# Patient Record
Sex: MALE | Race: WHITE | NOT HISPANIC OR LATINO | Employment: OTHER | URBAN - METROPOLITAN AREA
[De-identification: names, ages, dates, MRNs, and addresses within clinical notes are randomized per-mention and may not be internally consistent; named-entity substitution may affect disease eponyms.]

---

## 2017-01-06 ENCOUNTER — INITIAL CONSULT (OUTPATIENT)
Dept: OPHTHALMOLOGY | Facility: CLINIC | Age: 77
End: 2017-01-06
Payer: MEDICARE

## 2017-01-06 DIAGNOSIS — G70.00 OCULAR MYASTHENIA GRAVIS: ICD-10-CM

## 2017-01-06 DIAGNOSIS — H35.30 AMD (AGE-RELATED MACULAR DEGENERATION), BILATERAL: ICD-10-CM

## 2017-01-06 DIAGNOSIS — H52.7 REFRACTIVE ERROR: ICD-10-CM

## 2017-01-06 DIAGNOSIS — I10 ESSENTIAL HYPERTENSION: ICD-10-CM

## 2017-01-06 DIAGNOSIS — H25.13 NUCLEAR SCLEROSIS, BILATERAL: Primary | ICD-10-CM

## 2017-01-06 PROCEDURE — 99999 PR PBB SHADOW E&M-EST. PATIENT-LVL II: CPT | Mod: PBBFAC,,, | Performed by: OPHTHALMOLOGY

## 2017-01-06 PROCEDURE — 92004 COMPRE OPH EXAM NEW PT 1/>: CPT | Mod: S$GLB,,, | Performed by: OPHTHALMOLOGY

## 2017-01-06 RX ORDER — ALFUZOSIN HYDROCHLORIDE 10 MG/1
10 TABLET, EXTENDED RELEASE ORAL
COMMUNITY
End: 2018-05-16

## 2017-01-06 RX ORDER — AMLODIPINE BESYLATE 10 MG/1
5 TABLET ORAL DAILY
COMMUNITY
End: 2017-11-03

## 2017-01-06 RX ORDER — PYRIDOSTIGMINE BROMIDE 60 MG/1
60 TABLET ORAL 3 TIMES DAILY
COMMUNITY
End: 2022-03-30

## 2017-01-06 NOTE — PROGRESS NOTES
HPI     Pt states last eye exam was X 1 year ago. Pt was d/x with ocular Melgina   and was being treated form previous. MD. Pt states he occas has double   vision. Pt states that she has a 1 D prism down in glasses. Pt states that   he has Dry AMD in OD only. Pt has occas floaters. Sunlight is a bother in   OD only. RLL occas get sore. Pt denies any eye sx and procedures.     Eyemeds  AT's OU PRN          Last edited by Gonzalo Hall on 1/6/2017  2:13 PM.         Assessment /Plan     For exam results, see Encounter Report.    There are no diagnoses linked to this encounter.

## 2017-01-06 NOTE — MR AVS SNAPSHOT
[facility]  [address]  [place] [address]  Phone: [phone]  Fax: [phone]                  Robe Cevallos   2017 2:30 PM   Initial consult    Description:  Male : 1940   Provider:  Ad Jolley MD   Department:  [facility]           Reason for Visit     Eye Exam           Diagnoses this Visit        Comments    Nuclear sclerosis, bilateral    -  Primary     Ocular myasthenia gravis         AMD (age-related macular degeneration), bilateral         Essential hypertension         Refractive error                To Do List           Goals (5 Years of Data)     None      Follow-Up and Disposition     Return if symptoms worsen or fail to improve, for CE OU..      [facility]     [facility] On Call Nurse Care Line -  Assistance  Registered nurses in the [facility] On Call Center provide clinical advisement, health education, appointment booking, and other advisory services.  Call for this free service at [phone].             Medications           Message regarding Medications     Verify the changes and/or additions to your medication regime listed below are the same as discussed with your clinician today.  If any of these changes or additions are incorrect, please notify your healthcare provider.             Verify that the below list of medications is an accurate representation of the medications you are currently taking.  If none reported, the list may be blank. If incorrect, please contact your healthcare provider. Carry this list with you in case of emergency.           Current Medications     alfuzosin (UROXATRAL) 10 mg Tb24 Take 10 mg by mouth daily with breakfast.    amlodipine (NORVASC) 10 MG tablet Take 10 mg by mouth once daily.    pyridostigmine (MESTINON) 60 mg Tab Take 60 mg by mouth.           Clinical Reference Information           Allergies as of 2017     No Known Allergies      Immunizations Administered on Date of  Encounter - 1/6/2017     None      [facility] Sign-Up     Activating your [facility] account is as easy as 1-2-3!     1) Visit [email or web address], select Sign Up Now, enter this activation code and your date of birth, then select Next.  [email or web address]  Expires: 2/20/2017  5:52 PM      2) Create a username and password to use when you visit [facility] in the future and select a security question in case you lose your password and select Next.    3) Enter your e-mail address and click Sign Up!    Additional Information  If you have questions, please e-mail [email or web address] or call [phone] to talk to our [facility] staff. Remember, [facility] is NOT to be used for urgent needs. For medical emergencies, dial 911.

## 2017-01-06 NOTE — PROGRESS NOTES
Subjective:       Patient ID: Robe Cevallos is a 76 y.o. male.    Chief Complaint: Eye Exam    HPI  Review of Systems    Objective:      Physical Exam    Assessment:       1. Nuclear sclerosis, bilateral    2. Ocular myasthenia gravis    3. AMD (age-related macular degeneration), bilateral    4. Essential hypertension    5. Refractive error        Plan:       Visually significant cataract OU -Pt. Wants Sx.     Ocular MG-Stable with prism in Rx & Mestinon.  Dry AMD OU-Mild, stable.  HTN-No retinopathy OU.  RE        Pt to call to schedule pre-op for CE in June or sooner.  AREDS/AG.  Control HTN.

## 2017-01-09 ENCOUNTER — TELEPHONE (OUTPATIENT)
Dept: OPHTHALMOLOGY | Facility: CLINIC | Age: 77
End: 2017-01-09

## 2017-01-09 NOTE — TELEPHONE ENCOUNTER
----- Message from Martínez Heath sent at 1/9/2017  1:09 PM CST -----  Contact: Robe Patrick Mart would like to schedule his testing he needs to before surgery with Dr. Jolley. He can be reached at [phone] or [phone]

## 2017-01-27 ENCOUNTER — OFFICE VISIT (OUTPATIENT)
Dept: OPHTHALMOLOGY | Facility: CLINIC | Age: 77
End: 2017-01-27
Payer: MEDICARE

## 2017-01-27 DIAGNOSIS — H25.13 NUCLEAR SCLEROSIS, BILATERAL: Primary | ICD-10-CM

## 2017-01-27 PROCEDURE — 99499 UNLISTED E&M SERVICE: CPT | Mod: S$GLB,,, | Performed by: OPHTHALMOLOGY

## 2017-01-27 PROCEDURE — 92136 OPHTHALMIC BIOMETRY: CPT | Mod: 26,RT,S$GLB, | Performed by: OPHTHALMOLOGY

## 2017-01-27 PROCEDURE — 99999 PR PBB SHADOW E&M-EST. PATIENT-LVL I: CPT | Mod: PBBFAC,,, | Performed by: OPHTHALMOLOGY

## 2017-01-27 RX ORDER — OFLOXACIN 3 MG/ML
1 SOLUTION/ DROPS OPHTHALMIC 4 TIMES DAILY
Qty: 5 ML | Refills: 1 | Status: SHIPPED | OUTPATIENT
Start: 2017-03-27 | End: 2017-04-06

## 2017-01-27 RX ORDER — DIFLUPREDNATE OPHTHALMIC 0.5 MG/ML
1 EMULSION OPHTHALMIC 4 TIMES DAILY
Qty: 5 ML | Refills: 1 | Status: SHIPPED | OUTPATIENT
Start: 2017-03-30 | End: 2017-04-29

## 2017-01-27 RX ORDER — NEPAFENAC 3 MG/ML
1 SUSPENSION/ DROPS OPHTHALMIC DAILY
Qty: 3 ML | Refills: 1 | Status: SHIPPED | OUTPATIENT
Start: 2017-03-27 | End: 2017-04-26

## 2017-01-27 NOTE — PROGRESS NOTES
Subjective:       Patient ID: Robe Cevallso is a 76 y.o. male.    Chief Complaint: Pre-op Exam    HPI  Review of Systems    Objective:      Physical Exam    Assessment:       1. Nuclear sclerosis, bilateral        Plan:       Visually significant cataract OU -Pt. Wants Sx.           CE OD 3/30/17 SN60WF 21.0,        OS 4/20/17 SN60WF 20.0.

## 2017-01-27 NOTE — MR AVS SNAPSHOT
[facility]  [address] [facility]  [place] [address]  Phone: [phone]  Fax: [phone]                  Robe Cevallos   2017 11:00 AM   Office Visit    Description:  Male : 1940   Provider:  Ad Jolley MD   Department:  [facility]           Reason for Visit     Pre-op Exam           Diagnoses this Visit        Comments    Nuclear sclerosis, bilateral    -  Primary            To Do List           Future Appointments        Provider Department Dept Phone    3/31/2017 8:15 AM Ad Jolley MD [facility] [phone]    2017 8:15 AM Ad Jolley MD [facility] [phone]    2017 8:00 AM Ad Jolley MD [facility] [phone]    2017 8:00 AM Ad Jolley MD [facility] [phone]    2017 8:00 AM Ad Jolley MD [facility] [phone]      Goals (5 Years of Data)     None      Follow-Up and Disposition     Return in about 2 months (around 3/30/2017) for CE OD..       These Medications        Disp Refills Start End    ILEVRO 0.3 % DrpS 3 mL 1 3/27/2017 2017    Place 1 drop into both eyes once daily. For 30 days - Both Eyes    Pharmacy: [facility] [address] [place] [address] - [address] AT [email or web address]  #: [phone]       ofloxacin (OCUFLOX) 0.3 % ophthalmic solution 5 mL 1 3/27/2017 2017    Place 1 drop into the right eye 4 (four) times daily. - Right Eye    Pharmacy: [id number] - [place] - [address] AT [email or web address] Ph #: [phone]       difluprednate (DUREZOL) 0.05 % Drop ophthalmic solution 5 mL 1 3/30/2017 2017    Place 1 drop into the right eye 4 (four) times daily. For 30 days - Right Eye    Pharmacy: [id number] - [place] - [address] AT SEC of [address] Ph #: [phone]         [facility]     [facility] Nurse Care [facility] - 24/7 Assistance  Registered nurses in the [facility] Center provide clinical advisement, health education, appointment booking, and other advisory services.  Call for this free service at [phone].             Medications           Message regarding Medications     Verify the changes and/or additions to your medication regime listed below are the same as discussed with your clinician today.  If any of these changes or additions are incorrect, please notify your healthcare provider.        START taking these NEW medications        Refills    ILEVRO 0.3 % DrpS 1    Starting on: 3/27/2017    Sig: Place 1 drop into both eyes once daily. For 30 days    Class: Normal    Route: Both Eyes    ofloxacin (OCUFLOX) 0.3 % ophthalmic solution 1    Starting on: 3/27/2017    Sig: Place 1 drop into the right eye 4 (four) times daily.    Class: Normal    Route: Right Eye    difluprednate (DUREZOL) 0.05 % Drop ophthalmic solution 1    Starting on: 3/30/2017    Sig: Place 1 drop into the right eye 4 (four) times daily. For 30 days    Class: Normal    Route: Right Eye           Verify that the below list of medications is an accurate representation of the medications you are currently taking.  If none reported, the list may be blank. If incorrect, please contact your healthcare provider. Carry this list with you in case of emergency.           Current Medications     alfuzosin (UROXATRAL) 10 mg Tb24 Take 10 mg by mouth daily with breakfast.    amlodipine (NORVASC) 10 MG tablet Take 10 mg by mouth once daily.    difluprednate (DUREZOL) 0.05 % Drop ophthalmic solution Starting on Mar 30, 2017. Place 1 drop into the right eye 4 (four) times daily. For 30 days    ILEVRO 0.3 % DrpS Starting on Mar 27, 2017. Place 1 drop into both eyes once daily. For 30 days    ofloxacin (OCUFLOX) 0.3 % ophthalmic solution Starting on Mar 27, 2017. Place 1 drop into the right eye 4 (four) times daily.    pyridostigmine  (MESTINON) 60 mg Tab Take 60 mg by mouth.           Clinical Reference Information           Allergies as of 1/27/2017     No Known Allergies      Immunizations Administered on Date of Encounter - 1/27/2017     None      Orders Placed During Today's Visit      Normal Orders This Visit    Biometry       [facility] Sign-Up     Activating your [facility] account is as easy as 1-2-3!     1) Visit [email or web address], select Sign Up Now, enter this activation code and your date of birth, then select Next.  [email or web address]  Expires: 2/20/2017  5:52 PM      2) Create a username and password to use when you visit [facility] in the future and select a security question in case you lose your password and select Next.    3) Enter your e-mail address and click Sign Up!    Additional Information  If you have questions, please e-mail [email or web address] or call [phone] to talk to our [facility] staff. Remember, [facility] is NOT to be used for urgent needs. For medical emergencies, dial 911.

## 2017-02-07 ENCOUNTER — TELEPHONE (OUTPATIENT)
Dept: OPHTHALMOLOGY | Facility: CLINIC | Age: 77
End: 2017-02-07

## 2017-02-07 DIAGNOSIS — H25.11 NS (NUCLEAR SCLEROSIS), RIGHT: Primary | ICD-10-CM

## 2017-03-23 NOTE — PRE ADMISSION SCREENING
RN phone pre op done.  Instructed to remain NPO after midnight prior to surgery, except to take Amlodipine and Mestinon as directed.  Expressed understanding of instructions.  Arrival time 06:30 am.

## 2017-03-25 NOTE — H&P
[facility]  History & Physical    Subjective:      Chief Complaint/Reason for Admission:     Robe Cevallos is a 77 y.o. male.    Past Medical History:   Diagnosis Date    Cancer 2008    Lymphoma    Hypertension      Past Surgical History:   Procedure Laterality Date    CHOLECYSTECTOMY      KNEE ARTHROSCOPY      Lt knee    TONSILLECTOMY       History reviewed. No pertinent family history.  Social History   Substance Use Topics    Smoking status: Never Smoker    Smokeless tobacco: Never Used    Alcohol use No       No prescriptions prior to admission.     Review of patient's allergies indicates:  No Known Allergies     Review of Systems   Eyes: Positive for blurred vision.   All other systems reviewed and are negative.      Objective:      Vital Signs (Most Recent)       Vital Signs Range (Last 24H):       Physical Exam   Constitutional: He is oriented to person, place, and time. He appears well-developed and well-nourished.   HENT:   Head: Normocephalic.   Eyes: Conjunctivae and EOM are normal. Pupils are equal, round, and reactive to light.   Neck: Normal range of motion. Neck supple.   Cardiovascular: Normal rate.    Pulmonary/Chest: Effort normal and breath sounds normal.   Abdominal: Soft. Bowel sounds are normal.   Musculoskeletal: Normal range of motion.   Neurological: He is alert and oriented to person, place, and time.   Skin: Skin is warm.   Psychiatric: He has a normal mood and affect.       Data Review:   ECG:     Assessment:      Cataract OD.    Plan:    CE OD.

## 2017-03-28 ENCOUNTER — TELEPHONE (OUTPATIENT)
Dept: OPTOMETRY | Facility: CLINIC | Age: 77
End: 2017-03-28

## 2017-03-28 DIAGNOSIS — H25.12 NUCLEAR SCLEROSIS, LEFT: Primary | ICD-10-CM

## 2017-03-29 ENCOUNTER — ANESTHESIA EVENT (OUTPATIENT)
Dept: SURGERY | Facility: HOSPITAL | Age: 77
End: 2017-03-29
Payer: MEDICARE

## 2017-03-30 ENCOUNTER — ANESTHESIA (OUTPATIENT)
Dept: SURGERY | Facility: HOSPITAL | Age: 77
End: 2017-03-30
Payer: MEDICARE

## 2017-03-30 ENCOUNTER — SURGERY (OUTPATIENT)
Age: 77
End: 2017-03-30

## 2017-03-30 ENCOUNTER — HOSPITAL ENCOUNTER (OUTPATIENT)
Facility: HOSPITAL | Age: 77
Discharge: HOME | End: 2017-03-30
Attending: OPHTHALMOLOGY | Admitting: OPHTHALMOLOGY
Payer: MEDICARE

## 2017-03-30 VITALS
HEART RATE: 69 BPM | HEIGHT: 71 IN | BODY MASS INDEX: 27.3 KG/M2 | SYSTOLIC BLOOD PRESSURE: 131 MMHG | RESPIRATION RATE: 22 BRPM | WEIGHT: 195 LBS | DIASTOLIC BLOOD PRESSURE: 82 MMHG | OXYGEN SATURATION: 95 % | TEMPERATURE: 98 F

## 2017-03-30 DIAGNOSIS — H25.10 SENILE NUCLEAR SCLEROSIS: ICD-10-CM

## 2017-03-30 PROCEDURE — 36000706: Performed by: OPHTHALMOLOGY

## 2017-03-30 PROCEDURE — 71000015 HC POSTOP RECOV 1ST HR: Performed by: OPHTHALMOLOGY

## 2017-03-30 PROCEDURE — 63600175 PHARM REV CODE 636 W HCPCS: Performed by: NURSE ANESTHETIST, CERTIFIED REGISTERED

## 2017-03-30 PROCEDURE — C9447 INJ, PHENYLEPHRINE KETOROLAC: HCPCS | Performed by: OPHTHALMOLOGY

## 2017-03-30 PROCEDURE — V2632 POST CHMBR INTRAOCULAR LENS: HCPCS | Performed by: OPHTHALMOLOGY

## 2017-03-30 PROCEDURE — D9220A PRA ANESTHESIA: Mod: CRNA,,, | Performed by: NURSE ANESTHETIST, CERTIFIED REGISTERED

## 2017-03-30 PROCEDURE — 25000003 PHARM REV CODE 250: Performed by: OPHTHALMOLOGY

## 2017-03-30 PROCEDURE — 63600175 PHARM REV CODE 636 W HCPCS: Performed by: OPHTHALMOLOGY

## 2017-03-30 PROCEDURE — 37000009 HC ANESTHESIA EA ADD 15 MINS: Performed by: OPHTHALMOLOGY

## 2017-03-30 PROCEDURE — 66984 XCAPSL CTRC RMVL W/O ECP: CPT | Mod: RT,,, | Performed by: OPHTHALMOLOGY

## 2017-03-30 PROCEDURE — 37000008 HC ANESTHESIA 1ST 15 MINUTES: Performed by: OPHTHALMOLOGY

## 2017-03-30 PROCEDURE — 36000707: Performed by: OPHTHALMOLOGY

## 2017-03-30 PROCEDURE — 25000003 PHARM REV CODE 250: Performed by: ANESTHESIOLOGY

## 2017-03-30 PROCEDURE — D9220A PRA ANESTHESIA: Mod: ANES,,, | Performed by: ANESTHESIOLOGY

## 2017-03-30 DEVICE — LENS 21.0 ACRYSOF: Type: IMPLANTABLE DEVICE | Site: EYE | Status: FUNCTIONAL

## 2017-03-30 RX ORDER — HYDROCODONE BITARTRATE AND ACETAMINOPHEN 5; 325 MG/1; MG/1
1 TABLET ORAL EVERY 4 HOURS PRN
Status: DISCONTINUED | OUTPATIENT
Start: 2017-03-30 | End: 2017-03-30 | Stop reason: HOSPADM

## 2017-03-30 RX ORDER — LIDOCAINE HYDROCHLORIDE 10 MG/ML
1 INJECTION, SOLUTION EPIDURAL; INFILTRATION; INTRACAUDAL; PERINEURAL ONCE AS NEEDED
Status: DISCONTINUED | OUTPATIENT
Start: 2017-03-30 | End: 2017-03-30 | Stop reason: HOSPADM

## 2017-03-30 RX ORDER — CYCLOPENTOLATE HYDROCHLORIDE 10 MG/ML
1 SOLUTION/ DROPS OPHTHALMIC
Status: COMPLETED | OUTPATIENT
Start: 2017-03-30 | End: 2017-03-30

## 2017-03-30 RX ORDER — LIDOCAINE HYDROCHLORIDE 40 MG/ML
INJECTION, SOLUTION RETROBULBAR
Status: DISCONTINUED | OUTPATIENT
Start: 2017-03-30 | End: 2017-03-30 | Stop reason: HOSPADM

## 2017-03-30 RX ORDER — PROPARACAINE HYDROCHLORIDE 5 MG/ML
1 SOLUTION/ DROPS OPHTHALMIC
Status: DISCONTINUED | OUTPATIENT
Start: 2017-03-30 | End: 2017-03-30 | Stop reason: HOSPADM

## 2017-03-30 RX ORDER — PHENYLEPHRINE HYDROCHLORIDE 25 MG/ML
1 SOLUTION/ DROPS OPHTHALMIC
Status: DISCONTINUED | OUTPATIENT
Start: 2017-03-30 | End: 2017-03-30 | Stop reason: HOSPADM

## 2017-03-30 RX ORDER — MIDAZOLAM HYDROCHLORIDE 1 MG/ML
INJECTION, SOLUTION INTRAMUSCULAR; INTRAVENOUS
Status: DISCONTINUED | OUTPATIENT
Start: 2017-03-30 | End: 2017-03-30

## 2017-03-30 RX ORDER — OFLOXACIN 3 MG/ML
1 SOLUTION/ DROPS OPHTHALMIC
Status: COMPLETED | OUTPATIENT
Start: 2017-03-30 | End: 2017-03-30

## 2017-03-30 RX ORDER — PREDNISOLONE ACETATE 10 MG/ML
SUSPENSION/ DROPS OPHTHALMIC
Status: DISCONTINUED | OUTPATIENT
Start: 2017-03-30 | End: 2017-03-30 | Stop reason: HOSPADM

## 2017-03-30 RX ORDER — POVIDONE-IODINE 5 %
SOLUTION, NON-ORAL OPHTHALMIC (EYE)
Status: DISCONTINUED | OUTPATIENT
Start: 2017-03-30 | End: 2017-03-30 | Stop reason: HOSPADM

## 2017-03-30 RX ORDER — ACETAMINOPHEN 325 MG/1
650 TABLET ORAL EVERY 4 HOURS PRN
Status: DISCONTINUED | OUTPATIENT
Start: 2017-03-30 | End: 2017-03-30 | Stop reason: HOSPADM

## 2017-03-30 RX ORDER — TROPICAMIDE 10 MG/ML
1 SOLUTION/ DROPS OPHTHALMIC
Status: DISCONTINUED | OUTPATIENT
Start: 2017-03-30 | End: 2017-03-30 | Stop reason: HOSPADM

## 2017-03-30 RX ORDER — SODIUM CHLORIDE, SODIUM LACTATE, POTASSIUM CHLORIDE, CALCIUM CHLORIDE 600; 310; 30; 20 MG/100ML; MG/100ML; MG/100ML; MG/100ML
INJECTION, SOLUTION INTRAVENOUS CONTINUOUS
Status: DISCONTINUED | OUTPATIENT
Start: 2017-03-30 | End: 2017-03-30 | Stop reason: HOSPADM

## 2017-03-30 RX ADMIN — MIDAZOLAM HYDROCHLORIDE 1 MG: 1 INJECTION, SOLUTION INTRAMUSCULAR; INTRAVENOUS at 09:03

## 2017-03-30 RX ADMIN — CYCLOPENTOLATE HYDROCHLORIDE 1 DROP: 10 SOLUTION/ DROPS OPHTHALMIC at 06:03

## 2017-03-30 RX ADMIN — TROPICAMIDE 1 DROP: 10 SOLUTION/ DROPS OPHTHALMIC at 06:03

## 2017-03-30 RX ADMIN — POVIDONE-IODINE 30 ML: 5 SOLUTION OPHTHALMIC at 08:03

## 2017-03-30 RX ADMIN — PHENYLEPHRINE AND KETOROLAC 4 ML: 10.16; 2.88 INJECTION, SOLUTION, CONCENTRATE INTRAOCULAR at 08:03

## 2017-03-30 RX ADMIN — ACETAMINOPHEN 650 MG: 325 TABLET ORAL at 09:03

## 2017-03-30 RX ADMIN — OFLOXACIN 1 DROP: 3 SOLUTION OPHTHALMIC at 06:03

## 2017-03-30 RX ADMIN — PHENYLEPHRINE HYDROCHLORIDE 1 DROP: 25 SOLUTION/ DROPS OPHTHALMIC at 06:03

## 2017-03-30 RX ADMIN — FLUORESCEIN SODIUM 1 STRIP: 1 STRIP OPHTHALMIC at 08:03

## 2017-03-30 RX ADMIN — PROPARACAINE HYDROCHLORIDE 1 DROP: 5 SOLUTION/ DROPS OPHTHALMIC at 06:03

## 2017-03-30 RX ADMIN — BALANCED SALT SOLUTION ENRICHED WITH BICARBONATE, DEXTROSE, AND GLUTATHIONE 500 ML: KIT at 08:03

## 2017-03-30 RX ADMIN — Medication 15 ML: at 08:03

## 2017-03-30 RX ADMIN — PREDNISOLONE ACETATE 2 DROP: 10 SUSPENSION/ DROPS OPHTHALMIC at 08:03

## 2017-03-30 RX ADMIN — SODIUM CHONDROITIN SULFATE / SODIUM HYALURONATE 2 ML: 0.55-0.5 INJECTION INTRAOCULAR at 08:03

## 2017-03-30 RX ADMIN — MIDAZOLAM HYDROCHLORIDE 1 MG: 1 INJECTION, SOLUTION INTRAMUSCULAR; INTRAVENOUS at 08:03

## 2017-03-30 RX ADMIN — LIDOCAINE HYDROCHLORIDE 3 ML: 40 INJECTION, SOLUTION RETROBULBAR; TOPICAL at 08:03

## 2017-03-30 RX ADMIN — SODIUM CHLORIDE, SODIUM LACTATE, POTASSIUM CHLORIDE, AND CALCIUM CHLORIDE: .6; .31; .03; .02 INJECTION, SOLUTION INTRAVENOUS at 06:03

## 2017-03-30 NOTE — BRIEF OP NOTE
Operative Note     SUMMARY     Surgery Date: 3/30/2017    Surgeon(s) and Role:      Ad Jolley MD - Primary    Pre-op Diagnosis:  Nuclear sclerosis     Post-op/ Diagnosis:  Same    Final Diagnosis: Cataract    Procedure(s) (LRB):  PHACOEMULSIFICATION-ASPIRATION-CATARACT   INSERTION-INTRAOCULAR LENS (IOL)     Anesthesia: Monitored Anesthesia Care    Findings/Key Components:  Cataract    Outcome: Tolerated procedure well    Estimated Blood Loss: None         Specimens     None          Follow-up:  Tomorrow in clinic      Discharge Note      SUMMARY     Admit Date: 3/30/2017    Attending Physician: Ad Jolley MD    Discharge Physician: Ad Jolley MD    Discharge Date: 3/30/2017    Final Diagnosis: Cataract    Outcome: Tolerated procedure well    Disposition: Discharge to Home.    Medications:       Robe Cevallos   Home Medication Instructions FRANCISCO:[id number]    Printed on:03/30/17 0926   Medication Information                      alfuzosin (UROXATRAL) 10 mg Tb24  Take 10 mg by mouth daily with breakfast.             amlodipine (NORVASC) 10 MG tablet  Take 10 mg by mouth once daily.             difluprednate (DUREZOL) 0.05 % Drop ophthalmic solution  Place 1 drop into the right eye 4 (four) times daily. For 30 days             ILEVRO 0.3 % DrpS  Place 1 drop into both eyes once daily. For 30 days             ofloxacin (OCUFLOX) 0.3 % ophthalmic solution  Place 1 drop into the right eye 4 (four) times daily.             pyridostigmine (MESTINON) 60 mg Tab  Take 60 mg by mouth.                   Patient Discharge Instructions:     Keep Summers Shield over operated eye when not using drops.     DIET:  Regular    Activity: No heavy lifting or bending X 1 week.    Follow-up:  Tomorrow in clinic

## 2017-03-30 NOTE — DISCHARGE INSTRUCTIONS
ACTIVITY LEVEL:  If you received sedation or an anesthetic, you may feel sleepy for several hours. Rest until you are more awake. Gradually resume your normal activities. Normal activity will cause no undue risk to your eye. The white part of your eye might be red - this is nothing to worry about. Wear your old glasses or sunglasses that were given to you for protection during the day.    RESTRICTIONS - for the next 7 days  · Do not lift anything over 10 pounds.  · When bending, bend at the knees not the waist.  · Do not rub the eye.  · Do not get water in the eye.  · Do not sleep on the side that had surgery.  · Protect your eye at bedtime with the shield provided.    DIET: At home, continue with liquids. If there is no nausea, you may eat a soft diet and gradually resume your normal diet. Limit alcohol intake for 24 hours.    BATHING: You may shower or bathe as normal. You may take a warm wash cloth, which has been wrung out to remove excess water, and gently remove crusting on the lashes.    MEDICATIONS: You may take Extra Strength Tylenol every 4 hours for pain/headache.    LAST DOSE OF TYLENOL 9:47 am    Use your drops     Today: [place]- 1, 5,  AND 9 pm     [place] - 1, 5, and 9 pm     [place]- 1 pm        Tomorrow:  Resume pink and beige cap drops four times a day.  Resume grey cap drops once a day.    Place one drop in the eye that had surgery from the first bottle. Wait 5 minutes and then use the second bottle. (It does not matter which bottle is used first.) CONTINUE all glaucoma drops.   No driving, alcoholic beverages or signing legal documents for next 24 hours or while taking pain medication      WHEN TO CALL THE DOCTOR:  · Redness that increases significantly  · Pain not relieved by Tylenol  RETURN APPOINTMENT:  You will need to see Dr. Jolley on Friday at the [facility] at__8:15 am__. Bring your eye kit with you on your follow-up visit. Your kit contains sunglasses, eye shield, tape and your eye  drops.  FOR EMERGENCIES:  If any unusual problems or difficulties occur, contact Dr. Jolley at the Clinic office at [phone] during normal business hours. If after hours, call [phone].  Fall Prevention  Millions of people fall every year and injure themselves. You may have had anesthesia or sedation which may increase your risk of falling. You may have health issues that put you at an increased risk of falling.     Here are ways to reduce your risk of falling.  ·   · Make your home safe by keeping walkways clear of objects you may trip over.  · Use non-slip pads under rugs. Do not use area rugs or small throw rugs.  · Use non-slip mats in bathtubs and showers.  · Install handrails and lights on staircases.  · Do not walk in poorly lit areas.  · Do not stand on chairs or wobbly ladders.  · Use caution when reaching overhead or looking upward. This position can cause a loss of balance.  · Be sure your shoes fit properly, have non-slip bottoms and are in good condition.   · Wear shoes both inside and out. Avoid going barefoot or wearing slippers.  · Be cautious when going up and down stairs, curbs, and when walking on uneven sidewalks.  · If your balance is poor, consider using a cane or walker.  · If your fall was related to alcohol use, stop or limit alcohol intake.   · If your fall was related to use of sleeping medicines, talk to your doctor about this. You may need to reduce your dosage at bedtime if you awaken during the night to go to the bathroom.    · To reduce the need for nighttime bathroom trips:  ¨ Avoid drinking fluids for several hours before going to bed  ¨ Empty your bladder before going to bed  ¨ Men can keep a urinal at the bedside  · Stay as active as you can. Balance, flexibility, strength, and endurance all come from exercise. They all play a role in preventing falls. Ask your healthcare provider which types of activity are right for you.  · Get your vision checked on a regular  basis.  · If you have pets, know where they are before you stand up or walk so you don't trip over them.  · Use night lights.

## 2017-03-30 NOTE — ANESTHESIA POSTPROCEDURE EVALUATION
"Anesthesia Post Evaluation    Patient: Robe Cevallos    Procedure(s) Performed: Procedure(s) (LRB):  PHACOEMULSIFICATION-ASPIRATION-CATARACT (Right)  INSERTION-INTRAOCULAR LENS (IOL) (Right)    Final Anesthesia Type: MAC  Patient location during evaluation: [facility]  Patient participation: Yes- Able to Participate  Level of consciousness: awake and alert and oriented  Post-procedure vital signs: reviewed and stable  Pain management: adequate  Airway patency: patent  PONV status at discharge: No PONV  Anesthetic complications: no      Cardiovascular status: blood pressure returned to baseline and hemodynamically stable  Respiratory status: unassisted, spontaneous ventilation and room air  Hydration status: euvolemic  Follow-up not needed.        Visit Vitals    BP (!) 140/91 (BP Location: Right arm, Patient Position: Lying, BP Method: Automatic)    Pulse 72    Temp 37 °C (98.6 °F) (Oral)    Resp 17    Ht 5' 11" (1.803 m)    Wt 88.5 kg (195 lb)    SpO2 97%    BMI 27.2 kg/m2       Pain/Joel Score: Pain Assessment Performed: Yes (3/30/2017  6:53 AM)  Presence of Pain: denies (3/30/2017  6:53 AM)  Joel Score: 10 (3/30/2017  6:53 AM)      "

## 2017-03-30 NOTE — IP AVS SNAPSHOT
[place] [address] [place] [address]  Phone: [phone]           Patient Discharge Instructions   Our goal is to set you up for success. This packet includes information on your condition, medications, and your home care.  It will help you care for yourself to prevent having to return to the hospital.     Please ask your nurse if you have any questions.      There are many details to remember when preparing to leave the hospital. Here is what you will need to do:    1. Take your medicine. If you are prescribed medications, review your Medication List on the following pages. You may have new medications to  at the pharmacy and others that you'll need to stop taking. Review the instructions for how and when to take your medications. Talk with your doctor or nurses if you are unsure of what to do.     2. Go to your follow-up appointments. Specific follow-up information is listed in the following pages. Your may be contacted by a nurse or clinical provider about future appointments. Be sure we have all of the phone numbers to reach you. Please contact your provider's office if you are unable to make an appointment.     3. Watch for warning signs. Your doctor or nurse will give you detailed warning signs to watch for and when to call for assistance. These instructions may also include educational information about your condition. If you experience any of warning signs to your health, call your doctor.           [facility]  Unless otherwise directed by your provider, please   contact [facility], our nurse care line   that is available for 24/7 assistance.     [phone] (toll-free)     Registered nurses in the [facility]   provide: appointment scheduling, clinical advisement, health education, and other advisory services.                  ** Verify the list of medication(s) below is accurate and up to date. Carry this with you in case of emergency.  If your medications have changed, please notify your healthcare provider.             Medication List      CONTINUE taking these medications        Additional Info                      difluprednate 0.05 % Drop ophthalmic solution   Commonly known as:  DUREZOL   Quantity:  5 mL   Refills:  1   Dose:  1 drop    Instructions:  Place 1 drop into the right eye 4 (four) times daily. For 30 days     Begin Date    AM    Noon    PM    Bedtime       ILEVRO 0.3 % Drps   Quantity:  3 mL   Refills:  1   Dose:  1 drop   Generic drug:  nepafenac    Instructions:  Place 1 drop into both eyes once daily. For 30 days     Begin Date    AM    Noon    PM    Bedtime       NORVASC 10 MG tablet   Refills:  0   Dose:  10 mg   Generic drug:  amlodipine    Instructions:  Take 10 mg by mouth once daily.     Begin Date    AM    Noon    PM    Bedtime       ofloxacin 0.3 % ophthalmic solution   Commonly known as:  OCUFLOX   Quantity:  5 mL   Refills:  1   Dose:  1 drop    Last time this was given:  1 drop on 3/30/2017  6:56 AM   Instructions:  Place 1 drop into the right eye 4 (four) times daily.     Begin Date    AM    Noon    PM    Bedtime       pyridostigmine 60 mg Tab   Commonly known as:  MESTINON   Refills:  0   Dose:  60 mg    Instructions:  Take 60 mg by mouth.     Begin Date    AM    Noon    PM    Bedtime       UROXATRAL 10 mg Tb24   Refills:  0   Dose:  10 mg   Generic drug:  alfuzosin    Instructions:  Take 10 mg by mouth daily with breakfast.     Begin Date    AM    Noon    PM    Bedtime                  Please bring to all follow up appointments:    1. A copy of your discharge instructions.  2. All medicines you are currently taking in their original bottles.  3. Identification and insurance card.    Please arrive 15 minutes ahead of scheduled appointment time.    Please call 24 hours in advance if you must reschedule your appointment and/or time.        Your Scheduled Appointments     Mar 31, 2017  8:15 AM CDT   Post OP with  Ad Jolley MD   [facility] ([facility])    [address]  [place] [address] [phone]            Apr 07, 2017  8:15 AM CDT   Post OP with Ad Jolley MD   [facility] ([facility])    [address]  [place] [id number] [phone]            Apr 21, 2017  8:00 AM CDT   Post OP with Ad Jolley MD   [facility] ([facility])    [address]  [place] [address] [phone]            Apr 28, 2017  8:00 AM CDT   Post OP with Ad Jolley MD   [facility] ([facility])    [address]  [place] [address] [phone]            May 19, 2017  8:00 AM CDT   Post OP with Ad Jolley MD   [facility] ([facility])    [address]  [place] [address] [phone]              Your Future Surgeries/Procedures     Apr 20, 2017   Surgery with Ad Jolley MD   [facility] ([facility])    [place] [address]   [phone]                Discharge Instructions     Future Orders    Other restrictions (specify):     Comments:    No heavy lifting or bending for 1 week.        Discharge Instructions       ACTIVITY LEVEL:  If you received sedation or an anesthetic, you may feel sleepy for several hours. Rest until you are more awake. Gradually resume your normal activities. Normal activity will cause no undue risk to your eye. The white part of your eye might be red - this is nothing to worry about. Wear your old glasses or sunglasses that were given to you for protection during the day.    RESTRICTIONS - for the next 7 days  · Do not lift anything over 10 pounds.  · When bending, bend at the knees not the waist.  · Do not rub the eye.  · Do not get water in the eye.  · Do not sleep on the side that had surgery.  · Protect your eye at bedtime with the shield provided.    DIET: At  home, continue with liquids. If there is no nausea, you may eat a soft diet and gradually resume your normal diet. Limit alcohol intake for 24 hours.    BATHING: You may shower or bathe as normal. You may take a warm wash cloth, which has been wrung out to remove excess water, and gently remove crusting on the lashes.    MEDICATIONS: You may take Extra Strength Tylenol every 4 hours for pain/headache.    LAST DOSE OF TYLENOL 9:47 am    Use your drops     Today: Hough- 1, 5,  AND 9 pm     Beige - 1, 5, and 9 pm     Perla- 1 pm        Tomorrow:  Resume pink and beige cap drops four times a day.  Resume grey cap drops once a day.    Place one drop in the eye that had surgery from the first bottle. Wait 5 minutes and then use the second bottle. (It does not matter which bottle is used first.) CONTINUE all glaucoma drops.   No driving, alcoholic beverages or signing legal documents for next 24 hours or while taking pain medication      WHEN TO CALL THE DOCTOR:  · Redness that increases significantly  · Pain not relieved by Tylenol  RETURN APPOINTMENT:  You will need to see Dr. Jolley on Friday at the [facility] clinic at__8:15 am__. Bring your eye kit with you on your follow-up visit. Your kit contains sunglasses, eye shield, tape and your eye drops.  FOR EMERGENCIES:  If any unusual problems or difficulties occur, contact Dr. Jolley at the Clinic office at [phone] during normal business hours. If after hours, call [phone].  Fall Prevention  Millions of people fall every year and injure themselves. You may have had anesthesia or sedation which may increase your risk of falling. You may have health issues that put you at an increased risk of falling.     Here are ways to reduce your risk of falling.  ·   · Make your home safe by keeping walkways clear of objects you may trip over.  · Use non-slip pads under rugs. Do not use area rugs or small throw rugs.  · Use non-slip mats in bathtubs and  showers.  · Install handrails and lights on staircases.  · Do not walk in poorly lit areas.  · Do not stand on chairs or wobbly ladders.  · Use caution when reaching overhead or looking upward. This position can cause a loss of balance.  · Be sure your shoes fit properly, have non-slip bottoms and are in good condition.   · Wear shoes both inside and out. Avoid going barefoot or wearing slippers.  · Be cautious when going up and down stairs, curbs, and when walking on uneven sidewalks.  · If your balance is poor, consider using a cane or walker.  · If your fall was related to alcohol use, stop or limit alcohol intake.   · If your fall was related to use of sleeping medicines, talk to your doctor about this. You may need to reduce your dosage at bedtime if you awaken during the night to go to the bathroom.    · To reduce the need for nighttime bathroom trips:  ¨ Avoid drinking fluids for several hours before going to bed  ¨ Empty your bladder before going to bed  ¨ Men can keep a urinal at the bedside  · Stay as active as you can. Balance, flexibility, strength, and endurance all come from exercise. They all play a role in preventing falls. Ask your healthcare provider which types of activity are right for you.  · Get your vision checked on a regular basis.  · If you have pets, know where they are before you stand up or walk so you don't trip over them.  · Use night lights.        Primary Diagnosis     Your primary diagnosis was:  Cataract      Admission Information     Date & Time Provider Department [facility]    3/30/2017  6:10 AM Ad Jolley MD [facility] [id number]      Care Providers     Provider Role Specialty Primary office phone    Ad Jolley MD Attending Provider Ophthalmology [phone]    Ad Jolley MD Surgeon  Ophthalmology [phone]      Your Vitals Were     BP Pulse Temp Resp Height Weight    140/91 (BP Location: Right arm, Patient Position: Lying, BP  "Method: Automatic) 72 98.6 °F (37 °C) (Oral) 17 5' 11" (1.803 m) 88.5 kg (195 lb)    SpO2 BMI             97% 27.2 kg/m2         Recent Lab Values     No lab values to display.      Allergies as of 3/30/2017     No Known Allergies      Advance Directives     An advance directive is a document which, in the event you are no longer able to make decisions for yourself, tells your healthcare team what kind of treatment you do or do not want to receive, or who you would like to make those decisions for you.  If you do not currently have an advance directive, [facility] encourages you to create one.  For more information call:  [phone] ([phone]), [phone] ([phone]),  or log on to [email or web address].        Language Assistance Services     ATTENTION: Language assistance services are available, free of charge. Please call [phone].      ATENCIÓN: Si habla español, tiene a elliott disposición servicios gratuitos de asistencia lingüística. Llame al [phone].     CHÚ Ý: N?u b?n nói Ti?ng Vi?t, có các d?ch v? h? tr? ngôn ng? mi?n phí dành cho b?n. G?i s? [phone].        [facility] Sign-Up     Activating your [facility] account is as easy as 1-2-3!     1) Visit [email or web address], select Sign Up Now, enter this activation code and your date of birth, then select Next.  [email or web address]  Expires: 5/14/2017 10:01 AM      2) Create a username and password to use when you visit [facility] in the future and select a security question in case you lose your password and select Next.    3) Enter your e-mail address and click Sign Up!    Additional Information  If you have questions, please e-mail [email or web address] or call [phone] to talk to our [facility] staff. Remember, [facility] is NOT to be used for urgent needs. For medical emergencies, dial 911.          [facility] complies with applicable Federal civil rights laws and does not discriminate on the basis of race, color, " national origin, age, disability, or sex.

## 2017-03-30 NOTE — TRANSFER OF CARE
"Anesthesia Transfer of Care Note    Patient: Robe Cevallos    Procedure(s) Performed: Procedure(s) (LRB):  PHACOEMULSIFICATION-ASPIRATION-CATARACT (Right)  INSERTION-INTRAOCULAR LENS (IOL) (Right)    Patient location: OPS    Anesthesia Type: MAC    Transport from OR: Transported from OR on room air with adequate spontaneous ventilation    Post pain: adequate analgesia    Post assessment: no apparent anesthetic complications and tolerated procedure well    Post vital signs: stable    Level of consciousness: awake, alert and oriented    Nausea/Vomiting: no nausea/vomiting    Complications: none          Last vitals:   Visit Vitals    BP (!) 140/91 (BP Location: Right arm, Patient Position: Lying, BP Method: Automatic)    Pulse 72    Temp 37 °C (98.6 °F) (Oral)    Resp 17    Ht 5' 11" (1.803 m)    Wt 88.5 kg (195 lb)    SpO2 97%    BMI 27.2 kg/m2     "

## 2017-03-31 ENCOUNTER — OFFICE VISIT (OUTPATIENT)
Dept: OPHTHALMOLOGY | Facility: CLINIC | Age: 77
End: 2017-03-31
Payer: MEDICARE

## 2017-03-31 VITALS — DIASTOLIC BLOOD PRESSURE: 72 MMHG | SYSTOLIC BLOOD PRESSURE: 145 MMHG | HEART RATE: 67 BPM

## 2017-03-31 DIAGNOSIS — Z98.890 POST-OPERATIVE STATE: Primary | ICD-10-CM

## 2017-03-31 PROCEDURE — 99024 POSTOP FOLLOW-UP VISIT: CPT | Mod: S$GLB,,, | Performed by: OPHTHALMOLOGY

## 2017-03-31 PROCEDURE — 99999 PR PBB SHADOW E&M-EST. PATIENT-LVL II: CPT | Mod: PBBFAC,,, | Performed by: OPHTHALMOLOGY

## 2017-03-31 NOTE — MR AVS SNAPSHOT
[facility]  [address]  [place] [address]  Phone: [phone]  Fax: [phone]                  Robe Cevallos   3/31/2017 8:15 AM   Office Visit    Description:  Male : 1940   Provider:  Ad Jolley MD   Department:  [facility]           Reason for Visit     Post-op Evaluation           Diagnoses this Visit        Comments    Post-operative state    -  Primary            To Do List           Future Appointments        Provider Department Dept Phone    2017 8:15 AM Ad Jolley MD [facility] [phone]    2017 8:00 AM Ad Jolley MD [facility] [phone]    2017 8:00 AM Ad Jolley MD [facility] [phone]    2017 8:00 AM Ad Jolley MD [facility] [phone]      Your Future Surgeries/Procedures     2017   Surgery with Ad Jolley MD   [facility] ([facility])    [facility] [place] [address] [phone]              Goals (5 Years of Data)     None      Follow-Up and Disposition     Return in about 1 week (around 2017) for Post-op Right eye.      [facility]     [facility] Nurse Care Line -  Assistance  Unless otherwise directed by your provider, please contact [facility], our nurse care line that is available for  assistance.     Registered nurses in the [facility] provide: appointment scheduling, clinical advisement, health education, and other advisory services.  Call: [phone] (toll free)               Medications           Message regarding Medications     Verify the changes and/or additions to your medication regime listed below are the same as discussed with your clinician today.  If any of these changes or additions are incorrect, please notify your healthcare provider.             Verify that the below list of  medications is an accurate representation of the medications you are currently taking.  If none reported, the list may be blank. If incorrect, please contact your healthcare provider. Carry this list with you in case of emergency.           Current Medications     alfuzosin (UROXATRAL) 10 mg Tb24 Take 10 mg by mouth daily with breakfast.    amlodipine (NORVASC) 10 MG tablet Take 10 mg by mouth once daily.    difluprednate (DUREZOL) 0.05 % Drop ophthalmic solution Place 1 drop into the right eye 4 (four) times daily. For 30 days    ILEVRO 0.3 % DrpS Place 1 drop into both eyes once daily. For 30 days    ofloxacin (OCUFLOX) 0.3 % ophthalmic solution Place 1 drop into the right eye 4 (four) times daily.    pyridostigmine (MESTINON) 60 mg Tab Take 60 mg by mouth.           Clinical Reference Information           Your Vitals Were     BP Pulse                145/72 (BP Location: Right arm, Patient Position: Sitting) 67          Blood Pressure          Most Recent Value    BP  (!)  145/72      Allergies as of 3/31/2017     No Known Allergies      Immunizations Administered on Date of Encounter - 3/31/2017     None      [facility] Sign-Up     Activating your [facility] account is as easy as 1-2-3!     1) Visit [email or web address], select Sign Up Now, enter this activation code and your date of birth, then select Next.  [email or web address]  Expires: 5/14/2017 10:01 AM      2) Create a username and password to use when you visit [facility] in the future and select a security question in case you lose your password and select Next.    3) Enter your e-mail address and click Sign Up!    Additional Information  If you have questions, please e-mail [email or web address] or call [phone] to talk to our [facility] staff. Remember, [facility] is NOT to be used for urgent needs. For medical emergencies, dial 911.         Language Assistance Services     ATTENTION: Language assistance services are available, free of charge. Please call  [phone].      ATENCIÓN: Si habla español, tiene a elliott disposición servicios gratuitos de asistencia lingüística. Llame al [phone].     CHÚ Ý: N?u b?n nói Ti?ng Vi?t, có các d?ch v? h? tr? ngôn ng? mi?n phí dành cho b?n. G?i s? [phone].         [facility] complies with applicable Federal civil rights laws and does not discriminate on the basis of race, color, national origin, age, disability, or sex.

## 2017-03-31 NOTE — OP NOTE
DATE OF PROCEDURE:  03/30/2017    PREOPERATIVE DIAGNOSIS:  Nuclear sclerotic cataract, right eye.    POSTOPERATIVE DIAGNOSIS:  Nuclear sclerotic cataract, right eye.    SURGEON:  Ad Jolley M.D.    PROCEDURE:  Clear corneal phacoemulsification with posterior chamber intraocular   lens implant, right eye.    ANESTHESIA:  Monitored anesthesia care with 2% Xylocaine jelly topically, 1%   free lidocaine topically and intrachamberly.    PROCEDURE IN DETAIL:   After the appropriate preoperative consent was obtained,   the patient had the 2% Xylocaine jelly applied to the cornea.  The patient was   then brought to the operating room and placed into the supine position.  The   right periorbit was then prepped and draped in the usual sterile ophthalmic   fashion.  A lid speculum was then inserted into the right eye.  Several drops of   the 1% lidocaine were placed onto the right cornea.  The 1% lidocaine was also   applied to the perilimbal region with the Weck-sweta sponge.  Using the 0.12-mm   forceps and the Super Sharp blade, a paracentesis site was made at the 12   o'clock position.  Approximately 0.5 mL of the 1% lidocaine was injected into   the anterior chamber.  Next, Viscoat was injected into the anterior chamber   through the paracentesis site.  The 2.75-mm keratome and the cyclodialysis   spatula were used to create a 2.75-mm clear corneal temporal groove.  The   cystitomy needle and Utrata forceps were then used to create a continuous tear   360-degree capsulorrhexis.  BSS in a cannula was then used for hydrodissection.    Phacoemulsification then proceeded in a stop-and-chop fashion without any   complications.  Another 0.5 mL of the 1% lidocaine was injected into the   anterior chamber.  The curved tip irrigation aspiration handpiece was then used   to remove the residual cortical material from the capsular bag.  Again, the 1%   lidocaine was applied to the perilimbal region with the Weck-sweta sponge.   Healon   GV was then injected into the anterior chamber and capsular bag.  An [employer] intraocular lens model SN60WF, 21.0 diopters in power, and serial   #[id number] was injected into the capsular bag with the lens injector.  The   Sinskey hook was used to position the lens into its proper place.  The residual   viscoelastic material was removed from the anterior chamber and capsular bag   with the curved tip irrigation aspiration handpiece.  Both wounds were hydrated   with BSS on a cannula.  Both wounds were checked and found to be watertight.    The lid speculum was then removed from the right eye.  The patient then had 1   drop of Vigamox ophthalmic drop and 1 drop of Econopred +1% ophthalmic drop   instilled onto the right cornea.  The eye was then shielded.  The patient   tolerated the procedure well and was then brought to the recovery room in good   condition.      WALLACE  dd: 03/30/2017 18:26:50 (CDT)  td: 03/31/2017 00:30:08 (CDT)  Doc ID   #[id number]  Job ID #[id number]    CC:

## 2017-03-31 NOTE — PROGRESS NOTES
Subjective:       Patient ID: Robe Cevallos is a 77 y.o. male.    Chief Complaint: Post-op Evaluation    HPI  Review of Systems    Objective:      Physical Exam    Assessment:       1. Post-operative state        Plan:       S/p CE OD- Doing well.           CPM OD.  RTC 1 wk.

## 2017-04-07 ENCOUNTER — OFFICE VISIT (OUTPATIENT)
Dept: OPHTHALMOLOGY | Facility: CLINIC | Age: 77
End: 2017-04-07
Payer: MEDICARE

## 2017-04-07 DIAGNOSIS — H25.12 NUCLEAR SCLEROSIS, LEFT: ICD-10-CM

## 2017-04-07 DIAGNOSIS — H35.30 AMD (AGE-RELATED MACULAR DEGENERATION), BILATERAL: ICD-10-CM

## 2017-04-07 DIAGNOSIS — Z98.890 POST-OPERATIVE STATE: Primary | ICD-10-CM

## 2017-04-07 PROCEDURE — 92136 OPHTHALMIC BIOMETRY: CPT | Mod: LT,S$GLB,, | Performed by: OPHTHALMOLOGY

## 2017-04-07 PROCEDURE — 99024 POSTOP FOLLOW-UP VISIT: CPT | Mod: S$GLB,,, | Performed by: OPHTHALMOLOGY

## 2017-04-07 PROCEDURE — 99999 PR PBB SHADOW E&M-EST. PATIENT-LVL II: CPT | Mod: PBBFAC,,, | Performed by: OPHTHALMOLOGY

## 2017-04-07 NOTE — MR AVS SNAPSHOT
[facility]  [address] [facility]  [place] [address]  Phone: [phone]  Fax: [phone]                  Robe Cevallos   2017 8:15 AM   Office Visit    Description:  Male : 1940   Provider:  Ad Jolley MD   Department:  [facility]           Reason for Visit     Post-op Evaluation           Diagnoses this Visit        Comments    Post-operative state    -  Primary     Nuclear sclerosis, left         AMD (age-related macular degeneration), bilateral                To Do List           Future Appointments        Provider Department Dept Phone    2017 8:00 AM Ad Jolley MD [facility] [phone]    2017 8:00 AM Ad Jolley MD [facility] [phone]    2017 8:00 AM Ad Jolley MD [facility] [phone]      Your Future Surgeries/Procedures     2017   Surgery with Ad Jolley MD   [facility] ([facility])    [address] [place], [place] [address] [address] [phone]              Goals (5 Years of Data)     None      Follow-Up and Disposition     Return in about 2 weeks (around 2017) for CE OS..      [facility]     [facility] Nurse Care Line -  Assistance  Unless otherwise directed by your provider, please contact [facility], our nurse care line that is available for  assistance.     Registered nurses in the [facility] provide: appointment scheduling, clinical advisement, health education, and other advisory services.  Call: [phone] (toll free)               Medications           Message regarding Medications     Verify the changes and/or additions to your medication regime listed below are the same as discussed with your clinician today.  If any of these changes or additions are incorrect, please notify your healthcare provider.             Verify that the below list of  medications is an accurate representation of the medications you are currently taking.  If none reported, the list may be blank. If incorrect, please contact your healthcare provider. Carry this list with you in case of emergency.           Current Medications     alfuzosin (UROXATRAL) 10 mg Tb24 Take 10 mg by mouth daily with breakfast.    amlodipine (NORVASC) 10 MG tablet Take 5 mg by mouth once daily.     difluprednate (DUREZOL) 0.05 % Drop ophthalmic solution Place 1 drop into the right eye 4 (four) times daily. For 30 days    ILEVRO 0.3 % DrpS Place 1 drop into both eyes once daily. For 30 days    pyridostigmine (MESTINON) 60 mg Tab Take 60 mg by mouth.           Clinical Reference Information           Allergies as of 4/7/2017     No Known Allergies      Immunizations Administered on Date of Encounter - 4/7/2017     None      [facility] Sign-Up     Activating your [facility] account is as easy as 1-2-3!     1) Visit [email or web address], select Sign Up Now, enter this activation code and your date of birth, then select Next.  [email or web address]  Expires: 5/14/2017 10:01 AM      2) Create a username and password to use when you visit [facility] in the future and select a security question in case you lose your password and select Next.    3) Enter your e-mail address and click Sign Up!    Additional Information  If you have questions, please e-mail [email or web address] or call [phone] to talk to our [facility] staff. Remember, [facility] is NOT to be used for urgent needs. For medical emergencies, dial 911.         Language Assistance Services     ATTENTION: Language assistance services are available, free of charge. Please call [phone].      ATENCIÓN: Si habla español, tiene a elliott disposición servicios gratuitos de asistencia lingüística. Llame al [phone].     CHÚ Ý: N?u b?n nói Ti?ng Vi?t, có các d?ch v? h? tr? ngôn ng? mi?n phí dành cho b?n. G?i s? [phone].         [facility]  complies with applicable Federal civil rights laws and does not discriminate on the basis of race, color, national origin, age, disability, or sex.

## 2017-04-07 NOTE — PROGRESS NOTES
Subjective:       Patient ID: Robe Cevallos is a 77 y.o. male.    Chief Complaint: Post-op Evaluation (1 week PO OD)    HPI  Review of Systems    Objective:      Physical Exam    Assessment:       1. Post-operative state    2. Nuclear sclerosis, left    3. AMD (age-related macular degeneration), bilateral        Plan:       S/p CE OD- Doing well.     Visually significant cataract OS -Pt. Wants Sx.  Dry AMD OU-Stable.      Taper gtts OD.  CE OD 4/20/17.

## 2017-04-15 NOTE — H&P
[facility]  History & Physical    Subjective:      Chief Complaint/Reason for Admission:     Robe Cevallos is a 77 y.o. male.    Past Medical History:   Diagnosis Date    Cancer 2008    Lymphoma    Cataract     Hypertension      Past Surgical History:   Procedure Laterality Date    CATARACT EXTRACTION W/  INTRAOCULAR LENS IMPLANT Right 03/30/2017    Dr. Jolley    CHOLECYSTECTOMY      KNEE ARTHROSCOPY      Lt knee    TONSILLECTOMY       Family History   Problem Relation Age of Onset    Amblyopia Neg Hx     Blindness Neg Hx     Cataracts Neg Hx     Glaucoma Neg Hx     Macular degeneration Neg Hx     Retinal detachment Neg Hx     Strabismus Neg Hx      Social History   Substance Use Topics    Smoking status: Never Smoker    Smokeless tobacco: Never Used    Alcohol use No       No prescriptions prior to admission.     Review of patient's allergies indicates:  No Known Allergies     Review of Systems   Eyes: Positive for blurred vision.   All other systems reviewed and are negative.      Objective:      Vital Signs (Most Recent)       Vital Signs Range (Last 24H):  BP: ()/()   Arterial Line BP: ()/()     Physical Exam   Constitutional: He is oriented to person, place, and time. He appears well-developed and well-nourished.   HENT:   Head: Normocephalic.   Eyes: Conjunctivae and EOM are normal. Pupils are equal, round, and reactive to light.   Neck: Normal range of motion. Neck supple.   Cardiovascular: Normal rate.    Pulmonary/Chest: Effort normal and breath sounds normal.   Abdominal: Soft. Bowel sounds are normal.   Musculoskeletal: Normal range of motion.   Neurological: He is alert and oriented to person, place, and time.   Skin: Skin is warm.   Psychiatric: He has a normal mood and affect.       Data Review:    ECG:     Assessment:      Cataract OS.    Plan:    CE OS.

## 2017-04-17 NOTE — PRE ADMISSION SCREENING
RN Phone pre op done.  Instructed to remain NPO after midnight prior to surgery, except to take Amlodipine as directed.  Expressed understanding of instructions.  Arrival time 06:30 am.

## 2017-04-19 ENCOUNTER — ANESTHESIA EVENT (OUTPATIENT)
Dept: SURGERY | Facility: HOSPITAL | Age: 77
End: 2017-04-19
Payer: MEDICARE

## 2017-04-20 ENCOUNTER — ANESTHESIA (OUTPATIENT)
Dept: SURGERY | Facility: HOSPITAL | Age: 77
End: 2017-04-20
Payer: MEDICARE

## 2017-04-20 ENCOUNTER — HOSPITAL ENCOUNTER (OUTPATIENT)
Facility: HOSPITAL | Age: 77
Discharge: HOME | End: 2017-04-20
Attending: OPHTHALMOLOGY | Admitting: OPHTHALMOLOGY
Payer: MEDICARE

## 2017-04-20 VITALS
TEMPERATURE: 99 F | SYSTOLIC BLOOD PRESSURE: 125 MMHG | HEIGHT: 72 IN | DIASTOLIC BLOOD PRESSURE: 76 MMHG | BODY MASS INDEX: 26.41 KG/M2 | WEIGHT: 195 LBS | RESPIRATION RATE: 16 BRPM | HEART RATE: 67 BPM | OXYGEN SATURATION: 98 %

## 2017-04-20 DIAGNOSIS — H25.10 SENILE NUCLEAR SCLEROSIS: ICD-10-CM

## 2017-04-20 PROCEDURE — 37000009 HC ANESTHESIA EA ADD 15 MINS: Performed by: OPHTHALMOLOGY

## 2017-04-20 PROCEDURE — 37000008 HC ANESTHESIA 1ST 15 MINUTES: Performed by: OPHTHALMOLOGY

## 2017-04-20 PROCEDURE — 66984 XCAPSL CTRC RMVL W/O ECP: CPT | Mod: 79,LT,, | Performed by: OPHTHALMOLOGY

## 2017-04-20 PROCEDURE — V2632 POST CHMBR INTRAOCULAR LENS: HCPCS | Performed by: OPHTHALMOLOGY

## 2017-04-20 PROCEDURE — D9220A PRA ANESTHESIA: Mod: ANES,,, | Performed by: ANESTHESIOLOGY

## 2017-04-20 PROCEDURE — D9220A PRA ANESTHESIA: Mod: CRNA,,, | Performed by: REGISTERED NURSE

## 2017-04-20 PROCEDURE — 36000706: Performed by: OPHTHALMOLOGY

## 2017-04-20 PROCEDURE — 71000015 HC POSTOP RECOV 1ST HR: Performed by: OPHTHALMOLOGY

## 2017-04-20 PROCEDURE — 63600175 PHARM REV CODE 636 W HCPCS: Performed by: OPHTHALMOLOGY

## 2017-04-20 PROCEDURE — 36000707: Performed by: OPHTHALMOLOGY

## 2017-04-20 PROCEDURE — C9447 INJ, PHENYLEPHRINE KETOROLAC: HCPCS | Performed by: OPHTHALMOLOGY

## 2017-04-20 PROCEDURE — 25000003 PHARM REV CODE 250: Performed by: OPHTHALMOLOGY

## 2017-04-20 PROCEDURE — 25000003 PHARM REV CODE 250: Performed by: ANESTHESIOLOGY

## 2017-04-20 PROCEDURE — 63600175 PHARM REV CODE 636 W HCPCS: Performed by: REGISTERED NURSE

## 2017-04-20 DEVICE — LENS 20.0: Type: IMPLANTABLE DEVICE | Site: EYE | Status: FUNCTIONAL

## 2017-04-20 RX ORDER — SODIUM CHLORIDE, SODIUM LACTATE, POTASSIUM CHLORIDE, CALCIUM CHLORIDE 600; 310; 30; 20 MG/100ML; MG/100ML; MG/100ML; MG/100ML
INJECTION, SOLUTION INTRAVENOUS CONTINUOUS
Status: DISCONTINUED | OUTPATIENT
Start: 2017-04-20 | End: 2017-04-20 | Stop reason: HOSPADM

## 2017-04-20 RX ORDER — POVIDONE-IODINE 5 %
SOLUTION, NON-ORAL OPHTHALMIC (EYE)
Status: DISCONTINUED | OUTPATIENT
Start: 2017-04-20 | End: 2017-04-20 | Stop reason: HOSPADM

## 2017-04-20 RX ORDER — OFLOXACIN 3 MG/ML
1 SOLUTION/ DROPS OPHTHALMIC
Status: COMPLETED | OUTPATIENT
Start: 2017-04-20 | End: 2017-04-20

## 2017-04-20 RX ORDER — HYDROCODONE BITARTRATE AND ACETAMINOPHEN 5; 325 MG/1; MG/1
1 TABLET ORAL EVERY 4 HOURS PRN
Status: CANCELLED | OUTPATIENT
Start: 2017-04-20

## 2017-04-20 RX ORDER — LIDOCAINE HYDROCHLORIDE 40 MG/ML
INJECTION, SOLUTION RETROBULBAR
Status: DISCONTINUED | OUTPATIENT
Start: 2017-04-20 | End: 2017-04-20 | Stop reason: HOSPADM

## 2017-04-20 RX ORDER — ACETAMINOPHEN 325 MG/1
650 TABLET ORAL EVERY 4 HOURS PRN
Status: CANCELLED | OUTPATIENT
Start: 2017-04-20

## 2017-04-20 RX ORDER — TROPICAMIDE 10 MG/ML
1 SOLUTION/ DROPS OPHTHALMIC
Status: DISCONTINUED | OUTPATIENT
Start: 2017-04-20 | End: 2017-04-20 | Stop reason: HOSPADM

## 2017-04-20 RX ORDER — PREDNISOLONE ACETATE 10 MG/ML
SUSPENSION/ DROPS OPHTHALMIC
Status: DISCONTINUED | OUTPATIENT
Start: 2017-04-20 | End: 2017-04-20 | Stop reason: HOSPADM

## 2017-04-20 RX ORDER — LIDOCAINE HYDROCHLORIDE 10 MG/ML
1 INJECTION, SOLUTION EPIDURAL; INFILTRATION; INTRACAUDAL; PERINEURAL ONCE
Status: DISCONTINUED | OUTPATIENT
Start: 2017-04-20 | End: 2017-04-20 | Stop reason: HOSPADM

## 2017-04-20 RX ORDER — FENTANYL CITRATE 50 UG/ML
INJECTION, SOLUTION INTRAMUSCULAR; INTRAVENOUS
Status: DISCONTINUED | OUTPATIENT
Start: 2017-04-20 | End: 2017-04-20

## 2017-04-20 RX ORDER — PROPARACAINE HYDROCHLORIDE 5 MG/ML
1 SOLUTION/ DROPS OPHTHALMIC
Status: DISCONTINUED | OUTPATIENT
Start: 2017-04-20 | End: 2017-04-20 | Stop reason: HOSPADM

## 2017-04-20 RX ORDER — CYCLOPENTOLATE HYDROCHLORIDE 10 MG/ML
1 SOLUTION/ DROPS OPHTHALMIC
Status: DISCONTINUED | OUTPATIENT
Start: 2017-04-20 | End: 2017-04-20 | Stop reason: HOSPADM

## 2017-04-20 RX ORDER — PHENYLEPHRINE HYDROCHLORIDE 25 MG/ML
1 SOLUTION/ DROPS OPHTHALMIC
Status: DISCONTINUED | OUTPATIENT
Start: 2017-04-20 | End: 2017-04-20 | Stop reason: HOSPADM

## 2017-04-20 RX ADMIN — TROPICAMIDE 1 DROP: 10 SOLUTION/ DROPS OPHTHALMIC at 07:04

## 2017-04-20 RX ADMIN — PROPARACAINE HYDROCHLORIDE 1 DROP: 5 SOLUTION/ DROPS OPHTHALMIC at 07:04

## 2017-04-20 RX ADMIN — FENTANYL CITRATE 25 MCG: 50 INJECTION INTRAMUSCULAR; INTRAVENOUS at 08:04

## 2017-04-20 RX ADMIN — OFLOXACIN 1 DROP: 3 SOLUTION OPHTHALMIC at 07:04

## 2017-04-20 RX ADMIN — SODIUM CHLORIDE, SODIUM LACTATE, POTASSIUM CHLORIDE, AND CALCIUM CHLORIDE: .6; .31; .03; .02 INJECTION, SOLUTION INTRAVENOUS at 07:04

## 2017-04-20 RX ADMIN — CYCLOPENTOLATE HYDROCHLORIDE 1 DROP: 10 SOLUTION/ DROPS OPHTHALMIC at 07:04

## 2017-04-20 RX ADMIN — PHENYLEPHRINE HYDROCHLORIDE 1 DROP: 25 SOLUTION/ DROPS OPHTHALMIC at 07:04

## 2017-04-20 RX ADMIN — FENTANYL CITRATE 25 MCG: 50 INJECTION INTRAMUSCULAR; INTRAVENOUS at 09:04

## 2017-04-20 NOTE — ANESTHESIA PREPROCEDURE EVALUATION
04/20/2017  Robe Cevallos is a 77 y.o., male.    Anesthesia Evaluation    I have reviewed the Patient Summary Reports.     I have reviewed the Medications.     Review of Systems  Anesthesia Hx:  No problems with previous Anesthesia Denies Hx of Anesthetic complications  History of prior surgery of interest to airway management or planning: Denies Family Hx of Anesthesia complications.   Denies Personal Hx of Anesthesia complications.   Social:  Non-Smoker, No Alcohol Use    Hematology/Oncology:  Hematology Normal       -- Cancer in past history:  Oncology Comments: Lymphoma now in remission     EENT/Dental:EENT/Dental Normal   Cardiovascular:   Exercise tolerance: good Hypertension    Pulmonary:  Pulmonary Normal    Renal/:  Renal/ Normal     Hepatic/GI:  Hepatic/GI Normal    Musculoskeletal:  Musculoskeletal Normal    Neurological:   Neuromuscular Disease, MG   Endocrine:  Endocrine Normal    Dermatological:  Skin Normal    Psych:  Psychiatric Normal           Physical Exam  General:  Well nourished    Airway/Jaw/Neck:  Airway Findings: Mouth Opening: Normal General Airway Assessment: Adult  Mallampati: II  TM Distance: Normal, at least 6 cm         Dental:  DENTAL FINDINGS: Normal   Chest/Lungs:  Chest/Lungs Clear    Heart/Vascular:  Heart Findings: Normal Heart murmur: negative       Mental Status:  Mental Status Findings:  Cooperative, Alert and Oriented         Anesthesia Plan  Type of Anesthesia, risks & benefits discussed:  Anesthesia Type:  MAC  Patient's Preference:   Intra-op Monitoring Plan: standard ASA monitors  Intra-op Monitoring Plan Comments:   Post Op Pain Control Plan:   Post Op Pain Control Plan Comments:   Induction:   IV  Beta Blocker:  Patient is not currently on a Beta-Blocker (No further documentation required).       Informed Consent: Patient understands risks and agrees with  Anesthesia plan.  Questions answered. Anesthesia consent signed with patient.  ASA Score: 2     Day of Surgery Review of History & Physical:            Ready For Surgery From Anesthesia Perspective.

## 2017-04-20 NOTE — DISCHARGE INSTRUCTIONS
ACTIVITY LEVEL:  If you received sedation or an anesthetic, you may feel sleepy for several hours. Rest until you are more awake. Gradually resume your normal activities. Normal activity will cause no undue risk to your eye. The white part of your eye might be red - this is nothing to worry about. Wear your old glasses or sunglasses that were given to you for protection during the day.    RESTRICTIONS - for the next 7 days  · Do not lift anything over 10 pounds.  · When bending, bend at the knees not the waist.  · Do not rub the eye.  · Do not get water in the eye.  · Do not sleep on the side that had surgery.  · Protect your eye at bedtime with the shield provided.    DIET: At home, continue with liquids. If there is no nausea, you may eat a soft diet and gradually resume your normal diet. Limit alcohol intake for 24 hours.    BATHING: You may shower or bathe as normal. You may take a warm wash cloth, which has been wrung out to remove excess water, and gently remove crusting on the lashes.    MEDICATIONS: You may take Extra Strength Tylenol every 4 hours for pain/headache.     Use your drops     Today: Pink-   1pm 5pm 9pm     Beige -1 pm 5pm 9pm     Grey-   1p    Tomorrow:  Resume pink and beige cap drops four times a day.  Resume grey cap drops once a day.    Place one drop in the eye that had surgery from the first bottle. Wait 5 minutes and then use the second bottle. (It does not matter which bottle is used first.) CONTINUE all glaucoma drops.   No driving, alcoholic beverages or signing legal documents for next 24 hours or while taking pain medication      WHEN TO CALL THE DOCTOR:  · Redness that increases significantly  · Pain not relieved by Tylenol  RETURN APPOINTMENT:  You will need to see Dr. Jolley on Friday at the [facility] at_______________. Bring your eye kit with you on your follow-up visit. Your kit contains sunglasses, eye shield, tape and your eye drops.  FOR EMERGENCIES:  If any  unusual problems or difficulties occur, contact Dr. Jolley at the Clinic office at [phone] during normal business hours. If after hours, call [phone].

## 2017-04-20 NOTE — TRANSFER OF CARE
"Anesthesia Transfer of Care Note    Patient: Robe Cevallos    Procedure(s) Performed: Procedure(s) (LRB):  PHACOEMULSIFICATION-ASPIRATION-CATARACT (Left)  INSERTION-INTRAOCULAR LENS (IOL) (Left)    Patient location: [facility]    Anesthesia Type: MAC    Transport from OR: Transported from OR on room air with adequate spontaneous ventilation    Post pain: adequate analgesia    Post assessment: no apparent anesthetic complications and tolerated procedure well    Post vital signs: stable    Level of consciousness: awake, alert and oriented    Nausea/Vomiting: no nausea/vomiting    Complications: none          Last vitals:   Visit Vitals    BP (!) 152/94    Pulse 70    Temp 36.6 °C (97.9 °F) (Oral)    Resp 14    Ht 5' 11.5" (1.816 m)    Wt 88.5 kg (195 lb)    SpO2 96%    BMI 26.82 kg/m2     "

## 2017-04-20 NOTE — ANESTHESIA POSTPROCEDURE EVALUATION
"Anesthesia Post Evaluation    Patient: Robe Cevallos    Procedure(s) Performed: Procedure(s) (LRB):  PHACOEMULSIFICATION-ASPIRATION-CATARACT (Left)  INSERTION-INTRAOCULAR LENS (IOL) (Left)    Final Anesthesia Type: MAC  Patient location during evaluation: [facility]  Patient participation: Yes- Able to Participate  Level of consciousness: awake  Post-procedure vital signs: reviewed and stable  Pain management: adequate  Airway patency: patent  PONV status at discharge: No PONV  Anesthetic complications: no      Cardiovascular status: stable  Respiratory status: unassisted  Hydration status: euvolemic  Follow-up not needed.        Visit Vitals    BP (!) 152/94    Pulse 70    Temp 36.6 °C (97.9 °F) (Oral)    Resp 14    Ht 5' 11.5" (1.816 m)    Wt 88.5 kg (195 lb)    SpO2 96%    BMI 26.82 kg/m2       Pain/Joel Score: Presence of Pain: denies (4/20/2017  7:43 AM)      "

## 2017-04-20 NOTE — IP AVS SNAPSHOT
[place] [address] [place] [address]  Phone: [phone]           Patient Discharge Instructions   Our goal is to set you up for success. This packet includes information on your condition, medications, and your home care.  It will help you care for yourself to prevent having to return to the hospital.     Please ask your nurse if you have any questions.      There are many details to remember when preparing to leave the hospital. Here is what you will need to do:    1. Take your medicine. If you are prescribed medications, review your Medication List on the following pages. You may have new medications to  at the pharmacy and others that you'll need to stop taking. Review the instructions for how and when to take your medications. Talk with your doctor or nurses if you are unsure of what to do.     2. Go to your follow-up appointments. Specific follow-up information is listed in the following pages. Your may be contacted by a nurse or clinical provider about future appointments. Be sure we have all of the phone numbers to reach you. Please contact your provider's office if you are unable to make an appointment.     3. Watch for warning signs. Your doctor or nurse will give you detailed warning signs to watch for and when to call for assistance. These instructions may also include educational information about your condition. If you experience any of warning signs to your health, call your doctor.           [facility]  Unless otherwise directed by your provider, please   contact [facility], our nurse care line   that is available for 24/7 assistance.     [phone] (toll-free)     Registered nurses in the [facility]   provide: appointment scheduling, clinical advisement, health education, and other advisory services.                  ** Verify the list of medication(s) below is accurate and up to date. Carry this with you in case of emergency.  If your medications have changed, please notify your healthcare provider.             Medication List      CONTINUE taking these medications        Additional Info                      difluprednate 0.05 % Drop ophthalmic solution   Commonly known as:  DUREZOL   Quantity:  5 mL   Refills:  1   Dose:  1 drop    Instructions:  Place 1 drop into the right eye 4 (four) times daily. For 30 days     Begin Date    AM    Noon    PM    Bedtime       ILEVRO 0.3 % Drps   Quantity:  3 mL   Refills:  1   Dose:  1 drop   Generic drug:  nepafenac    Instructions:  Place 1 drop into both eyes once daily. For 30 days     Begin Date    AM    Noon    PM    Bedtime       NORVASC 10 MG tablet   Refills:  0   Dose:  5 mg   Generic drug:  amlodipine    Instructions:  Take 5 mg by mouth once daily.     Begin Date    AM    Noon    PM    Bedtime       pyridostigmine 60 mg Tab   Commonly known as:  MESTINON   Refills:  0   Dose:  60 mg    Instructions:  Take 60 mg by mouth.     Begin Date    AM    Noon    PM    Bedtime       UROXATRAL 10 mg Tb24   Refills:  0   Dose:  10 mg   Generic drug:  alfuzosin    Instructions:  Take 10 mg by mouth daily with breakfast.     Begin Date    AM    Noon    PM    Bedtime                  Please bring to all follow up appointments:    1. A copy of your discharge instructions.  2. All medicines you are currently taking in their original bottles.  3. Identification and insurance card.    Please arrive 15 minutes ahead of scheduled appointment time.    Please call 24 hours in advance if you must reschedule your appointment and/or time.        Your Scheduled Appointments     Apr 21, 2017  8:00 AM CDT   Post OP with Ad Jolley MD   [facility] ([facility])    [address] [facility] [place] [address] [phone]            Apr 28, 2017  8:00 AM CDT   Post OP with Ad Jolley MD   [facility] ([facility])    [address] [place] [address]    [phone]            May 19, 2017  8:00 AM CDT   Post OP with Ad Jolley MD   [facility] ([facility])    [address] [facility]  [place] [address] [phone]                Discharge Instructions     Future Orders    Other restrictions (specify):     Comments:    No heavy lifting or bending for 1 week.        Discharge Instructions       ACTIVITY LEVEL:  If you received sedation or an anesthetic, you may feel sleepy for several hours. Rest until you are more awake. Gradually resume your normal activities. Normal activity will cause no undue risk to your eye. The white part of your eye might be red - this is nothing to worry about. Wear your old glasses or sunglasses that were given to you for protection during the day.    RESTRICTIONS - for the next 7 days  · Do not lift anything over 10 pounds.  · When bending, bend at the knees not the waist.  · Do not rub the eye.  · Do not get water in the eye.  · Do not sleep on the side that had surgery.  · Protect your eye at bedtime with the shield provided.    DIET: At home, continue with liquids. If there is no nausea, you may eat a soft diet and gradually resume your normal diet. Limit alcohol intake for 24 hours.    BATHING: You may shower or bathe as normal. You may take a warm wash cloth, which has been wrung out to remove excess water, and gently remove crusting on the lashes.    MEDICATIONS: You may take Extra Strength Tylenol every 4 hours for pain/headache.     Use your drops     Today: Pink-   1pm 5pm 9pm     Beige -1 pm 5pm 9pm     Grey-   1p    Tomorrow:  Resume pink and beige cap drops four times a day.  Resume grey cap drops once a day.    Place one drop in the eye that had surgery from the first bottle. Wait 5 minutes and then use the second bottle. (It does not matter which bottle is used first.) CONTINUE all glaucoma drops.   No driving, alcoholic beverages or signing legal documents for next 24 hours or while taking  "pain medication      WHEN TO CALL THE DOCTOR:  · Redness that increases significantly  · Pain not relieved by Tylenol  RETURN APPOINTMENT:  You will need to see Dr. Jolley on Friday at the [facility] clinic at_______________. Bring your eye kit with you on your follow-up visit. Your kit contains sunglasses, eye shield, tape and your eye drops.  FOR EMERGENCIES:  If any unusual problems or difficulties occur, contact Dr. Jolley at the Clinic office at [phone] during normal business hours. If after hours, call [phone].        Primary Diagnosis     Your primary diagnosis was:  Cataract      Admission Information     Date & Time Provider Department CSN    4/20/2017  6:20 AM Ad Jolley MD [facility] [id number]      Care Providers     Provider Role Specialty Primary office phone    Ad Jolley MD Attending Provider Ophthalmology [phone]    Ad Jolley MD Surgeon  Ophthalmology [phone]      Your Vitals Were     BP Pulse Temp Resp Height Weight    152/94 70 97.9 °F (36.6 °C) (Oral) 14 5' 11.5" (1.816 m) 88.5 kg (195 lb)    SpO2 BMI             96% 26.82 kg/m2         Recent Lab Values     No lab values to display.      Allergies as of 4/20/2017     No Known Allergies      Advance Directives     An advance directive is a document which, in the event you are no longer able to make decisions for yourself, tells your healthcare team what kind of treatment you do or do not want to receive, or who you would like to make those decisions for you.  If you do not currently have an advance directive, [facility] encourages you to create one.  For more information call:  [phone]), [phone] ([phone]),  or log on to [email or web address].        Language Assistance Services     ATTENTION: Language assistance services are available, free of charge. Please call [phone].      ATENCIÓN: Si habla español, tiene a elliott disposición " servicios gratuitos de asistencia lingüística. Anika al [phone].     [facility] Ý: N?u b?n nói Ti?ng Vi?t, có các d?ch v? h? tr? ngôn ng? mi?n phí dành cho b?n. G?i s? [phone].        [facility] Sign-Up     Activating your [facility] account is as easy as 1-2-3!     1) Visit [email or web address], select Sign Up Now, enter this activation code and your date of birth, then select Next.  [email or web address]  Expires: 5/14/2017 10:01 AM      2) Create a username and password to use when you visit [facility] in the future and select a security question in case you lose your password and select Next.    3) Enter your e-mail address and click Sign Up!    Additional Information  If you have questions, please e-mail [email or web address] or call [phone] to talk to our [facility] staff. Remember, [facility] is NOT to be used for urgent needs. For medical emergencies, dial 911.          [facility] complies with applicable Federal civil rights laws and does not discriminate on the basis of race, color, national origin, age, disability, or sex.

## 2017-04-20 NOTE — BRIEF OP NOTE
Operative Note     SUMMARY     Surgery Date: 4/20/2017    Surgeon(s) and Role:      Ad Jolley MD - Primary    Pre-op Diagnosis:  Nuclear sclerosis     Post-op/ Diagnosis:  Same    Final Diagnosis: Cataract    Procedure(s) (LRB):  PHACOEMULSIFICATION-ASPIRATION-CATARACT   INSERTION-INTRAOCULAR LENS (IOL)     Anesthesia: Monitored Anesthesia Care    Findings/Key Components:  Cataract    Outcome: Tolerated procedure well    Estimated Blood Loss: None         Specimens     None          Follow-up:  Tomorrow in clinic      Discharge Note      SUMMARY     Admit Date: 4/20/2017    Attending Physician: dA Jolley MD    Discharge Physician: Ad Jolley MD    Discharge Date: 4/20/2017    Final Diagnosis: Cataract    Outcome: Tolerated procedure well    Disposition: Discharge to Home.    Medications:       Robe Cevallos   Home Medication Instructions FRANCISCO:[id number]    Printed on:04/20/17 0921   Medication Information                      alfuzosin (UROXATRAL) 10 mg Tb24  Take 10 mg by mouth daily with breakfast.             amlodipine (NORVASC) 10 MG tablet  Take 5 mg by mouth once daily.              difluprednate (DUREZOL) 0.05 % Drop ophthalmic solution  Place 1 drop into the right eye 4 (four) times daily. For 30 days             ILEVRO 0.3 % DrpS  Place 1 drop into both eyes once daily. For 30 days             pyridostigmine (MESTINON) 60 mg Tab  Take 60 mg by mouth.                   Patient Discharge Instructions:     Keep Summers Shield over operated eye when not using drops.     DIET:  Regular    Activity: No heavy lifting or bending X 1 week.    Follow-up:  Tomorrow in clinic

## 2017-04-21 ENCOUNTER — OFFICE VISIT (OUTPATIENT)
Dept: OPHTHALMOLOGY | Facility: CLINIC | Age: 77
End: 2017-04-21
Payer: MEDICARE

## 2017-04-21 VITALS — SYSTOLIC BLOOD PRESSURE: 148 MMHG | DIASTOLIC BLOOD PRESSURE: 74 MMHG | HEART RATE: 67 BPM

## 2017-04-21 DIAGNOSIS — Z98.890 POST-OPERATIVE STATE: Primary | ICD-10-CM

## 2017-04-21 PROCEDURE — 99024 POSTOP FOLLOW-UP VISIT: CPT | Mod: S$GLB,,, | Performed by: OPHTHALMOLOGY

## 2017-04-21 PROCEDURE — 99999 PR PBB SHADOW E&M-EST. PATIENT-LVL II: CPT | Mod: PBBFAC,,, | Performed by: OPHTHALMOLOGY

## 2017-04-21 NOTE — OP NOTE
DATE OF PROCEDURE:  04/20/2017    PREOPERATIVE DIAGNOSIS:  Nuclear sclerotic cataract, left eye.    POSTOPERATIVE DIAGNOSIS:  Nuclear sclerotic cataract, left eye.    SURGEON:  Ad Jolley M.D.    PROCEDURE:  Clear corneal phacoemulsification with posterior chamber intraocular   lens implant, left eye.    ANESTHESIA:  Monitored anesthesia care with 2% Xylocaine jelly topically, 1%   free lidocaine topically and intrachamberly.    PROCEDURE IN DETAIL:  After the appropriate preoperative consent was obtained,   the patient had the 2% Xylocaine jelly applied to the cornea.  The patient was   then brought to the operating room and placed into the supine position.  The   left periorbit was then prepped and draped in the usual sterile ophthalmic   fashion.  A lid speculum was then inserted into the left eye.  Several drops of   the 1% lidocaine were placed onto the left cornea.  The 1% lidocaine was also   applied to the perilimbal region with the Weck-sweta sponge.  Using the 0.12-mm   forceps and the Super Sharp blade, a paracentesis site was made at the 6 o'clock   position.  Approximately 0.5 mL of the 1% lidocaine was injected into the   anterior chamber.  Next, Viscoat was injected into the anterior chamber through   the paracentesis site.  The 2.75-mm keratome and the cyclodialysis spatula were   used to create a 2.75-mm clear corneal temporal groove.  The cystitomy needle   and Utrata forceps were then used to create a continuous tear 360-degree   capsulorrhexis.  BSS in a cannula was then used for hydrodissection.    Phacoemulsification then proceeded in a stop-and-chop fashion without any   complications.  Another 0.5 mL of the 1% lidocaine was injected into the   anterior chamber.  The curved tip irrigation aspiration handpiece was then used   to remove the residual cortical material from the capsular bag.  Again, the 1%   lidocaine was applied to the perilimbal region with the Weck-sweta sponge.  Janey    GV was then injected into the anterior chamber and capsular bag.  An [employer] intraocular lens model SN60WF, 20.0 diopters in power, and serial   #[id number] was injected into the capsular bag with the lens injector.  The   Sinskey hook was used to position the lens into its proper place.  The residual   viscoelastic material was removed from the anterior chamber and capsular bag   with the curved tip irrigation aspiration handpiece.  Both wounds were hydrated   with BSS on a cannula.  Both wounds were checked and found to be watertight.    The lid speculum was then removed from the left eye.  The patient then had 1   drop of Vigamox ophthalmic drop and 1 drop of Econopred +1% ophthalmic drop   instilled onto the left cornea.  The eye was then shielded.  The patient   tolerated the procedure well and was then brought to the recovery room in good   condition.      WALLACE  dd: 04/20/2017 18:28:51 (CDT)  td: 04/20/2017 22:38:47 (CDT)  Doc ID   #[id number]  Job ID #[id number]    CC:

## 2017-04-21 NOTE — PROGRESS NOTES
Subjective:       Patient ID: Robe Cevallos is a 77 y.o. male.    Chief Complaint: Post-op Evaluation (1 day PO OS. CE IOL 4/20/2017 OS. ) and Photophobia (Left eye)    HPI  Review of Systems    Objective:      Physical Exam    Assessment:       1. Post-operative state        Plan:       S/p CE OU- Doing well.          CPM OS.  Taper gtts OD.  RTC 1 wk.

## 2017-04-21 NOTE — MR AVS SNAPSHOT
[facility]  [address] [facility]  [place] [address]  Phone: [phone]  Fax: [phone]                  Robe Cevallos   2017 8:00 AM   Office Visit    Description:  Male : 1940   Provider:  Ad Jolley MD   Department:  [facility]           Reason for Visit     Post-op Evaluation     Photophobia           Diagnoses this Visit        Comments    Post-operative state    -  Primary            To Do List           Future Appointments        Provider Department Dept Phone    2017 8:00 AM Ad Jolley MD [facility] [phone]    2017 8:00 AM Ad Jolley MD [facility] [phone]      Goals (5 Years of Data)     None      Follow-Up and Disposition     Return in about 1 week (around 2017) for Post-op Both eyes.      [facility] On Call     [facility] On Call Nurse Care Line -  Assistance  Unless otherwise directed by your provider, please contact [facility], our nurse care line that is available for  assistance.     Registered nurses in the [facility] On Call Center provide: appointment scheduling, clinical advisement, health education, and other advisory services.  Call: [phone] (toll free)               Medications           Message regarding Medications     Verify the changes and/or additions to your medication regime listed below are the same as discussed with your clinician today.  If any of these changes or additions are incorrect, please notify your healthcare provider.             Verify that the below list of medications is an accurate representation of the medications you are currently taking.  If none reported, the list may be blank. If incorrect, please contact your healthcare provider. Carry this list with you in case of emergency.           Current Medications     alfuzosin (UROXATRAL) 10 mg Tb24 Take 10 mg by mouth daily with breakfast.    amlodipine (NORVASC) 10 MG tablet  Take 5 mg by mouth once daily.     difluprednate (DUREZOL) 0.05 % Drop ophthalmic solution Place 1 drop into the right eye 4 (four) times daily. For 30 days    ILEVRO 0.3 % DrpS Place 1 drop into both eyes once daily. For 30 days    pyridostigmine (MESTINON) 60 mg Tab Take 60 mg by mouth.           Clinical Reference Information           Your Vitals Were     BP Pulse                148/74 (BP Location: Right arm, Patient Position: Sitting, BP Method: Automatic) 67          Blood Pressure          Most Recent Value    BP  (!)  148/74      Allergies as of 4/21/2017     No Known Allergies      Immunizations Administered on Date of Encounter - 4/21/2017     None      [facility] Sign-Up     Activating your [facility] account is as easy as 1-2-3!     1) Visit [email or web address], select Sign Up Now, enter this activation code and your date of birth, then select Next.  [email or web address]  Expires: 5/14/2017 10:01 AM      2) Create a username and password to use when you visit [facility] in the future and select a security question in case you lose your password and select Next.    3) Enter your e-mail address and click Sign Up!    Additional Information  If you have questions, please e-mail [email or web address] or call [phone] to talk to our [facility] staff. Remember, [facility] is NOT to be used for urgent needs. For medical emergencies, dial 911.         Language Assistance Services     ATTENTION: Language assistance services are available, free of charge. Please call [phone].      ATENCIÓN: Si habla español, tiene a elliott disposición servicios gratuitos de asistencia lingüística. Llame al [phone].     CHÚ Ý: N?u b?n nói Ti?ng Vi?t, có các d?ch v? h? tr? ngôn ng? mi?n phí dành cho b?n. G?i s? [phone].         [facility] complies with applicable Federal civil rights laws and does not discriminate on the basis of race, color, national origin, age, disability, or sex.

## 2017-04-28 ENCOUNTER — OFFICE VISIT (OUTPATIENT)
Dept: OPHTHALMOLOGY | Facility: CLINIC | Age: 77
End: 2017-04-28
Payer: MEDICARE

## 2017-04-28 DIAGNOSIS — H35.30 AMD (AGE-RELATED MACULAR DEGENERATION), BILATERAL: ICD-10-CM

## 2017-04-28 DIAGNOSIS — Z98.890 POST-OPERATIVE STATE: Primary | ICD-10-CM

## 2017-04-28 PROCEDURE — 99999 PR PBB SHADOW E&M-EST. PATIENT-LVL II: CPT | Mod: PBBFAC,,, | Performed by: OPHTHALMOLOGY

## 2017-04-28 PROCEDURE — 99024 POSTOP FOLLOW-UP VISIT: CPT | Mod: S$GLB,,, | Performed by: OPHTHALMOLOGY

## 2017-04-28 NOTE — MR AVS SNAPSHOT
[facility]  [address] [facility]  [place] [address]  Phone: [phone]  Fax: [phone]                  Robe Cevallos   2017 8:00 AM   Office Visit    Description:  Male : 1940   Provider:  Ad Jolley MD   Department:  [facility]           Reason for Visit     Post-op Evaluation           Diagnoses this Visit        Comments    Post-operative state    -  Primary     AMD (age-related macular degeneration), bilateral                To Do List           Future Appointments        Provider Department Dept Phone    5/10/2017 8:30 AM Rajani Read DPM [facility] [phone]    2017 8:00 AM Ad Jolley MD [facility] [phone]      Goals (5 Years of Data)     None      Follow-Up and Disposition     Return in about 3 weeks (around 2017) for Post-op Both eyes.      [facility] On Call     [facility] On Call Nurse Care Line -  Assistance  Unless otherwise directed by your provider, please contact [facility], our nurse care line that is available for  assistance.     Registered nurses in the [facility] On Call Center provide: appointment scheduling, clinical advisement, health education, and other advisory services.  Call: [phone] (toll free)               Medications           Message regarding Medications     Verify the changes and/or additions to your medication regime listed below are the same as discussed with your clinician today.  If any of these changes or additions are incorrect, please notify your healthcare provider.             Verify that the below list of medications is an accurate representation of the medications you are currently taking.  If none reported, the list may be blank. If incorrect, please contact your healthcare provider. Carry this list with you in case of emergency.           Current Medications     alfuzosin (UROXATRAL) 10 mg Tb24 Take 10 mg by mouth daily with breakfast.     amlodipine (NORVASC) 10 MG tablet Take 5 mg by mouth once daily.     difluprednate (DUREZOL) 0.05 % Drop ophthalmic solution Place 1 drop into the right eye 4 (four) times daily. For 30 days    pyridostigmine (MESTINON) 60 mg Tab Take 60 mg by mouth.           Clinical Reference Information           Allergies as of 4/28/2017     No Known Allergies      Immunizations Administered on Date of Encounter - 4/28/2017     None      [facility] Sign-Up     Activating your [facility] account is as easy as 1-2-3!     1) Visit [email or web address], select Sign Up Now, enter this activation code and your date of birth, then select Next.  [email or web address]  Expires: 5/14/2017 10:01 AM      2) Create a username and password to use when you visit [facility] in the future and select a security question in case you lose your password and select Next.    3) Enter your e-mail address and click Sign Up!    Additional Information  If you have questions, please e-mail [email or web address] or call [phone] to talk to our [facility] staff. Remember, [facility] is NOT to be used for urgent needs. For medical emergencies, dial 911.         Language Assistance Services     ATTENTION: Language assistance services are available, free of charge. Please call [phone].      ATENCIÓN: Si habla español, tiene a elliott disposición servicios gratuitos de asistencia lingüística. Llame al [phone].     [facility] Ý: N?u b?n nói Ti?ng Vi?t, có các d?ch v? h? tr? ngôn ng? mi?n phí dành cho b?n. G?i s? [phone].         [facility] complies with applicable Federal civil rights laws and does not discriminate on the basis of race, color, national origin, age, disability, or sex.

## 2017-04-28 NOTE — PROGRESS NOTES
Subjective:       Patient ID: Robe Cevallos is a 77 y.o. male.    Chief Complaint: Post-op Evaluation (1 week po os)    HPI  Review of Systems    Objective:      Physical Exam    Assessment:       1. Post-operative state    2. AMD (age-related macular degeneration), bilateral        Plan:       S/p CE OU- Doing well.  Dry AMD OU-Stable.        Taper gtts OS.  RTC 3 wks.

## 2017-05-10 ENCOUNTER — OFFICE VISIT (OUTPATIENT)
Dept: PODIATRY | Facility: CLINIC | Age: 77
End: 2017-05-10
Payer: MEDICARE

## 2017-05-10 VITALS
HEIGHT: 71 IN | WEIGHT: 195 LBS | BODY MASS INDEX: 27.3 KG/M2 | SYSTOLIC BLOOD PRESSURE: 142 MMHG | DIASTOLIC BLOOD PRESSURE: 74 MMHG

## 2017-05-10 DIAGNOSIS — I87.2 VENOUS INSUFFICIENCY: ICD-10-CM

## 2017-05-10 DIAGNOSIS — L30.9 DERMATITIS OF FOOT: ICD-10-CM

## 2017-05-10 DIAGNOSIS — B35.1 ONYCHOMYCOSIS DUE TO DERMATOPHYTE: ICD-10-CM

## 2017-05-10 DIAGNOSIS — R60.0 BILATERAL LOWER EXTREMITY EDEMA: ICD-10-CM

## 2017-05-10 DIAGNOSIS — T45.1X5A CHEMOTHERAPY-INDUCED NEUROPATHY: Primary | ICD-10-CM

## 2017-05-10 DIAGNOSIS — G62.0 CHEMOTHERAPY-INDUCED NEUROPATHY: Primary | ICD-10-CM

## 2017-05-10 PROCEDURE — 1126F AMNT PAIN NOTED NONE PRSNT: CPT | Mod: S$GLB,,, | Performed by: PODIATRIST

## 2017-05-10 PROCEDURE — 1159F MED LIST DOCD IN RCRD: CPT | Mod: S$GLB,,, | Performed by: PODIATRIST

## 2017-05-10 PROCEDURE — 3078F DIAST BP <80 MM HG: CPT | Mod: S$GLB,,, | Performed by: PODIATRIST

## 2017-05-10 PROCEDURE — 1160F RVW MEDS BY RX/DR IN RCRD: CPT | Mod: S$GLB,,, | Performed by: PODIATRIST

## 2017-05-10 PROCEDURE — 99999 PR PBB SHADOW E&M-EST. PATIENT-LVL III: CPT | Mod: PBBFAC,,, | Performed by: PODIATRIST

## 2017-05-10 PROCEDURE — 99203 OFFICE O/P NEW LOW 30 MIN: CPT | Mod: S$GLB,,, | Performed by: PODIATRIST

## 2017-05-10 PROCEDURE — 3077F SYST BP >= 140 MM HG: CPT | Mod: S$GLB,,, | Performed by: PODIATRIST

## 2017-05-10 NOTE — MR AVS SNAPSHOT
[facility]  [address] [facility]  [place] [address]  Phone: [phone]                  Robe Cevallos   5/10/2017 8:30 AM   Office Visit    Description:  Male : 1940   Provider:  Rajani Read DPM   Department:  [facility]           Reason for Visit     Foot Problem     Foot Injury     Nail Problem           Diagnoses this Visit        Comments    Chemotherapy-induced neuropathy    -  Primary     Onychomycosis due to dermatophyte         Bilateral lower extremity edema         Venous insufficiency                To Do List           Future Appointments        Provider Department Dept Phone    2017 8:00 AM Ad Jolley MD [facility] [phone]      Goals (5 Years of Data)     None      [facility]     [facility] On Call Nurse Care Line -  Assistance  Unless otherwise directed by your provider, please contact [facility], our nurse care line that is available for  assistance.     Registered nurses in the [facility] On Call Center provide: appointment scheduling, clinical advisement, health education, and other advisory services.  Call: [phone] (toll free)               Medications           Message regarding Medications     Verify the changes and/or additions to your medication regime listed below are the same as discussed with your clinician today.  If any of these changes or additions are incorrect, please notify your healthcare provider.             Verify that the below list of medications is an accurate representation of the medications you are currently taking.  If none reported, the list may be blank. If incorrect, please contact your healthcare provider. Carry this list with you in case of emergency.           Current Medications     alfuzosin (UROXATRAL) 10 mg Tb24 Take 10 mg by mouth daily with breakfast.    amlodipine (NORVASC) 10 MG tablet Take 5 mg by mouth once daily.     pyridostigmine (MESTINON) 60 mg Tab Take 60 mg by  "mouth.           Clinical Reference Information           Your Vitals Were     BP Height Weight BMI       142/74 5' 11" (1.803 m) 88.5 kg (195 lb) 27.2 kg/m2       Blood Pressure          Most Recent Value    BP  (!)  142/74      Allergies as of 5/10/2017     No Known Allergies      Immunizations Administered on Date of Encounter - 5/10/2017     None      [facility] Sign-Up     Activating your [facility] account is as easy as 1-2-3!     1) Visit [email or web address], select Sign Up Now, enter this activation code and your date of birth, then select Next.  [email or web address]  Expires: 5/14/2017 10:01 AM      2) Create a username and password to use when you visit [facility] in the future and select a security question in case you lose your password and select Next.    3) Enter your e-mail address and click Sign Up!    Additional Information  If you have questions, please e-mail [email or web address] or call [phone] to talk to our [facility] staff. Remember, [facility] is NOT to be used for urgent needs. For medical emergencies, dial 911.         Instructions      Understanding Tinea Unguium  Tinea unguium is a type of fungal infection. The fungus infects the fingernails and, more commonly, the toenails. Its more common in men, older adults, and people who have diabetes, peripheral vascular disease, or another health problem that weakens the immune system.  How to say it  TIN-ee-uh [id number]   What causes tinea unguium?  Tinea unguium is caused by a fungus. Several different types of fungus can grow on the nails.  The condition is much more likely to occur on the toenails. It can spread from one nail to another. You are more likely to get tinea unguium if you:  · Have another fungal infection, such as athletes foot  · Have sweaty feet  · Share nail clippers with a person who has a fungal infection  · Swim often  · Walk barefoot in damp areas, such as locker rooms  · Use communal or shared showers  What are the symptoms " of tinea unguium?  If you have an infected nail, it may become:  · Brittle  · Thick  · Hard  · Discolored, yellow to brown  · Irregular in shape  The nail may also have crumbling white or colored material under it.  If left untreated, the fungus may spread to the nail bed, which is the skin under the nail. The nail may  also fall off.  How is tinea unguium treated?  With proper treatment, tinea unguium may be cured. But it often takes several months as the nail grows.  Treatments include:  · Good hygiene. Keep feet and nails clean and dry. If the infection is on the toenails, check that your shoes fit properly.  · Medicine. Over-the-counter antifungal products, such as creams, may kill the fungus and ease symptoms. If you have a severe infection, or the infection wont go away, you may need to take another medicine by mouth.  Some of these oral medicines can have side effects and need to be used for 3 months.  · Surgery.  The nail may be removed. But there is a high chance the fungus will return.  · Laser. A special type of laser directed at the nail itself can kill the fungus.  When should I call my healthcare provider?  Call your healthcare provider right away if you have any of these:  · Pain that gets worse  · Symptoms that dont get better, or get worse  · New symptoms   Date Last Reviewed: 3/30/2016  © 0749-0528 [employer]. [address] [place] [address]. All rights reserved. This information is not intended as a substitute for professional medical care. Always follow your healthcare professional's instructions.             Language Assistance Services     ATTENTION: Language assistance services are available, free of charge. Please call [phone].      ATENCIÓN: Si sarmadla collin, tiene a elliott disposición servicios gratuitos de asistencia lingüística. Llame al [phone].     [facility] Ý: N?u b?n nói Ti?ng Vi?t, có các d?ch v? h? tr? ngôn ng? mi?n phí dành cho b?n. G?i s?  [phone].         [facility] complies with applicable Federal civil rights laws and does not discriminate on the basis of race, color, national origin, age, disability, or sex.

## 2017-05-10 NOTE — PROGRESS NOTES
Subjective:      Patient ID: Robe Cevallos is a 77 y.o. male.    Chief Complaint: Foot Problem (neuropathy in both feet /bilateral fungus/right foot swelling and pt thinks that he dislocated the great toe on that foot  Pcp Dr. Reddy  02/2017); Foot Injury; and Nail Problem    Robe is a 77 y.o. male who presents to the clinic complaining of thick and discolored toenails on both feet. Robe is inquiring about treatment options. Has been using vinegar and tea tree oil with some improvement over past several weeks. Also relates stubbing his R great toe, which led to sharp shooting pain. He manipulated the toe a few days ago, felt a pop and all the pain improved. States it may have been dislocated. 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 He has general questions regarding his feet and nail fungus. No other major complaints today. Had R ankle fracture years ago leading to chronic arthritis in the joint. Bothers him from time to time.     Past Medical History:   Diagnosis Date    AMD (age-related macular degeneration), bilateral     Cancer 2008    Lymphoma    Cataract     Hypertension        Past Surgical History:   Procedure Laterality Date    CATARACT EXTRACTION W/  INTRAOCULAR LENS IMPLANT Right 03/30/2017    Dr. Jolley    CATARACT EXTRACTION W/  INTRAOCULAR LENS IMPLANT Left 04/20/2017    Dr. Jolley    CHOLECYSTECTOMY      EYE SURGERY      Rt cataract    KNEE ARTHROSCOPY      Lt knee    TONSILLECTOMY         Family History   Problem Relation Age of Onset    Amblyopia Neg Hx     Blindness Neg Hx     Cataracts Neg Hx     Glaucoma Neg Hx     Macular degeneration Neg Hx     Retinal detachment Neg Hx     Strabismus Neg Hx        Social History     Social History    Marital status:      Spouse name: N/A    Number of children: N/A    Years of education: N/A     Social History Main Topics    Smoking status: Never Smoker    Smokeless tobacco: Never Used     Alcohol use No    Drug use: No    Sexual activity: Not Asked     Other Topics Concern    None     Social History Narrative       Current Outpatient Prescriptions   Medication Sig Dispense Refill    alfuzosin (UROXATRAL) 10 mg Tb24 Take 10 mg by mouth daily with breakfast.      amlodipine (NORVASC) 10 MG tablet Take 5 mg by mouth once daily.       pyridostigmine (MESTINON) 60 mg Tab Take 60 mg by mouth.       No current facility-administered medications for this visit.        Review of patient's allergies indicates:  No Known Allergies        Review of Systems   Constitution: Negative for chills and fever.   Cardiovascular: Negative for chest pain, claudication and leg swelling.   Respiratory: Negative for cough and shortness of breath.    Skin: Positive for dry skin, nail changes and rash.   Musculoskeletal: Positive for arthritis, joint pain, joint swelling and stiffness.   Gastrointestinal: Negative for nausea and vomiting.   Neurological: Positive for numbness and paresthesias.   Psychiatric/Behavioral: Negative for altered mental status.           Objective:      Physical Exam   Constitutional: He is oriented to person, place, and time. He appears well-developed and well-nourished.   HENT:   Head: Normocephalic.   Cardiovascular: Intact distal pulses.    Pulses:       Dorsalis pedis pulses are 2+ on the right side, and 2+ on the left side.        Posterior tibial pulses are 1+ on the right side, and 1+ on the left side.   CRT < 3 sec to tips of toes. Mild edema noted to b/l LE. ++ spider veins and vericosities noted to b/l LEs.      Pulmonary/Chest: No respiratory distress.   Musculoskeletal:   Gastrocnemius equinus noted to b/l ankles with decreased DF noted on exam. MMT 5/5 in DF/PF/Inv/Ev resistance with no reproduction of pain in any direction. Passive range of motion of ankle and pedal joints is painless. Adequate pedal joint ROM.     Crepitus noted to ROM of 1st MTPJ and IPJ of b/l hallux. Otherwise  adequate rectus positioning of all pedal and ankle joints.   Neurological: He is alert and oriented to person, place, and time. He has normal strength. A sensory deficit is present.   Light touch, proprioception, and sharp/dull sensation are all intact bilaterally. Protective threshold with the [place]Wienstein monofilament is intact bilaterally. Vibratory sensation diminished b/l distal foot.      Skin: Skin is warm, dry and intact. No erythema.   No open lesions, lacerations or wounds noted. Nails are thickened and discolored with white streaks however well trimmed to R 1-5 and L 1-5. Interdigital spaces clean, dry and intact b/l. No erythema noted to b/l foot. Skin texture normal. Pedal hair adequate. Skin temperature normal b/l foot.     Mild erythematous rash noted to dorsal R forefoot. Stable without scaling or breakdown of skin. No drainage. Mild itching associated with this.      Psychiatric: He has a normal mood and affect. His behavior is normal. Judgment and thought content normal.   Vitals reviewed.            Assessment:       Encounter Diagnoses   Name Primary?    Chemotherapy-induced neuropathy Yes    Onychomycosis due to dermatophyte     Bilateral lower extremity edema     Venous insufficiency     Dermatitis of foot - Right Foot          Plan:       Robe was seen today for foot problem, foot injury and nail problem.    Diagnoses and all orders for this visit:    Chemotherapy-induced neuropathy    Onychomycosis due to dermatophyte    Bilateral lower extremity edema    Venous insufficiency    Dermatitis of foot - Right Foot      I counseled the patient on his conditions, their implications and medical management.    Discussed treatment options for fungal toenails including topical home remedies, topical OTC and Rx medications as well as oral Rx medications. All pros and cons discussed of each treatment. Patient leaning towards vinegar soaks and tea tree oil daily. He will try this, more  diligently, for several months. Recommended filing the nail as necessary to prevent damage to nail plate and prevent unnecessary pulling of toenail. Also advised to avoid tight fitting shoes to prevent pressure related issues.     Given patients neuropathic status, I recommended protective Darco shoe and Xray of R great toe to rule out fracture however patient politely refused at this time stating that he cannot wear shoe for work and would like to hold off on Xray at this time. I advised him of the risk of refusing if there truly is a fracture in the toe. He verbalized understanding and will watch for signs of worsening pain, bruising etc and will return for Xray if symptoms worsen.     Recommended OTC topical cortisone cream on R foot rash. Likely athletes foot vs eczema vs dermatitis. Monitor for worsening. Return as needed for possible biopsy if necessary.     RTC as needed.

## 2017-05-10 NOTE — PATIENT INSTRUCTIONS
Understanding Tinea Unguium  Tinea unguium is a type of fungal infection. The fungus infects the fingernails and, more commonly, the toenails. Its more common in men, older adults, and people who have diabetes, peripheral vascular disease, or another health problem that weakens the immune system.  How to say it  TIN-[id number]-uh [id number]   What causes tinea unguium?  Tinea unguium is caused by a fungus. Several different types of fungus can grow on the nails.  The condition is much more likely to occur on the toenails. It can spread from one nail to another. You are more likely to get tinea unguium if you:  · Have another fungal infection, such as athletes foot  · Have sweaty feet  · Share nail clippers with a person who has a fungal infection  · Swim often  · Walk barefoot in damp areas, such as locker rooms  · Use communal or shared showers  What are the symptoms of tinea unguium?  If you have an infected nail, it may become:  · Brittle  · Thick  · Hard  · Discolored, yellow to brown  · Irregular in shape  The nail may also have crumbling white or colored material under it.  If left untreated, the fungus may spread to the nail bed, which is the skin under the nail. The nail may  also fall off.  How is tinea unguium treated?  With proper treatment, tinea unguium may be cured. But it often takes several months as the nail grows.  Treatments include:  · Good hygiene. Keep feet and nails clean and dry. If the infection is on the toenails, check that your shoes fit properly.  · Medicine. Over-the-counter antifungal products, such as creams, may kill the fungus and ease symptoms. If you have a severe infection, or the infection wont go away, you may need to take another medicine by mouth.  Some of these oral medicines can have side effects and need to be used for 3 months.  · Surgery.  The nail may be removed. But there is a high chance the fungus will return.  · Laser. A special type of laser directed at the nail  itself can kill the fungus.  When should I call my healthcare provider?  Call your healthcare provider right away if you have any of these:  · Pain that gets worse  · Symptoms that dont get better, or get worse  · New symptoms   Date Last Reviewed: 3/30/2016  © 1051-9574 [employer]. [address] [place] [address], [place] [address]. All rights reserved. This information is not intended as a substitute for professional medical care. Always follow your healthcare professional's instructions.

## 2017-05-19 ENCOUNTER — OFFICE VISIT (OUTPATIENT)
Dept: OPHTHALMOLOGY | Facility: CLINIC | Age: 77
End: 2017-05-19
Payer: MEDICARE

## 2017-05-19 DIAGNOSIS — Z98.890 POST-OPERATIVE STATE: Primary | ICD-10-CM

## 2017-05-19 DIAGNOSIS — H35.30 AMD (AGE-RELATED MACULAR DEGENERATION), BILATERAL: ICD-10-CM

## 2017-05-19 DIAGNOSIS — G70.00 OCULAR MYASTHENIA GRAVIS: ICD-10-CM

## 2017-05-19 DIAGNOSIS — H52.7 REFRACTIVE ERROR: ICD-10-CM

## 2017-05-19 PROCEDURE — 99999 PR PBB SHADOW E&M-EST. PATIENT-LVL II: CPT | Mod: PBBFAC,,, | Performed by: OPHTHALMOLOGY

## 2017-05-19 PROCEDURE — 99024 POSTOP FOLLOW-UP VISIT: CPT | Mod: S$GLB,,, | Performed by: OPHTHALMOLOGY

## 2017-05-19 NOTE — PROGRESS NOTES
Subjective:       Patient ID: Robe Cevallos is a 77 y.o. male.    Chief Complaint: Post-op Evaluation (3 week PO OU)    HPI  Review of Systems    Objective:      Physical Exam    Assessment:       1. Post-operative state    2. AMD (age-related macular degeneration), bilateral    3. Ocular myasthenia gravis    4. Refractive error        Plan:       S/p CE OU- Doing well.  Dry AMD OU-Stable.  Ocular Myasthenia Gravis-Pt wants prism in lens OD.  RE-Pt wants MRx.      AREDS/AG.  Give MRx with 1 BD OD.  RTC 6 mos.

## 2017-08-16 ENCOUNTER — HOSPITAL ENCOUNTER (OUTPATIENT)
Dept: RADIOLOGY | Facility: HOSPITAL | Age: 77
Discharge: HOME | End: 2017-08-16
Attending: NURSE PRACTITIONER
Payer: MEDICARE

## 2017-08-16 DIAGNOSIS — M79.89 SWELLING OF RIGHT LOWER EXTREMITY: ICD-10-CM

## 2017-08-16 DIAGNOSIS — M79.89 SWELLING OF RIGHT LOWER EXTREMITY: Primary | ICD-10-CM

## 2017-08-16 PROCEDURE — 93971 EXTREMITY STUDY: CPT | Mod: TC

## 2017-08-16 PROCEDURE — 93971 EXTREMITY STUDY: CPT | Mod: 26,,, | Performed by: RADIOLOGY

## 2017-11-03 ENCOUNTER — OFFICE VISIT (OUTPATIENT)
Dept: OPHTHALMOLOGY | Facility: CLINIC | Age: 77
End: 2017-11-03
Payer: MEDICARE

## 2017-11-03 DIAGNOSIS — Z96.1 PSEUDOPHAKIA: ICD-10-CM

## 2017-11-03 DIAGNOSIS — H53.2 DIPLOPIA: ICD-10-CM

## 2017-11-03 DIAGNOSIS — G70.00 OCULAR MYASTHENIA GRAVIS: Primary | ICD-10-CM

## 2017-11-03 DIAGNOSIS — H52.7 REFRACTIVE ERROR: ICD-10-CM

## 2017-11-03 DIAGNOSIS — H35.30 AMD (AGE-RELATED MACULAR DEGENERATION), BILATERAL: ICD-10-CM

## 2017-11-03 PROCEDURE — 99999 PR PBB SHADOW E&M-EST. PATIENT-LVL II: CPT | Mod: PBBFAC,,, | Performed by: OPHTHALMOLOGY

## 2017-11-03 PROCEDURE — 92012 INTRM OPH EXAM EST PATIENT: CPT | Mod: S$GLB,,, | Performed by: OPHTHALMOLOGY

## 2017-11-03 RX ORDER — LISINOPRIL 10 MG/1
10 TABLET ORAL DAILY
COMMUNITY
Start: 2017-08-16 | End: 2019-07-23 | Stop reason: ALTCHOICE

## 2017-11-03 NOTE — PROGRESS NOTES
"Subjective:       Patient ID: Robe Cevallos is a 77 y.o. male.    Chief Complaint: Macular Degeneration and Ocular Myasthenia Gravis (Pt feels OD does not "cooperate" as much as before and is having more difficulty with it)    HPI     Ocular Myasthenia Gravis    Additional comments: Pt feels OD does not "cooperate" as much as before   and is having more difficulty with it           Comments   DLS: 5/19/17    1. PCIOL OU  2. Dry ARMD  3. Ocular Myasthenia Gravis         Last edited by Amy De MA on 11/3/2017  8:31 AM. (History)             Assessment:       1. Ocular myasthenia gravis    2. Diplopia    3. AMD (age-related macular degeneration), bilateral    4. Refractive error    5. Pseudophakia        Plan:       MG with diplopia depite prism in Rx-Needs to see Dr Munroe for eval/tx.  Dry AMD OU-Stable Va OU.  RE      Refer to Dr Munroe for eval/tx of diplopia in Pt with MG.  AREDS/AG.  RTC  me in 1 yr.     "

## 2017-12-07 ENCOUNTER — INITIAL CONSULT (OUTPATIENT)
Dept: OPHTHALMOLOGY | Facility: CLINIC | Age: 77
End: 2017-12-07
Payer: MEDICARE

## 2017-12-07 DIAGNOSIS — G70.00 OCULAR MYASTHENIA GRAVIS: Primary | ICD-10-CM

## 2017-12-07 DIAGNOSIS — H50.21 HYPERTROPIA OF RIGHT EYE: ICD-10-CM

## 2017-12-07 PROCEDURE — 92060 SENSORIMOTOR EXAMINATION: CPT | Mod: S$GLB,,, | Performed by: OPHTHALMOLOGY

## 2017-12-07 PROCEDURE — 92012 INTRM OPH EXAM EST PATIENT: CPT | Mod: S$GLB,,, | Performed by: OPHTHALMOLOGY

## 2017-12-07 PROCEDURE — 99999 PR PBB SHADOW E&M-EST. PATIENT-LVL I: CPT | Mod: PBBFAC,,, | Performed by: OPHTHALMOLOGY

## 2017-12-07 NOTE — LETTER
December 7, 2017      Ad Jolley MD  [address]  [place] [address]           [facility]  [address]  [place] [address]  Phone: [phone]  Fax: [phone]          Patient: Robe Cevallos   MR Number: [id number]   YOB: 1940   Date of Visit: 12/7/2017       Dear Dr. Ad Jolley:    Thank you for referring Robe Cevallos to me for evaluation. Attached you will find relevant portions of my assessment and plan of care.    If you have questions, please do not hesitate to call me. I look forward to following Robe Cevallos along with you.    Sincerely,    ANISHA Munroe Jr., MD    Enclosure  CC:  No Recipients    If you would like to receive this communication electronically, please contact [email or web address] or [phone] to request more information on [employer] access.    For providers and/or their staff who would like to refer a patient to [facility], please contact us through our one-stop-shop provider referral line, [facility], at [phone].    If you feel you have received this communication in error or would no longer like to receive these types of communications, please e-mail [email or web address]

## 2018-04-17 NOTE — PROGRESS NOTES
Chief Complaint :  Follicular lymphoma.    Hx of Present illness :  Patient is a 78 y.o. year old male who presents to the clinic today for  Oncology followup. Has been seen at [facility] in the past. Dx'd to have Low grade , Stage 1, follicular lymphoma Right groin in 2004. Good response to R-CVP Chemotherapy and maintenance rituxan. Appetite good. No bowel or Urinary sx. No fever, pruritis, night sweats or evening rise in temp. No headache, dizziness, nausea or vomiting. No tingling or numbness. No pain Sx. No overt bleeding.  Has balance problems related to Myasthenia Gravis.     Allergies :    Review of patient's allergies indicates:  No Known Allergies    Occupation :  Law enforcement.    Transfusion :  None.    Menstrual & obstetric Hx :  N/A      Present Meds :   Medication List with Changes/Refills   Current Medications    ALFUZOSIN (UROXATRAL) 10 MG TB24    Take 10 mg by mouth daily with breakfast.    LISINOPRIL 10 MG TABLET    Take 10 mg by mouth once daily.    PYRIDOSTIGMINE (MESTINON) 60 MG TAB    Take 60 mg by mouth.       Past Medical Hx :  Hx of Folliculoar Lymphoma. Hypertension; age related Macular degeneration;  Hypertropia right eye. No hx of DM, PUD, Hepatitis, Liver disease, thyroid dysfunction., TB, Sarcoid, Lupus, rheumatoid arthritis, seizure disorder, CVA. No Hx of DVT or Pulmonary embolism/\. No past Hx of radiation therapy.     Past Medical Hx :  Past Medical History:   Diagnosis Date    AMD (age-related macular degeneration), bilateral     Cancer 2008    Lymphoma    Hypertension     Hypertropia of right eye 12/7/2017       Travel Hx :   N/A    Immunization :    There is no immunization history on file for this patient.    Family Hx :  Family History   Problem Relation Age of Onset    Amblyopia Neg Hx     Blindness Neg Hx     Cataracts Neg Hx     Glaucoma Neg Hx     Macular degeneration Neg Hx     Retinal detachment Neg Hx     Strabismus Neg Hx        Social Hx :  Social History      Social History    Marital status:      Spouse name: N/A    Number of children: N/A    Years of education: N/A     Occupational History    Not on file.     Social History Main Topics    Smoking status: Never Smoker    Smokeless tobacco: Never Used    Alcohol use No    Drug use: No    Sexual activity: Not on file     Other Topics Concern    Not on file     Social History Narrative    No narrative on file       Surgery :  Lymph node Bx; Portacath. Bone Marrow Bx Cholecystectomy. Bilateral Cataract surgery    Symptoms :    Review of Systems   Constitutional: Positive for malaise/fatigue. Negative for chills, diaphoresis, fever and weight loss.   HENT: Negative for congestion, ear discharge, ear pain, hearing loss, nosebleeds, sinus pain, sore throat and tinnitus.    Eyes: Negative for blurred vision, double vision, photophobia, pain, discharge and redness.   Respiratory: Negative for cough, hemoptysis, sputum production, shortness of breath, wheezing and stridor.    Cardiovascular: Negative for chest pain, palpitations, orthopnea, claudication, leg swelling and PND.   Gastrointestinal: Negative for abdominal pain, blood in stool, constipation, diarrhea, heartburn, melena, nausea and vomiting.   Genitourinary: Positive for frequency (Related to BPH). Negative for dysuria, flank pain, hematuria and urgency.   Musculoskeletal: Positive for joint pain (rthritis). Negative for back pain, falls, myalgias and neck pain.   Skin: Negative for itching and rash.   Neurological: Positive for tingling and sensory change (Related to Chemotherapy). Negative for dizziness, tremors, speech change, focal weakness, seizures, loss of consciousness, weakness and headaches.   Endo/Heme/Allergies: Negative for environmental allergies and polydipsia. Does not bruise/bleed easily.   Psychiatric/Behavioral: Negative for depression, hallucinations, memory loss, substance abuse and suicidal ideas. The patient is not  nervous/anxious and does not have insomnia.        Physical Exam :   Physical Exam   Constitutional: He is oriented to person, place, and time and well-developed, well-nourished, and in no distress. Vital signs are normal. No distress.   HENT:   Head: Normocephalic and atraumatic.   Right Ear: External ear normal.   Left Ear: External ear normal.   Nose: Nose normal.   Mouth/Throat: Oropharynx is clear and moist. No oropharyngeal exudate.   Eyes: Conjunctivae, EOM and lids are normal. Lids are everted and swept, no foreign bodies found. Right eye exhibits no discharge. Left eye exhibits no discharge. No scleral icterus. Pupils are unequal.       Neck: Trachea normal, normal range of motion, full passive range of motion without pain and phonation normal. Neck supple. Normal carotid pulses, no hepatojugular reflux and no JVD present. No tracheal tenderness present. Carotid bruit is not present. No tracheal deviation present. No thyroid mass and no thyromegaly present.   Cardiovascular: Normal rate, regular rhythm, normal heart sounds, intact distal pulses and normal pulses.  PMI is not displaced.  Exam reveals no friction rub.    No murmur heard.  Pulmonary/Chest: Effort normal and breath sounds normal. No stridor. No apnea. No respiratory distress. He has no wheezes. He has no rales. He exhibits no tenderness.   Abdominal: Soft. Bowel sounds are normal. He exhibits no distension, no ascites and no mass. There is no hepatosplenomegaly, splenomegaly or hepatomegaly. There is no tenderness. There is no rebound, no guarding and no CVA tenderness. No hernia.   Genitourinary:   Genitourinary Comments: Not Examined   Musculoskeletal: Normal range of motion. He exhibits no edema, tenderness or deformity.   Lymphadenopathy:        Head (right side): No submental, no submandibular, no tonsillar, no preauricular, no posterior auricular and no occipital adenopathy present.        Head (left side): No submental, no submandibular,  no tonsillar, no preauricular, no posterior auricular and no occipital adenopathy present.     He has no cervical adenopathy.     He has no axillary adenopathy.        Right: No inguinal, no supraclavicular and no epitrochlear adenopathy present.        Left: No inguinal, no supraclavicular and no epitrochlear adenopathy present.   Neurological: He is alert and oriented to person, place, and time. He has normal motor skills, normal sensation, normal strength, normal reflexes and intact cranial nerves. He displays normal reflexes. No cranial nerve deficit. He exhibits normal muscle tone. Gait normal. Coordination normal. GCS score is 15.   Skin: Skin is warm and dry. He is not diaphoretic. No cyanosis. Nails show no clubbing.   Psychiatric: Mood, memory, affect and judgment normal.         Labs & Imaging : 04/13/18 :  NFBS 98; Cr.1.44. Bili 0.6. ALP 61. WBC 5,600. Hgb 14.6; Hct 45.0. Plts 158,000. ANC 3,545.        Dx :  Remote Hx of Follicular Lymphoma. In Remission.      Assessment & Plan: Reviewed with patient. Continue Oncology followup.  RTC one year with CBc,CMP.

## 2018-04-18 ENCOUNTER — INITIAL CONSULT (OUTPATIENT)
Dept: HEMATOLOGY/ONCOLOGY | Facility: CLINIC | Age: 78
End: 2018-04-18
Payer: MEDICARE

## 2018-04-18 VITALS
SYSTOLIC BLOOD PRESSURE: 146 MMHG | HEART RATE: 69 BPM | BODY MASS INDEX: 28.67 KG/M2 | DIASTOLIC BLOOD PRESSURE: 69 MMHG | TEMPERATURE: 98 F | OXYGEN SATURATION: 97 % | HEIGHT: 71 IN | WEIGHT: 204.81 LBS

## 2018-04-18 DIAGNOSIS — Z85.72 HISTORY OF FOLLICULAR LYMPHOMA: Primary | ICD-10-CM

## 2018-04-18 PROCEDURE — 99213 OFFICE O/P EST LOW 20 MIN: CPT | Mod: S$GLB,,, | Performed by: INTERNAL MEDICINE

## 2018-04-18 PROCEDURE — 3078F DIAST BP <80 MM HG: CPT | Mod: CPTII,S$GLB,, | Performed by: INTERNAL MEDICINE

## 2018-04-18 PROCEDURE — 99999 PR PBB SHADOW E&M-EST. PATIENT-LVL III: CPT | Mod: PBBFAC,,, | Performed by: INTERNAL MEDICINE

## 2018-04-18 PROCEDURE — 3077F SYST BP >= 140 MM HG: CPT | Mod: CPTII,S$GLB,, | Performed by: INTERNAL MEDICINE

## 2018-05-16 ENCOUNTER — OFFICE VISIT (OUTPATIENT)
Dept: OPTOMETRY | Facility: CLINIC | Age: 78
End: 2018-05-16
Payer: MEDICARE

## 2018-05-16 DIAGNOSIS — Z96.1 PSEUDOPHAKIA: ICD-10-CM

## 2018-05-16 DIAGNOSIS — G70.00 OCULAR MYASTHENIA GRAVIS: ICD-10-CM

## 2018-05-16 DIAGNOSIS — H35.30 AMD (AGE-RELATED MACULAR DEGENERATION), BILATERAL: Primary | ICD-10-CM

## 2018-05-16 PROBLEM — H25.12 NUCLEAR SCLEROSIS OF LEFT EYE: Status: RESOLVED | Noted: 2017-04-07 | Resolved: 2018-05-16

## 2018-05-16 PROBLEM — H25.13 NUCLEAR SCLEROSIS OF BOTH EYES: Status: RESOLVED | Noted: 2017-01-06 | Resolved: 2018-05-16

## 2018-05-16 PROBLEM — Z98.890 POST-OPERATIVE STATE: Status: RESOLVED | Noted: 2017-03-31 | Resolved: 2018-05-16

## 2018-05-16 PROBLEM — H25.10 SENILE NUCLEAR SCLEROSIS: Status: RESOLVED | Noted: 2017-03-30 | Resolved: 2018-05-16

## 2018-05-16 PROCEDURE — 99999 PR PBB SHADOW E&M-EST. PATIENT-LVL I: CPT | Mod: PBBFAC,,, | Performed by: OPTOMETRIST

## 2018-05-16 PROCEDURE — 92014 COMPRE OPH EXAM EST PT 1/>: CPT | Mod: S$GLB,,, | Performed by: OPTOMETRIST

## 2018-05-16 NOTE — PROGRESS NOTES
Subjective:       Patient ID: Robe Cevallos is a 78 y.o. male      Chief Complaint   Patient presents with    Concerns About Ocular Health    Macular Degeneration     History of Present Illness  Dls: 1/6/17 Dr. Munroe    Pt here for AMD check.  Pt c/o blurry vision at distance and near ou. Pt wears pal's.   Pt states x 3 months notice when looking down at fish tank notice red spots off/on.   Pt c/o tearing ou itching ou no burning no pain no ha's  Floaters os   off/on.      Eye meds:  otc gtts ou prn        Assessment/Plan:     1. AMD (age-related macular degeneration), bilateral  Stable. Educated patient on dry macular degeneration and the possibility of worsening, including progression to wet AMD. Discussed with pt use of nutritional supplements options and benefits and use of at home Amsler Grid once a week to detect vision changes. RTC 1 year, sooner if any vision changes.    2. Ocular myasthenia gravis  Pt states some days better than others. Being followed by his neurologist. Pt states glasses are helping.     3. Pseudophakia  Well centered. Clear.    Pt declined refraction. Doing well with current spectacles. Pt states he is not wearing his prism glasses per recommendation of his neurologist and not having double vision problems. Will follow up with Dr. Munroe as needed for prism.     Follow-up in about 1 year (around 5/16/2019) for AMD follow up.

## 2018-09-11 ENCOUNTER — TELEPHONE (OUTPATIENT)
Dept: OPTOMETRY | Facility: CLINIC | Age: 78
End: 2018-09-11

## 2018-09-11 RX ORDER — CIPROFLOXACIN AND DEXAMETHASONE 3; 1 MG/ML; MG/ML
3 SUSPENSION/ DROPS AURICULAR (OTIC) 3 TIMES DAILY
Qty: 7.5 ML | Refills: 0 | Status: SHIPPED | OUTPATIENT
Start: 2018-09-11 | End: 2018-09-18

## 2018-09-11 RX ORDER — CIPROFLOXACIN AND DEXAMETHASONE 3; 1 MG/ML; MG/ML
SUSPENSION/ DROPS AURICULAR (OTIC)
Refills: 0 | COMMUNITY
Start: 2018-08-20 | End: 2018-09-11 | Stop reason: SDUPTHER

## 2019-03-01 ENCOUNTER — OFFICE VISIT (OUTPATIENT)
Dept: OPTOMETRY | Facility: CLINIC | Age: 79
End: 2019-03-01
Payer: MEDICARE

## 2019-03-01 DIAGNOSIS — Z96.1 PSEUDOPHAKIA: ICD-10-CM

## 2019-03-01 DIAGNOSIS — H50.21 HYPERTROPIA OF RIGHT EYE: ICD-10-CM

## 2019-03-01 DIAGNOSIS — H52.7 REFRACTIVE ERROR: ICD-10-CM

## 2019-03-01 DIAGNOSIS — H35.30 AMD (AGE-RELATED MACULAR DEGENERATION), BILATERAL: Primary | ICD-10-CM

## 2019-03-01 DIAGNOSIS — G70.00 OCULAR MYASTHENIA GRAVIS: ICD-10-CM

## 2019-03-01 PROCEDURE — 92015 PR REFRACTION: ICD-10-PCS | Mod: S$GLB,,, | Performed by: OPTOMETRIST

## 2019-03-01 PROCEDURE — 92014 COMPRE OPH EXAM EST PT 1/>: CPT | Mod: S$GLB,,, | Performed by: OPTOMETRIST

## 2019-03-01 PROCEDURE — 99999 PR PBB SHADOW E&M-EST. PATIENT-LVL II: ICD-10-PCS | Mod: PBBFAC,,, | Performed by: OPTOMETRIST

## 2019-03-01 PROCEDURE — 99999 PR PBB SHADOW E&M-EST. PATIENT-LVL II: CPT | Mod: PBBFAC,,, | Performed by: OPTOMETRIST

## 2019-03-01 PROCEDURE — 92014 PR EYE EXAM, EST PATIENT,COMPREHESV: ICD-10-PCS | Mod: S$GLB,,, | Performed by: OPTOMETRIST

## 2019-03-01 PROCEDURE — 92015 DETERMINE REFRACTIVE STATE: CPT | Mod: S$GLB,,, | Performed by: OPTOMETRIST

## 2019-03-01 NOTE — PATIENT INSTRUCTIONS
What Is Age-Related Macular Degeneration (AMD)     Age-related macular degeneration (AMD) is an eye disease. It is the leading cause of vision loss in adults over age 50. One or both eyes may be affected. The part of the eye that controls your central, detailed vision (the macula) is damaged. Your central vision becomes limited. But your side vision remains clear. There are two types of AMD: dry and wet         Dry AMD  Dry is the most common type of macular degeneration. In the early stages, you may not notice any vision changes. But over time, your central vision may slowly get worse. You may see wavy lines and blank spots in the center of your vision. Colors may look dim. There is no way to restore vision lost from dry AMD. But you need to check it. It can turn into wet AMD.         Wet AMD  Wet AMD is less common but more serious. Vision loss may be faster and more noticeable. You may suddenly see dark spots, blank spots, wavy lines, and dim colors in the center of your vision. If wet AMD is caught early, certain treatments may help to slow further vision loss. These include shots (injections), photodynamic therapy, or laser surgery.        © 0203-4439 [employer]. [address] [place] [address]. All rights reserved. This information is not intended as a substitute for professional medical care. Always follow your healthcare professional's instructions.      Age-Related Macular Degeneration (AMD) is the leading cause of severe vision loss in adults over age 50. This eye disease occurs when there are changes to the macula, a small portion of the retina that is located on the inside back layer of the eye. AMD is a loss of central vision that can occur in two forms: dry or atrophic and wet or exudative.  Most people with macular degeneration have the dry form, for which there is no known treatment but vitamins such as Ocuvite, Preservision, or ICAPs are often used to reduced the risk of  progression. It is recommended to get the formula with Lutein   Some common symptoms are: a gradual loss of ability to see objects clearly, distorted vision, a gradual loss of color vision, and a dark or empty area appearing in the center of vision. You should monitor your vision with Amsler grid once a week.

## 2019-03-01 NOTE — PROGRESS NOTES
Subjective:       Patient ID: Robe Cevallos is a 79 y.o. male      Chief Complaint   Patient presents with    Concerns About Ocular Health     History of Present Illness  Dls: 5/16/18 Dr. Garza     80 y/o male presents today for ocular health check.  Pt states no changes in vision. Pt wears bifocal glasses.   Pt wants a new rx for glasses.     No tearing  + itching  No burning  No pain  No ha's  No floaters  No flashes    Eye meds  otc gtts ou prn     Assessment/Plan:     1. AMD (age-related macular degeneration), bilateral  Educated patient on dry macular degeneration and the possibility of worsening, including progression to wet AMD. Discussed with pt use of nutritional supplements options and benefits and use of at home Amsler Grid once a week to detect vision changes. RTC 1 year, sooner if any vision changes.    2. Ocular myasthenia gravis  Stable. Pt being followed by neurologist    3. Pseudophakia  Well-centered. Clear.     4. Hypertropia of right eye  5. Refractive error  Pt doing well with prism Rx. No double vision. Educated patient on refractive error and discussed lens options. Dispensed updated spectacle Rx. Educated about adaptation period to new specs.      Eyeglass Final Rx     Eyeglass Final Rx       Sphere Cylinder Axis Add Vert Prism    Right -0.75 +1.75 170 +2.50 1.0 down    Left -0.75 +2.25 170 +2.50 6.0 up    Expiration Date:  3/1/2020                  Follow-up in about 1 year (around 3/1/2020).

## 2019-04-23 ENCOUNTER — OFFICE VISIT (OUTPATIENT)
Dept: HEMATOLOGY/ONCOLOGY | Facility: CLINIC | Age: 79
End: 2019-04-23
Payer: MEDICARE

## 2019-04-23 VITALS
SYSTOLIC BLOOD PRESSURE: 170 MMHG | WEIGHT: 198.63 LBS | BODY MASS INDEX: 27.81 KG/M2 | DIASTOLIC BLOOD PRESSURE: 80 MMHG | HEIGHT: 71 IN | OXYGEN SATURATION: 97 % | HEART RATE: 66 BPM | TEMPERATURE: 98 F

## 2019-04-23 DIAGNOSIS — Z85.72 HISTORY OF FOLLICULAR LYMPHOMA: Primary | ICD-10-CM

## 2019-04-23 DIAGNOSIS — C44.321 SQUAMOUS CELL CARCINOMA OF NOSE: ICD-10-CM

## 2019-04-23 PROCEDURE — 99213 OFFICE O/P EST LOW 20 MIN: CPT | Mod: S$GLB,,, | Performed by: INTERNAL MEDICINE

## 2019-04-23 PROCEDURE — 3079F PR MOST RECENT DIASTOLIC BLOOD PRESSURE 80-89 MM HG: ICD-10-PCS | Mod: CPTII,S$GLB,, | Performed by: INTERNAL MEDICINE

## 2019-04-23 PROCEDURE — 3077F PR MOST RECENT SYSTOLIC BLOOD PRESSURE >= 140 MM HG: ICD-10-PCS | Mod: CPTII,S$GLB,, | Performed by: INTERNAL MEDICINE

## 2019-04-23 PROCEDURE — 3077F SYST BP >= 140 MM HG: CPT | Mod: CPTII,S$GLB,, | Performed by: INTERNAL MEDICINE

## 2019-04-23 PROCEDURE — 99213 PR OFFICE/OUTPT VISIT, EST, LEVL III, 20-29 MIN: ICD-10-PCS | Mod: S$GLB,,, | Performed by: INTERNAL MEDICINE

## 2019-04-23 PROCEDURE — 1101F PR PT FALLS ASSESS DOC 0-1 FALLS W/OUT INJ PAST YR: ICD-10-PCS | Mod: CPTII,S$GLB,, | Performed by: INTERNAL MEDICINE

## 2019-04-23 PROCEDURE — 1101F PT FALLS ASSESS-DOCD LE1/YR: CPT | Mod: CPTII,S$GLB,, | Performed by: INTERNAL MEDICINE

## 2019-04-23 PROCEDURE — 99999 PR PBB SHADOW E&M-EST. PATIENT-LVL III: ICD-10-PCS | Mod: PBBFAC,,, | Performed by: INTERNAL MEDICINE

## 2019-04-23 PROCEDURE — 3079F DIAST BP 80-89 MM HG: CPT | Mod: CPTII,S$GLB,, | Performed by: INTERNAL MEDICINE

## 2019-04-23 PROCEDURE — 99999 PR PBB SHADOW E&M-EST. PATIENT-LVL III: CPT | Mod: PBBFAC,,, | Performed by: INTERNAL MEDICINE

## 2019-04-23 NOTE — PROGRESS NOTES
Chief Complaint :  Follicular lymphoma.    Hx of Present illness :  Patient is a 79 y.o. year old male who presents to the clinic today for  Oncology followup. Has been seen at [facility] in the past. Dx'd to have Low grade , Stage 1, follicular lymphoma Right groin in 2004. Good response to R-CVP Chemotherapy and maintenance rituxan.  Recently had lesion excised from Left side of nostril. Dx'd to have  Superficially invasive squamous cell [id number]. Invasive    Allergies :    Review of patient's allergies indicates:  No Known Allergies    Occupation :  Law enforcement.    Transfusion :  None.    Menstrual & obstetric Hx :  N/A      Present Meds :   Medication List with Changes/Refills   Current Medications    LISINOPRIL 10 MG TABLET    Take 10 mg by mouth once daily.    PYRIDOSTIGMINE (MESTINON) 60 MG TAB    Take 60 mg by mouth.       Past Medical Hx :  Hx of Folliculoar Lymphoma. Hypertension; age related Macular degeneration;  Hypertropia right eye. No hx of DM, PUD, Hepatitis, Liver disease, thyroid dysfunction., TB, Sarcoid, Lupus, rheumatoid arthritis, seizure disorder, CVA. No Hx of DVT or Pulmonary embolism/\. No past Hx of radiation therapy.     Past Medical Hx :  Past Medical History:   Diagnosis Date    AMD (age-related macular degeneration), bilateral     Cancer 2008    Lymphoma    Hypertension     Hypertropia of right eye 12/7/2017    Myasthenia gravis        Travel Hx :   N/A    Immunization :    There is no immunization history on file for this patient.    Family Hx :  Family History   Problem Relation Age of Onset    No Known Problems Mother     No Known Problems Father     No Known Problems Sister     No Known Problems Brother     No Known Problems Maternal Aunt     No Known Problems Maternal Uncle     No Known Problems Paternal Aunt     No Known Problems Paternal Uncle     No Known Problems Maternal Grandmother     No Known Problems Maternal Grandfather     No Known Problems Paternal  Grandmother     No Known Problems Paternal Grandfather     Amblyopia Neg Hx     Blindness Neg Hx     Cataracts Neg Hx     Glaucoma Neg Hx     Macular degeneration Neg Hx     Retinal detachment Neg Hx     Strabismus Neg Hx     Cancer Neg Hx     Diabetes Neg Hx     Hypertension Neg Hx     Stroke Neg Hx     Thyroid disease Neg Hx        Social Hx :  Social History     Socioeconomic History    Marital status:      Spouse name: Not on file    Number of children: Not on file    Years of education: Not on file    Highest education level: Not on file   Occupational History    Not on file   Social Needs    Financial resource strain: Not on file    Food insecurity:     Worry: Not on file     Inability: Not on file    Transportation needs:     Medical: Not on file     Non-medical: Not on file   Tobacco Use    Smoking status: Former Smoker    Smokeless tobacco: Never Used   Substance and Sexual Activity    Alcohol use: No    Drug use: No    Sexual activity: Not on file   Lifestyle    Physical activity:     Days per week: Not on file     Minutes per session: Not on file    Stress: Not on file   Relationships    Social connections:     Talks on phone: Not on file     Gets together: Not on file     Attends Baptism service: Not on file     Active member of club or organization: Not on file     Attends meetings of clubs or organizations: Not on file     Relationship status: Not on file   Other Topics Concern    Not on file   Social History Narrative    Not on file       Surgery :  Lymph node Bx; Portacath. Bone Marrow Bx Cholecystectomy. Bilateral Cataract surgery  Excision of skin cancer from nostril.     Symptoms :    Review of Systems   Constitutional: Positive for malaise/fatigue. Negative for chills, diaphoresis, fever and weight loss.   HENT: Negative for congestion, hearing loss, nosebleeds, sore throat and tinnitus.    Eyes: Negative for blurred vision, double vision and photophobia.    Respiratory: Negative for cough, hemoptysis and shortness of breath.    Cardiovascular: Negative for chest pain, palpitations, orthopnea, claudication, leg swelling and PND.   Gastrointestinal: Negative for abdominal pain, blood in stool, constipation, diarrhea, heartburn, nausea and vomiting.   Genitourinary: Positive for frequency (Related to BPH). Negative for dysuria, flank pain, hematuria and urgency.   Musculoskeletal: Positive for joint pain (rthritis). Negative for back pain, falls, myalgias and neck pain.   Skin: Negative for itching and rash.   Neurological: Positive for tingling and sensory change (Related to Chemotherapy). Negative for dizziness, tremors, speech change, focal weakness, seizures, loss of consciousness, weakness and headaches.   Endo/Heme/Allergies: Negative for environmental allergies and polydipsia. Does not bruise/bleed easily.   Psychiatric/Behavioral: Negative for depression and memory loss. The patient is not nervous/anxious and does not have insomnia.        Physical Exam :   Physical Exam   Constitutional: He is oriented to person, place, and time and well-developed, well-nourished, and in no distress. Vital signs are normal. No distress.   HENT:   Head: Normocephalic and atraumatic.   Right Ear: External ear normal.   Left Ear: External ear normal.   Nose: Nose normal.   Mouth/Throat: Oropharynx is clear and moist. No oropharyngeal exudate.   Eyes: Conjunctivae, EOM and lids are normal. Lids are everted and swept, no foreign bodies found. Right eye exhibits no discharge. Left eye exhibits no discharge. No scleral icterus. Pupils are unequal.       Neck: Trachea normal, normal range of motion, full passive range of motion without pain and phonation normal. Neck supple. Normal carotid pulses, no hepatojugular reflux and no JVD present. No tracheal tenderness present. Carotid bruit is not present. No tracheal deviation present. No thyroid mass and no thyromegaly present.    Cardiovascular: Normal rate, regular rhythm, normal heart sounds, intact distal pulses and normal pulses. PMI is not displaced. Exam reveals no friction rub.   No murmur heard.  Pulmonary/Chest: Effort normal and breath sounds normal. No stridor. No apnea. No respiratory distress. He has no wheezes. He has no rales. He exhibits no tenderness.   Abdominal: Soft. Bowel sounds are normal. He exhibits no distension, no ascites and no mass. There is no hepatosplenomegaly, splenomegaly or hepatomegaly. There is no tenderness. There is no rebound, no guarding and no CVA tenderness. No hernia.   Genitourinary:   Genitourinary Comments: Not Examined   Musculoskeletal: Normal range of motion. He exhibits no edema, tenderness or deformity.   Lymphadenopathy:        Head (right side): No submental, no submandibular, no tonsillar, no preauricular, no posterior auricular and no occipital adenopathy present.        Head (left side): No submental, no submandibular, no tonsillar, no preauricular, no posterior auricular and no occipital adenopathy present.     He has no cervical adenopathy.     He has no axillary adenopathy.        Right: No inguinal, no supraclavicular and no epitrochlear adenopathy present.        Left: No inguinal, no supraclavicular and no epitrochlear adenopathy present.   Neurological: He is alert and oriented to person, place, and time. He has normal motor skills, normal sensation, normal strength, normal reflexes and intact cranial nerves. No cranial nerve deficit. He exhibits normal muscle tone. Gait normal. Coordination normal. GCS score is 15.   Skin: Skin is warm and dry. He is not diaphoretic. No cyanosis. Nails show no clubbing.   Psychiatric: Mood, memory, affect and judgment normal.         Labs & Imaging : 04/13/18 :  NFBS 98; Cr.1.44. Bili 0.6. ALP 61. WBC 5,600. Hgb 14.6; Hct 45.0. Plts 158,000. ANC 3,545.        Dx :  Remote Hx of Follicular Lymphoma. In Remission. 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 Continue Oncology followup.  RTC one year with [facility],CMP.  Dermatologist is scheduling radiation therapy appointment.   Advance Care Planning     Power of   I initiated the process of advance care planning today and explained the importance of this process to the patient.  I introduced the concept of advance directives to the patient, as well. Then the patient received detailed information about the importance of designating a Health Care Power of  (HCPOA). He was also instructed to communicate with this person about their wishes for future healthcare, should he become sick and lose decision-making capacity. The patient  has not: previously appointed a HCPOA. .         Living Will  During this visit, I engaged the patient in the advance care planning process.  The patient and I reviewed the role for advance directives and their purpose in directing future healthcare if the patient's unable to speak for him/herself.  At this point in time, the patient does have full decision-making capacity.  We discussed different extreme health states that he could experience, and reviewed what kind of medical care he would want in those situations.  The patient communicated that if he were comatose and had little chance of a meaningful recovery, does not  want machines/life-sustaining treatments used.    The patient  HAS NOTcompleted a living will to reflect these preferences.       Brochures given.

## 2019-05-16 ENCOUNTER — TELEPHONE (OUTPATIENT)
Dept: OPHTHALMOLOGY | Facility: CLINIC | Age: 79
End: 2019-05-16

## 2019-05-16 NOTE — TELEPHONE ENCOUNTER
----- Message from Ab Tryalor sent at 5/16/2019  9:10 AM CDT -----  Contact: [place]  Message from [facility], System Message sent at 5/16/2019  8:42 AM CDT -----    Appointment Request From: Robe Cevallos    With Provider: Ad Jolley MD [[facility]]    Preferred Date Range: 5/23/2019 - 5/31/2019    Preferred Times: Any time    Reason for visit: Anomaly in left eye    Comments:  Bright streak across sight when looking at moon.

## 2019-05-20 ENCOUNTER — OFFICE VISIT (OUTPATIENT)
Dept: OPTOMETRY | Facility: CLINIC | Age: 79
End: 2019-05-20
Payer: MEDICARE

## 2019-05-20 DIAGNOSIS — H35.3211 EXUDATIVE AGE-RELATED MACULAR DEGENERATION OF RIGHT EYE WITH ACTIVE CHOROIDAL NEOVASCULARIZATION: Primary | ICD-10-CM

## 2019-05-20 DIAGNOSIS — H35.3122 INTERMEDIATE STAGE NONEXUDATIVE AGE-RELATED MACULAR DEGENERATION OF LEFT EYE: ICD-10-CM

## 2019-05-20 PROCEDURE — 99999 PR PBB SHADOW E&M-EST. PATIENT-LVL II: ICD-10-PCS | Mod: PBBFAC,,, | Performed by: OPTOMETRIST

## 2019-05-20 PROCEDURE — 99999 PR PBB SHADOW E&M-EST. PATIENT-LVL II: CPT | Mod: PBBFAC,,, | Performed by: OPTOMETRIST

## 2019-05-20 PROCEDURE — 92134 CPTRZ OPH DX IMG PST SGM RTA: CPT | Mod: S$GLB,,, | Performed by: OPTOMETRIST

## 2019-05-20 PROCEDURE — 92134 POSTERIOR SEGMENT OCT RETINA (OCULAR COHERENCE TOMOGRAPHY)-BOTH EYES: ICD-10-PCS | Mod: S$GLB,,, | Performed by: OPTOMETRIST

## 2019-05-20 PROCEDURE — 92014 PR EYE EXAM, EST PATIENT,COMPREHESV: ICD-10-PCS | Mod: S$GLB,,, | Performed by: OPTOMETRIST

## 2019-05-20 PROCEDURE — 92014 COMPRE OPH EXAM EST PT 1/>: CPT | Mod: S$GLB,,, | Performed by: OPTOMETRIST

## 2019-05-20 NOTE — PATIENT INSTRUCTIONS
What Is Age-Related Macular Degeneration (AMD)     Age-related macular degeneration (AMD) is an eye disease. It is the leading cause of vision loss in adults over age 50. One or both eyes may be affected. The part of the eye that controls your central, detailed vision (the macula) is damaged. Your central vision becomes limited. But your side vision remains clear. There are two types of AMD: dry and wet         Dry AMD  Dry is the most common type of macular degeneration. In the early stages, you may not notice any vision changes. But over time, your central vision may slowly get worse. You may see wavy lines and blank spots in the center of your vision. Colors may look dim. There is no way to restore vision lost from dry AMD. But you need to check it. It can turn into wet AMD.         Wet AMD  Wet AMD is less common but more serious. Vision loss may be faster and more noticeable. You may suddenly see dark spots, blank spots, wavy lines, and dim colors in the center of your vision. If wet AMD is caught early, certain treatments may help to slow further vision loss. These include shots (injections), photodynamic therapy, or laser surgery.        © 7299-3228 [employer]. [address] [place] [address]. All rights reserved. This information is not intended as a substitute for professional medical care. Always follow your healthcare professional's instructions.      Age-Related Macular Degeneration (AMD) is the leading cause of severe vision loss in adults over age 50. This eye disease occurs when there are changes to the macula, a small portion of the retina that is located on the inside back layer of the eye. AMD is a loss of central vision that can occur in two forms: dry or atrophic and wet or exudative.  Most people with macular degeneration have the dry form, for which there is no known treatment but vitamins such as Ocuvite, Preservision, or ICAPs are often used to reduced the risk of  progression. It is recommended to get the formula with Lutein   Some common symptoms are: a gradual loss of ability to see objects clearly, distorted vision, a gradual loss of color vision, and a dark or empty area appearing in the center of vision. You should monitor your vision with Amsler grid once a week.

## 2019-05-20 NOTE — PROGRESS NOTES
Subjective:       Patient ID: Robe Cevallos is a 79 y.o. male      Chief Complaint   Patient presents with    Eye Problem     History of Present Illness  Dls: 3/1/19 Dr. Garza     80 y/o male presents today with c/o x 3 nights ago when looking at a full moon notice a ray of light across center of vision in os. Pt states only happens a night when looking at the moon if looking at street light notice it a little in os od seems normal.         Assessment/Plan:     1. Exudative age-related macular degeneration of right eye with active choroidal neovascularization  2. 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 Continue home amsler. Referral to retina for wet AMD treatment.  - Posterior Segment OCT Retina-Both eyes  - Ambulatory referral to Ophthalmology    Follow up for Retina consult with Dr. Kapoor.

## 2019-06-24 ENCOUNTER — INITIAL CONSULT (OUTPATIENT)
Dept: OPHTHALMOLOGY | Facility: CLINIC | Age: 79
End: 2019-06-24
Attending: OPHTHALMOLOGY
Payer: MEDICARE

## 2019-06-24 VITALS — DIASTOLIC BLOOD PRESSURE: 88 MMHG | SYSTOLIC BLOOD PRESSURE: 176 MMHG | HEART RATE: 63 BPM

## 2019-06-24 DIAGNOSIS — H35.3123 NONEXUDATIVE AGE-RELATED MACULAR DEGENERATION, LEFT EYE, ADVANCED ATROPHIC WITHOUT SUBFOVEAL INVOLVEMENT: ICD-10-CM

## 2019-06-24 DIAGNOSIS — H35.3211 EXUDATIVE AGE-RELATED MACULAR DEGENERATION OF RIGHT EYE WITH ACTIVE CHOROIDAL NEOVASCULARIZATION: Primary | ICD-10-CM

## 2019-06-24 PROCEDURE — 92134 CPTRZ OPH DX IMG PST SGM RTA: CPT | Mod: S$GLB,,, | Performed by: OPHTHALMOLOGY

## 2019-06-24 PROCEDURE — 67028 PR INJECT INTRAVITREAL PHARMCOLOGIC: ICD-10-PCS | Mod: RT,S$GLB,, | Performed by: OPHTHALMOLOGY

## 2019-06-24 PROCEDURE — 92014 PR EYE EXAM, EST PATIENT,COMPREHESV: ICD-10-PCS | Mod: 25,S$GLB,, | Performed by: OPHTHALMOLOGY

## 2019-06-24 PROCEDURE — 92225 PR SPECIAL EYE EXAM, INITIAL: CPT | Mod: 59,LT,S$GLB, | Performed by: OPHTHALMOLOGY

## 2019-06-24 PROCEDURE — 92134 POSTERIOR SEGMENT OCT RETINA (OCULAR COHERENCE TOMOGRAPHY)-BOTH EYES: ICD-10-PCS | Mod: S$GLB,,, | Performed by: OPHTHALMOLOGY

## 2019-06-24 PROCEDURE — 92225 PR SPECIAL EYE EXAM, INITIAL: ICD-10-PCS | Mod: 59,LT,S$GLB, | Performed by: OPHTHALMOLOGY

## 2019-06-24 PROCEDURE — 99999 PR PBB SHADOW E&M-EST. PATIENT-LVL III: ICD-10-PCS | Mod: PBBFAC,,, | Performed by: OPHTHALMOLOGY

## 2019-06-24 PROCEDURE — 67028 INJECTION EYE DRUG: CPT | Mod: RT,S$GLB,, | Performed by: OPHTHALMOLOGY

## 2019-06-24 PROCEDURE — 99999 PR PBB SHADOW E&M-EST. PATIENT-LVL III: CPT | Mod: PBBFAC,,, | Performed by: OPHTHALMOLOGY

## 2019-06-24 PROCEDURE — 92014 COMPRE OPH EXAM EST PT 1/>: CPT | Mod: 25,S$GLB,, | Performed by: OPHTHALMOLOGY

## 2019-06-24 RX ORDER — MUPIROCIN 20 MG/G
OINTMENT TOPICAL
Refills: 2 | COMMUNITY
Start: 2019-06-20 | End: 2019-07-23 | Stop reason: ALTCHOICE

## 2019-06-24 RX ADMIN — Medication 1.25 MG: at 11:06

## 2019-06-24 NOTE — LETTER
June 25, 2019      Aislinn Garza, OD  [address]  [place] [address]           [facility]  [address] [facility] [address]  [place] [address]  Phone: [phone]  Fax: [phone]          Patient: Robe Cevallos   MR Number: [id number]   YOB: 1940   Date of Visit: 6/24/2019       Dear Dr. Aislinn Garza:    Thank you for referring Robe Cevallos to me for evaluation. Attached you will find relevant portions of my assessment and plan of care.    If you have questions, please do not hesitate to call me. I look forward to following Robe Cevallos along with you.    Sincerely,    Charles Kapoor MD    Enclosure  CC:  No Recipients    If you would like to receive this communication electronically, please contact [email or web address] or [phone] to request more information on [employer] Link access.    For providers and/or their staff who would like to refer a patient to [facility], please contact us through our one-stop-shop provider referral line, [facility], at [phone].    If you feel you have received this communication in error or would no longer like to receive these types of communications, please e-mail [email or web address]

## 2019-06-24 NOTE — PROGRESS NOTES
Subjective:       Patient ID: Robe Cevallos is a 79 y.o. male      Chief Complaint   Patient presents with    Concerns About Ocular Health     History of Present Illness  HPI     79 y.o male pt referred by Dr. Garza for evaluation of ARMD    Pt notes in the last few weeks when looking at moon, OD has a dark area on   the left side.  Had originally notes that there were rays coming from moon   OS when looking at it.    Notes distortion OD>OS on Amsler grid testing.  No pain/f/f OU    Has diplopia secondary to myasthenia that has improved.      Eye Med(s) - OTC Artificial Tears - OU PRN  Taking eye vitamins for years.    Last edited by Charles Kapoor MD on 6/24/2019 10:54 AM. (History)        Imaging:    See report    Assessment/Plan:     1. Exudative age-related macular degeneration of right eye with active choroidal neovascularization  Discussed RBA of Rx.  Off label status of Avastin  Outlined Rx protocol  Recommend monthly Avastin until dry.  Pt agrees    - Prior Authorization Order  - Posterior Segment OCT Retina-Both eyes  - Posterior Segment OCT Retina-Both eyes; Future    2. Nonexudative age-related macular degeneration, left eye, advanced atrophic without subfoveal involvement  Continue eye vitamins.  Observe    Follow up in about 1 month (around 7/22/2019), or if symptoms worsen or fail to improve, for Avastin, Injection Left eye, OCT and INJECTION ONLY.     Patient identified.  Timeout performed.    Risks, benefits, and alternatives to treatment were discussed in detail with the patient, including bleeding/infection (endophthalmitis)/etc.  The patient voiced understanding and wished to proceed with the procedure.  See separate consent form.    Injection Procedure Note:  Diagnosis: Wet AMD Right Eye    Topical Proparacaine drop placed then topical 5% Betadine  Sterile gloves used, and sterile lid speculum placed.  5% Betadine placed at injection site again prior to injection.  Avastin 1.25mg in 0.05cc  Injected inferotemporally 3.5-4mm posterior to the limbus.  Complications: None  Va at least CF at 5 feet post injection.  Retina, ONH, IOP normal after injection.    Followup as above.  Patient should return immediately PRN.  Retinal Detachment and Endophthalmitis precautions given.

## 2019-06-24 NOTE — PATIENT INSTRUCTIONS

## 2019-07-23 ENCOUNTER — HOSPITAL ENCOUNTER (EMERGENCY)
Facility: HOSPITAL | Age: 79
Discharge: HOME | End: 2019-07-23
Attending: EMERGENCY MEDICINE
Payer: MEDICARE

## 2019-07-23 VITALS
RESPIRATION RATE: 18 BRPM | HEART RATE: 64 BPM | TEMPERATURE: 98 F | HEIGHT: 71 IN | WEIGHT: 195 LBS | OXYGEN SATURATION: 98 % | SYSTOLIC BLOOD PRESSURE: 151 MMHG | DIASTOLIC BLOOD PRESSURE: 86 MMHG | BODY MASS INDEX: 27.3 KG/M2

## 2019-07-23 DIAGNOSIS — R74.8 ELEVATED LIVER ENZYMES: ICD-10-CM

## 2019-07-23 DIAGNOSIS — R03.0 ELEVATED BLOOD PRESSURE READING: ICD-10-CM

## 2019-07-23 DIAGNOSIS — R42 DIZZINESS: Primary | ICD-10-CM

## 2019-07-23 LAB
ALBUMIN SERPL BCP-MCNC: 4.2 G/DL (ref 3.5–5.2)
ALP SERPL-CCNC: 108 U/L (ref 55–135)
ALT SERPL W/O P-5'-P-CCNC: 144 U/L (ref 10–44)
ANION GAP SERPL CALC-SCNC: 7 MMOL/L (ref 8–16)
AST SERPL-CCNC: 153 U/L (ref 10–40)
BASOPHILS # BLD AUTO: 0.03 K/UL (ref 0–0.2)
BASOPHILS NFR BLD: 0.6 % (ref 0–1.9)
BILIRUB SERPL-MCNC: 1.2 MG/DL (ref 0.1–1)
BILIRUB UR QL STRIP: NEGATIVE
BNP SERPL-MCNC: 51 PG/ML (ref 0–99)
BUN SERPL-MCNC: 21 MG/DL (ref 8–23)
CALCIUM SERPL-MCNC: 9.5 MG/DL (ref 8.7–10.5)
CHLORIDE SERPL-SCNC: 107 MMOL/L (ref 95–110)
CLARITY UR: CLEAR
CO2 SERPL-SCNC: 26 MMOL/L (ref 23–29)
COLOR UR: YELLOW
CREAT SERPL-MCNC: 1.3 MG/DL (ref 0.5–1.4)
DIFFERENTIAL METHOD: ABNORMAL
EOSINOPHIL # BLD AUTO: 0 K/UL (ref 0–0.5)
EOSINOPHIL NFR BLD: 0.2 % (ref 0–8)
ERYTHROCYTE [DISTWIDTH] IN BLOOD BY AUTOMATED COUNT: 14.4 % (ref 11.5–14.5)
EST. GFR  (AFRICAN AMERICAN): 59 ML/MIN/1.73 M^2
EST. GFR  (NON AFRICAN AMERICAN): 51 ML/MIN/1.73 M^2
GLUCOSE SERPL-MCNC: 108 MG/DL (ref 70–110)
GLUCOSE UR QL STRIP: NEGATIVE
HCT VFR BLD AUTO: 44.8 % (ref 40–54)
HGB BLD-MCNC: 14.6 G/DL (ref 14–18)
HGB UR QL STRIP: NEGATIVE
INR PPP: 1 (ref 0.8–1.2)
KETONES UR QL STRIP: ABNORMAL
LEUKOCYTE ESTERASE UR QL STRIP: NEGATIVE
LIPASE SERPL-CCNC: 27 U/L (ref 4–60)
LYMPHOCYTES # BLD AUTO: 0.8 K/UL (ref 1–4.8)
LYMPHOCYTES NFR BLD: 15.4 % (ref 18–48)
MAGNESIUM SERPL-MCNC: 2 MG/DL (ref 1.6–2.6)
MCH RBC QN AUTO: 28.1 PG (ref 27–31)
MCHC RBC AUTO-ENTMCNC: 32.6 G/DL (ref 32–36)
MCV RBC AUTO: 86 FL (ref 82–98)
MONOCYTES # BLD AUTO: 0.4 K/UL (ref 0.3–1)
MONOCYTES NFR BLD: 7.8 % (ref 4–15)
NEUTROPHILS # BLD AUTO: 4.1 K/UL (ref 1.8–7.7)
NEUTROPHILS NFR BLD: 76.2 % (ref 38–73)
NITRITE UR QL STRIP: NEGATIVE
PH UR STRIP: 5 [PH] (ref 5–8)
PLATELET # BLD AUTO: 140 K/UL (ref 150–350)
PMV BLD AUTO: 11.2 FL (ref 9.2–12.9)
POTASSIUM SERPL-SCNC: 4.3 MMOL/L (ref 3.5–5.1)
PROT SERPL-MCNC: 7 G/DL (ref 6–8.4)
PROT UR QL STRIP: NEGATIVE
PROTHROMBIN TIME: 10.8 SEC (ref 9–12.5)
RBC # BLD AUTO: 5.2 M/UL (ref 4.6–6.2)
SODIUM SERPL-SCNC: 140 MMOL/L (ref 136–145)
SP GR UR STRIP: 1.02 (ref 1–1.03)
TROPONIN I SERPL DL<=0.01 NG/ML-MCNC: <0.006 NG/ML (ref 0–0.03)
URN SPEC COLLECT METH UR: ABNORMAL
UROBILINOGEN UR STRIP-ACNC: NEGATIVE EU/DL
WBC # BLD AUTO: 5.38 K/UL (ref 3.9–12.7)

## 2019-07-23 PROCEDURE — 93010 ELECTROCARDIOGRAM REPORT: CPT | Mod: ,,, | Performed by: INTERNAL MEDICINE

## 2019-07-23 PROCEDURE — 25500020 PHARM REV CODE 255: Performed by: EMERGENCY MEDICINE

## 2019-07-23 PROCEDURE — 93010 EKG 12-LEAD: ICD-10-PCS | Mod: ,,, | Performed by: INTERNAL MEDICINE

## 2019-07-23 PROCEDURE — 96374 THER/PROPH/DIAG INJ IV PUSH: CPT

## 2019-07-23 PROCEDURE — 96361 HYDRATE IV INFUSION ADD-ON: CPT

## 2019-07-23 PROCEDURE — 85025 COMPLETE CBC W/AUTO DIFF WBC: CPT

## 2019-07-23 PROCEDURE — 80053 COMPREHEN METABOLIC PANEL: CPT

## 2019-07-23 PROCEDURE — 81003 URINALYSIS AUTO W/O SCOPE: CPT

## 2019-07-23 PROCEDURE — 84484 ASSAY OF TROPONIN QUANT: CPT

## 2019-07-23 PROCEDURE — 83735 ASSAY OF MAGNESIUM: CPT

## 2019-07-23 PROCEDURE — 93005 ELECTROCARDIOGRAM TRACING: CPT

## 2019-07-23 PROCEDURE — 25000003 PHARM REV CODE 250: Performed by: EMERGENCY MEDICINE

## 2019-07-23 PROCEDURE — 63600175 PHARM REV CODE 636 W HCPCS: Performed by: EMERGENCY MEDICINE

## 2019-07-23 PROCEDURE — 83880 ASSAY OF NATRIURETIC PEPTIDE: CPT

## 2019-07-23 PROCEDURE — 99285 EMERGENCY DEPT VISIT HI MDM: CPT | Mod: 25

## 2019-07-23 PROCEDURE — 83690 ASSAY OF LIPASE: CPT

## 2019-07-23 PROCEDURE — 85610 PROTHROMBIN TIME: CPT

## 2019-07-23 RX ORDER — HYDRALAZINE HYDROCHLORIDE 20 MG/ML
5 INJECTION INTRAMUSCULAR; INTRAVENOUS
Status: COMPLETED | OUTPATIENT
Start: 2019-07-23 | End: 2019-07-23

## 2019-07-23 RX ORDER — MECLIZINE HYDROCHLORIDE 25 MG/1
25 TABLET ORAL
Status: COMPLETED | OUTPATIENT
Start: 2019-07-23 | End: 2019-07-23

## 2019-07-23 RX ORDER — AMOXICILLIN 500 MG/1
500 CAPSULE ORAL 4 TIMES DAILY
COMMUNITY
Start: 2019-07-22 | End: 2019-07-31

## 2019-07-23 RX ORDER — MECLIZINE HYDROCHLORIDE 25 MG/1
25 TABLET ORAL 3 TIMES DAILY PRN
Qty: 20 TABLET | Refills: 0 | Status: SHIPPED | OUTPATIENT
Start: 2019-07-23 | End: 2019-11-01 | Stop reason: SDUPTHER

## 2019-07-23 RX ORDER — HYDROCODONE BITARTRATE AND ACETAMINOPHEN 7.5; 325 MG/1; MG/1
1 TABLET ORAL EVERY 6 HOURS PRN
COMMUNITY
End: 2019-09-04

## 2019-07-23 RX ADMIN — MECLIZINE HYDROCHLORIDE 25 MG: 25 TABLET ORAL at 01:07

## 2019-07-23 RX ADMIN — IOHEXOL 100 ML: 350 INJECTION, SOLUTION INTRAVENOUS at 12:07

## 2019-07-23 RX ADMIN — SODIUM CHLORIDE 500 ML: 0.9 INJECTION, SOLUTION INTRAVENOUS at 11:07

## 2019-07-23 RX ADMIN — HYDRALAZINE HYDROCHLORIDE 5 MG: 20 INJECTION INTRAMUSCULAR; INTRAVENOUS at 11:07

## 2019-07-23 NOTE — ED PROVIDER NOTES
Encounter Date: 7/23/2019    SCRIBE #1 NOTE: I, Isabel Mcgowan, am scribing for, and in the presence of,  . I have scribed the entire note.       History     Chief Complaint   Patient presents with    Dizziness     pt reports he is currently being treated for a toothache, recently started on a prescription of PCN. states today he is feeling dizzy and weak and BP is high     Dental Pain     CC: generalized weakness    HPI:  This is a 79 y.o. male with a PMHx of cancer, hypertension, prediabetes and Myasthenia gravis who presents to the Emergency Department with his wife with a cc of generalized weakness this morning. 4 days ago he got a dental filling, 2 days ago his dental pain became severe, and 1 day ago he started Lortab for pain, as well as Penicillin and Amoxicillin. Today, he woke up with generalized weakness. Associated symptoms include lightheadedness elicited by laying down, diaphoresis, intermittent nausea, and tingling to fingers. His BP was about 100 (usually 130/90), and his pulse was 54. He also reports double vision in his right eye for the past few days he attributes to his Myasthenia gravis. He denies chest pain, shortness of breath, trouble swallowing, shoulder pain, hip pain, slurred speech, confusion, weakness, numbness, facial droop, abdominal pain, constipation, or urinary symptoms. His dental pain is improved with Lortab. He reports compliance with his medications. He denies history of cardiac problems. He denies drinking or smoking.    The history is provided by the patient.     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 Jolley    CHOLECYSTECTOMY      EYE SURGERY      Rt cataract    INSERTION-INTRAOCULAR LENS (IOL) Left 4/20/2017    [facility]    INSERTION-INTRAOCULAR LENS (IOL) Right 3/30/2017    [facility]    KNEE ARTHROSCOPY      Lt knee    PHACOEMULSIFICATION-ASPIRATION-CATARACT Left 4/20/2017    [facility]    PHACOEMULSIFICATION-ASPIRATION-CATARACT Right 3/30/2017    [facility]    TONSILLECTOMY       Family History   Problem Relation Age of Onset    No Known Problems Mother     No Known Problems Father     No Known Problems Sister     No Known Problems Brother     No Known Problems Maternal Aunt     No Known Problems Maternal Uncle     No Known Problems Paternal Aunt     No Known Problems Paternal Uncle     No Known Problems Maternal Grandmother     No Known Problems Maternal Grandfather     No Known Problems Paternal Grandmother     No Known Problems Paternal Grandfather     Amblyopia Neg Hx     Blindness Neg Hx     Cataracts Neg Hx     Glaucoma Neg Hx     Macular degeneration Neg Hx     Retinal detachment Neg Hx     Strabismus Neg Hx     Cancer Neg Hx     Diabetes Neg Hx     Hypertension Neg Hx     Stroke Neg Hx     Thyroid disease Neg Hx      Social History     Tobacco Use    Smoking status: Former Smoker    Smokeless tobacco: Never Used   Substance Use Topics    Alcohol use: No    Drug use: No     Review of Systems   Constitutional: Positive for diaphoresis and fatigue (generalized weakness). Negative for fever.   HENT: Positive for dental problem. Negative for sore throat and trouble swallowing.    Eyes: Positive for visual disturbance.   Respiratory: Negative for shortness of breath.    Cardiovascular: Negative for chest pain.    Gastrointestinal: Positive for nausea. Negative for abdominal pain and constipation.   Genitourinary: Negative.  Negative for dysuria.   Musculoskeletal: Negative for arthralgias and back pain.        Negative for shoulder pain or hip pain.   Skin: Negative for rash.   Neurological: Positive for light-headedness. Negative for facial asymmetry, speech difficulty, weakness, numbness and headaches.        Positive for paresthesia to fingers.   Psychiatric/Behavioral: Negative for confusion.       Physical Exam     Initial Vitals [07/23/19 0859]   BP Pulse Resp Temp SpO2   (!) 204/108 64 20 97.5 °F (36.4 °C) 100 %      MAP       --         Physical Exam    Nursing note and vitals reviewed.  Constitutional: He appears well-developed and well-nourished.   HENT:   Head: Normocephalic and atraumatic.   Eyes: EOM are normal. Pupils are equal, round, and reactive to light.   Neck: Full passive range of motion without pain. Neck supple. No thyromegaly present. No JVD present.   Cardiovascular: Normal rate and regular rhythm. Exam reveals no gallop and no friction rub.    No murmur heard.  Pulmonary/Chest: Breath sounds normal. No respiratory distress.   Abdominal: Soft. Bowel sounds are normal. There is no tenderness.   Musculoskeletal: Normal range of motion. He exhibits no edema or tenderness.   Neurological: He is alert and oriented to person, place, and time. He has normal strength. GCS eye subscore is 4. GCS verbal subscore is 5. GCS motor subscore is 6.   Cranial nerves II through XII grossly intact. Finger to nose normal. Tone normal. Sens intact to light touch. No drift. No disdiadochokinesia. Strength 5/5 bilaterally upper and lower. Normal gait. No Romberg. Speech and cognition is normal.  No focal neurologic deficit.   Skin: Skin is warm and dry. Capillary refill takes less than 2 seconds.   Psychiatric: He has a normal mood and affect. His behavior is normal.         ED Course   Procedures  Labs Reviewed   CBC W/  AUTO DIFFERENTIAL - Abnormal; Notable for the following components:       Result Value    Platelets 140 (*)     Lymph # 0.8 (*)     Gran% 76.2 (*)     Lymph% 15.4 (*)     All other components within normal limits   COMPREHENSIVE METABOLIC PANEL - Abnormal; Notable for the following components:    Total Bilirubin 1.2 (*)      (*)      (*)     Anion Gap 7 (*)     eGFR if  59 (*)     eGFR if non  51 (*)     All other components within normal limits   URINALYSIS, REFLEX TO URINE CULTURE - Abnormal; Notable for the following components:    Ketones, UA Trace (*)     All other components within normal limits    Narrative:     Preferred Collection Type->Urine, Clean Catch   TROPONIN I   B-TYPE NATRIURETIC PEPTIDE   MAGNESIUM   LIPASE   PROTIME-INR     EKG Readings: (Independently Interpreted)   Initial Reading: No STEMI. Rhythm: Normal Sinus Rhythm. Heart Rate: 60. Ectopy: No Ectopy. Conduction: RBBB. ST Segments: Normal ST Segments. T Waves: Normal. Axis: Normal.     ECG Results          EKG 12-lead (In process)  Result time 07/23/19 10:01:23    In process by Interface, Lab In [facility] (07/23/19 10:01:23)                 Narrative:    Test Reason : R42,    Vent. Rate : 053 BPM     Atrial Rate : 053 BPM     P-R Int : 212 ms          QRS Dur : 104 ms      QT Int : 424 ms       P-R-T Axes : 069 027 073 degrees     QTc Int : 397 ms    Sinus bradycardia with 1st degree A-V block  Incomplete right bundle branch block  Borderline Abnormal ECG  No previous ECGs available    Referred By: [email or web address]   SELF           Confirmed By:                   In process by Interface, Lab In [facility] (07/23/19 09:57:10)                 Narrative:    Test Reason : R42,    Vent. Rate : 053 BPM     Atrial Rate : 053 BPM     P-R Int : 212 ms          QRS Dur : 104 ms      QT Int : 424 ms       P-R-T Axes : 069 027 073 degrees     QTc Int : 397 ms    Sinus bradycardia with 1st degree A-V block  Incomplete  right bundle branch block  Borderline Abnormal ECG  When compared with ECG of 23-JUL-2019 09:07,  No significant change was found    Referred By: [email or web address]   SELF           Confirmed By:                             Imaging Results          CT Head Without Contrast (Final result)  Result time 07/23/19 13:05:02    Final result by Nik Rankin DO (07/23/19 13:05:02)                 Impression:      No significant change from prior as detailed above.  Allowing for motion limitation there is no evidence for acute intracranial hemorrhage or sulcal effacement to suggest large territory recent infarction.    Clinical correlation and further evaluation as warranted.      Electronically signed by: Nik Rankin DO  Date:    07/23/2019  Time:    13:05             Narrative:    EXAMINATION:  CT HEAD WITHOUT CONTRAST    CLINICAL HISTORY:  Dizziness;Potential central dizziness;    TECHNIQUE:  Multiple sequential 5 mm axial images of the head without contrast.  Coronal and sagittal reformatted imaging from the axial acquisition.    COMPARISON:  None    FINDINGS:  Study is limited by patient motion.  Allowing for motion limitation there is no evidence for acute intracranial hemorrhage or sulcal effacement.  Small extra-axial hyperdensity overlies the left frontal convexity unchanged from prior suggestive for prominent dural calcification versus is small calcified meningioma.  There is no significant mass effect on the adjacent underlying brain parenchyma.  There is relatively stable configuration of ventricles without hydrocephalus.  No midline shift.  Mild mucosal thickening inferior frontal sinuses bilaterally.  Remaining visualized paranasal sinuses and mastoid air cells are clear..                               CT Abdomen Pelvis With Contrast (Final result)  Result time 07/23/19 13:16:08    Final result by Lewis Telles MD (07/23/19 13:16:08)                 Impression:      1. Findings suggesting hepatic steatosis  noting mild hepatomegaly.  Correlation with LFTs recommended.  2. 2-3 mm pulmonary nodule within the anterior aspect of the right upper lobe, nonspecific.  For a solid nodule <6 mm, Fleischner Society 2017 guidelines recommend no routine follow up for a low risk patient, or follow-up with non-contrast chest CT at 12 months in a high risk patient.  3. Prostatomegaly.  4. 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  5. Additional findings above.      Electronically signed by: Lewis Telles MD  Date:    07/23/2019  Time:    13:16             Narrative:    EXAMINATION:  CT ABDOMEN PELVIS WITH CONTRAST    CLINICAL HISTORY:  Nausea, vomiting, diarrhea;RUQ pain with elevated liver enzymes- hx GB removed;    TECHNIQUE:  Low dose axial images, sagittal and coronal reformations were obtained from the lung bases to the pubic symphysis following the IV administration of 100 mL of Omnipaque 350 and the oral administration of 30 mL of Omnipaque 350.    COMPARISON:  None.    FINDINGS:  Images of the lower thorax are remarkable for a 2-3 mm pulmonary nodule within the anterior aspect of the right upper lobe.  There is bilateral basilar subsegmental atelectasis or scarring.  There is a calcified granuloma within the left lower lobe.    There is a fluid attenuating structure adjacent to the esophagus, only partially imaged, measuring 3.8 x 3.4 cm, above the esophageal hiatus.  This may reflect nonspecific pericardial or esophageal duplication cyst.    The liver is diffusely hypoattenuating, suggesting steatosis, correlation with LFTs recommended.  There is a subcentimeter low attenuating lesion within the anterior aspect of the left hepatic lobe, too small for characterization.  The spleen, pancreas and adrenal glands are unremarkable.  The gallbladder is surgically absent.  No significant biliary dilation or ascites.  The portal vein, splenic vein, SMV, celiac axis and SMA all are patent.  There is a small hiatal hernia.   No significant abdominal lymphadenopathy.    The kidneys enhance symmetrically without hydronephrosis or nephrolithiasis.  There may be a few scattered punctate hypoattenuating foci within the bilateral renal cortices versus artifact.  The bilateral ureters are unremarkable without calculi seen.  The urinary bladder is unremarkable.  The prostate is enlarged, measuring 5.6 cm.    The large bowel is remarkable for several scattered colonic diverticula without convincing surrounding inflammation to suggest diverticulitis.  The terminal ileum is unremarkable.  The appendix or appendiceal stump is unremarkable.  The small bowel is unremarkable.  There are a few scattered shotty periaortic, and paracaval lymph nodes.  There is atherosclerotic calcification of the aorta and its branches.  No focal organized pelvic fluid collection.  Surgical changes are noted of the scrotum.  Degenerative changes are noted of the spine.  There is osteopenia.  There is grade 1 anterolisthesis of L4 on L5.  No significant inguinal lymphadenopathy.                                 Medical Decision Making:   Initial Assessment:   Robe Cevallos is a 79 y.o. male presenting for an emergent evaluation of generalized weakness, lightheadedness, diaphoresis, nausea, tingling to fingers, double vision, and dental pain. Exam unremarkable. Will obtain labs, and given the extent of pain, imaging. I will treat the pt's symptoms appropriately and reassess.    Independently Interpreted Test(s):   I have ordered and independently interpreted EKG Reading(s) - see prior notes  Clinical Tests:   Lab Tests: Ordered and Reviewed  Radiological Study: Ordered and Reviewed  Medical Tests: Ordered and Reviewed  ED Management:  1:19 PM: Reevaluated the pt.   Patient has chronic dizziness due to double vision from myasthenia gravis.  Patient also however felt like he was going to pass out when he woke up this morning.  This was several hours after taking a Lortab  due to dental pain.  States he was never takes medication like this.  He no longer feels like he is going to pass out but still has the dizziness which appears to be chronic.  He did get some improvement with meclizine however.  Will prescribe meclizine.  Head CT shows no acute abnormalities.  Lab work shows no acute abnormalities.  Patient did have elevated blood pressure and states he is not have a history of elevated blood pressure.  Blood pressure trending downward is now 151/86.  Possibly related to recent dental procedure and dental pain. States he has not been sleeping well secondary to pain. Denies chest pain or shortness of breath. No evidence of end-organ damage.  No headache. She will follow up with his doctor within the next 2 days for blood pressure recheck.  Declined any blood pressure treatment at this time.  Incidentally the patient has some elevated liver enzymes.  Imaging showed suggests fatty liver disease.  Patient denies any alcohol.  Referred to hepatology.  Patient also needs to follow up with Dr. Davis his neurologist regarding his myasthenia gravis and dizziness        Scribe Attestation:   Scribe #1: I performed the above scribed service and the documentation accurately describes the services I performed. I attest to the accuracy of the note.               Clinical Impression:     1. Dizziness    2. Elevated liver enzymes    3. Elevated blood pressure reading            Disposition:   Disposition: Discharged  Condition: Stable    Scribe attestation: I, Umberto Payton, personally performed the services described in this documentation. All medical record entries made by the scribe were at my direction and in my presence.  I have reviewed the chart and agree that the record reflects my personal performance and is accurate and complete.                      Umberto Payton MD  07/23/19 [id number]

## 2019-07-29 ENCOUNTER — PROCEDURE VISIT (OUTPATIENT)
Dept: OPHTHALMOLOGY | Facility: CLINIC | Age: 79
End: 2019-07-29
Attending: OPHTHALMOLOGY
Payer: MEDICARE

## 2019-07-29 VITALS — HEART RATE: 53 BPM | DIASTOLIC BLOOD PRESSURE: 80 MMHG | SYSTOLIC BLOOD PRESSURE: 150 MMHG

## 2019-07-29 DIAGNOSIS — H35.3211 EXUDATIVE AGE-RELATED MACULAR DEGENERATION OF RIGHT EYE WITH ACTIVE CHOROIDAL NEOVASCULARIZATION: ICD-10-CM

## 2019-07-29 PROCEDURE — 67028 INJECTION EYE DRUG: CPT | Mod: RT,S$GLB,, | Performed by: OPHTHALMOLOGY

## 2019-07-29 PROCEDURE — 67028 PR INJECT INTRAVITREAL PHARMCOLOGIC: ICD-10-PCS | Mod: RT,S$GLB,, | Performed by: OPHTHALMOLOGY

## 2019-07-29 PROCEDURE — 92134 POSTERIOR SEGMENT OCT RETINA (OCULAR COHERENCE TOMOGRAPHY)-BOTH EYES: ICD-10-PCS | Mod: S$GLB,,, | Performed by: OPHTHALMOLOGY

## 2019-07-29 PROCEDURE — 99499 UNLISTED E&M SERVICE: CPT | Mod: S$GLB,,, | Performed by: OPHTHALMOLOGY

## 2019-07-29 PROCEDURE — 92134 CPTRZ OPH DX IMG PST SGM RTA: CPT | Mod: S$GLB,,, | Performed by: OPHTHALMOLOGY

## 2019-07-29 PROCEDURE — 99499 NO LOS: ICD-10-PCS | Mod: S$GLB,,, | Performed by: OPHTHALMOLOGY

## 2019-07-29 RX ORDER — FLUOCINONIDE 0.5 MG/G
CREAM TOPICAL
Refills: 1 | Status: ON HOLD | COMMUNITY
Start: 2019-07-19 | End: 2019-10-03 | Stop reason: HOSPADM

## 2019-07-29 RX ADMIN — Medication 1.25 MG: at 10:07

## 2019-07-29 NOTE — PATIENT INSTRUCTIONS
POST INTRAVITREAL INJECTION PATIENT INSTRUCTIONS   Below are some guidelines for what to expect after your treatment and additional care instructions.   * you may experience mild discomfort in your eye after the treatment. Your eye usually feels better within 24 to 48 hours after the procedure.   * you have been given drops that numb the surface of your eye.   DO NOT rub or touch your eye, DO NOT wear contacts until numbness goes away.   * you may experience small spots that appear in your field of vision, these are usually caused by an air bubble or from the medication. It taked a few hours or days for these to go away.   * use of sunglasses will help reduce light sensitivity and glare.   * DO NOT swim   for at least 3 hours after the injection   * If you have a gritty, burning, irritating or stinging feeling in the injected eye. This may be a result of the antiseptic used. Use artifical tears or eye lubricant ( from over- the- counter from your local pharmacy ). If you have some at home already please check the expiration date, so not to use anything contaminated in your eye. A cool pack over your eye brow above the injected eye may also relieve discomfort.   Call us right away or go to the Emergency Department if you have a dramatic decrease in vision or extreme pain in the eye.   [facility] [phone]

## 2019-07-31 NOTE — PROGRESS NOTES
Subjective:       Patient ID: Robe Cevallos is a 79 y.o. male      Chief Complaint   Patient presents with    Macular Degeneration     History of Present Illness  HPI     DLS  06/24/2019  Hx Exudative ARMD OD.  Pt feels that vision improved after Avastin.    Pt deny any f/f/pain          Last edited by Charles Kapoor MD on 7/31/2019  6:19 PM. (History)        Imaging:    See report    Assessment/Plan:     1. Exudative age-related macular degeneration of right eye with active choroidal neovascularization  Doing well 5 wks after Avastin  Recommend Avastin OD today and in 5 wks  If stable then will TREX    - Posterior Segment OCT Retina-Both eyes    Follow up in about 5 weeks (around 9/2/2019), or if symptoms worsen or fail to improve, for Injection Right eye, OCT and INJECTION ONLY, Avastin.     Patient identified.  Timeout performed.    Risks, benefits, and alternatives to treatment were discussed in detail with the patient, including bleeding/infection (endophthalmitis)/etc.  The patient voiced understanding and wished to proceed with the procedure.  See separate consent form.    Injection Procedure Note:  Diagnosis: Wet AMD Right Eye    Topical Proparacaine drop placed then topical 5% Betadine  Sterile gloves used, and sterile lid speculum placed.  5% Betadine placed at injection site again prior to injection.  Avastin 1.25mg in 0.05cc Injected inferotemporally 3.5-4mm posterior to the limbus.  Complications: None  Va at least CF at 5 feet post injection.  Retina, ONH, IOP normal after injection.    Followup as above.  Patient should return immediately PRN.  Retinal Detachment and Endophthalmitis precautions given.

## 2019-08-27 ENCOUNTER — TELEPHONE (OUTPATIENT)
Dept: INTERNAL MEDICINE | Facility: CLINIC | Age: 79
End: 2019-08-27

## 2019-08-27 NOTE — TELEPHONE ENCOUNTER
----- Message from Ivett Hatfield sent at 8/26/2019  7:25 PM CDT -----  Contact: Pt via [facility]   Appointment Request From: Robe Cevallos    With Provider: SONAM    Preferred Date Range: 8/26/2019 - 8/30/2019    Preferred Times: Any time    Reason for visit: Respiratory inflamation    Comments:  Sinus infection

## 2019-09-04 ENCOUNTER — PROCEDURE VISIT (OUTPATIENT)
Dept: OPHTHALMOLOGY | Facility: CLINIC | Age: 79
End: 2019-09-04
Attending: OPHTHALMOLOGY
Payer: MEDICARE

## 2019-09-04 DIAGNOSIS — H35.3211 EXUDATIVE AGE-RELATED MACULAR DEGENERATION OF RIGHT EYE WITH ACTIVE CHOROIDAL NEOVASCULARIZATION: ICD-10-CM

## 2019-09-04 PROCEDURE — 99499 UNLISTED E&M SERVICE: CPT | Mod: S$GLB,,, | Performed by: OPHTHALMOLOGY

## 2019-09-04 PROCEDURE — 67028 INJECTION EYE DRUG: CPT | Mod: RT,S$GLB,, | Performed by: OPHTHALMOLOGY

## 2019-09-04 PROCEDURE — 92134 CPTRZ OPH DX IMG PST SGM RTA: CPT | Mod: S$GLB,,, | Performed by: OPHTHALMOLOGY

## 2019-09-04 PROCEDURE — 99499 NO LOS: ICD-10-PCS | Mod: S$GLB,,, | Performed by: OPHTHALMOLOGY

## 2019-09-04 PROCEDURE — 92134 POSTERIOR SEGMENT OCT RETINA (OCULAR COHERENCE TOMOGRAPHY)-BOTH EYES: ICD-10-PCS | Mod: S$GLB,,, | Performed by: OPHTHALMOLOGY

## 2019-09-04 PROCEDURE — 67028 PR INJECT INTRAVITREAL PHARMCOLOGIC: ICD-10-PCS | Mod: RT,S$GLB,, | Performed by: OPHTHALMOLOGY

## 2019-09-04 RX ADMIN — Medication 1.25 MG: at 10:09

## 2019-09-04 NOTE — PATIENT INSTRUCTIONS

## 2019-09-04 NOTE — PROGRESS NOTES
Subjective:       Patient ID: Robe Cevallos is a 79 y.o. male      Chief Complaint   Patient presents with    Macular Degeneration     History of Present Illness  HPI     DLS  07/29/2019  HX: Exudative ARMD OD with choroidal neovascularization .    Pt feels that vision OD is even better than last visit.  No pain/redness.  Here for inj OD today    Last edited by Charles Kapoor MD on 9/4/2019  9:59 AM. (History)        Imaging:    See report    Assessment/Plan:     1. Exudative age-related macular degeneration of right eye with active choroidal neovascularization  Doing very well 5+ wks since Avastin  Rec Avastin OD today and 6 wks TREX    - Posterior Segment OCT Retina-Both eyes    Follow up in about 6 weeks (around 10/16/2019), or if symptoms worsen or fail to improve, for Dilated examination, OCT Mac, Avastin, Injection Right eye.     Patient identified.  Timeout performed.    Risks, benefits, and alternatives to treatment were discussed in detail with the patient, including bleeding/infection (endophthalmitis)/etc.  The patient voiced understanding and wished to proceed with the procedure.  See separate consent form.    Injection Procedure Note:  Diagnosis: Wet AMD Right Eye    Topical Proparacaine drop placed then topical 5% Betadine  Sterile gloves used, and sterile lid speculum placed.  5% Betadine placed at injection site again prior to injection.  Avastin 1.25mg in 0.05cc Injected inferotemporally 3.5-4mm posterior to the limbus.  Complications: None  Va at least CF at 5 feet post injection.  Retina, ONH, IOP normal after injection.    Followup as above.  Patient should return immediately PRN.  Retinal Detachment and Endophthalmitis precautions given.

## 2019-09-05 ENCOUNTER — HOSPITAL ENCOUNTER (OUTPATIENT)
Dept: RADIOLOGY | Facility: HOSPITAL | Age: 79
Discharge: HOME | End: 2019-09-05
Attending: INTERNAL MEDICINE
Payer: MEDICARE

## 2019-09-05 DIAGNOSIS — Z85.72 HISTORY OF FOLLICULAR LYMPHOMA: ICD-10-CM

## 2019-09-05 DIAGNOSIS — R13.19 ESOPHAGEAL DYSPHAGIA: ICD-10-CM

## 2019-09-05 DIAGNOSIS — R05.9 COUGH: ICD-10-CM

## 2019-09-05 DIAGNOSIS — R93.89 ABNORMAL CXR (CHEST X-RAY): ICD-10-CM

## 2019-09-05 DIAGNOSIS — R05.9 COUGH: Primary | ICD-10-CM

## 2019-09-05 DIAGNOSIS — C83.32 DIFFUSE LARGE B-CELL LYMPHOMA OF INTRATHORACIC LYMPH NODES: ICD-10-CM

## 2019-09-05 PROCEDURE — 71046 XR CHEST PA AND LATERAL: ICD-10-PCS | Mod: 26,,, | Performed by: RADIOLOGY

## 2019-09-05 PROCEDURE — 71046 X-RAY EXAM CHEST 2 VIEWS: CPT | Mod: TC,FY

## 2019-09-05 PROCEDURE — 71046 X-RAY EXAM CHEST 2 VIEWS: CPT | Mod: 26,,, | Performed by: RADIOLOGY

## 2019-09-12 ENCOUNTER — OFFICE VISIT (OUTPATIENT)
Dept: OTOLARYNGOLOGY | Facility: CLINIC | Age: 79
End: 2019-09-12
Payer: MEDICARE

## 2019-09-12 VITALS
HEIGHT: 71 IN | WEIGHT: 196 LBS | BODY MASS INDEX: 27.44 KG/M2 | SYSTOLIC BLOOD PRESSURE: 140 MMHG | DIASTOLIC BLOOD PRESSURE: 70 MMHG

## 2019-09-12 DIAGNOSIS — R09.81 NASAL CONGESTION: Primary | ICD-10-CM

## 2019-09-12 PROCEDURE — 1101F PR PT FALLS ASSESS DOC 0-1 FALLS W/OUT INJ PAST YR: ICD-10-PCS | Mod: CPTII,S$GLB,, | Performed by: OTOLARYNGOLOGY

## 2019-09-12 PROCEDURE — 1101F PT FALLS ASSESS-DOCD LE1/YR: CPT | Mod: CPTII,S$GLB,, | Performed by: OTOLARYNGOLOGY

## 2019-09-12 PROCEDURE — 3078F PR MOST RECENT DIASTOLIC BLOOD PRESSURE < 80 MM HG: ICD-10-PCS | Mod: CPTII,S$GLB,, | Performed by: OTOLARYNGOLOGY

## 2019-09-12 PROCEDURE — 3078F DIAST BP <80 MM HG: CPT | Mod: CPTII,S$GLB,, | Performed by: OTOLARYNGOLOGY

## 2019-09-12 PROCEDURE — 3077F PR MOST RECENT SYSTOLIC BLOOD PRESSURE >= 140 MM HG: ICD-10-PCS | Mod: CPTII,S$GLB,, | Performed by: OTOLARYNGOLOGY

## 2019-09-12 PROCEDURE — 99204 OFFICE O/P NEW MOD 45 MIN: CPT | Mod: S$GLB,,, | Performed by: OTOLARYNGOLOGY

## 2019-09-12 PROCEDURE — 3077F SYST BP >= 140 MM HG: CPT | Mod: CPTII,S$GLB,, | Performed by: OTOLARYNGOLOGY

## 2019-09-12 PROCEDURE — 99204 PR OFFICE/OUTPT VISIT, NEW, LEVL IV, 45-59 MIN: ICD-10-PCS | Mod: S$GLB,,, | Performed by: OTOLARYNGOLOGY

## 2019-09-12 RX ORDER — AZELASTINE 1 MG/ML
1 SPRAY, METERED NASAL 2 TIMES DAILY
Qty: 30 ML | Refills: 3 | Status: ON HOLD | OUTPATIENT
Start: 2019-09-12 | End: 2019-10-08 | Stop reason: ALTCHOICE

## 2019-09-16 ENCOUNTER — HOSPITAL ENCOUNTER (OUTPATIENT)
Dept: RADIOLOGY | Facility: HOSPITAL | Age: 79
Discharge: HOME | End: 2019-09-16
Attending: INTERNAL MEDICINE
Payer: MEDICARE

## 2019-09-16 DIAGNOSIS — R93.89 ABNORMAL CXR (CHEST X-RAY): ICD-10-CM

## 2019-09-16 PROCEDURE — 71260 CT CHEST WITH CONTRAST: ICD-10-PCS | Mod: 26,,, | Performed by: RADIOLOGY

## 2019-09-16 PROCEDURE — 71260 CT THORAX DX C+: CPT | Mod: TC

## 2019-09-16 PROCEDURE — 71260 CT THORAX DX C+: CPT | Mod: 26,,, | Performed by: RADIOLOGY

## 2019-09-16 PROCEDURE — 25500020 PHARM REV CODE 255: Performed by: INTERNAL MEDICINE

## 2019-09-16 RX ADMIN — IOHEXOL 75 ML: 350 INJECTION, SOLUTION INTRAVENOUS at 01:09

## 2019-09-20 NOTE — PROGRESS NOTES
Chief Complaint   Patient presents with    Sinusitis     Chronic    Sinus Problem         79 y.o. male presents with longstanding history of nasal congestion. No facial pain or pressure. No headaches or tooth pain. Uses Astelin nasal spray.       Review of Systems     Constitutional: Negative for fatigue and unexpected weight change.   HENT: per HPI.  Eyes: Negative for visual disturbance.   Respiratory: Negative for shortness of breath, hemoptysis  Cardiovascular: Negative for chest pain and palpitations.   Genitourinary: Negative for dysuria and difficulty urinating.   Musculoskeletal: Negative for decreased ROM, back pain.   Skin: Negative for rash, sunburn, itching.   Neurological: Negative for dizziness and seizures.   Hematological: Negative for adenopathy. Does not bruise/bleed easily.   Psychiatric/Behavioral: Negative for agitation. The patient is not nervous/anxious.   Endocrine: Negative for rapid weight loss/weight gain, heat/cold intolerance.     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 Jolley    CATARACT EXTRACTION W/  INTRAOCULAR LENS IMPLANT Left 04/20/2017    Dr. Jolley    CHOLECYSTECTOMY      EYE SURGERY      Rt cataract    INSERTION-INTRAOCULAR LENS (IOL) Left 4/20/2017    [facility]    INSERTION-INTRAOCULAR LENS (IOL) Right 3/30/2017    [facility] at [facility]    KNEE ARTHROSCOPY      Lt knee    PHACOEMULSIFICATION-ASPIRATION-CATARACT Left 4/20/2017    [facility] at [facility]    PHACOEMULSIFICATION-ASPIRATION-CATARACT Right 3/30/2017    [facility]     TONSILLECTOMY         family history includes No Known Problems in his brother, father, maternal aunt, maternal grandfather, maternal grandmother, maternal uncle, mother, paternal aunt, paternal grandfather, paternal grandmother, paternal uncle, and sister.    Pt  reports that he has quit smoking. He has never used smokeless tobacco. He reports that he does not drink alcohol or use drugs.    Review of patient's allergies indicates:  No Known Allergies     Physical Exam    Vitals:    09/12/19 0928   BP: (!) 140/70     Body mass index is 27.33 kg/m².    Physical Exam   Constitutional: He is oriented to person, place, and time. He appears well-developed and well-nourished. No distress.   HENT:   Head: Normocephalic and atraumatic.   Right Ear: Hearing, tympanic membrane, external ear and ear canal normal. Tympanic membrane mobility is normal. No middle ear effusion. No decreased hearing is noted.   Left Ear: Hearing, tympanic membrane, external ear and ear canal normal. Tympanic membrane mobility is normal.  No middle ear effusion. No decreased hearing is noted.   Nose: Nose normal.   Mouth/Throat: Oropharynx is clear and moist.   Bilateral nasal cavities inspected, no polyps or purulent fluid seen.  Dry nasal mucosa bilaterally.   Eyes: Pupils are equal, round, and reactive to light. Conjunctivae, EOM and lids are normal. Right eye exhibits no discharge. Left eye exhibits no discharge.   Neck: Trachea normal, normal range of motion and phonation normal. Neck supple. No tracheal tenderness present. No tracheal deviation, no edema and no erythema present. No thyroid mass and no thyromegaly present.   Cardiovascular: Normal heart sounds.   Pulmonary/Chest: Breath sounds normal. No stridor.   Abdominal: Soft.   Lymphadenopathy:     He has no cervical adenopathy.   Neurological: He is alert and oriented to person, place, and time.   Skin: Skin is warm and dry. No rash noted. He is not diaphoretic. No erythema. No pallor.    Psychiatric: He has a normal mood and affect.   Nursing note and vitals reviewed.        Assessment     1. Nasal congestion          Plan  In summary, Mr. Cevallos is an 79 year old male with nasal congestion and dry nasal mucosa, findings discussed with patient. Recommend consistent nasal regimen with Flonase/Astelin and nasal saline for improved mucociliary clearance. All questions were answered. Return to clinic if symptoms fail to improve or worsen.

## 2019-09-25 ENCOUNTER — HOSPITAL ENCOUNTER (INPATIENT)
Facility: HOSPITAL | Age: 79
LOS: 8 days | Discharge: HOME HEALTH | DRG: 871 | End: 2019-10-03
Attending: EMERGENCY MEDICINE | Admitting: EMERGENCY MEDICINE
Payer: MEDICARE

## 2019-09-25 DIAGNOSIS — I50.30 (HFPEF) HEART FAILURE WITH PRESERVED EJECTION FRACTION: ICD-10-CM

## 2019-09-25 DIAGNOSIS — C83.30 DLBCL (DIFFUSE LARGE B CELL LYMPHOMA): Primary | ICD-10-CM

## 2019-09-25 DIAGNOSIS — C85.90 LYMPHOMA: ICD-10-CM

## 2019-09-25 DIAGNOSIS — J18.9 PNEUMONIA OF LEFT LOWER LOBE DUE TO INFECTIOUS ORGANISM: ICD-10-CM

## 2019-09-25 DIAGNOSIS — C83.32 DIFFUSE LARGE B-CELL LYMPHOMA OF INTRATHORACIC LYMPH NODES: ICD-10-CM

## 2019-09-25 DIAGNOSIS — E83.52 HYPERCALCEMIA: ICD-10-CM

## 2019-09-25 DIAGNOSIS — E88.3 TUMOR LYSIS SYNDROME: ICD-10-CM

## 2019-09-25 DIAGNOSIS — G89.3 CANCER RELATED PAIN: ICD-10-CM

## 2019-09-25 DIAGNOSIS — R10.9 ABDOMINAL PAIN: ICD-10-CM

## 2019-09-25 PROBLEM — C85.92: Status: ACTIVE | Noted: 2019-09-25

## 2019-09-25 PROBLEM — N18.30 STAGE 3 CHRONIC KIDNEY DISEASE: Status: ACTIVE | Noted: 2019-09-25

## 2019-09-25 PROBLEM — J98.59 MEDIASTINAL MASS: Status: ACTIVE | Noted: 2019-09-25

## 2019-09-25 PROBLEM — E79.0 HYPERURICEMIA: Status: ACTIVE | Noted: 2019-09-25

## 2019-09-25 PROBLEM — A41.9 SEPSIS: Status: ACTIVE | Noted: 2019-09-25

## 2019-09-25 PROBLEM — J96.01 ACUTE RESPIRATORY FAILURE WITH HYPOXIA: Status: ACTIVE | Noted: 2019-09-25

## 2019-09-25 LAB
ALBUMIN SERPL BCP-MCNC: 3.6 G/DL (ref 3.5–5.2)
ALP SERPL-CCNC: 140 U/L (ref 55–135)
ALT SERPL W/O P-5'-P-CCNC: 43 U/L (ref 10–44)
ANION GAP SERPL CALC-SCNC: 14 MMOL/L (ref 8–16)
AORTIC ROOT ANNULUS: 3.52 CM
AORTIC VALVE CUSP SEPERATION: 2.1 CM
AST SERPL-CCNC: 30 U/L (ref 10–40)
AV INDEX (PROSTH): 0.67
AV MEAN GRADIENT: 4 MMHG
AV PEAK GRADIENT: 7 MMHG
AV VELOCITY RATIO: 0.67
BASOPHILS # BLD AUTO: 0.02 K/UL (ref 0–0.2)
BASOPHILS NFR BLD: 0.2 % (ref 0–1.9)
BILIRUB SERPL-MCNC: 1.4 MG/DL (ref 0.1–1)
BILIRUB UR QL STRIP: NEGATIVE
BNP SERPL-MCNC: 27 PG/ML (ref 0–99)
BSA FOR ECHO PROCEDURE: 2.06 M2
BUN SERPL-MCNC: 28 MG/DL (ref 8–23)
CALCIUM SERPL-MCNC: 10.9 MG/DL (ref 8.7–10.5)
CHLORIDE SERPL-SCNC: 99 MMOL/L (ref 95–110)
CLARITY UR: CLEAR
CO2 SERPL-SCNC: 23 MMOL/L (ref 23–29)
COLOR UR: YELLOW
CREAT SERPL-MCNC: 1.3 MG/DL (ref 0.5–1.4)
CV ECHO LV RWT: 0.61 CM
DIFFERENTIAL METHOD: ABNORMAL
DOP CALC AO PEAK VEL: 1.34 M/S
DOP CALC AO VTI: 25.81 CM
DOP CALC LVOT PEAK VEL: 0.9 M/S
DOP CALCLVOT PEAK VEL VTI: 17.19 CM
E WAVE DECELERATION TIME: 226.88 MSEC
E/A RATIO: 1.02
ECHO LV POSTERIOR WALL: 1.13 CM (ref 0.6–1.1)
EOSINOPHIL # BLD AUTO: 0 K/UL (ref 0–0.5)
EOSINOPHIL NFR BLD: 0.2 % (ref 0–8)
ERYTHROCYTE [DISTWIDTH] IN BLOOD BY AUTOMATED COUNT: 14.3 % (ref 11.5–14.5)
EST. GFR  (AFRICAN AMERICAN): 59 ML/MIN/1.73 M^2
EST. GFR  (NON AFRICAN AMERICAN): 51 ML/MIN/1.73 M^2
FRACTIONAL SHORTENING: 29 % (ref 28–44)
GLUCOSE SERPL-MCNC: 112 MG/DL (ref 70–110)
GLUCOSE UR QL STRIP: NEGATIVE
HCT VFR BLD AUTO: 43.5 % (ref 40–54)
HGB BLD-MCNC: 14.7 G/DL (ref 14–18)
HGB UR QL STRIP: NEGATIVE
INTERVENTRICULAR SEPTUM: 1.18 CM (ref 0.6–1.1)
IVRT: 0.07 MSEC
KETONES UR QL STRIP: ABNORMAL
LA MAJOR: 5.85 CM
LACTATE SERPL-SCNC: 2 MMOL/L (ref 0.5–2.2)
LACTATE SERPL-SCNC: 2.4 MMOL/L (ref 0.5–2.2)
LACTATE SERPL-SCNC: 2.6 MMOL/L (ref 0.5–2.2)
LDH SERPL L TO P-CCNC: 460 U/L (ref 110–260)
LEFT ATRIUM SIZE: 2.91 CM
LEFT INTERNAL DIMENSION IN SYSTOLE: 2.6 CM (ref 2.1–4)
LEFT VENTRICLE DIASTOLIC VOLUME INDEX: 28.01 ML/M2
LEFT VENTRICLE DIASTOLIC VOLUME: 57.41 ML
LEFT VENTRICLE MASS INDEX: 67 G/M2
LEFT VENTRICLE SYSTOLIC VOLUME INDEX: 12 ML/M2
LEFT VENTRICLE SYSTOLIC VOLUME: 24.6 ML
LEFT VENTRICULAR INTERNAL DIMENSION IN DIASTOLE: 3.68 CM (ref 3.5–6)
LEFT VENTRICULAR MASS: 137.95 G
LEUKOCYTE ESTERASE UR QL STRIP: NEGATIVE
LIPASE SERPL-CCNC: 18 U/L (ref 4–60)
LYMPHOCYTES # BLD AUTO: 1.2 K/UL (ref 1–4.8)
LYMPHOCYTES NFR BLD: 14.1 % (ref 18–48)
MCH RBC QN AUTO: 28.8 PG (ref 27–31)
MCHC RBC AUTO-ENTMCNC: 33.8 G/DL (ref 32–36)
MCV RBC AUTO: 85 FL (ref 82–98)
MONOCYTES # BLD AUTO: 0.4 K/UL (ref 0.3–1)
MONOCYTES NFR BLD: 4.9 % (ref 4–15)
MV PEAK A VEL: 0.94 M/S
MV PEAK E VEL: 0.96 M/S
NEUTROPHILS # BLD AUTO: 6.9 K/UL (ref 1.8–7.7)
NEUTROPHILS NFR BLD: 81.2 % (ref 38–73)
NITRITE UR QL STRIP: NEGATIVE
PH UR STRIP: 5 [PH] (ref 5–8)
PISA TR MAX VEL: 2.48 M/S
PLATELET # BLD AUTO: 271 K/UL (ref 150–350)
PMV BLD AUTO: 10.2 FL (ref 9.2–12.9)
POTASSIUM SERPL-SCNC: 4.7 MMOL/L (ref 3.5–5.1)
PROT SERPL-MCNC: 7.1 G/DL (ref 6–8.4)
PROT UR QL STRIP: NEGATIVE
PV PEAK VELOCITY: 0.71 CM/S
RA MAJOR: 6 CM
RA PRESSURE: 3 MMHG
RA WIDTH: 3.15 CM
RBC # BLD AUTO: 5.1 M/UL (ref 4.6–6.2)
RIGHT VENTRICULAR END-DIASTOLIC DIMENSION: 3.59 CM
RV TISSUE DOPPLER FREE WALL SYSTOLIC VELOCITY 1 (APICAL 4 CHAMBER VIEW): 9.85 CM/S
SODIUM SERPL-SCNC: 136 MMOL/L (ref 136–145)
SP GR UR STRIP: >1.03 (ref 1–1.03)
TR MAX PG: 25 MMHG
TROPONIN I SERPL DL<=0.01 NG/ML-MCNC: 0.01 NG/ML (ref 0–0.03)
TROPONIN I SERPL DL<=0.01 NG/ML-MCNC: 0.02 NG/ML (ref 0–0.03)
TV REST PULMONARY ARTERY PRESSURE: 28 MMHG
URATE SERPL-MCNC: 9.6 MG/DL (ref 3.4–7)
URN SPEC COLLECT METH UR: ABNORMAL
UROBILINOGEN UR STRIP-ACNC: NEGATIVE EU/DL
WBC # BLD AUTO: 8.61 K/UL (ref 3.9–12.7)

## 2019-09-25 PROCEDURE — 83615 LACTATE (LD) (LDH) ENZYME: CPT

## 2019-09-25 PROCEDURE — 96375 TX/PRO/DX INJ NEW DRUG ADDON: CPT

## 2019-09-25 PROCEDURE — 84484 ASSAY OF TROPONIN QUANT: CPT | Mod: 91

## 2019-09-25 PROCEDURE — 84550 ASSAY OF BLOOD/URIC ACID: CPT

## 2019-09-25 PROCEDURE — 99233 SBSQ HOSP IP/OBS HIGH 50: CPT | Mod: ,,, | Performed by: INTERNAL MEDICINE

## 2019-09-25 PROCEDURE — 83605 ASSAY OF LACTIC ACID: CPT | Mod: 91

## 2019-09-25 PROCEDURE — 27000221 HC OXYGEN, UP TO 24 HOURS

## 2019-09-25 PROCEDURE — 36415 COLL VENOUS BLD VENIPUNCTURE: CPT

## 2019-09-25 PROCEDURE — 96376 TX/PRO/DX INJ SAME DRUG ADON: CPT

## 2019-09-25 PROCEDURE — 93005 ELECTROCARDIOGRAM TRACING: CPT

## 2019-09-25 PROCEDURE — 25500020 PHARM REV CODE 255: Performed by: EMERGENCY MEDICINE

## 2019-09-25 PROCEDURE — 63600175 PHARM REV CODE 636 W HCPCS: Performed by: HOSPITALIST

## 2019-09-25 PROCEDURE — 99223 1ST HOSP IP/OBS HIGH 75: CPT | Mod: ,,, | Performed by: INTERNAL MEDICINE

## 2019-09-25 PROCEDURE — 99233 PR SUBSEQUENT HOSPITAL CARE,LEVL III: ICD-10-PCS | Mod: ,,, | Performed by: INTERNAL MEDICINE

## 2019-09-25 PROCEDURE — 83880 ASSAY OF NATRIURETIC PEPTIDE: CPT

## 2019-09-25 PROCEDURE — 99285 EMERGENCY DEPT VISIT HI MDM: CPT | Mod: 25

## 2019-09-25 PROCEDURE — P9612 CATHETERIZE FOR URINE SPEC: HCPCS

## 2019-09-25 PROCEDURE — 83690 ASSAY OF LIPASE: CPT

## 2019-09-25 PROCEDURE — 25000003 PHARM REV CODE 250: Performed by: INTERNAL MEDICINE

## 2019-09-25 PROCEDURE — 80053 COMPREHEN METABOLIC PANEL: CPT

## 2019-09-25 PROCEDURE — 93010 ELECTROCARDIOGRAM REPORT: CPT | Mod: ,,, | Performed by: INTERNAL MEDICINE

## 2019-09-25 PROCEDURE — 25000003 PHARM REV CODE 250: Performed by: STUDENT IN AN ORGANIZED HEALTH CARE EDUCATION/TRAINING PROGRAM

## 2019-09-25 PROCEDURE — 85025 COMPLETE CBC W/AUTO DIFF WBC: CPT

## 2019-09-25 PROCEDURE — 25000003 PHARM REV CODE 250: Performed by: HOSPITALIST

## 2019-09-25 PROCEDURE — 20600001 HC STEP DOWN PRIVATE ROOM

## 2019-09-25 PROCEDURE — 83605 ASSAY OF LACTIC ACID: CPT

## 2019-09-25 PROCEDURE — 81003 URINALYSIS AUTO W/O SCOPE: CPT

## 2019-09-25 PROCEDURE — 94640 AIRWAY INHALATION TREATMENT: CPT

## 2019-09-25 PROCEDURE — 63600175 PHARM REV CODE 636 W HCPCS: Performed by: EMERGENCY MEDICINE

## 2019-09-25 PROCEDURE — 87040 BLOOD CULTURE FOR BACTERIA: CPT

## 2019-09-25 PROCEDURE — 96365 THER/PROPH/DIAG IV INF INIT: CPT

## 2019-09-25 PROCEDURE — 99223 PR INITIAL HOSPITAL CARE,LEVL III: ICD-10-PCS | Mod: ,,, | Performed by: INTERNAL MEDICINE

## 2019-09-25 PROCEDURE — 63600175 PHARM REV CODE 636 W HCPCS: Performed by: STUDENT IN AN ORGANIZED HEALTH CARE EDUCATION/TRAINING PROGRAM

## 2019-09-25 PROCEDURE — 94761 N-INVAS EAR/PLS OXIMETRY MLT: CPT

## 2019-09-25 PROCEDURE — 63600175 PHARM REV CODE 636 W HCPCS: Performed by: INTERNAL MEDICINE

## 2019-09-25 PROCEDURE — 87040 BLOOD CULTURE FOR BACTERIA: CPT | Mod: 59

## 2019-09-25 PROCEDURE — 25000242 PHARM REV CODE 250 ALT 637 W/ HCPCS: Performed by: EMERGENCY MEDICINE

## 2019-09-25 PROCEDURE — 96361 HYDRATE IV INFUSION ADD-ON: CPT

## 2019-09-25 PROCEDURE — 93010 EKG 12-LEAD: ICD-10-PCS | Mod: ,,, | Performed by: INTERNAL MEDICINE

## 2019-09-25 RX ORDER — OXYCODONE HYDROCHLORIDE 5 MG/1
5 TABLET ORAL EVERY 4 HOURS PRN
Status: DISCONTINUED | OUTPATIENT
Start: 2019-09-25 | End: 2019-10-03 | Stop reason: HOSPADM

## 2019-09-25 RX ORDER — MORPHINE SULFATE 10 MG/ML
2 INJECTION INTRAMUSCULAR; INTRAVENOUS; SUBCUTANEOUS
Status: DISCONTINUED | OUTPATIENT
Start: 2019-09-25 | End: 2019-09-25

## 2019-09-25 RX ORDER — MORPHINE SULFATE 10 MG/ML
4 INJECTION INTRAMUSCULAR; INTRAVENOUS; SUBCUTANEOUS EVERY 4 HOURS PRN
Status: DISCONTINUED | OUTPATIENT
Start: 2019-09-25 | End: 2019-09-25

## 2019-09-25 RX ORDER — MORPHINE SULFATE 10 MG/ML
4 INJECTION INTRAMUSCULAR; INTRAVENOUS; SUBCUTANEOUS
Status: COMPLETED | OUTPATIENT
Start: 2019-09-25 | End: 2019-09-25

## 2019-09-25 RX ORDER — ACETAMINOPHEN 325 MG/1
650 TABLET ORAL EVERY 8 HOURS PRN
Status: DISCONTINUED | OUTPATIENT
Start: 2019-09-25 | End: 2019-10-03 | Stop reason: HOSPADM

## 2019-09-25 RX ORDER — ALLOPURINOL 300 MG/1
300 TABLET ORAL DAILY
Status: DISCONTINUED | OUTPATIENT
Start: 2019-09-25 | End: 2019-09-25 | Stop reason: CLARIF

## 2019-09-25 RX ORDER — IPRATROPIUM BROMIDE AND ALBUTEROL SULFATE 2.5; .5 MG/3ML; MG/3ML
3 SOLUTION RESPIRATORY (INHALATION)
Status: COMPLETED | OUTPATIENT
Start: 2019-09-25 | End: 2019-09-25

## 2019-09-25 RX ORDER — SODIUM CHLORIDE 9 MG/ML
INJECTION, SOLUTION INTRAVENOUS CONTINUOUS
Status: DISCONTINUED | OUTPATIENT
Start: 2019-09-25 | End: 2019-09-25

## 2019-09-25 RX ORDER — MORPHINE SULFATE 10 MG/ML
2 INJECTION INTRAMUSCULAR; INTRAVENOUS; SUBCUTANEOUS EVERY 4 HOURS PRN
Status: DISCONTINUED | OUTPATIENT
Start: 2019-09-25 | End: 2019-09-25

## 2019-09-25 RX ORDER — ALLOPURINOL 100 MG/1
300 TABLET ORAL DAILY
Status: DISCONTINUED | OUTPATIENT
Start: 2019-09-25 | End: 2019-09-28

## 2019-09-25 RX ORDER — SODIUM CHLORIDE 0.9 % (FLUSH) 0.9 %
10 SYRINGE (ML) INJECTION
Status: DISCONTINUED | OUTPATIENT
Start: 2019-09-25 | End: 2019-10-03 | Stop reason: HOSPADM

## 2019-09-25 RX ORDER — AMOXICILLIN 250 MG
2 CAPSULE ORAL 2 TIMES DAILY
Status: DISCONTINUED | OUTPATIENT
Start: 2019-09-25 | End: 2019-09-29

## 2019-09-25 RX ORDER — ALLOPURINOL 100 MG/1
300 TABLET ORAL DAILY
Status: DISCONTINUED | OUTPATIENT
Start: 2019-09-26 | End: 2019-09-25

## 2019-09-25 RX ORDER — MORPHINE SULFATE 2 MG/ML
4 INJECTION, SOLUTION INTRAMUSCULAR; INTRAVENOUS
Status: DISCONTINUED | OUTPATIENT
Start: 2019-09-25 | End: 2019-10-03 | Stop reason: HOSPADM

## 2019-09-25 RX ORDER — POLYETHYLENE GLYCOL 3350 17 G/17G
17 POWDER, FOR SOLUTION ORAL DAILY
Status: DISCONTINUED | OUTPATIENT
Start: 2019-09-26 | End: 2019-09-29

## 2019-09-25 RX ORDER — SODIUM CHLORIDE 9 MG/ML
INJECTION, SOLUTION INTRAVENOUS CONTINUOUS
Status: ACTIVE | OUTPATIENT
Start: 2019-09-25 | End: 2019-09-26

## 2019-09-25 RX ORDER — ONDANSETRON 2 MG/ML
4 INJECTION INTRAMUSCULAR; INTRAVENOUS
Status: COMPLETED | OUTPATIENT
Start: 2019-09-25 | End: 2019-09-25

## 2019-09-25 RX ORDER — ONDANSETRON 8 MG/1
8 TABLET, ORALLY DISINTEGRATING ORAL EVERY 6 HOURS PRN
Status: DISCONTINUED | OUTPATIENT
Start: 2019-09-25 | End: 2019-09-26

## 2019-09-25 RX ORDER — PYRIDOSTIGMINE BROMIDE 60 MG/1
60 TABLET ORAL DAILY
Status: DISCONTINUED | OUTPATIENT
Start: 2019-09-25 | End: 2019-09-26

## 2019-09-25 RX ORDER — ENOXAPARIN SODIUM 100 MG/ML
40 INJECTION SUBCUTANEOUS EVERY 24 HOURS
Status: DISCONTINUED | OUTPATIENT
Start: 2019-09-26 | End: 2019-09-29

## 2019-09-25 RX ADMIN — IPRATROPIUM BROMIDE AND ALBUTEROL SULFATE 3 ML: .5; 3 SOLUTION RESPIRATORY (INHALATION) at 12:09

## 2019-09-25 RX ADMIN — CEFTRIAXONE 2 G: 2 INJECTION, SOLUTION INTRAVENOUS at 11:09

## 2019-09-25 RX ADMIN — MORPHINE SULFATE 2 MG: 10 INJECTION INTRAVENOUS at 06:09

## 2019-09-25 RX ADMIN — OXYCODONE HYDROCHLORIDE 5 MG: 5 TABLET ORAL at 10:09

## 2019-09-25 RX ADMIN — IOHEXOL 100 ML: 350 INJECTION, SOLUTION INTRAVENOUS at 09:09

## 2019-09-25 RX ADMIN — PYRIDOSTIGMINE BROMIDE 60 MG: 60 TABLET ORAL at 05:09

## 2019-09-25 RX ADMIN — IPRATROPIUM BROMIDE AND ALBUTEROL SULFATE 3 ML: .5; 3 SOLUTION RESPIRATORY (INHALATION) at 11:09

## 2019-09-25 RX ADMIN — AZITHROMYCIN MONOHYDRATE 500 MG: 500 INJECTION, POWDER, LYOPHILIZED, FOR SOLUTION INTRAVENOUS at 12:09

## 2019-09-25 RX ADMIN — SODIUM CHLORIDE: 0.9 INJECTION, SOLUTION INTRAVENOUS at 09:09

## 2019-09-25 RX ADMIN — MORPHINE SULFATE 4 MG: 10 INJECTION INTRAVENOUS at 12:09

## 2019-09-25 RX ADMIN — ONDANSETRON 4 MG: 2 INJECTION INTRAMUSCULAR; INTRAVENOUS at 08:09

## 2019-09-25 RX ADMIN — ONDANSETRON 8 MG: 8 TABLET, ORALLY DISINTEGRATING ORAL at 10:09

## 2019-09-25 RX ADMIN — MORPHINE SULFATE 4 MG: 10 INJECTION INTRAVENOUS at 11:09

## 2019-09-25 RX ADMIN — ALLOPURINOL 300 MG: 100 TABLET ORAL at 10:09

## 2019-09-25 RX ADMIN — MORPHINE SULFATE 4 MG: 10 INJECTION INTRAVENOUS at 08:09

## 2019-09-25 RX ADMIN — SODIUM CHLORIDE: 0.9 INJECTION, SOLUTION INTRAVENOUS at 05:09

## 2019-09-25 RX ADMIN — PIPERACILLIN AND TAZOBACTAM 4.5 G: 4; .5 INJECTION, POWDER, LYOPHILIZED, FOR SOLUTION INTRAVENOUS; PARENTERAL at 05:09

## 2019-09-25 RX ADMIN — PIPERACILLIN AND TAZOBACTAM 4.5 G: 4; .5 INJECTION, POWDER, LYOPHILIZED, FOR SOLUTION INTRAVENOUS; PARENTERAL at 10:09

## 2019-09-25 RX ADMIN — SODIUM CHLORIDE 2600 ML: 0.9 INJECTION, SOLUTION INTRAVENOUS at 08:09

## 2019-09-25 NOTE — SUBJECTIVE & OBJECTIVE
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 Jolley    CATARACT EXTRACTION W/  INTRAOCULAR LENS IMPLANT Left 04/20/2017    Dr. Jolley    CHOLECYSTECTOMY      EYE SURGERY      Rt cataract    KNEE ARTHROSCOPY      Lt knee    TONSILLECTOMY         Review of patient's allergies indicates:  No Known Allergies    No current facility-administered medications on file prior to encounter.      Current Outpatient Medications on File Prior to Encounter   Medication Sig    azelastine (ASTELIN) 137 mcg (0.1 %) nasal spray 1 spray (137 mcg total) by Nasal route 2 (two) times daily.    pyridostigmine (MESTINON) 60 mg Tab Take 60 mg by mouth.    fluocinonide 0.05% (LIDEX) 0.05 % cream KARYNA EXT AA ON CHEST BID    meclizine (ANTIVERT) 25 mg tablet Take 1 tablet (25 mg total) by mouth 3 (three) times daily as needed.     Family History     Problem Relation (Age of Onset)    No Known Problems Mother, Father, Sister, Brother, Maternal Aunt, Maternal Uncle, Paternal Aunt, Paternal Uncle, Maternal Grandmother, Maternal Grandfather, Paternal Grandmother, Paternal Grandfather        Tobacco Use    Smoking status: Former Smoker    Smokeless tobacco: Never Used   Substance and Sexual Activity    Alcohol use: No    Drug use: No    Sexual activity: Not on file     Review of Systems   Constitutional: Positive for activity change and appetite change.   HENT: Negative for congestion and dental problem.    Eyes: Negative for discharge and itching.   Respiratory: Positive for cough and shortness of breath.    Cardiovascular: Negative for chest pain.   Gastrointestinal: Positive for abdominal pain. Negative for abdominal distention.   Endocrine:  Negative for cold intolerance.   Genitourinary: Negative for difficulty urinating and dysuria.   Musculoskeletal: Negative for arthralgias and back pain.   Skin: Negative for color change and pallor.   Allergic/Immunologic: Negative for environmental allergies.   Neurological: Negative for facial asymmetry.   Hematological: Negative for adenopathy. Does not bruise/bleed easily.   Psychiatric/Behavioral: Negative for agitation and behavioral problems.     Objective:     Vital Signs (Most Recent):  Temp: 97.9 °F (36.6 °C) (09/25/19 0753)  Pulse: 80 (09/25/19 1200)  Resp: 16 (09/25/19 1200)  BP: (!) 164/78 (09/25/19 1131)  SpO2: 100 % (09/25/19 1200) Vital Signs (24h Range):  Temp:  [97.9 °F (36.6 °C)] 97.9 °F (36.6 °C)  Pulse:  [] 80  Resp:  [15-29] 16  SpO2:  [94 %-100 %] 100 %  BP: (106-164)/(63-78) 164/78     Weight: 84.8 kg (187 lb)  Body mass index is 26.08 kg/m².    Physical Exam   Constitutional: He is oriented to person, place, and time. No distress.   HENT:   Head: Atraumatic.   Eyes: EOM are normal.   Neck: Neck supple.   Cardiovascular: Normal rate and regular rhythm.   Pulmonary/Chest: Effort normal. He has wheezes.   Abdominal: Soft. Bowel sounds are normal. There is tenderness.   Musculoskeletal: Normal range of motion. He exhibits no edema or deformity.   Neurological: He is oriented to person, place, and time.   Skin: Skin is warm and dry.   Psychiatric: He has a normal mood and affect. His behavior is normal.         CRANIAL NERVES     CN III, IV, VI   Extraocular motions are normal.        Significant Labs:   BMP:   Recent Labs   Lab 09/25/19  0808   *      K 4.7   CL 99   CO2 23   BUN 28*   CREATININE 1.3   CALCIUM 10.9*     CBC:   Recent Labs   Lab 09/25/19  0808   WBC 8.61   HGB 14.7   HCT 43.5        CMP:   Recent Labs   Lab 09/25/19  0808      K 4.7   CL 99   CO2 23   *   BUN 28*   CREATININE 1.3   CALCIUM 10.9*   PROT 7.1   ALBUMIN 3.6   BILITOT 1.4*    ALKPHOS 140*   AST 30   ALT 43   ANIONGAP 14   EGFRNONAA 51*       Significant Imaging: reviewed.

## 2019-09-25 NOTE — HPI
"This 79 y.o male, with a medical history of hypertension, myasthenia gravis, and lymphoma, presents to the ED c/o acute, constant left upper quadrant abdominal pain. Pt reports that the symptoms began over the weekend and had been intermittent since onset, but he states that the pain worsened this morning. Pt describes the symptoms as feeling "like a gas attack". He adds that it feels "like an abscess on the spleen", noting that the present symptoms feel similar to when he had an abscess present to the gallbladder in the past. Pt reports that the pain is exacerbated when lying on his back, but improves when sitting up. He currently rates the pain 6/10. Pt additionally notes experiencing a decreased appetite (pain returns accompanied by distention with consuming small amounts of water), abnormal stool (stool is yellow in color) and back pain. He reports that he had a CT scan preformed last Monday and was found to have a mediastinal mass in the chest. Pt states that he later visited a pulmonologist and subsequently had a lymph node biopsy preformed. He notes that he had an appointment scheduled for this morning to receive the results. No recent sick contact. Pt denies fever, emesis and rash/lesions. No recent travels. No history of heart failure. No other associated symptoms. No alleviating factors.CT chest show,mediastinal mass,lymphomal with pneumonia,patient has been  started on IV Abx and oncology is consulted,  "

## 2019-09-25 NOTE — ASSESSMENT & PLAN NOTE
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  -Will start on allopurinol 300mg daily

## 2019-09-25 NOTE — LETTER
AUTHORIZATION FOR RELEASE OF   CONFIDENTIAL MEDICAL INFORMATION    Dear [facility] and [place] [facility] department,    We are seeing Robe Cevallos, date of birth 1940, in the hospital at [facility]. Robe Cevallos had an lymph node biopsy collected at your facility on 19. In order to help keep his health information updated, he has authorized us to request the following medical record(s):        (  )  MAMMOGRAM                                      (  )  COLONOSCOPY      (  )  PAP SMEAR                                          (  )  OUTSIDE LAB RESULTS     (  )  DEXA SCAN                                          (  )  EYE EXAM            (  )  FOOT EXAM                                          (  )  ENTIRE RECORD     (  )  OUTSIDE IMMUNIZATIONS                 ( X )  Lymph node biopsy slides          Path #[id number]      Please send slides to [facility]: Anatomic Pathology Department, Attn Dr. Mitchell  [address] [place] [address]     If you have any questions, please contact Pathology Department at [phone].           Patient Name: Robe Cevallos  : 1940  Patient Phone #: [phone]

## 2019-09-25 NOTE — HPI
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  Upon admission the patient underwent a CTA chest and CT of the abdomen.  The CT of the chest showed extrinsic compression of the esophagus, a large retrocardiac mass with involvement of the left hilum causing extrinsic compression of the adjacent esophagus, compression of the left upper and lower lobe bronchi and compression/obliteration of the left inferior pulmonary vein. The patient was started on azithromycin and ceftriaxone for post obstructive pneumonia with plans for transition to zosyn. He had previously undergone a CT of the chest on 9/16/19 showing a 9.8 x 3.8 x 13 cm size posterior mediastinal mass.  He underwent a biopsy of a left supraclavicular LN on 9/20/19 under the care of Dr Deion Davis a pulmonologist at [facility].  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  The patient states he has been feeling bad for over a month.  He has had increased PO intake over the past 10 days due to difficulty swallowing solids.  He denies any fever, chills, night sweats.  He endorses some weight loss.  He endorses CP, SOB, fatigue, abdominal pain.  He denies constipation, diarrhea.

## 2019-09-25 NOTE — SUBJECTIVE & OBJECTIVE
Oncology Treatment Plan:   [No treatment plan]    Medications:  Continuous Infusions:   sodium chloride 0.9%       Scheduled Meds:   [START ON 9/26/2019] allopurinol  300 mg Oral Daily    [START ON 9/26/2019] azithromycin  500 mg Intravenous Q24H    piperacillin-tazobactam (ZOSYN) IVPB  4.5 g Intravenous Q8H    pyridostigmine  60 mg Oral Daily     PRN Meds:acetaminophen, influenza, morphine, oxyCODONE, pneumoc 13-oscar conj-dip cr(PF), sodium chloride 0.9%     Review of patient's allergies indicates:  No Known Allergies     Past Medical History:   Diagnosis Date    AMD (age-related macular degeneration), bilateral     Cancer 2004    Lymphoma    Hypertension     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 Jolley    CATARACT EXTRACTION W/  INTRAOCULAR LENS IMPLANT Left 04/20/2017    Dr. Jolley    CHOLECYSTECTOMY      EYE SURGERY      Rt cataract    KNEE ARTHROSCOPY      Lt knee    TONSILLECTOMY       Family History     Problem Relation (Age of Onset)    No Known Problems Mother, Father, Sister, Brother, Maternal Aunt, Maternal Uncle, Paternal Aunt, Paternal Uncle, Maternal Grandmother, Maternal Grandfather, Paternal Grandmother, Paternal Grandfather        Tobacco Use    Smoking status: Former Smoker    Smokeless tobacco: Never Used   Substance and Sexual Activity    Alcohol use: No    Drug use: No    Sexual activity: Not on file       Review of Systems   Constitutional: Positive for fatigue and unexpected weight change. Negative for chills, diaphoresis and fever.   HENT: Positive for trouble swallowing. Negative for sore throat and voice change.    Eyes: Negative for photophobia and visual disturbance.   Respiratory: Positive for shortness of breath. Negative for cough and chest tightness.    Cardiovascular: Negative for  chest pain, palpitations and leg swelling.   Gastrointestinal: Positive for abdominal pain. Negative for constipation, diarrhea, nausea and vomiting.   Endocrine: Negative for cold intolerance and heat intolerance.   Genitourinary: Negative for difficulty urinating, dysuria and hematuria.   Musculoskeletal: Negative for arthralgias, back pain and myalgias.   Skin: Negative for color change and rash.   Neurological: Negative for dizziness, light-headedness, numbness and headaches.   Hematological: Negative for adenopathy. Does not bruise/bleed easily.     Objective:     Vital Signs (Most Recent):  Temp: 97.9 °F (36.6 °C) (09/25/19 0753)  Pulse: 85 (09/25/19 1458)  Resp: 20 (09/25/19 1458)  BP: 138/70 (09/25/19 1431)  SpO2: (!) 94 % (09/25/19 1458) Vital Signs (24h Range):  Temp:  [97.9 °F (36.6 °C)] 97.9 °F (36.6 °C)  Pulse:  [] 85  Resp:  [15-29] 20  SpO2:  [93 %-100 %] 94 %  BP: (106-164)/(63-78) 138/70     Weight: 84.8 kg (187 lb)  Body mass index is 26.08 kg/m².  Body surface area is 2.06 meters squared.      Intake/Output Summary (Last 24 hours) at 9/25/2019 1548  Last data filed at 9/25/2019 1317  Gross per 24 hour   Intake 2900 ml   Output --   Net 2900 ml       Physical Exam   Constitutional: He is oriented to person, place, and time. He appears distressed.   HENT:   Head: Normocephalic and atraumatic.   Eyes: EOM are normal. Right eye exhibits no discharge. Left eye exhibits no discharge. No scleral icterus.   Cardiovascular: Normal rate, regular rhythm, normal heart sounds and intact distal pulses. Exam reveals no gallop and no friction rub.   No murmur heard.  Pulmonary/Chest: Effort normal and breath sounds normal. No respiratory distress. He has no wheezes. He has no rales. He exhibits no tenderness.   Abdominal: Soft. Bowel sounds are normal. He exhibits no distension and no mass. There is no tenderness. There is no rebound.   Musculoskeletal: Normal range of motion. He exhibits no edema or  tenderness.   Neurological: He is alert and oriented to person, place, and time. No cranial nerve deficit. Coordination normal.   Skin: Skin is warm and dry. No rash noted. He is not diaphoretic. No erythema.   Psychiatric: He has a normal mood and affect. His behavior is normal.       Significant Labs:   Recent Results (from the past 24 hour(s))   CBC W/ AUTO DIFFERENTIAL    Collection Time: 09/25/19  8:08 AM   Result Value Ref Range    WBC 8.61 3.90 - 12.70 K/uL    RBC 5.10 4.60 - 6.20 M/uL    Hemoglobin 14.7 14.0 - 18.0 g/dL    Hematocrit 43.5 40.0 - 54.0 %    Mean Corpuscular Volume 85 82 - 98 fL    Mean Corpuscular Hemoglobin 28.8 27.0 - 31.0 pg    Mean Corpuscular Hemoglobin Conc 33.8 32.0 - 36.0 g/dL    RDW 14.3 11.5 - 14.5 %    Platelets 271 150 - 350 K/uL    MPV 10.2 9.2 - 12.9 fL    Gran # (ANC) 6.9 1.8 - 7.7 K/uL    Lymph # 1.2 1.0 - 4.8 K/uL    Mono # 0.4 0.3 - 1.0 K/uL    Eos # 0.0 0.0 - 0.5 K/uL    Baso # 0.02 0.00 - 0.20 K/uL    Gran% 81.2 (H) 38.0 - 73.0 %    Lymph% 14.1 (L) 18.0 - 48.0 %    Mono% 4.9 4.0 - 15.0 %    Eosinophil% 0.2 0.0 - 8.0 %    Basophil% 0.2 0.0 - 1.9 %    Differential Method Automated    Comp. Metabolic Panel    Collection Time: 09/25/19  8:08 AM   Result Value Ref Range    Sodium 136 136 - 145 mmol/L    Potassium 4.7 3.5 - 5.1 mmol/L    Chloride 99 95 - 110 mmol/L    CO2 23 23 - 29 mmol/L    Glucose 112 (H) 70 - 110 mg/dL    BUN, Bld 28 (H) 8 - 23 mg/dL    Creatinine 1.3 0.5 - 1.4 mg/dL    Calcium 10.9 (H) 8.7 - 10.5 mg/dL    Total Protein 7.1 6.0 - 8.4 g/dL    Albumin 3.6 3.5 - 5.2 g/dL    Total Bilirubin 1.4 (H) 0.1 - 1.0 mg/dL    Alkaline Phosphatase 140 (H) 55 - 135 U/L    AST 30 10 - 40 U/L    ALT 43 10 - 44 U/L    Anion Gap 14 8 - 16 mmol/L    eGFR if African American 59 (A) >60 mL/min/1.73 m^2    eGFR if non African American 51 (A) >60 mL/min/1.73 m^2   Lipase    Collection Time: 09/25/19  8:08 AM   Result Value Ref Range    Lipase 18 4 - 60 U/L   Troponin I    Collection  Time: 09/25/19  8:08 AM   Result Value Ref Range    Troponin I 0.017 0.000 - 0.026 ng/mL   Brain natriuretic peptide    Collection Time: 09/25/19  8:08 AM   Result Value Ref Range    BNP 27 0 - 99 pg/mL   Lactic acid, plasma    Collection Time: 09/25/19  8:25 AM   Result Value Ref Range    Lactate (Lactic Acid) 2.6 (H) 0.5 - 2.2 mmol/L   Blood culture #1 **CANNOT BE ORDERED STAT**    Collection Time: 09/25/19  8:25 AM   Result Value Ref Range    Blood Culture, Routine No Growth to date    Blood culture #2 **CANNOT BE ORDERED STAT**    Collection Time: 09/25/19  8:30 AM   Result Value Ref Range    Blood Culture, Routine No Growth to date    Lactate dehydrogenase    Collection Time: 09/25/19  8:38 AM   Result Value Ref Range     (H) 110 - 260 U/L   Uric acid    Collection Time: 09/25/19  8:38 AM   Result Value Ref Range    Uric Acid 9.6 (H) 3.4 - 7.0 mg/dL   Urinalysis, Reflex to Urine Culture Urine, Clean Catch    Collection Time: 09/25/19 10:05 AM   Result Value Ref Range    Specimen UA Urine, Clean Catch     Color, UA Yellow Yellow, Straw, Sanna    Appearance, UA Clear Clear    pH, UA 5.0 5.0 - 8.0    Specific Gravity, UA >1.030 (A) 1.005 - 1.030    Protein, UA Negative Negative    Glucose, UA Negative Negative    Ketones, UA 1+ (A) Negative    Bilirubin (UA) Negative Negative    Occult Blood UA Negative Negative    Nitrite, UA Negative Negative    Urobilinogen, UA Negative <2.0 EU/dL    Leukocytes, UA Negative Negative   Lactic acid, plasma    Collection Time: 09/25/19 11:53 AM   Result Value Ref Range    Lactate (Lactic Acid) 2.0 0.5 - 2.2 mmol/L         Diagnostic Results:    CTA Chest/CT abdomen and pelvis    1. No CTA findings to suggest a pulmonary arterial thromboembolus.  2. Persistent mediastinal/left hilar mass and extensive left supraclavicular, mediastinal and upper abdominal lymphadenopathy consistent with neoplasm, likely lymphoma given reported history.  When compared to CT dated 09/16/2019,  there has been interval increased rightward mediastinal shift and extrinsic compression of the left upper and lower lobe bronchi with worsening pulmonary parenchymal consolidation concerning for postobstructive pneumonia or atelectasis.  3. Increasing left pleural effusion.  4. 1.1 cm indeterminate right hepatic lobe hypodensity, slightly increased in conspicuity when compared to the previous exam.  Stable left hepatic cyst.  5. 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  6. Additional findings as in the body of the report.  This report was flagged in Epic as abnormal.

## 2019-09-25 NOTE — ASSESSMENT & PLAN NOTE
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  -Echo will be needed for potential treatment planning  -I spoke with Dr Pau Lepe the primary physician on the BMT service at [facility] who is willing to accept the patient  -Pt will need CBC, CMP, phos, uric acid daily  -Treatment for tumor lysis as discussed below  -Cancer pain treated as discussed below

## 2019-09-25 NOTE — ASSESSMENT & PLAN NOTE
-Pt with imaging findings concerning for post obstructive pneumonia  -Pt on azithromycin and zosyn  -Management per primary team

## 2019-09-25 NOTE — ASSESSMENT & PLAN NOTE
-Pt with cancer related pain in the chest and abdomen  -Will start on oxycodone 5mg and continue IV morphine for breakthrough pain  -Pt will need either senokot or miralax daily for prevention of opioid induced constipation.

## 2019-09-25 NOTE — ED PROVIDER NOTES
"Encounter Date: 9/25/2019    SCRIBE #1 NOTE: I, Connie Denis, am scribing for, and in the presence of,  Enrique Greco MD. I have scribed the following portions of the note - Other sections scribed: HPI, ROS and PE.       History     Chief Complaint   Patient presents with    Abdominal Pain     LUQ abdominal pain x 1 week.  Pain more intense over last few days and is worse when laying down.  Recently found out he has a large medistinal mass in his chest.  Hx of lymphoma.     CC: Abdominal Pain    HPI: This 79 y.o male, with a medical history of hypertension, myasthenia gravis, and lymphoma, presents to the ED c/o acute, constant left upper quadrant abdominal pain. Pt reports that the symptoms began over the weekend and had been intermittent since onset, but he states that the pain worsened this morning. Pt describes the symptoms as feeling "like a gas attack". He adds that it feels "like an abscess on the spleen", noting that the present symptoms feel similar to when he had an abscess present to the gallbladder in the past. Pt reports that the pain is exacerbated when lying on his back, but improves when sitting up. He currently rates the pain 6/10. Pt additionally notes experiencing a decreased appetite (pain returns accompanied by distention with consuming small amounts of water), abnormal stool (stool is yellow in color) and back pain. He reports that he had a CT scan preformed last Monday and was found to have a mediastinal mass in the chest. Pt states that he later visited a pulmonologist and subsequently had a lymph node biopsy preformed. He notes that he had an appointment scheduled for this morning to receive the results. No recent sick contact. Pt denies fever, emesis and rash/lesions. No recent travels. No history of heart failure. No other associated symptoms. No alleviating factors.    The history is provided by the patient. No  was used.     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 Jolley    CATARACT EXTRACTION W/  INTRAOCULAR LENS IMPLANT Left 04/20/2017    Dr. Jolley    CHOLECYSTECTOMY      EYE SURGERY      Rt cataract    KNEE ARTHROSCOPY      Lt knee    TONSILLECTOMY       Family History   Problem Relation Age of Onset    No Known Problems Mother     No Known Problems Father     No Known Problems Sister     No Known Problems Brother     No Known Problems Maternal Aunt     No Known Problems Maternal Uncle     No Known Problems Paternal Aunt     No Known Problems Paternal Uncle     No Known Problems Maternal Grandmother     No Known Problems Maternal Grandfather     No Known Problems Paternal Grandmother     No Known Problems Paternal Grandfather     Amblyopia Neg Hx     Blindness Neg Hx     Cataracts Neg Hx     Glaucoma Neg Hx     Macular degeneration Neg Hx     Retinal detachment Neg Hx     Strabismus Neg Hx     Cancer Neg Hx     Diabetes Neg Hx     Hypertension Neg Hx     Stroke Neg Hx     Thyroid disease Neg Hx      Social History     Tobacco Use    Smoking status: Former Smoker    Smokeless tobacco: Never Used   Substance Use Topics    Alcohol use: No    Drug use: No     Review of Systems   Constitutional: Positive for appetite change (decreased). Negative for chills and fever.   HENT: Negative for congestion, rhinorrhea and sore throat.    Eyes: Negative for visual disturbance.   Respiratory: Negative for cough and shortness of breath.    Cardiovascular: Negative for chest pain.   Gastrointestinal: Positive for abdominal distention and abdominal pain (left upper  quadrant). Negative for diarrhea, nausea and vomiting.        (+) abnormal stool (stool is yellow in color)   Genitourinary: Negative for dysuria, frequency and hematuria.   Musculoskeletal: Positive for back pain.   Skin: Negative for rash.   Neurological: Negative for dizziness, weakness and headaches.       Physical Exam     Initial Vitals [09/25/19 0753]   BP Pulse Resp Temp SpO2   120/72 109 20 97.9 °F (36.6 °C) (!) 94 %      MAP       --         Physical Exam    Nursing note and vitals reviewed.  Constitutional: He is not diaphoretic. No distress.   Patient looks uncomfortable.   HENT:   Head: Normocephalic and atraumatic.   Mouth/Throat: Oropharynx is clear and moist.   Eyes: Conjunctivae and EOM are normal. Pupils are equal, round, and reactive to light.   Neck: Normal range of motion. Neck supple.   Cardiovascular: Normal rate, regular rhythm and normal heart sounds.   Pulmonary/Chest:   Patient tachypneic.  Seems like he has more difficulty breathing when he moves around on the bed.  Looks uncomfortable deep breath. Minimal wheezing.   Abdominal: Soft. Bowel sounds are normal.   Left upper quadrant tenderness.   Musculoskeletal: Normal range of motion. He exhibits no edema or tenderness.   Neurological: He is alert and oriented to person, place, and time.   Skin: Skin is warm and dry.   Psychiatric: He has a normal mood and affect.         ED Course   Procedures  Labs Reviewed   CBC W/ AUTO DIFFERENTIAL - Abnormal; Notable for the following components:       Result Value    Gran% 81.2 (*)     Lymph% 14.1 (*)     All other components within normal limits   COMPREHENSIVE METABOLIC PANEL - Abnormal; Notable for the following components:    Glucose 112 (*)     BUN, Bld 28 (*)     Calcium 10.9 (*)     Total Bilirubin 1.4 (*)     Alkaline Phosphatase 140 (*)     eGFR if  59 (*)     eGFR if non  51 (*)     All other components within normal limits   URINALYSIS, REFLEX TO URINE  CULTURE - Abnormal; Notable for the following components:    Specific Gravity, UA >1.030 (*)     Ketones, UA 1+ (*)     All other components within normal limits    Narrative:     Preferred Collection Type->Urine, Clean Catch   LACTIC ACID, PLASMA - Abnormal; Notable for the following components:    Lactate (Lactic Acid) 2.6 (*)     All other components within normal limits   URIC ACID - Abnormal; Notable for the following components:    Uric Acid 9.6 (*)     All other components within normal limits   CULTURE, BLOOD   CULTURE, BLOOD   LIPASE   TROPONIN I   B-TYPE NATRIURETIC PEPTIDE   LACTIC ACID, PLASMA   LACTATE DEHYDROGENASE     EKG Readings: (Independently Interpreted)   EKG done at 8:10 a.m. showing normal sinus rhythm incomplete right bundle branch block.  No ST elevation or T-wave abnormality.  Nonspecific EKG and compared to previous and incomplete right bundle branch block a little bit more prominent.     ECG Results          EKG 12-lead (In process)  Result time 09/25/19 13:21:41    In process by Interface, Lab In [facility] (09/25/19 13:21:41)                 Narrative:    Test Reason : R10.9,    Vent. Rate : 090 BPM     Atrial Rate : 090 BPM     P-R Int : 164 ms          QRS Dur : 094 ms      QT Int : 350 ms       P-R-T Axes : 052 048 057 degrees     QTc Int : 428 ms    Normal sinus rhythm  Incomplete right bundle branch block  Borderline Abnormal ECG  When compared with ECG of 23-JUL-2019 09:45,  Significant changes have occurred    Referred By: [email or web address]   SELF           Confirmed By:                   In process by Interface, Lab In [facility] (09/25/19 13:19:19)                 Narrative:    Test Reason : R10.9,    Vent. Rate : 090 BPM     Atrial Rate : 090 BPM     P-R Int : 164 ms          QRS Dur : 094 ms      QT Int : 350 ms       P-R-T Axes : 052 048 057 degrees     QTc Int : 428 ms    Normal sinus rhythm  Incomplete right bundle branch block  Borderline Abnormal ECG  When compared with ECG of  23-JUL-2019 09:45,  Significant changes have occurred    Referred By: [email or web address]   SELF           Confirmed By:                             Imaging Results           CTA Chest Non-Coronary - PE Study (Final result)  Result time 09/25/19 10:19:17    Final result by Dejuan Love MD (09/25/19 10:19:17)                 Impression:      1. No CTA findings to suggest a pulmonary arterial thromboembolus.  2. Persistent mediastinal/left hilar mass and extensive left supraclavicular, mediastinal and upper abdominal lymphadenopathy consistent with neoplasm, likely lymphoma given reported history.  When compared to CT dated 09/16/2019, there has been interval increased rightward mediastinal shift and extrinsic compression of the left upper and lower lobe bronchi with worsening pulmonary parenchymal consolidation concerning for postobstructive pneumonia or atelectasis.  3. Increasing left pleural effusion.  4. 1.1 cm indeterminate right hepatic lobe hypodensity, slightly increased in conspicuity when compared to the previous exam.  Stable left hepatic cyst.  5. 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  6. Additional findings as in the body of the report.  This report was flagged in Epic as abnormal.      Electronically signed by: Dejuan Love MD  Date:    09/25/2019  Time:    10:19             Narrative:    EXAMINATION:  CTA CHEST NON CORONARY; CT ABDOMEN PELVIS WITH CONTRAST    CLINICAL HISTORY:  Chest pain, acute, PE suspected, high pretest prob;; Nausea, vomiting, diarrhea; heart is normal in size.  There is moderate dense coronary artery atherosclerosis.  No pericardial effusion.    TECHNIQUE:  Low dose axial images, sagittal and coronal reformations were obtained from the thoracic inlet to the pubic symphysis following administration of 100 cc of intravenous contrast.    COMPARISON:  CT 09/16/2019.    FINDINGS:  Structures at the base of the neck are unremarkable.    There  is a left-sided aortic arch with 3 branch vessels.  The thoracic aorta tapers normally with prominent atherosclerotic calcification.  No dissection.    Pulmonary arteries distribute normally.  No filling defects to suggest a thromboembolus.    Heart is normal in size.  There is moderate dense coronary artery atherosclerosis.  No pericardial effusion.    There is prominent mediastinal and left supraclavicular lymphadenopathy with index lymph nodes as follows:    *Pre-vascular: 3.4 cm, series 2, image 214.  *Left supraclavicular: 2.2 cm, series 2, image 63.  There is extrinsic compression of the esophagus.    Again identified is a large retrocardiac mass with involvement of the left hilum that demonstrates associated extrinsic compression of the adjacent esophagus, compression of the left upper and lower lobe bronchi and compression/obliteration of the left inferior pulmonary vein.  When compared to the previous exam, there has been interval worsening parenchymal consolidation throughout the left lung, likely postobstructive in nature.  There is an increasing left-sided pleural effusion.  Right lung demonstrates subsegmental band like opacities likely atelectasis or consolidation. There is mild upper lobe predominant emphysema.  No pneumothorax.  There are several calcified left pleural plaques.    The liver is normal in size.  There is a stable parenchymal hypodensity within the inferior aspect of the left hepatic lobe.  There is an ill-defined 1.1 cm hypodensity within the periphery of the right hepatic lobe, slightly increased in conspicuity when compared to the previous exam and overall indeterminate.  Remaining hepatic parenchyma demonstrates homogeneous enhancement without focal lesions.  Portal vasculature is patent.  No intrahepatic bile duct dilatation.    Gallbladder is surgically absent.  Common bile duct is normal in caliber.    Stomach, duodenum, spleen, pancreas and adrenal glands are within normal  limits.    Kidneys are normal in size and location.  No cortical enhancing lesions.  No nephrolithiasis.  No hydronephrosis or hydroureter.  Prostate is enlarged.  Bladder is fluid filled and unremarkable.    Small bowel is normal in caliber.  There are multiple scattered colonic diverticuli.  The appendix is normal.  No obstruction or inflammatory changes.    The aorta tapers normally with prominent atherosclerotic calcification.  Celiac artery, SMA, bilateral renal arteries and BRAD are patent.    No ascites or intra-abdominal free air.  No focal mesenteric masses.    There is abnormal upper abdominal lymphadenopathy with index lymph node as follows:    *Adjacent to GE junction: 2.3 cm, series 3, image 36.  There is mild generalized body wall edema.    Degenerative changes of the spine and femoroacetabular joints are identified.  No suspicious lytic or blastic lesions                                CT Abdomen Pelvis With Contrast (Final result)  Result time 09/25/19 10:19:17    Final result by Dejuan Love MD (09/25/19 10:19:17)                 Impression:      1. No CTA findings to suggest a pulmonary arterial thromboembolus.  2. Persistent mediastinal/left hilar mass and extensive left supraclavicular, mediastinal and upper abdominal lymphadenopathy consistent with neoplasm, likely lymphoma given reported history.  When compared to CT dated 09/16/2019, there has been interval increased rightward mediastinal shift and extrinsic compression of the left upper and lower lobe bronchi with worsening pulmonary parenchymal consolidation concerning for postobstructive pneumonia or atelectasis.  3. Increasing left pleural effusion.  4. 1.1 cm indeterminate right hepatic lobe hypodensity, slightly increased in conspicuity when compared to the previous exam.  Stable left hepatic cyst.  5. 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  6. Additional findings as in the body of the  report.  This report was flagged in Epic as abnormal.      Electronically signed by: Dejuan Love MD  Date:    09/25/2019  Time:    10:19             Narrative:    EXAMINATION:  CTA CHEST NON CORONARY; CT ABDOMEN PELVIS WITH CONTRAST    CLINICAL HISTORY:  Chest pain, acute, PE suspected, high pretest prob;; Nausea, vomiting, diarrhea; heart is normal in size.  There is moderate dense coronary artery atherosclerosis.  No pericardial effusion.    TECHNIQUE:  Low dose axial images, sagittal and coronal reformations were obtained from the thoracic inlet to the pubic symphysis following administration of 100 cc of intravenous contrast.    COMPARISON:  CT 09/16/2019.    FINDINGS:  Structures at the base of the neck are unremarkable.    There is a left-sided aortic arch with 3 branch vessels.  The thoracic aorta tapers normally with prominent atherosclerotic calcification.  No dissection.    Pulmonary arteries distribute normally.  No filling defects to suggest a thromboembolus.    Heart is normal in size.  There is moderate dense coronary artery atherosclerosis.  No pericardial effusion.    There is prominent mediastinal and left supraclavicular lymphadenopathy with index lymph nodes as follows:    *Pre-vascular: 3.4 cm, series 2, image 214.  *Left supraclavicular: 2.2 cm, series 2, image 63.  There is extrinsic compression of the esophagus.    Again identified is a large retrocardiac mass with involvement of the left hilum that demonstrates associated extrinsic compression of the adjacent esophagus, compression of the left upper and lower lobe bronchi and compression/obliteration of the left inferior pulmonary vein.  When compared to the previous exam, there has been interval worsening parenchymal consolidation throughout the left lung, likely postobstructive in nature.  There is an increasing left-sided pleural effusion.  Right lung demonstrates subsegmental band like opacities likely atelectasis or consolidation.  There is mild upper lobe predominant emphysema.  No pneumothorax.  There are several calcified left pleural plaques.    The liver is normal in size.  There is a stable parenchymal hypodensity within the inferior aspect of the left hepatic lobe.  There is an ill-defined 1.1 cm hypodensity within the periphery of the right hepatic lobe, slightly increased in conspicuity when compared to the previous exam and overall indeterminate.  Remaining hepatic parenchyma demonstrates homogeneous enhancement without focal lesions.  Portal vasculature is patent.  No intrahepatic bile duct dilatation.    Gallbladder is surgically absent.  Common bile duct is normal in caliber.    Stomach, duodenum, spleen, pancreas and adrenal glands are within normal limits.    Kidneys are normal in size and location.  No cortical enhancing lesions.  No nephrolithiasis.  No hydronephrosis or hydroureter.  Prostate is enlarged.  Bladder is fluid filled and unremarkable.    Small bowel is normal in caliber.  There are multiple scattered colonic diverticuli.  The appendix is normal.  No obstruction or inflammatory changes.    The aorta tapers normally with prominent atherosclerotic calcification.  Celiac artery, SMA, bilateral renal arteries and BRAD are patent.    No ascites or intra-abdominal free air.  No focal mesenteric masses.    There is abnormal upper abdominal lymphadenopathy with index lymph node as follows:    *Adjacent to GE junction: 2.3 cm, series 3, image 36.  There is mild generalized body wall edema.    Degenerative changes of the spine and femoroacetabular joints are identified.  No suspicious lytic or blastic lesions                               X-Ray Chest AP Portable (Final result)  Result time 09/25/19 09:18:26    Final result by Dejuan Love MD (09/25/19 09:18:26)                 Impression:      Marked interval increased size of a left pleural effusion with associated rightward mediastinal shift.      Electronically  signed by: Dejuan Love MD  Date:    09/25/2019  Time:    09:18             Narrative:    EXAMINATION:  XR CHEST AP PORTABLE    CLINICAL HISTORY:  upper abdominal pain;    TECHNIQUE:  Single frontal view of the chest was performed.    COMPARISON:  Radiograph 09/05/2019.  CT 09/16/2019.    FINDINGS:  There is rightward mediastinal shift.  Cardiac silhouette is not well evaluated.  There is a large left pleural effusion with associated atelectasis of the adjacent lung, markedly increased when compared to the previous exam.  Right lung is clear.  No pneumothorax.  No free air beneath the diaphragm.  Degenerative changes of the spine and shoulders noted.                                 Medical Decision Making:   Initial Assessment:   89-year-old male coming in secondary to left upper abdominal pain and also patient looks like he is having difficultly breathing on exam.  Differential is wide.  PE is a consideration along with pneumonia along with worsening mass burden on the patient.  Patient also could have some type contusion and/or musculoskeletal issue.  Colitis pancreatitis are also consideration.  EKG, labs, imaging ordered on the patient.  IV morphine for pain.    Labs are reassuring.  CT PE shows a worsening as burn with collapse along and possible developing pneumonia.  Lactic acid is also slightly elevated.  Patient did receive 30 milliliter/kilogram bolus and also lactic acid blood cultures and community-acquired pneumonia treatment with ceftriaxone and azithromycin.  Patient had to be re-dosed another 2 times for pain medications to pain. Patient was admitted for further workup and management.  Patient placed on oxygen for comfort.  Port score is 121.    Please put in 35 minutes of critical care due to patient having a high risk of respiratory failure.   Separate from teaching and exclusive of procedure and ekg time  Includes:  Time at bedside  Time reviewing test results  Time discussing case with  staff  Time documenting the medical record  Time spent with family members  Time spent with consults  Management    Clinical Tests:   Lab Tests: Ordered and Reviewed  Radiological Study: Reviewed and Ordered  Medical Tests: Ordered and Reviewed                      Clinical Impression:       ICD-10-CM ICD-9-CM   1. Pneumonia of left lower lobe due to infectious organism J18.1 486   2. Abdominal pain R10.9 789.00            I, Enrique Greco, personally performed the services described in this documentation. All medical record entries made by the scribe were at my direction and in my presence. I have reviewed the chart and agree that the record reflects my personal performance and is accurate and complete.                     Enrique Greco MD  09/25/19 0089

## 2019-09-25 NOTE — ED TRIAGE NOTES
"Pt arrived to the ED via POV from home with c/o LUQ pain. Pt reports been having pain and weakness since last night that worsened on this morning. States "a large mass was found in my chest last Monday". Denies chest pain/discomfort, SOB, n/v/d, dizziness, numbness/tingling. On admit to ED yael, pt AAOx3, VSS. Pt placed on cardiac monitor, pulse ox and BP cuff. Rates abd 7/10 on pain scale.  "

## 2019-09-25 NOTE — ED NOTES
"Pt informed that urine specimen is needed' pt verbalizes understanding and states "I'm dehydrated and can't go right now". Pt provided urinal to bedside.  "

## 2019-09-25 NOTE — CONSULTS
[facility]  Hematology/Oncology  Consult Note    Patient Name: Robe Cevallos  MRN: [id number]  Admission Date: 9/25/2019  Hospital Length of Stay: 0 days  Code Status: Full Code   Attending Provider: Enrique Greco MD  Consulting Provider: Willis Britton MD  Primary Care Physician: Kang Reddy MD  Principal Problem:Pneumonia of left lower lobe due to infectious organism    Inpatient consult to Hematology/Oncology  Consult performed by: Willis Britton MD  Consult ordered by: Manfred Perez MD  Reason for consult: Lymphoma        Subjective:     HPI:  The patietn is an 78 yo M with Myasthenia Gravis, HTN, macular degeneration, h/o 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 Jojo Carcamo with R-CVP followed by maintenance rituxan who presents to the ER complaining of severe LUQ pain.  Upon admission the patient underwent a CTA chest and CT of the abdomen.  The CT of the chest showed extrinsic compression of the esophagus, a large retrocardiac mass with involvement of the left hilum causing extrinsic compression of the adjacent esophagus, compression of the left upper and lower lobe bronchi and compression/obliteration of the left inferior pulmonary vein. The patient was started on azithromycin and ceftriaxone for post obstructive pneumonia with plans for transition to zosyn. He had previously undergone a CT of the chest on 9/16/19 showing a 9.8 x 3.8 x 13 cm size posterior mediastinal mass.  He underwent a biopsy of a left supraclavicular LN on 9/20/19 under the care of Dr Deion Davis a pulmonologist at [facility].  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  The patient states he has been feeling bad for over a month.  He has had increased PO intake over the past 10 days due to difficulty swallowing solids.  He denies any fever, chills, night sweats.  He endorses some weight loss.  He endorses CP, SOB, fatigue, abdominal pain.  He denies  constipation, diarrhea.           Oncology Treatment Plan:   [No treatment plan]    Medications:  Continuous Infusions:   sodium chloride 0.9%       Scheduled Meds:   [START ON 9/26/2019] allopurinol  300 mg Oral Daily    [START ON 9/26/2019] azithromycin  500 mg Intravenous Q24H    piperacillin-tazobactam (ZOSYN) IVPB  4.5 g Intravenous Q8H    pyridostigmine  60 mg Oral Daily     PRN Meds:acetaminophen, influenza, morphine, oxyCODONE, pneumoc 13-oscar conj-dip cr(PF), sodium chloride 0.9%     Review of patient's allergies indicates:  No Known Allergies     Past Medical History:   Diagnosis Date    AMD (age-related macular degeneration), bilateral     Cancer 2004    Lymphoma    Hypertension     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 Jolley    CATARACT EXTRACTION W/  INTRAOCULAR LENS IMPLANT Left 04/20/2017    Dr. Jolley    CHOLECYSTECTOMY      EYE SURGERY      Rt cataract    KNEE ARTHROSCOPY      Lt knee    TONSILLECTOMY       Family History     Problem Relation (Age of Onset)    No Known Problems Mother, Father, Sister, Brother, Maternal Aunt, Maternal Uncle, Paternal Aunt, Paternal Uncle, Maternal Grandmother, Maternal Grandfather, Paternal Grandmother, Paternal Grandfather        Tobacco Use    Smoking status: Former Smoker    Smokeless tobacco: Never Used   Substance and Sexual Activity    Alcohol use: No    Drug use: No    Sexual activity: Not on file       Review of Systems   Constitutional: Positive for fatigue and unexpected weight change. Negative for chills, diaphoresis and fever.   HENT: Positive for trouble swallowing. Negative for sore throat and voice change.    Eyes: Negative for photophobia and visual disturbance.   Respiratory: Positive for shortness of breath. Negative for cough and chest tightness.     Cardiovascular: Negative for chest pain, palpitations and leg swelling.   Gastrointestinal: Positive for abdominal pain. Negative for constipation, diarrhea, nausea and vomiting.   Endocrine: Negative for cold intolerance and heat intolerance.   Genitourinary: Negative for difficulty urinating, dysuria and hematuria.   Musculoskeletal: Negative for arthralgias, back pain and myalgias.   Skin: Negative for color change and rash.   Neurological: Negative for dizziness, light-headedness, numbness and headaches.   Hematological: Negative for adenopathy. Does not bruise/bleed easily.     Objective:     Vital Signs (Most Recent):  Temp: 97.9 °F (36.6 °C) (09/25/19 0753)  Pulse: 85 (09/25/19 1458)  Resp: 20 (09/25/19 1458)  BP: 138/70 (09/25/19 1431)  SpO2: (!) 94 % (09/25/19 1458) Vital Signs (24h Range):  Temp:  [97.9 °F (36.6 °C)] 97.9 °F (36.6 °C)  Pulse:  [] 85  Resp:  [15-29] 20  SpO2:  [93 %-100 %] 94 %  BP: (106-164)/(63-78) 138/70     Weight: 84.8 kg (187 lb)  Body mass index is 26.08 kg/m².  Body surface area is 2.06 meters squared.      Intake/Output Summary (Last 24 hours) at 9/25/2019 1548  Last data filed at 9/25/2019 1317  Gross per 24 hour   Intake 2900 ml   Output --   Net 2900 ml       Physical Exam   Constitutional: He is oriented to person, place, and time. He appears distressed.   HENT:   Head: Normocephalic and atraumatic.   Eyes: EOM are normal. Right eye exhibits no discharge. Left eye exhibits no discharge. No scleral icterus.   Cardiovascular: Normal rate, regular rhythm, normal heart sounds and intact distal pulses. Exam reveals no gallop and no friction rub.   No murmur heard.  Pulmonary/Chest: Effort normal and breath sounds normal. No respiratory distress. He has no wheezes. He has no rales. He exhibits no tenderness.   Abdominal: Soft. Bowel sounds are normal. He exhibits no distension and no mass. There is no tenderness. There is no rebound.   Musculoskeletal: Normal range of motion.  He exhibits no edema or tenderness.   Neurological: He is alert and oriented to person, place, and time. No cranial nerve deficit. Coordination normal.   Skin: Skin is warm and dry. No rash noted. He is not diaphoretic. No erythema.   Psychiatric: He has a normal mood and affect. His behavior is normal.       Significant Labs:   Recent Results (from the past 24 hour(s))   CBC W/ AUTO DIFFERENTIAL    Collection Time: 09/25/19  8:08 AM   Result Value Ref Range    WBC 8.61 3.90 - 12.70 K/uL    RBC 5.10 4.60 - 6.20 M/uL    Hemoglobin 14.7 14.0 - 18.0 g/dL    Hematocrit 43.5 40.0 - 54.0 %    Mean Corpuscular Volume 85 82 - 98 fL    Mean Corpuscular Hemoglobin 28.8 27.0 - 31.0 pg    Mean Corpuscular Hemoglobin Conc 33.8 32.0 - 36.0 g/dL    RDW 14.3 11.5 - 14.5 %    Platelets 271 150 - 350 K/uL    MPV 10.2 9.2 - 12.9 fL    Gran # (ANC) 6.9 1.8 - 7.7 K/uL    Lymph # 1.2 1.0 - 4.8 K/uL    Mono # 0.4 0.3 - 1.0 K/uL    Eos # 0.0 0.0 - 0.5 K/uL    Baso # 0.02 0.00 - 0.20 K/uL    Gran% 81.2 (H) 38.0 - 73.0 %    Lymph% 14.1 (L) 18.0 - 48.0 %    Mono% 4.9 4.0 - 15.0 %    Eosinophil% 0.2 0.0 - 8.0 %    Basophil% 0.2 0.0 - 1.9 %    Differential Method Automated    Comp. Metabolic Panel    Collection Time: 09/25/19  8:08 AM   Result Value Ref Range    Sodium 136 136 - 145 mmol/L    Potassium 4.7 3.5 - 5.1 mmol/L    Chloride 99 95 - 110 mmol/L    CO2 23 23 - 29 mmol/L    Glucose 112 (H) 70 - 110 mg/dL    BUN, Bld 28 (H) 8 - 23 mg/dL    Creatinine 1.3 0.5 - 1.4 mg/dL    Calcium 10.9 (H) 8.7 - 10.5 mg/dL    Total Protein 7.1 6.0 - 8.4 g/dL    Albumin 3.6 3.5 - 5.2 g/dL    Total Bilirubin 1.4 (H) 0.1 - 1.0 mg/dL    Alkaline Phosphatase 140 (H) 55 - 135 U/L    AST 30 10 - 40 U/L    ALT 43 10 - 44 U/L    Anion Gap 14 8 - 16 mmol/L    eGFR if African American 59 (A) >60 mL/min/1.73 m^2    eGFR if non African American 51 (A) >60 mL/min/1.73 m^2   Lipase    Collection Time: 09/25/19  8:08 AM   Result Value Ref Range    Lipase 18 4 - 60 U/L    Troponin I    Collection Time: 09/25/19  8:08 AM   Result Value Ref Range    Troponin I 0.017 0.000 - 0.026 ng/mL   Brain natriuretic peptide    Collection Time: 09/25/19  8:08 AM   Result Value Ref Range    BNP 27 0 - 99 pg/mL   Lactic acid, plasma    Collection Time: 09/25/19  8:25 AM   Result Value Ref Range    Lactate (Lactic Acid) 2.6 (H) 0.5 - 2.2 mmol/L   Blood culture #1 **CANNOT BE ORDERED STAT**    Collection Time: 09/25/19  8:25 AM   Result Value Ref Range    Blood Culture, Routine No Growth to date    Blood culture #2 **CANNOT BE ORDERED STAT**    Collection Time: 09/25/19  8:30 AM   Result Value Ref Range    Blood Culture, Routine No Growth to date    Lactate dehydrogenase    Collection Time: 09/25/19  8:38 AM   Result Value Ref Range     (H) 110 - 260 U/L   Uric acid    Collection Time: 09/25/19  8:38 AM   Result Value Ref Range    Uric Acid 9.6 (H) 3.4 - 7.0 mg/dL   Urinalysis, Reflex to Urine Culture Urine, Clean Catch    Collection Time: 09/25/19 10:05 AM   Result Value Ref Range    Specimen UA Urine, Clean Catch     Color, UA Yellow Yellow, Straw, Sanna    Appearance, UA Clear Clear    pH, UA 5.0 5.0 - 8.0    Specific Gravity, UA >1.030 (A) 1.005 - 1.030    Protein, UA Negative Negative    Glucose, UA Negative Negative    Ketones, UA 1+ (A) Negative    Bilirubin (UA) Negative Negative    Occult Blood UA Negative Negative    Nitrite, UA Negative Negative    Urobilinogen, UA Negative <2.0 EU/dL    Leukocytes, UA Negative Negative   Lactic acid, plasma    Collection Time: 09/25/19 11:53 AM   Result Value Ref Range    Lactate (Lactic Acid) 2.0 0.5 - 2.2 mmol/L         Diagnostic Results:    CTA Chest/CT abdomen and pelvis    1. No CTA findings to suggest a pulmonary arterial thromboembolus.  2. Persistent mediastinal/left hilar mass and extensive left supraclavicular, mediastinal and upper abdominal lymphadenopathy consistent with neoplasm, likely lymphoma given reported history.  When compared  to CT dated 09/16/2019, there has been interval increased rightward mediastinal shift and extrinsic compression of the left upper and lower lobe bronchi with worsening pulmonary parenchymal consolidation concerning for postobstructive pneumonia or atelectasis.  3. Increasing left pleural effusion.  4. 1.1 cm indeterminate right hepatic lobe hypodensity, slightly increased in conspicuity when compared to the previous exam.  Stable left hepatic cyst.  5. 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  6. Additional findings as in the body of the report.  This report was flagged in Epic as abnormal.        Assessment/Plan:     * Pneumonia of left lower lobe due to infectious organism  -Pt with imaging findings concerning for post obstructive pneumonia  -Pt on azithromycin and zosyn  -Management per primary team    Diffuse large B-cell lymphoma of intrathoracic lymph nodes  -The patient has DLBCL based on the path report from [facility] and left supraclavicular LN biopsied on 9/20/19  -Path has been scanned into the media tab today  -This is likely a transformation from his follicular lymphoma diagnosed and treated back in 2004  -Pt will likely need BM biopsy for complete staging.  -Echo will be needed for potential treatment planning  -I spoke with Dr Pau Lepe the primary physician on the BMT service at [facility] who is willing to accept the patient  -Pt will need CBC, CMP, phos, uric acid daily  -Treatment for tumor lysis as discussed below  -Cancer pain treated as discussed below    Cancer related pain  -Pt with cancer related pain in the chest and abdomen  -Will start on oxycodone 5mg and continue IV morphine for breakthrough pain  -Pt will need either senokot or miralax daily for prevention of opioid induced constipation.    Tumor lysis syndrome  -Patient with tumor lysis syndrome with elevated uric acid level and hypercalcemia  -IV fluids started at 150cc/hr  -Pt will need  echo to assess for ability to tolerate aggressive hydration and potential treatment of TLS.  -Will start on allopurinol 300mg daily        Thank you for your consult. I will follow-up with patient. Please contact us if you have any additional questions.    Willis Britton MD  Hematology/Oncology  [facility]

## 2019-09-25 NOTE — ASSESSMENT & PLAN NOTE
Per clinical presentation and CT imaging,started on broad spectrum IV Abx,folow up with cultures.

## 2019-09-25 NOTE — H&P
"[facility] Hospital Medicine  History & Physical    Patient Name: Robe Cevallos  MRN: [id number]  Admission Date: 9/25/2019  Attending Physician: Manfred Perez    Primary Care Provider: Kang Reddy MD         Patient information was obtained from patient, past medical records and ER records.     Subjective:     Principal Problem:Pneumonia of left lower lobe due to infectious organism    Chief Complaint:   Chief Complaint   Patient presents with    Abdominal Pain     LUQ abdominal pain x 1 week.  Pain more intense over last few days and is worse when laying down.  Recently found out he has a large medistinal mass in his chest.  Hx of lymphoma.        HPI: This 79 y.o male, with a medical history of hypertension, myasthenia gravis, and lymphoma, presents to the ED c/o acute, constant left upper quadrant abdominal pain. Pt reports that the symptoms began over the weekend and had been intermittent since onset, but he states that the pain worsened this morning. Pt describes the symptoms as feeling "like a gas attack". He adds that it feels "like an abscess on the spleen", noting that the present symptoms feel similar to when he had an abscess present to the gallbladder in the past. Pt reports that the pain is exacerbated when lying on his back, but improves when sitting up. He currently rates the pain 6/10. Pt additionally notes experiencing a decreased appetite (pain returns accompanied by distention with consuming small amounts of water), abnormal stool (stool is yellow in color) and back pain. He reports that he had a CT scan preformed last Monday and was found to have a mediastinal mass in the chest. Pt states that he later visited a pulmonologist and subsequently had a lymph node biopsy preformed. He notes that he had an appointment scheduled for this morning to receive the results. No recent sick contact. Pt denies fever, emesis and rash/lesions. No recent travels. No history of " heart failure. No other associated symptoms. 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 Jolley    CATARACT EXTRACTION W/  INTRAOCULAR LENS IMPLANT Left 04/20/2017    Dr. Jolley    CHOLECYSTECTOMY      EYE SURGERY      Rt cataract    KNEE ARTHROSCOPY      Lt knee    TONSILLECTOMY         Review of patient's allergies indicates:  No Known Allergies    No current facility-administered medications on file prior to encounter.      Current Outpatient Medications on File Prior to Encounter   Medication Sig    azelastine (ASTELIN) 137 mcg (0.1 %) nasal spray 1 spray (137 mcg total) by Nasal route 2 (two) times daily.    pyridostigmine (MESTINON) 60 mg Tab Take 60 mg by mouth.    fluocinonide 0.05% (LIDEX) 0.05 % cream KARYNA EXT AA ON CHEST BID    meclizine (ANTIVERT) 25 mg tablet Take 1 tablet (25 mg total) by mouth 3 (three) times daily as needed.     Family History     Problem Relation (Age of Onset)    No Known Problems Mother, Father, Sister, Brother, Maternal Aunt, Maternal Uncle, Paternal Aunt, Paternal Uncle, Maternal Grandmother, Maternal Grandfather, Paternal Grandmother, Paternal Grandfather        Tobacco Use    Smoking status: Former Smoker    Smokeless tobacco: Never Used   Substance and Sexual Activity    Alcohol use: No    Drug use: No    Sexual activity: Not on file     Review of Systems   Constitutional: Positive for activity change and appetite change.   HENT: Negative for congestion and dental problem.    Eyes: Negative for discharge and itching.    Respiratory: Positive for cough and shortness of breath.    Cardiovascular: Negative for chest pain.   Gastrointestinal: Positive for abdominal pain. Negative for abdominal distention.   Endocrine: Negative for cold intolerance.   Genitourinary: Negative for difficulty urinating and dysuria.   Musculoskeletal: Negative for arthralgias and back pain.   Skin: Negative for color change and pallor.   Allergic/Immunologic: Negative for environmental allergies.   Neurological: Negative for facial asymmetry.   Hematological: Negative for adenopathy. Does not bruise/bleed easily.   Psychiatric/Behavioral: Negative for agitation and behavioral problems.     Objective:     Vital Signs (Most Recent):  Temp: 97.9 °F (36.6 °C) (09/25/19 0753)  Pulse: 80 (09/25/19 1200)  Resp: 16 (09/25/19 1200)  BP: (!) 164/78 (09/25/19 1131)  SpO2: 100 % (09/25/19 1200) Vital Signs (24h Range):  Temp:  [97.9 °F (36.6 °C)] 97.9 °F (36.6 °C)  Pulse:  [] 80  Resp:  [15-29] 16  SpO2:  [94 %-100 %] 100 %  BP: (106-164)/(63-78) 164/78     Weight: 84.8 kg (187 lb)  Body mass index is 26.08 kg/m².    Physical Exam   Constitutional: He is oriented to person, place, and time. No distress.   HENT:   Head: Atraumatic.   Eyes: EOM are normal.   Neck: Neck supple.   Cardiovascular: Normal rate and regular rhythm.   Pulmonary/Chest: Effort normal. He has wheezes.   Abdominal: Soft. Bowel sounds are normal. There is tenderness.   Musculoskeletal: Normal range of motion. He exhibits no edema or deformity.   Neurological: He is oriented to person, place, and time.   Skin: Skin is warm and dry.   Psychiatric: He has a normal mood and affect. His behavior is normal.         CRANIAL NERVES     CN III, IV, VI   Extraocular motions are normal.        Significant Labs:   BMP:   Recent Labs   Lab 09/25/19  0808   *      K 4.7   CL 99   CO2 23   BUN 28*   CREATININE 1.3   CALCIUM 10.9*     CBC:   Recent Labs   Lab 09/25/19  0808   WBC 8.61   HGB  14.7   HCT 43.5        CMP:   Recent Labs   Lab 09/25/19  0808      K 4.7   CL 99   CO2 23   *   BUN 28*   CREATININE 1.3   CALCIUM 10.9*   PROT 7.1   ALBUMIN 3.6   BILITOT 1.4*   ALKPHOS 140*   AST 30   ALT 43   ANIONGAP 14   EGFRNONAA 51*       Significant Imaging: reviewed.    Assessment/Plan:     * Pneumonia of left lower lobe due to infectious organism  Per clinical presentation and CT imaging,started on broad spectrum IV Abx,folow up with cultures.      Lymphoma of intrathoracic lymph nodes  apparently per CT,lymhoma is enlarging,oncology is consulted for evaluation.      Mediastinal mass  As above,      Squamous cell carcinoma of nose  follow up with oncology,      Ocular myasthenia gravis  Resumed pyridostigmine.        VTE Risk Mitigation (From admission, onward)    None             Manfred Perez MD  Department of Hospital Medicine   [facility]

## 2019-09-26 LAB
ALBUMIN SERPL BCP-MCNC: 3 G/DL (ref 3.5–5.2)
ALLENS TEST: ABNORMAL
ALP SERPL-CCNC: 130 U/L (ref 55–135)
ALT SERPL W/O P-5'-P-CCNC: 40 U/L (ref 10–44)
ANION GAP SERPL CALC-SCNC: 11 MMOL/L (ref 8–16)
AST SERPL-CCNC: 28 U/L (ref 10–40)
BASOPHILS # BLD AUTO: 0.02 K/UL (ref 0–0.2)
BASOPHILS NFR BLD: 0.2 % (ref 0–1.9)
BILIRUB SERPL-MCNC: 1 MG/DL (ref 0.1–1)
BONE MARROW WRIGHT STAIN COMMENT: NORMAL
BUN SERPL-MCNC: 23 MG/DL (ref 8–23)
CALCIUM SERPL-MCNC: 10 MG/DL (ref 8.7–10.5)
CHLORIDE SERPL-SCNC: 103 MMOL/L (ref 95–110)
CO2 SERPL-SCNC: 21 MMOL/L (ref 23–29)
CREAT SERPL-MCNC: 1.2 MG/DL (ref 0.5–1.4)
DELSYS: ABNORMAL
DIFFERENTIAL METHOD: ABNORMAL
EOSINOPHIL # BLD AUTO: 0 K/UL (ref 0–0.5)
EOSINOPHIL NFR BLD: 0.1 % (ref 0–8)
ERYTHROCYTE [DISTWIDTH] IN BLOOD BY AUTOMATED COUNT: 14.6 % (ref 11.5–14.5)
EST. GFR  (AFRICAN AMERICAN): >60 ML/MIN/1.73 M^2
EST. GFR  (NON AFRICAN AMERICAN): 56.4 ML/MIN/1.73 M^2
FLOW: 3
GLUCOSE SERPL-MCNC: 101 MG/DL (ref 70–110)
HCO3 UR-SCNC: 20 MMOL/L (ref 24–28)
HCT VFR BLD AUTO: 41.5 % (ref 40–54)
HGB BLD-MCNC: 13.3 G/DL (ref 14–18)
IMM GRANULOCYTES # BLD AUTO: 0.09 K/UL (ref 0–0.04)
IMM GRANULOCYTES NFR BLD AUTO: 1 % (ref 0–0.5)
LACTATE SERPL-SCNC: 2.6 MMOL/L (ref 0.5–2.2)
LYMPHOCYTES # BLD AUTO: 0.6 K/UL (ref 1–4.8)
LYMPHOCYTES NFR BLD: 6.7 % (ref 18–48)
MAGNESIUM SERPL-MCNC: 2 MG/DL (ref 1.6–2.6)
MCH RBC QN AUTO: 28.7 PG (ref 27–31)
MCHC RBC AUTO-ENTMCNC: 32 G/DL (ref 32–36)
MCV RBC AUTO: 89 FL (ref 82–98)
MODE: ABNORMAL
MONOCYTES # BLD AUTO: 0.8 K/UL (ref 0.3–1)
MONOCYTES NFR BLD: 9.3 % (ref 4–15)
NEUTROPHILS # BLD AUTO: 7.1 K/UL (ref 1.8–7.7)
NEUTROPHILS NFR BLD: 82.7 % (ref 38–73)
NRBC BLD-RTO: 0 /100 WBC
PCO2 BLDA: 37.6 MMHG (ref 35–45)
PH SMN: 7.33 [PH] (ref 7.35–7.45)
PHOSPHATE SERPL-MCNC: 5.3 MG/DL (ref 2.7–4.5)
PLATELET # BLD AUTO: 228 K/UL (ref 150–350)
PMV BLD AUTO: 10.5 FL (ref 9.2–12.9)
PO2 BLDA: 88 MMHG (ref 80–100)
POC BE: -6 MMOL/L
POC SATURATED O2: 96 % (ref 95–100)
POC TCO2: 21 MMOL/L (ref 23–27)
POTASSIUM SERPL-SCNC: 4.8 MMOL/L (ref 3.5–5.1)
PROCALCITONIN SERPL IA-MCNC: 0.1 NG/ML
PROT SERPL-MCNC: 6.2 G/DL (ref 6–8.4)
PTH-INTACT SERPL-MCNC: 8 PG/ML (ref 9–77)
RBC # BLD AUTO: 4.64 M/UL (ref 4.6–6.2)
SAMPLE: ABNORMAL
SITE: ABNORMAL
SODIUM SERPL-SCNC: 135 MMOL/L (ref 136–145)
URATE SERPL-MCNC: 7.1 MG/DL (ref 3.4–7)
WBC # BLD AUTO: 8.64 K/UL (ref 3.9–12.7)

## 2019-09-26 PROCEDURE — 88185 FLOWCYTOMETRY/TC ADD-ON: CPT | Mod: 59 | Performed by: PATHOLOGY

## 2019-09-26 PROCEDURE — 88323 CONSLTJ&REPRT MATRL PREP SLD: CPT | Mod: 26,,, | Performed by: PATHOLOGY

## 2019-09-26 PROCEDURE — 80053 COMPREHEN METABOLIC PANEL: CPT

## 2019-09-26 PROCEDURE — 88342 TISSUE SPECIMEN TO PATHOLOGY: ICD-10-PCS | Mod: 26,59,, | Performed by: PATHOLOGY

## 2019-09-26 PROCEDURE — 77387 GUIDANCE FOR RADJ TX DLVR: CPT | Mod: TC | Performed by: RADIOLOGY

## 2019-09-26 PROCEDURE — 88311 DECALCIFY TISSUE: CPT | Mod: 26,,, | Performed by: PATHOLOGY

## 2019-09-26 PROCEDURE — 84100 ASSAY OF PHOSPHORUS: CPT

## 2019-09-26 PROCEDURE — 84550 ASSAY OF BLOOD/URIC ACID: CPT

## 2019-09-26 PROCEDURE — 88342 IMHCHEM/IMCYTCHM 1ST ANTB: CPT | Mod: 26,59,, | Performed by: PATHOLOGY

## 2019-09-26 PROCEDURE — 88365 INSITU HYBRIDIZATION (FISH): CPT | Mod: 26,59,, | Performed by: PATHOLOGY

## 2019-09-26 PROCEDURE — 85097 TISSUE SPECIMEN TO PATHOLOGY, BONE MARROW ASPIRATION/BIOPSY PROCEDURE: ICD-10-PCS | Mod: ,,, | Performed by: PATHOLOGY

## 2019-09-26 PROCEDURE — 88342 IMHCHEM/IMCYTCHM 1ST ANTB: CPT | Performed by: PATHOLOGY

## 2019-09-26 PROCEDURE — 88189 FLOWCYTOMETRY/READ 16 & >: CPT | Mod: ,,, | Performed by: PATHOLOGY

## 2019-09-26 PROCEDURE — 88184 FLOWCYTOMETRY/ TC 1 MARKER: CPT | Performed by: PATHOLOGY

## 2019-09-26 PROCEDURE — 88313 SPECIAL STAINS GROUP 2: CPT

## 2019-09-26 PROCEDURE — 36573 INSJ PICC RS&I 5 YR+: CPT

## 2019-09-26 PROCEDURE — 77290 THER RAD SIMULAJ FIELD CPLX: CPT | Mod: TC | Performed by: RADIOLOGY

## 2019-09-26 PROCEDURE — 83605 ASSAY OF LACTIC ACID: CPT

## 2019-09-26 PROCEDURE — 88365 TISSUE SPECIMEN TO PATHOLOGY: ICD-10-PCS | Mod: 26,59,, | Performed by: PATHOLOGY

## 2019-09-26 PROCEDURE — 88341 IMHCHEM/IMCYTCHM EA ADD ANTB: CPT | Mod: 26,59,, | Performed by: PATHOLOGY

## 2019-09-26 PROCEDURE — 77412 RADIATION TX DELIVERY LVL 3: CPT | Performed by: RADIOLOGY

## 2019-09-26 PROCEDURE — 88341 IMHCHEM/IMCYTCHM EA ADD ANTB: CPT | Performed by: PATHOLOGY

## 2019-09-26 PROCEDURE — G6002 PR STEREOSCOPIC XRAY GUIDE FOR RADIATION TX DELIV: ICD-10-PCS | Mod: 26,,, | Performed by: RADIOLOGY

## 2019-09-26 PROCEDURE — 88311 TISSUE SPECIMEN TO PATHOLOGY, BONE MARROW ASPIRATION/BIOPSY PROCEDURE: ICD-10-PCS | Mod: 26,,, | Performed by: PATHOLOGY

## 2019-09-26 PROCEDURE — 88313 TISSUE SPECIMEN TO PATHOLOGY, BONE MARROW ASPIRATION/BIOPSY PROCEDURE: ICD-10-PCS | Mod: 26,,, | Performed by: PATHOLOGY

## 2019-09-26 PROCEDURE — 88305 TISSUE EXAM BY PATHOLOGIST: CPT | Mod: 26,,, | Performed by: PATHOLOGY

## 2019-09-26 PROCEDURE — 20600001 HC STEP DOWN PRIVATE ROOM

## 2019-09-26 PROCEDURE — 36600 WITHDRAWAL OF ARTERIAL BLOOD: CPT

## 2019-09-26 PROCEDURE — 25000003 PHARM REV CODE 250: Performed by: STUDENT IN AN ORGANIZED HEALTH CARE EDUCATION/TRAINING PROGRAM

## 2019-09-26 PROCEDURE — 84145 PROCALCITONIN (PCT): CPT

## 2019-09-26 PROCEDURE — 88323 TISSUE SPECIMEN TO PATHOLOGY: ICD-10-PCS | Mod: 26,,, | Performed by: PATHOLOGY

## 2019-09-26 PROCEDURE — 63600175 PHARM REV CODE 636 W HCPCS: Performed by: NURSE PRACTITIONER

## 2019-09-26 PROCEDURE — 99233 PR SUBSEQUENT HOSPITAL CARE,LEVL III: ICD-10-PCS | Mod: 25,,, | Performed by: RADIOLOGY

## 2019-09-26 PROCEDURE — 77263 PR  RADIATION THERAPY PLAN COMPLEX: ICD-10-PCS | Mod: ,,, | Performed by: RADIOLOGY

## 2019-09-26 PROCEDURE — 38222 DX BONE MARROW BX & ASPIR: CPT | Mod: LT,,, | Performed by: NURSE PRACTITIONER

## 2019-09-26 PROCEDURE — 77417 THER RADIOLOGY PORT IMAGE(S): CPT | Performed by: RADIOLOGY

## 2019-09-26 PROCEDURE — 77295 3-D RADIOTHERAPY PLAN: CPT | Mod: TC | Performed by: RADIOLOGY

## 2019-09-26 PROCEDURE — 77300 RADIATION THERAPY DOSE PLAN: CPT | Mod: TC | Performed by: RADIOLOGY

## 2019-09-26 PROCEDURE — 88305 TISSUE EXAM BY PATHOLOGIST: CPT | Performed by: PATHOLOGY

## 2019-09-26 PROCEDURE — 85097 BONE MARROW INTERPRETATION: CPT | Mod: ,,, | Performed by: PATHOLOGY

## 2019-09-26 PROCEDURE — 88237 TISSUE CULTURE BONE MARROW: CPT

## 2019-09-26 PROCEDURE — 99233 PR SUBSEQUENT HOSPITAL CARE,LEVL III: ICD-10-PCS | Mod: ,,, | Performed by: INTERNAL MEDICINE

## 2019-09-26 PROCEDURE — 99233 SBSQ HOSP IP/OBS HIGH 50: CPT | Mod: 25,,, | Performed by: RADIOLOGY

## 2019-09-26 PROCEDURE — 25000003 PHARM REV CODE 250: Performed by: HOSPITALIST

## 2019-09-26 PROCEDURE — 85025 COMPLETE CBC W/AUTO DIFF WBC: CPT

## 2019-09-26 PROCEDURE — 77300 PR RADIATION THERAPY,DOSIMETRY PLAN: ICD-10-PCS | Mod: 26,,, | Performed by: RADIOLOGY

## 2019-09-26 PROCEDURE — 83735 ASSAY OF MAGNESIUM: CPT

## 2019-09-26 PROCEDURE — 88341 TISSUE SPECIMEN TO PATHOLOGY, BONE MARROW ASPIRATION/BIOPSY PROCEDURE: ICD-10-PCS | Mod: 26,59,, | Performed by: PATHOLOGY

## 2019-09-26 PROCEDURE — 77334 RADIATION TREATMENT AID(S): CPT | Mod: TC | Performed by: RADIOLOGY

## 2019-09-26 PROCEDURE — C1751 CATH, INF, PER/CENT/MIDLINE: HCPCS

## 2019-09-26 PROCEDURE — 77334 PR  RADN TREATMENT AID(S) COMPLX: ICD-10-PCS | Mod: 26,,, | Performed by: RADIOLOGY

## 2019-09-26 PROCEDURE — 77295 3-D RADIOTHERAPY PLAN: CPT | Mod: 26,,, | Performed by: RADIOLOGY

## 2019-09-26 PROCEDURE — 38222 PR BONE MARROW BIOPSY(IES) W/ASPIRATION(S); DIAGNOSTIC: ICD-10-PCS | Mod: LT,,, | Performed by: NURSE PRACTITIONER

## 2019-09-26 PROCEDURE — 77014 HC CT GUIDANCE RADIATION THERAPY FLDS PLACEMENT: CPT | Mod: TC | Performed by: RADIOLOGY

## 2019-09-26 PROCEDURE — 99233 SBSQ HOSP IP/OBS HIGH 50: CPT | Mod: ,,, | Performed by: INTERNAL MEDICINE

## 2019-09-26 PROCEDURE — 82803 BLOOD GASES ANY COMBINATION: CPT

## 2019-09-26 PROCEDURE — 77295 PR 3D RADIOTHERAPY PLAN: ICD-10-PCS | Mod: 26,,, | Performed by: RADIOLOGY

## 2019-09-26 PROCEDURE — G6002 STEREOSCOPIC X-RAY GUIDANCE: HCPCS | Mod: 26,,, | Performed by: RADIOLOGY

## 2019-09-26 PROCEDURE — 88341 TISSUE SPECIMEN TO PATHOLOGY: ICD-10-PCS | Mod: 26,59,, | Performed by: PATHOLOGY

## 2019-09-26 PROCEDURE — 99900035 HC TECH TIME PER 15 MIN (STAT)

## 2019-09-26 PROCEDURE — 25000003 PHARM REV CODE 250: Performed by: INTERNAL MEDICINE

## 2019-09-26 PROCEDURE — 88305 TISSUE SPECIMEN TO PATHOLOGY, BONE MARROW ASPIRATION/BIOPSY PROCEDURE: ICD-10-PCS | Mod: 26,,, | Performed by: PATHOLOGY

## 2019-09-26 PROCEDURE — 83615 LACTATE (LD) (LDH) ENZYME: CPT

## 2019-09-26 PROCEDURE — 63600175 PHARM REV CODE 636 W HCPCS: Performed by: STUDENT IN AN ORGANIZED HEALTH CARE EDUCATION/TRAINING PROGRAM

## 2019-09-26 PROCEDURE — 88264 CHROMOSOME ANALYSIS 20-25: CPT

## 2019-09-26 PROCEDURE — 77334 RADIATION TREATMENT AID(S): CPT | Mod: 26,,, | Performed by: RADIOLOGY

## 2019-09-26 PROCEDURE — 83970 ASSAY OF PARATHORMONE: CPT

## 2019-09-26 PROCEDURE — 25000003 PHARM REV CODE 250: Performed by: NURSE PRACTITIONER

## 2019-09-26 PROCEDURE — 36415 COLL VENOUS BLD VENIPUNCTURE: CPT

## 2019-09-26 PROCEDURE — 63600175 PHARM REV CODE 636 W HCPCS: Performed by: HOSPITALIST

## 2019-09-26 PROCEDURE — 76937 US GUIDE VASCULAR ACCESS: CPT

## 2019-09-26 PROCEDURE — 88342 TISSUE SPECIMEN TO PATHOLOGY, BONE MARROW ASPIRATION/BIOPSY PROCEDURE: ICD-10-PCS | Mod: 26,59,, | Performed by: PATHOLOGY

## 2019-09-26 PROCEDURE — 77263 THER RADIOLOGY TX PLNG CPLX: CPT | Mod: ,,, | Performed by: RADIOLOGY

## 2019-09-26 PROCEDURE — 77300 RADIATION THERAPY DOSE PLAN: CPT | Mod: 26,,, | Performed by: RADIOLOGY

## 2019-09-26 PROCEDURE — 88189 PR  FLOWCYTOMETRY/READ, 16 & > MARKERS: ICD-10-PCS | Mod: ,,, | Performed by: PATHOLOGY

## 2019-09-26 PROCEDURE — 88313 SPECIAL STAINS GROUP 2: CPT | Mod: 26,,, | Performed by: PATHOLOGY

## 2019-09-26 RX ORDER — HYDROMORPHONE HYDROCHLORIDE 1 MG/ML
0.25 INJECTION, SOLUTION INTRAMUSCULAR; INTRAVENOUS; SUBCUTANEOUS ONCE
Status: COMPLETED | OUTPATIENT
Start: 2019-09-26 | End: 2019-09-26

## 2019-09-26 RX ORDER — ALBUTEROL SULFATE 90 UG/1
2 AEROSOL, METERED RESPIRATORY (INHALATION) EVERY 6 HOURS PRN
Status: ON HOLD | COMMUNITY
End: 2019-10-08 | Stop reason: ALTCHOICE

## 2019-09-26 RX ORDER — ONDANSETRON 2 MG/ML
4 INJECTION INTRAMUSCULAR; INTRAVENOUS EVERY 6 HOURS PRN
Status: DISCONTINUED | OUTPATIENT
Start: 2019-09-26 | End: 2019-10-03 | Stop reason: HOSPADM

## 2019-09-26 RX ORDER — LISINOPRIL 10 MG/1
10 TABLET ORAL DAILY
Status: ON HOLD | COMMUNITY
End: 2019-10-08 | Stop reason: ALTCHOICE

## 2019-09-26 RX ORDER — PYRIDOSTIGMINE BROMIDE 60 MG/1
60 TABLET ORAL 3 TIMES DAILY
Status: DISCONTINUED | OUTPATIENT
Start: 2019-09-26 | End: 2019-10-01

## 2019-09-26 RX ORDER — SULFAMETHOXAZOLE AND TRIMETHOPRIM 800; 160 MG/1; MG/1
1 TABLET ORAL 2 TIMES DAILY
Status: ON HOLD | COMMUNITY
End: 2019-10-03 | Stop reason: HOSPADM

## 2019-09-26 RX ORDER — LIDOCAINE HYDROCHLORIDE 20 MG/ML
10 INJECTION, SOLUTION EPIDURAL; INFILTRATION; INTRACAUDAL; PERINEURAL ONCE
Status: COMPLETED | OUTPATIENT
Start: 2019-09-26 | End: 2019-09-26

## 2019-09-26 RX ORDER — ONDANSETRON 2 MG/ML
4 INJECTION INTRAMUSCULAR; INTRAVENOUS ONCE
Status: COMPLETED | OUTPATIENT
Start: 2019-09-26 | End: 2019-09-26

## 2019-09-26 RX ADMIN — AZITHROMYCIN MONOHYDRATE 500 MG: 500 INJECTION, POWDER, LYOPHILIZED, FOR SOLUTION INTRAVENOUS at 12:09

## 2019-09-26 RX ADMIN — OXYCODONE HYDROCHLORIDE 5 MG: 5 TABLET ORAL at 12:09

## 2019-09-26 RX ADMIN — MORPHINE SULFATE 4 MG: 2 INJECTION, SOLUTION INTRAMUSCULAR; INTRAVENOUS at 08:09

## 2019-09-26 RX ADMIN — ONDANSETRON 4 MG: 2 INJECTION INTRAMUSCULAR; INTRAVENOUS at 05:09

## 2019-09-26 RX ADMIN — ENOXAPARIN SODIUM 40 MG: 100 INJECTION SUBCUTANEOUS at 07:09

## 2019-09-26 RX ADMIN — MORPHINE SULFATE 4 MG: 2 INJECTION, SOLUTION INTRAMUSCULAR; INTRAVENOUS at 09:09

## 2019-09-26 RX ADMIN — HYDROMORPHONE HYDROCHLORIDE 0.25 MG: 1 INJECTION, SOLUTION INTRAMUSCULAR; INTRAVENOUS; SUBCUTANEOUS at 01:09

## 2019-09-26 RX ADMIN — PIPERACILLIN AND TAZOBACTAM 4.5 G: 4; .5 INJECTION, POWDER, LYOPHILIZED, FOR SOLUTION INTRAVENOUS; PARENTERAL at 11:09

## 2019-09-26 RX ADMIN — MORPHINE SULFATE 4 MG: 2 INJECTION, SOLUTION INTRAMUSCULAR; INTRAVENOUS at 01:09

## 2019-09-26 RX ADMIN — POLYETHYLENE GLYCOL 3350 17 G: 17 POWDER, FOR SOLUTION ORAL at 08:09

## 2019-09-26 RX ADMIN — SODIUM CHLORIDE, SODIUM LACTATE, POTASSIUM CHLORIDE, AND CALCIUM CHLORIDE 500 ML: .6; .31; .03; .02 INJECTION, SOLUTION INTRAVENOUS at 05:09

## 2019-09-26 RX ADMIN — SODIUM CHLORIDE: 0.9 INJECTION, SOLUTION INTRAVENOUS at 12:09

## 2019-09-26 RX ADMIN — ONDANSETRON 4 MG: 2 INJECTION INTRAMUSCULAR; INTRAVENOUS at 02:09

## 2019-09-26 RX ADMIN — PIPERACILLIN AND TAZOBACTAM 4.5 G: 4; .5 INJECTION, POWDER, LYOPHILIZED, FOR SOLUTION INTRAVENOUS; PARENTERAL at 07:09

## 2019-09-26 RX ADMIN — ALLOPURINOL 300 MG: 100 TABLET ORAL at 08:09

## 2019-09-26 RX ADMIN — MORPHINE SULFATE 4 MG: 2 INJECTION, SOLUTION INTRAMUSCULAR; INTRAVENOUS at 05:09

## 2019-09-26 RX ADMIN — ONDANSETRON 8 MG: 8 TABLET, ORALLY DISINTEGRATING ORAL at 10:09

## 2019-09-26 RX ADMIN — PYRIDOSTIGMINE BROMIDE 60 MG: 60 TABLET ORAL at 08:09

## 2019-09-26 RX ADMIN — PIPERACILLIN AND TAZOBACTAM 4.5 G: 4; .5 INJECTION, POWDER, LYOPHILIZED, FOR SOLUTION INTRAVENOUS; PARENTERAL at 03:09

## 2019-09-26 RX ADMIN — LIDOCAINE HYDROCHLORIDE 200 MG: 20 INJECTION, SOLUTION EPIDURAL; INFILTRATION; INTRACAUDAL; PERINEURAL at 01:09

## 2019-09-26 RX ADMIN — STANDARDIZED SENNA CONCENTRATE AND DOCUSATE SODIUM 2 TABLET: 8.6; 5 TABLET, FILM COATED ORAL at 08:09

## 2019-09-26 NOTE — ASSESSMENT & PLAN NOTE
Requiring 2-4L of O2 via NC to maintain saturations of 92%. Not short of breath  Likely source is pneumonia, pleural effusion, compressive atelectasis  Continue symptomatic support with supplemental oxygen.   Will review images to ensure no other interventions must be performed at this time.

## 2019-09-26 NOTE — CONSULTS
----- Message from Princess Ybarra [facility]  Radiation Oncology  Consult Note    Patient Name: Robe Cevallos  MRN: [id number]  Admission Date: 9/25/2019  Hospital Length of Stay: 1 days  Code Status: Full Code   Attending Provider: Pau Lepe MD  Consulting Provider: Agustin Montgomery Jr, MD  Primary Care Physician: Kang Reddy MD  Principal Problem:Diffuse large B-cell lymphoma of intrathoracic lymph nodes    Inpatient consult to Radiation Oncology  Consult performed by: Agustin Montgomery Jr., MD  Consult ordered by: Aleja Cuevas MD        Subjective:     HPI: Mr. 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  The patient was treated with R-CVP followed by maintenance Rituxan.  He was doing well until early this month when he presented for evaluation of increased dyspnea.  CXR revealed a Lt. infrahilar mass.  Subsequent CT of the chest on 9/16/19 confirmed a 9.8 x 3.8 x 13 cm size posterior mediastinal mass extending from the subcarinal retrocardiac region into lower mediastinum terminating above the level of GE junction with compression of the adjacent thoracic esophagus.  There was with partial encasement of few branches of left lower lobe pulmonary arterial and venous structures and encasement of major left lower lobe bronchi.  There was also supraclavicular and upper mediastinal adenopathy.  The patient was referred to pulmonary at [facility] and underwent IR biopsy of a supraclavicular node on 9/20/19.  Pathology revealed diffuse large B cell lymphoma.  The patient presented yesterday to the ED with complaints of LUQ abdominal pain, decreased appetite and dysphagia. Repeat CT of the chest reveals progression of disease now with worsening parenchymal consolidation throughout the left lung and an increasing left-sided pleural effusion.  He was transferred to [facility] for further evaluation.  We are consulted for consideration of palliative  radiotherapy.           Current Facility-Administered Medications   Medication    0.9%  NaCl infusion    acetaminophen tablet 650 mg    allopurinol tablet 300 mg    azithromycin 500 mg in dextrose 5 % 250 mL IVPB (ready to mix system)    enoxaparin injection 40 mg    influenza (HIGH-DOSE PF) vaccine 0.5 mL    morphine injection 4 mg    ondansetron disintegrating tablet 8 mg    oxyCODONE immediate release tablet 5 mg    piperacillin-tazobactam 4.5 g in sodium chloride 0.9% 100 mL IVPB (ready to mix system)    pneumoc 13-oscar conj-dip cr(PF) (PREVNAR 13 (PF)) 0.5 mL    polyethylene glycol packet 17 g    pyridostigmine tablet 60 mg    senna-docusate 8.6-50 mg per tablet 2 tablet    sodium chloride 0.9% flush 10 mL       Review of patient's allergies indicates:  No Known Allergies     Past Medical History:   Diagnosis Date    AMD (age-related macular degeneration), bilateral     Cancer 2004    Lymphoma    Hypertension     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 Jolley    CATARACT EXTRACTION W/  INTRAOCULAR LENS IMPLANT Left 04/20/2017    Dr. Jolley    CHOLECYSTECTOMY      EYE SURGERY      Rt cataract    KNEE ARTHROSCOPY      Lt knee    TONSILLECTOMY       Family History     Problem Relation (Age of Onset)    No Known Problems Mother, Father, Sister, Brother, Maternal Aunt, Maternal Uncle, Paternal Aunt, Paternal Uncle, Maternal Grandmother, Maternal Grandfather, Paternal Grandmother, Paternal Grandfather        Tobacco Use    Smoking status: Former Smoker    Smokeless tobacco: Never Used   Substance and Sexual Activity    Alcohol use: No    Drug use: No    Sexual activity: Not on file       Review of Systems   Constitutional: Positive for activity change and appetite change. Negative for chills, diaphoresis,  fatigue and fever.   HENT: Positive for trouble swallowing. Negative for sore throat.    Respiratory: Positive for shortness of breath. Negative for cough, choking and wheezing.    Cardiovascular: Negative for chest pain and palpitations.   Gastrointestinal: Positive for abdominal pain. Negative for diarrhea, nausea and vomiting.     Objective:     Vital Signs (Most Recent):  Temp: 97.9 °F (36.6 °C) (09/26/19 0750)  Pulse: 87 (09/26/19 1119)  Resp: 20 (09/26/19 0750)  BP: 134/74 (09/26/19 0750)  SpO2: 97 % (09/26/19 0750) Vital Signs (24h Range):  Temp:  [97.5 °F (36.4 °C)-98 °F (36.7 °C)] 97.9 °F (36.6 °C)  Pulse:  [60-94] 87  Resp:  [16-29] 20  SpO2:  [91 %-97 %] 97 %  BP: (111-148)/(61-79) 134/74     Weight: 89.4 kg (197 lb 3.2 oz)  Body mass index is 27.5 kg/m².  Body surface area is 2.12 meters squared.    Physical Exam   Constitutional: He appears well-developed and well-nourished. He appears distressed (mild ).   Neck: Normal range of motion.   Pulmonary/Chest: Effort normal. No respiratory distress.   Lymphadenopathy:     He has cervical adenopathy.       Significant Labs:   All pertinent labs from the last 24 hours have been reviewed.    Diagnostic Results:  I have reviewed all pertinent imaging results/findings within the past 24 hours.    Assessment/Plan:     History of follicular lymphoma, now with advanced stage diffuse B - cell lymphoma.  I reviewed the images and discussed the case with the BMT team.  Recommend we start a course of palliative radiotherapy to the hilum and mediastinum delivering some 25 - 35 Gy depending on the response.  I explained the rational for treatment to the patient and his wife.  We discussed the procedures, risks and benefits of therapy.  Will plan simulation today with treatment to begin this afternoon.       Thank you for your consult. I will follow-up with patient. Please contact us if you have any additional questions.    Agustin Montgomery Jr, MD  Radiation  Oncology  [facility]

## 2019-09-26 NOTE — ASSESSMENT & PLAN NOTE
Patient tachypneic and tachycardic on presentation to [facility] with mildly elevated lactic acid, suspected pneumonia  Treated with empiric antibiotics, received 30ccs/kg fluid resuscitation  Rechecked lactic acid to ensure adequate perfusion in setting of potential distributive process.   Following blood cultures.

## 2019-09-26 NOTE — ASSESSMENT & PLAN NOTE
-The patient has DLBCL based on the path report from [facility] and left supraclavicular LN biopsied on 9/20/19  -Path has been scanned into the media tab  -This is likely a transformation from his follicular lymphoma diagnosed and treated back in 2004  -Pt will likely need BM biopsy for complete staging, likely to be done today  -ECHO done 9/26, EF 60%  -Attempt to get pathology from [facility] to determine additional genetic features which may alter treatment  -Rad onc consult given airway and esophagus compression, to get SIM and start RT today 9/26  -Likely plan to start steroids, then when have final pathology, to start mini-CHOP (if DLBCL) vs alternative regimen if this is double hit  -PICC team consulted  -CT neck to complete staging. Had CTA chest and CT A/P done at [facility]  -CBC, CMP, phos, uric acid daily

## 2019-09-26 NOTE — ASSESSMENT & PLAN NOTE
-The patient has DLBCL based on the path report from [facility] and left supraclavicular LN biopsied on 9/20/19  -Path has been scanned into the media tab  -This is likely a transformation from his follicular lymphoma diagnosed and treated back in 2004  -Pt will likely need BM biopsy for complete staging.  -Will get Echo for treatment planning  -CBC, CMP, phos, uric acid daily

## 2019-09-26 NOTE — PROGRESS NOTES
PICC nurse stated that they could not place PICC in patient due to blood cx being drawn over night. Dr. Lepe spoke with Dr. Constantino from ID service who cleared patient for PICC placement. PICC team notified by Dr. Lepe.    Christin Mullen, ADELA  Hematology/Oncology/Bone Marrow Transplant

## 2019-09-26 NOTE — PHARMACY MED REC
"Admission Medication Reconciliation - Pharmacy Consult Note    The home medication history was taken by Yvon Persaud PharmD.  Based on information gathered and subsequent review by the clinical pharmacist, the items below may need attention.     You may go to "Admission" then "Reconcile Home Medications" tabs to review and/or act upon these items.     Potentially problematic discrepancies with current MAR  o Patient IS taking the following per pharmacy fill history (did not get to speak with patient directly today) which was not ordered upon admit   o Lisinopril 10 mg PO daily (last filled 8/1/19 for 90 day supply)  o Bactrim DS tablet BID (unclear indication - last filled 9/10/19 for 30 day supply #60)   o Patient is taking a drug DIFFERENTLY than how ordered upon admit  o Pyridostigmine 60 mg PO TID (last filled 8/19/19 for 90 day supply #270)     Please address this information as you see fit. Feel free to contact us if you have any questions or require assistance.    Yvon Persaud PharmD, [facility]  Clinical Pharmacist  Spectra Link: [phone]              .    .            "

## 2019-09-26 NOTE — ASSESSMENT & PLAN NOTE
Elevated uric acid, hypercalcemia  Started on allopurinol 300mg QD and started on IV fluids at 150cc/hr (continued from OSH).   Daily chemistry panel, uric acid levels. If not improving will increase fluid rate.

## 2019-09-26 NOTE — CARE UPDATE
Patient acutely short of breath, resolved spontaneously after a few minutes. No significant desaturation. Reviewed CT images, large retrocardiac mass. Assessed femoral pulse both during normal breathing and inspiration. No change in pulse character. BP checked on inspiration and expiration, no significant difference. Low suspicion of tamponade at this time. Echo changed to stat for verification. Will call cardiology to discuss. Also with obstruction of airways. No significant acute changes from previous image but will discuss with pulm fellow whether any acute stenting might be required.     Dhruv Mays MD   Internal Medicine PGY-3

## 2019-09-26 NOTE — H&P
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 Jojo Carcamo with R-CVP followed by maintenance rituxan who presented to the ED on [facility] with severe abdominal pain. Pain has been present intermittently since July but worsened in the last week. He has difficulty describing his symptoms and reports he "just feel[s] bad." He has had a cough with small streaks of blood for about 1 week. No significant sputum production. No fevers, chills, night sweats. Some weight loss, difficulty swallowing solid food for about 2 weeks. He had some pain in his right back earlier today which resolved with a massage. +Constipation. No N/V reported (but he became nauseated during my interview). Patient's main complaint at time of my exam is significant left sided abdominal pain which does not radiate.     Patient had a CT of the chest on 9/16 which showed a 9.8 x 3.8 x 13 cm size posterior mediastinal mass. He underwent lymph nose biopsy of supraclavicular node on 9/20, returned DLBCL (see media tab). Patient underwent CTA chest and CT abdomen with contrast at OSH on 9/25 after presentation to the ED. It showed extrinsic compression of the esophagus, a large retrocardiac mass with involvement of the left hilum causing extrinsic compression of the adjacent esophagus, compression of the left upper and lower lobe bronchi and compression/obliteration of the left inferior pulmonary vein. Labs significant for uncorrected Ca 10.9, Uric Acid 9.6, Cr 1.3, Lactate 2.6. He was treated for suspected post obstructive pneumonia with Piperacillin-tazobactam and Azithro and transferred for further oncology management.     Patient information was obtained from patient, spouse/SO, past medical records and ER records. "     Oncology History:   Follows with Dr. Carcamo  Low grade , Stage 1, follicular lymphoma Right groin in 2004. Good response to R-CVP Chemotherapy and maintenance rituxan per note 4/23/19.     CT Chest 9/16:  9.8 x 3.8 x 13 cm size posterior mediastinal mass extends from subcarinal retrocardiac into lower mediastinum terminating above the level of GE junction with compression of the adjacent thoracic esophagus.  Neck base adenopathy on left 1.5 cm paratracheal and aortopulmonary window adenopathy largest anterior to junction of ascending aorta and pulmonary outflow track 3.8 cm.  Adenopathy and door mass-effect extending from retrocardiac mediastinal mass engulfs medial left lower lobe with partial encasement of few branches of left lower lobe pulmonary arterial and venous structures.  Encasement of major left lower lobe bronchi noted as well.    Lymph node biopsy 9/20: DLBCL, 50% of the cells expressing CD 19, 20, 22. No significant expression of CD23, 5, 10.     Medications Prior to Admission   Medication Sig Dispense Refill Last Dose    azelastine (ASTELIN) 137 mcg (0.1 %) nasal spray 1 spray (137 mcg total) by Nasal route 2 (two) times daily. 30 mL 3 Past Month    pyridostigmine (MESTINON) 60 mg Tab Take 60 mg by mouth.   9/24/2019    fluocinonide 0.05% (LIDEX) 0.05 % cream KARYNA EXT AA ON CHEST BID  1 Not Taking    meclizine (ANTIVERT) 25 mg tablet Take 1 tablet (25 mg total) by mouth 3 (three) times daily as needed. 20 tablet 0 More than a month       Patient has no known allergies.     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 Jolley    CATARACT EXTRACTION W/  INTRAOCULAR LENS  IMPLANT Left 04/20/2017    Dr. Jolley    CHOLECYSTECTOMY      EYE SURGERY      Rt cataract    KNEE ARTHROSCOPY      Lt knee    TONSILLECTOMY       Family History     Problem Relation (Age of Onset)    No Known Problems Mother, Father, Sister, Brother, Maternal Aunt, Maternal Uncle, Paternal Aunt, Paternal Uncle, Maternal Grandmother, Maternal Grandfather, Paternal Grandmother, Paternal Grandfather        Tobacco Use    Smoking status: Former Smoker    Smokeless tobacco: Never Used   Substance and Sexual Activity    Alcohol use: No    Drug use: No    Sexual activity: Not on file       Review of Systems   Constitutional: Positive for activity change, appetite change, fatigue and unexpected weight change. Negative for chills and fever.   HENT: Positive for trouble swallowing. Negative for sore throat.    Eyes: Negative for photophobia and visual disturbance.   Respiratory: Positive for cough. Negative for shortness of breath and wheezing.    Cardiovascular: Negative for chest pain, palpitations and leg swelling.   Gastrointestinal: Positive for abdominal distention, abdominal pain and constipation. Negative for diarrhea, nausea and vomiting.   Genitourinary: Negative for dysuria and hematuria.   Musculoskeletal: Negative for arthralgias and myalgias.   Skin: Negative for rash and wound.   Neurological: Negative for seizures, syncope and headaches.   Psychiatric/Behavioral: Negative for agitation and confusion.     Objective:     Vital Signs (Most Recent):  Temp: 97.7 °F (36.5 °C) (09/25/19 1952)  Pulse: 75 (09/25/19 2149)  Resp: (!) 24 (09/25/19 1952)  BP: 138/73 (09/25/19 1952)  SpO2: (!) 91 % (09/25/19 1952) Vital Signs (24h Range):  Temp:  [97.7 °F (36.5 °C)-98 °F (36.7 °C)] 97.7 °F (36.5 °C)  Pulse:  [] 75  Resp:  [15-29] 24  SpO2:  [91 %-100 %] 91 %  BP: (106-164)/(61-78) 138/73     Weight: 89.5 kg (197 lb 5 oz)  Body mass index is 27.52 kg/m².  Body surface area is 2.12 meters squared.    ECOG  SCORE         [unfilled]    Lines/Drains/Airways     Peripheral Intravenous Line                 Peripheral IV - Single Lumen 09/25/19 0810 20 G Right Antecubital less than 1 day                Physical Exam   Constitutional: He is oriented to person, place, and time.   Mild distress   HENT:   Mouth/Throat: Oropharynx is clear and moist. No oropharyngeal exudate.   Eyes: Conjunctivae are normal. No scleral icterus.   Neck: No JVD present. No thyromegaly present.   Cardiovascular: Normal rate, regular rhythm and normal heart sounds.   Pulmonary/Chest: Effort normal. No respiratory distress.   Decreased breath sounds right lung fields   Abdominal:   Mild distension. Soft. Hyperactive bowel sounds. Marked tenderness to deep palpation left upper, left lower and left flank. No rebound or guarding.    Musculoskeletal: He exhibits no edema or deformity.   Neurological: He is alert and oriented to person, place, and time.   Skin: Skin is warm and dry.   Psychiatric: He has a normal mood and affect. His behavior is normal.   Nursing note and vitals reviewed.      Significant Labs:   Recent Lab Results       09/25/19  2133   09/25/19  1550   09/25/19  1548   09/25/19  1153   09/25/19  1005        Albumin               Alkaline Phosphatase               ALT               Anion Gap               Ao root annulus   3.52           Ao peak danish   1.34           Ao VTI   25.81           Appearance, UA         Clear     AST               AORTIC VALVE CUSP SEPERATION   2.10           AV mean gradient   4           AV index (prosthetic)   0.67           AV peak gradient   7           AV Velocity Ratio   0.67           Baso #               Basophil%               Bilirubin (UA)         Negative     BILIRUBIN TOTAL               Blood Culture, Routine               BNP               BSA   2.06           BUN, Bld               Calcium               Chloride               CO2               Color, UA         Yellow     Creatinine                Left Ventricle Relative Wall Thickness   0.61           Differential Method               E/A ratio   1.02           eGFR if                eGFR if non                Eos #               Eosinophil%               E wave decelartion time   226.88           FS   29           Glucose               Glucose, UA         Negative     Gran # (ANC)               Gran%               Hematocrit               Hemoglobin               IVRT   0.07           IVS   1.18           Ketones, UA         1+     Lactate, Isiah 2.4  Comment:  Falsely low lactic acid results can be found in samples   containing >=13.0 mg/dL total bilirubin and/or >=3.5 mg/dL   direct bilirubin.       2.0  Comment:  Falsely low lactic acid results can be found in samples   containing >=13.0 mg/dL total bilirubin and/or >=3.5 mg/dL   direct bilirubin.  Specimen slightly hemolyzed         Left Atrium Major Axis   5.85           LA size   2.91           LD               Leukocytes, UA         Negative     Lipase               LV Diastolic Volume   57.41           LV Diastolic Volume Index   28.01           LVIDD   3.68           LVIDS   2.60           LV mass   137.95           LV Mass Index   67           LVOT peak mike   0.90           LVOT peak VTI   17.19           LV Systolic Volume   24.60           LV Systolic Volume Index   12.0           Lymph #               Lymph%               MCH               MCHC               MCV               Mono #               Mono%               MPV               MV Peak A Mike   0.94           MV Peak E Mike   0.96           NITRITE UA         Negative     Occult Blood UA         Negative     pH, UA         5.0     Platelets               Potassium               PROTEIN TOTAL               Protein, UA         Negative  Comment:  Recommend a 24 hour urine protein or a urine   protein/creatinine ratio if globulin induced proteinuria is  clinically suspected.       PV PEAK VELOCITY   0.71            PW   1.13           Right Atrial Pressure (from IVC)   3           RA Major Axis   6.00           RA Width   3.15           RBC               RDW               RV S'   9.85           RVDD   3.59           Sodium               Specific Gravity, UA         >1.030     Specimen UA         Urine, Clean Catch     Triscuspid Valve Regurgitation Peak Gradient   25           TR Max Mike   2.48           Troponin I     0.012  Comment:  The reference interval for Troponin I represents the 99th percentile   cutoff   for our facility and is consistent with 3rd generation assay   performance.           TV rest pulmonary artery pressure   28           Uric Acid               UROBILINOGEN UA         Negative     WBC                                09/25/19  0838   09/25/19  0830   09/25/19  0825   09/25/19  0808        Albumin       3.6     Alkaline Phosphatase       140     ALT       43     Anion Gap       14     Ao root annulus             Ao peak mike             Ao VTI             Appearance, UA             AST       30     AORTIC VALVE CUSP SEPERATION             AV mean gradient             AV index (prosthetic)             AV peak gradient             AV Velocity Ratio             Baso #       0.02     Basophil%       0.2     Bilirubin (UA)             BILIRUBIN TOTAL       1.4  Comment:  For infants and newborns, interpretation of results should be based  on gestational age, weight and in agreement with clinical  observations.  Premature Infant recommended reference ranges:  Up to 24 hours.............<8.0 mg/dL  Up to 48 hours............<12.0 mg/dL  3-5 days..................<15.0 mg/dL  6-29 days.................<15.0 mg/dL       Blood Culture, Routine   No Growth to date[P] No Growth to date[P]       BNP       27  Comment:  Values of less than 100 pg/ml are consistent with non-CHF populations.     BSA             BUN, Bld       28     Calcium       10.9     Chloride       99     CO2       23     Color, UA              Creatinine       1.3     Left Ventricle Relative Wall Thickness             Differential Method       Automated     E/A ratio             eGFR if        59     eGFR if non        51  Comment:  Calculation used to obtain the estimated glomerular filtration  rate (eGFR) is the CKD-EPI equation.        Eos #       0.0     Eosinophil%       0.2     E wave decelartion time             FS             Glucose       112     Glucose, UA             Gran # (ANC)       6.9     Gran%       81.2     Hematocrit       43.5     Hemoglobin       14.7     IVRT             IVS             Ketones, UA             Lactate, Isiah     2.6  Comment:  Falsely low lactic acid results can be found in samples   containing >=13.0 mg/dL total bilirubin and/or >=3.5 mg/dL   direct bilirubin.         Left Atrium Major Axis             LA size               Comment:  Results are increased in hemolyzed samples.           Leukocytes, UA             Lipase       18     LV Diastolic Volume             LV Diastolic Volume Index             LVIDD             LVIDS             LV mass             LV Mass Index             LVOT peak mike             LVOT peak VTI             LV Systolic Volume             LV Systolic Volume Index             Lymph #       1.2     Lymph%       14.1     MCH       28.8     MCHC       33.8     MCV       85     Mono #       0.4     Mono%       4.9     MPV       10.2     MV Peak A Mike             MV Peak E Mike             NITRITE UA             Occult Blood UA             pH, UA             Platelets       271     Potassium       4.7     PROTEIN TOTAL       7.1     Protein, UA             PV PEAK VELOCITY             PW             Right Atrial Pressure (from IVC)             RA Major Axis             RA Width             RBC       5.10     RDW       14.3     RV S'             RVDD             Sodium       136     Specific Gravity, UA             Specimen UA             Triscuspid Valve  Regurgitation Peak Gradient             TR Max Mike             Troponin I       0.017  Comment:  The reference interval for Troponin I represents the 99th percentile   cutoff   for our facility and is consistent with 3rd generation assay   performance.       TV rest pulmonary artery pressure             Uric Acid 9.6           UROBILINOGEN UA             WBC       8.61           Diagnostic Results:  CT: CAP. Significantly reduced lung parenchyma in left lung fields with effusion, compression of airway. Mediastinal mass. No signfiicant intra-abdominal findings to explain symptoms.     Assessment/Plan:     * Pneumonia of left lower lobe due to infectious organism  Suspected post obstructive pneumonia in the setting of malignancy  Coverage with Piperacillin-tazo and azithro will cover most likely organisms, will continue  Getting blood cultures, sputum culture (if possible)  Repeating Lactic acid.     Acute respiratory failure with hypoxia  Requiring 2-4L of O2 via NC to maintain saturations of 92%. Not short of breath  Likely source is pneumonia, pleural effusion, compressive atelectasis  Continue symptomatic support with supplemental oxygen.   Will review images to ensure no other interventions must be performed at this time.     Sepsis  Patient tachypneic and tachycardic on presentation to [facility] with mildly elevated lactic acid, suspected pneumonia  Treated with empiric antibiotics, received 30ccs/kg fluid resuscitation  Rechecked lactic acid to ensure adequate perfusion in setting of potential distributive process.   Following blood cultures.     Hyperuricemia  Starting allopurinol and continuous IV fluids    Hypercalcemia  Getting PTH in AM, suspect 2/2 TLS  Continue IV fluids    Stage 3 chronic kidney disease  Renal function stable based on few readings in system.   Patient received contrast load today, will attempt to minimize further contrasted studies.     Cancer related pain  Oxy 5mg PO PRN Q4  If  unrelieved with PO, morphine IV ordered for breakthrough  Starting on bowel regimen.     Tumor lysis syndrome  Elevated uric acid, hypercalcemia  Started on allopurinol 300mg QD and started on IV fluids at 150cc/hr (continued from OSH).   Daily chemistry panel, uric acid levels. If not improving will increase fluid rate.     Diffuse large B-cell lymphoma of intrathoracic lymph nodes  -The patient has DLBCL based on the path report from [facility] and left supraclavicular LN biopsied on 9/20/19  -Path has been scanned into the media tab  -This is likely a transformation from his follicular lymphoma diagnosed and treated back in 2004  -Pt will likely need BM biopsy for complete staging.  -Will get Echo for treatment planning  -CBC, CMP, phos, uric acid daily    Ocular myasthenia gravis  Continue pyridostigmine        VTE Risk Mitigation (From admission, onward)         Ordered     enoxaparin injection 40 mg  Daily      09/25/19 2241     IP VTE HIGH RISK PATIENT  Once      09/25/19 1455     Place sequential compression device  Until discontinued      09/25/19 1455                Disposition: Pending clinical course    Dhruv Mays MD  Bone Marrow Transplant  Hematology  [facility]

## 2019-09-26 NOTE — CONSULTS
PICC unable to be placed in right basilic after attempting. Catheter would not pass to central location. Did not attempt left side due to mediastinal mass. Notified team. Recommended IR placement under flouro.

## 2019-09-26 NOTE — PROGRESS NOTES
Pt complaining of SOB SpO2 96% on 2L NC. MD notified + assessed pt @ bedside, VSS. STAT echo order, Blood Gas placed + pt now NPO. Will cont to monitor

## 2019-09-26 NOTE — CONSULTS
PATEL consulted for PICC placement for chemo    Notified team- PICC will be placed tomorrow pending blood culture readings

## 2019-09-26 NOTE — ASSESSMENT & PLAN NOTE
Oxy 5mg PO PRN Q4  If unrelieved with PO, morphine IV ordered for breakthrough  Starting on bowel regimen.

## 2019-09-26 NOTE — SUBJECTIVE & OBJECTIVE
Subjective:     Interval History: Patient seen today, continues to endorses pain, especially upper abdominal pain, some dyspnea with exertion. To get radiation today. Wife at bedside.    Objective:     Vital Signs (Most Recent):  Temp: 97.9 °F (36.6 °C) (09/26/19 0750)  Pulse: 87 (09/26/19 1119)  Resp: 20 (09/26/19 0750)  BP: 134/74 (09/26/19 0750)  SpO2: 97 % (09/26/19 0750) Vital Signs (24h Range):  Temp:  [97.5 °F (36.4 °C)-98 °F (36.7 °C)] 97.9 °F (36.6 °C)  Pulse:  [60-94] 87  Resp:  [16-29] 20  SpO2:  [91 %-100 %] 97 %  BP: (111-148)/(61-79) 134/74     Weight: 89.4 kg (197 lb 3.2 oz)  Body mass index is 27.5 kg/m².  Body surface area is 2.12 meters squared.    ECOG SCORE         [unfilled]    Intake/Output - Last 3 Shifts       09/24 0700 - 09/25 0659 09/25 0700 - 09/26 0659 09/26 0700 - 09/27 0659    I.V. (mL/kg)  925 (10.3)     IV Piggyback  2900     Total Intake(mL/kg)  3825 (42.8)     Urine (mL/kg/hr)  400     Emesis/NG output  0     Other  0     Stool  0     Blood  0     Total Output  400     Net  +3425            Urine Occurrence  0 x     Stool Occurrence  0 x     Emesis Occurrence  0 x           Physical Exam   Constitutional: He appears well-developed and well-nourished.   HENT:   Head: Normocephalic and atraumatic.   Mouth/Throat: No oropharyngeal exudate.   Eyes: Pupils are equal, round, and reactive to light. EOM are normal. No scleral icterus.   Neck: Neck supple.   Cardiovascular: Normal rate and regular rhythm.   Pulmonary/Chest: He is in respiratory distress (mild).   No palpable axillary LN   Abdominal: Soft. He exhibits distension (mild). There is no tenderness.   Genitourinary:   Genitourinary Comments: No palpable inguinal LN   Musculoskeletal: Normal range of motion. He exhibits no edema.   Lymphadenopathy:     He has no cervical adenopathy.   Neurological: He is alert.   Skin: Skin is warm and dry.   Psychiatric: He has a normal mood and affect. His behavior is normal.       Significant  Labs:   CBC:   Recent Labs   Lab 09/25/19  0808 09/26/19  0414   WBC 8.61 8.64   HGB 14.7 13.3*   HCT 43.5 41.5    228   , CMP:   Recent Labs   Lab 09/25/19  0808 09/26/19  0414    135*   K 4.7 4.8   CL 99 103   CO2 23 21*   * 101   BUN 28* 23   CREATININE 1.3 1.2   CALCIUM 10.9* 10.0   PROT 7.1 6.2   ALBUMIN 3.6 3.0*   BILITOT 1.4* 1.0   ALKPHOS 140* 130   AST 30 28   ALT 43 40   ANIONGAP 14 11   EGFRNONAA 51* 56.4*    and Coagulation: No results for input(s): PT, INR, APTT in the last 48 hours.    Diagnostic Results:  I have reviewed all pertinent imaging results/findings within the past 24 hours.

## 2019-09-26 NOTE — PROGRESS NOTES
[facility]  Hematology  Bone Marrow Transplant  Progress Note    Patient Name: Robe Cevallos  Admission Date: 9/25/2019  Hospital Length of Stay: 1 days  Code Status: Full Code    Subjective:     Interval History: Patient seen today, continues to endorses pain, especially upper abdominal pain, some dyspnea with exertion. To get radiation today. Wife at bedside.    Objective:     Vital Signs (Most Recent):  Temp: 97.9 °F (36.6 °C) (09/26/19 0750)  Pulse: 87 (09/26/19 1119)  Resp: 20 (09/26/19 0750)  BP: 134/74 (09/26/19 0750)  SpO2: 97 % (09/26/19 0750) Vital Signs (24h Range):  Temp:  [97.5 °F (36.4 °C)-98 °F (36.7 °C)] 97.9 °F (36.6 °C)  Pulse:  [60-94] 87  Resp:  [16-29] 20  SpO2:  [91 %-100 %] 97 %  BP: (111-148)/(61-79) 134/74     Weight: 89.4 kg (197 lb 3.2 oz)  Body mass index is 27.5 kg/m².  Body surface area is 2.12 meters squared.    ECOG SCORE         [unfilled]    Intake/Output - Last 3 Shifts       09/24 0700 - 09/25 0659 09/25 0700 - 09/26 0659 09/26 0700 - 09/27 0659    I.V. (mL/kg)  925 (10.3)     IV Piggyback  2900     Total Intake(mL/kg)  3825 (42.8)     Urine (mL/kg/hr)  400     Emesis/NG output  0     Other  0     Stool  0     Blood  0     Total Output  400     Net  +3425            Urine Occurrence  0 x     Stool Occurrence  0 x     Emesis Occurrence  0 x           Physical Exam   Constitutional: He appears well-developed and well-nourished.   HENT:   Head: Normocephalic and atraumatic.   Mouth/Throat: No oropharyngeal exudate.   Eyes: Pupils are equal, round, and reactive to light. EOM are normal. No scleral icterus.   Neck: Neck supple.   Cardiovascular: Normal rate and regular rhythm.   Pulmonary/Chest: He is in respiratory distress (mild).   No palpable axillary LN   Abdominal: Soft. He exhibits distension (mild). There is no tenderness.   Genitourinary:   Genitourinary Comments: No palpable inguinal LN   Musculoskeletal: Normal range of motion. He exhibits no edema.    Lymphadenopathy:     He has no cervical adenopathy.   Neurological: He is alert.   Skin: Skin is warm and dry.   Psychiatric: He has a normal mood and affect. His behavior is normal.       Significant Labs:   CBC:   Recent Labs   Lab 09/25/19  0808 09/26/19 0414   WBC 8.61 8.64   HGB 14.7 13.3*   HCT 43.5 41.5    228   , CMP:   Recent Labs   Lab 09/25/19  0808 09/26/19 0414    135*   K 4.7 4.8   CL 99 103   CO2 23 21*   * 101   BUN 28* 23   CREATININE 1.3 1.2   CALCIUM 10.9* 10.0   PROT 7.1 6.2   ALBUMIN 3.6 3.0*   BILITOT 1.4* 1.0   ALKPHOS 140* 130   AST 30 28   ALT 43 40   ANIONGAP 14 11   EGFRNONAA 51* 56.4*    and Coagulation: No results for input(s): PT, INR, APTT in the last 48 hours.    Diagnostic Results:  I have reviewed all pertinent imaging results/findings within the past 24 hours.    Assessment/Plan:     * Diffuse large B-cell lymphoma of intrathoracic lymph nodes  -The patient has DLBCL based on the path report from [facility] and left supraclavicular LN biopsied on 9/20/19  -Path has been scanned into the media tab  -This is likely a transformation from his follicular lymphoma diagnosed and treated back in 2004  -Pt will likely need BM biopsy for complete staging, likely to be done today  -ECHO done 9/26, EF 60%  -Attempt to get pathology from [facility] to determine additional genetic features which may alter treatment  -Rad onc consult given airway and esophagus compression, to get SIM and start RT today 9/26  -Likely plan to start steroids, then when have final pathology, to start mini-CHOP (if DLBCL) vs alternative regimen if this is double hit  -PICC team consulted  -CT neck to complete staging. Had CTA chest and CT A/P done at [facility]  -CBC, CMP, phos, uric acid daily    Acute respiratory failure with hypoxia  Requiring 2-4L of O2 via NC to maintain saturations of 92%. Not short of breath  Likely source is lymphoma, pneumonia, pleural effusion, compressive  atelectasis  Continue symptomatic support with supplemental oxygen.   Rad onc to perform RT to help alleviate obstruction  Plan to likely start steroids  Will review images to ensure no other interventions must be performed at this time.     Sepsis  Patient tachypneic and tachycardic on presentation to [facility] with mildly elevated lactic acid, suspected pneumonia  Treated with empiric antibiotics, received 30ccs/kg fluid resuscitation  Rechecked lactic acid to ensure adequate perfusion in setting of potential distributive process.   Following blood cultures.     Hyperuricemia  Starting allopurinol and continuous IV fluids    Hypercalcemia  Corrected calcium 10.8 today  Continue IV fluids    Stage 3 chronic kidney disease  Renal function stable based on few readings in system.   At baseline Cr ~1.2    Cancer related pain  Oxy 5mg PO PRN Q4  If unrelieved with PO, morphine IV ordered for breakthrough  Starting on bowel regimen.     Tumor lysis syndrome  Elevated uric acid, hypercalcemia  Started on allopurinol 300mg QD and started on IV fluids at 150cc/hr (continued from OSH).   Daily chemistry panel, uric acid levels. If not improving will increase fluid rate.     Pneumonia of left lower lobe due to infectious organism  Suspected post obstructive pneumonia in the setting of malignancy  Coverage with Piperacillin-tazo and azithro will cover most likely organisms, will continue, consider de-escalating  Getting blood cultures, sputum culture (if possible)  Repeating Lactic acid.     Ocular myasthenia gravis  Continue pyridostigmine TID      VTE Risk Mitigation (From admission, onward)         Ordered     enoxaparin injection 40 mg  Daily      09/25/19 2241     IP VTE HIGH RISK PATIENT  Once      09/25/19 1455     Place sequential compression device  Until discontinued      09/25/19 1455                Disposition: pending clinical improvement    The following was staffed and discussed with supervising physician  Dr. Lepe.    Aleja Cuevas MD PGY-V  Hematology/Oncology Fellow

## 2019-09-26 NOTE — ASSESSMENT & PLAN NOTE
Suspected post obstructive pneumonia in the setting of malignancy  Coverage with Piperacillin-tazo and azithro will cover most likely organisms, will continue  Getting blood cultures, sputum culture (if possible)  Repeating Lactic acid.

## 2019-09-26 NOTE — HPI
"79 M with myasthenia gravis,  h/o 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 Jojo Carcamo with R-CVP followed by maintenance rituxan who presented to the ED on [facility] with severe abdominal pain. Pain has been present intermittently since July but worsened in the last week. He has difficulty describing his symptoms and reports he "just feel[s] bad." He has had a cough with small streaks of blood for about 1 week. No significant sputum production. No fevers, chills, night sweats. Some weight loss, difficulty swallowing solid food for about 2 weeks. He had some pain in his right back earlier today which resolved with a massage. +Constipation. No N/V reported (but he became nauseated during my interview). Patient's main complaint at time of my exam is significant left sided abdominal pain which does not radiate.     Patient had a CT of the chest on 9/16 which showed a 9.8 x 3.8 x 13 cm size posterior mediastinal mass. He underwent lymph nose biopsy of supraclavicular node on 9/20, returned DLBCL (see media tab). Patient underwent CTA chest and CT abdomen with contrast at OSH on 9/25 after presentation to the ED. It showed extrinsic compression of the esophagus, a large retrocardiac mass with involvement of the left hilum causing extrinsic compression of the adjacent esophagus, compression of the left upper and lower lobe bronchi and compression/obliteration of the left inferior pulmonary vein. Labs significant for uncorrected Ca 10.9, Uric Acid 9.6, Cr 1.3, Lactate 2.6. He was treated for suspected post obstructive pneumonia with Piperacillin-tazobactam and Azithro and transferred for further oncology management.   "

## 2019-09-26 NOTE — HOSPITAL COURSE
09/26/2019 Patient admitted, ECHO done, to get neck CT. To get radiation given airway/esophagus compression.  09/27/2019 Hypoxic overnight, requiring BIPAP. To start decadron and vincristine. Pulm consulted for thora. thora performed at the bedside with drainage of 2 liters of exudative fluid.   09/28/2019 Doing better from a respiratory standpoint. Speech eval to see if he can safely swallow. Hold off on RT for now.   09/29/2019 breathing is improving. Passed bedside swallow eval, started on clears. Given one dose of Rasburicase, paln for IR guided prot insertion tomorrow followed by rest of CHOP.  09/30/2019 To get tunneled PICC, and plan for adriamycin and cytoxan. Barium swallow ordered.  10/01/2019: PICC placed in IR yesterday. Started cytoxan and daunorubicin over night. Tolerated well. AOx4 this morning. OT rec's home health and inpatient therapy 3x/week. PT consult pending. Scheduled for MBSS today. Uric acid 1.1. Allopurinol changed from 300 mg bid to 300 mg daily.  over night. Home lisinopril ordered. Mild transaminitis. Will monitor for now.  10/02/2019: S/P C1 of CHOP. Will receive rituxan outpatient. S/P MBSS on 10/1. Tolerating full liquids well. Able to swallow pills today. Will speak with ST regarding whether we can upgrade diet. PT/OT recommending that patient go home with home health. Home health orders placed. Will assess supplemental O2 needs if any. Completed zosyn and azythromycin for LLL PNA. K+, Ca+, and phos replaced. Tentatively planning to discharge patient home with home health tomorrow. PICC will be left in place at discharge.  10/03/2019 Pt is ready for d/c with home health for pt/ot/speech and line care. Will see Dr. Sparrow on 10/8. Have msg'd him and his staff to also schedule labs and rituxan for the same day.

## 2019-09-26 NOTE — ASSESSMENT & PLAN NOTE
Suspected post obstructive pneumonia in the setting of malignancy  Coverage with Piperacillin-tazo and azithro will cover most likely organisms, will continue, consider de-escalating  Getting blood cultures, sputum culture (if possible)  Repeating Lactic acid.

## 2019-09-26 NOTE — SUBJECTIVE & OBJECTIVE
Patient information was obtained from patient, spouse/SO, past medical records and ER records.     Oncology History:   Follows with Dr. Carcamo  Low grade , Stage 1, follicular lymphoma Right groin in 2004. Good response to R-CVP Chemotherapy and maintenance rituxan per note 4/23/19.     CT Chest 9/16:  9.8 x 3.8 x 13 cm size posterior mediastinal mass extends from subcarinal retrocardiac into lower mediastinum terminating above the level of GE junction with compression of the adjacent thoracic esophagus.  Neck base adenopathy on left 1.5 cm paratracheal and aortopulmonary window adenopathy largest anterior to junction of ascending aorta and pulmonary outflow track 3.8 cm.  Adenopathy and door mass-effect extending from retrocardiac mediastinal mass engulfs medial left lower lobe with partial encasement of few branches of left lower lobe pulmonary arterial and venous structures.  Encasement of major left lower lobe bronchi noted as well.    Lymph node biopsy 9/20: DLBCL, 50% of the cells expressing CD 19, 20, 22. No significant expression of CD23, 5, 10.     Medications Prior to Admission   Medication Sig Dispense Refill Last Dose    azelastine (ASTELIN) 137 mcg (0.1 %) nasal spray 1 spray (137 mcg total) by Nasal route 2 (two) times daily. 30 mL 3 Past Month    pyridostigmine (MESTINON) 60 mg Tab Take 60 mg by mouth.   9/24/2019    fluocinonide 0.05% (LIDEX) 0.05 % cream KARYNA EXT AA ON CHEST BID  1 Not Taking    meclizine (ANTIVERT) 25 mg tablet Take 1 tablet (25 mg total) by mouth 3 (three) times daily as needed. 20 tablet 0 More than a month       Patient has no known allergies.     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 Jolley    CATARACT EXTRACTION W/  INTRAOCULAR LENS IMPLANT Left 04/20/2017    Dr. Jolley    CHOLECYSTECTOMY      EYE SURGERY      Rt cataract    KNEE ARTHROSCOPY      Lt knee    TONSILLECTOMY       Family History     Problem Relation (Age of Onset)    No Known Problems Mother, Father, Sister, Brother, Maternal Aunt, Maternal Uncle, Paternal Aunt, Paternal Uncle, Maternal Grandmother, Maternal Grandfather, Paternal Grandmother, Paternal Grandfather        Tobacco Use    Smoking status: Former Smoker    Smokeless tobacco: Never Used   Substance and Sexual Activity    Alcohol use: No    Drug use: No    Sexual activity: Not on file       Review of Systems   Constitutional: Positive for activity change, appetite change, fatigue and unexpected weight change. Negative for chills and fever.   HENT: Positive for trouble swallowing. Negative for sore throat.    Eyes: Negative for photophobia and visual disturbance.   Respiratory: Positive for cough. Negative for shortness of breath and wheezing.    Cardiovascular: Negative for chest pain, palpitations and leg swelling.   Gastrointestinal: Positive for abdominal distention, abdominal pain and constipation. Negative for diarrhea, nausea and vomiting.   Genitourinary: Negative for dysuria and hematuria.   Musculoskeletal: Negative for arthralgias and myalgias.   Skin: Negative for rash and wound.   Neurological: Negative for seizures, syncope and headaches.   Psychiatric/Behavioral: Negative for agitation and confusion.     Objective:     Vital Signs (Most Recent):  Temp: 97.7 °F (36.5 °C) (09/25/19 1952)  Pulse: 75 (09/25/19 2149)  Resp: (!) 24 (09/25/19 1952)  BP: 138/73 (09/25/19 1952)  SpO2: (!) 91 % (09/25/19 1952) Vital Signs (24h Range):  Temp:  [97.7 °F (36.5 °C)-98 °F (36.7 °C)] 97.7 °F (36.5 °C)  Pulse:  [] 75  Resp:  [15-29] 24  SpO2:  [91 %-100 %] 91 %  BP: (106-164)/(61-78) 138/73     Weight: 89.5 kg (197  lb 5 oz)  Body mass index is 27.52 kg/m².  Body surface area is 2.12 meters squared.    ECOG SCORE         [unfilled]    Lines/Drains/Airways     Peripheral Intravenous Line                 Peripheral IV - Single Lumen 09/25/19 0810 20 G Right Antecubital less than 1 day                Physical Exam   Constitutional: He is oriented to person, place, and time.   Mild distress   HENT:   Mouth/Throat: Oropharynx is clear and moist. No oropharyngeal exudate.   Eyes: Conjunctivae are normal. No scleral icterus.   Neck: No JVD present. No thyromegaly present.   Cardiovascular: Normal rate, regular rhythm and normal heart sounds.   Pulmonary/Chest: Effort normal. No respiratory distress.   Decreased breath sounds right lung fields   Abdominal:   Mild distension. Soft. Hyperactive bowel sounds. Marked tenderness to deep palpation left upper, left lower and left flank. No rebound or guarding.    Musculoskeletal: He exhibits no edema or deformity.   Neurological: He is alert and oriented to person, place, and time.   Skin: Skin is warm and dry.   Psychiatric: He has a normal mood and affect. His behavior is normal.   Nursing note and vitals reviewed.      Significant Labs:   Recent Lab Results       09/25/19  2133   09/25/19  1550   09/25/19  1548   09/25/19  1153   09/25/19  1005        Albumin               Alkaline Phosphatase               ALT               Anion Gap               Ao root annulus   3.52           Ao peak danish   1.34           Ao VTI   25.81           Appearance, UA         Clear     AST               AORTIC VALVE CUSP SEPERATION   2.10           AV mean gradient   4           AV index (prosthetic)   0.67           AV peak gradient   7           AV Velocity Ratio   0.67           Baso #               Basophil%               Bilirubin (UA)         Negative     BILIRUBIN TOTAL               Blood Culture, Routine               BNP               BSA   2.06           BUN, Bld               Calcium                Chloride               CO2               Color, UA         Yellow     Creatinine               Left Ventricle Relative Wall Thickness   0.61           Differential Method               E/A ratio   1.02           eGFR if                eGFR if non                Eos #               Eosinophil%               E wave decelartion time   226.88           FS   29           Glucose               Glucose, UA         Negative     Gran # (ANC)               Gran%               Hematocrit               Hemoglobin               IVRT   0.07           IVS   1.18           Ketones, UA         1+     Lactate, Isiah 2.4  Comment:  Falsely low lactic acid results can be found in samples   containing >=13.0 mg/dL total bilirubin and/or >=3.5 mg/dL   direct bilirubin.       2.0  Comment:  Falsely low lactic acid results can be found in samples   containing >=13.0 mg/dL total bilirubin and/or >=3.5 mg/dL   direct bilirubin.  Specimen slightly hemolyzed         Left Atrium Major Axis   5.85           LA size   2.91           LD               Leukocytes, UA         Negative     Lipase               LV Diastolic Volume   57.41           LV Diastolic Volume Index   28.01           LVIDD   3.68           LVIDS   2.60           LV mass   137.95           LV Mass Index   67           LVOT peak mike   0.90           LVOT peak VTI   17.19           LV Systolic Volume   24.60           LV Systolic Volume Index   12.0           Lymph #               Lymph%               MCH               MCHC               MCV               Mono #               Mono%               MPV               MV Peak A Mike   0.94           MV Peak E Mike   0.96           NITRITE UA         Negative     Occult Blood UA         Negative     pH, UA         5.0     Platelets               Potassium               PROTEIN TOTAL               Protein, UA         Negative  Comment:  Recommend a 24 hour urine protein or a urine   protein/creatinine ratio if  globulin induced proteinuria is  clinically suspected.       PV PEAK VELOCITY   0.71           PW   1.13           Right Atrial Pressure (from IVC)   3           RA Major Axis   6.00           RA Width   3.15           RBC               RDW               RV S'   9.85           RVDD   3.59           Sodium               Specific Gravity, UA         >1.030     Specimen UA         Urine, Clean Catch     Triscuspid Valve Regurgitation Peak Gradient   25           TR Max Mike   2.48           Troponin I     0.012  Comment:  The reference interval for Troponin I represents the 99th percentile   cutoff   for our facility and is consistent with 3rd generation assay   performance.           TV rest pulmonary artery pressure   28           Uric Acid               UROBILINOGEN UA         Negative     WBC                                09/25/19  0838   09/25/19  0830   09/25/19  0825   09/25/19  0808        Albumin       3.6     Alkaline Phosphatase       140     ALT       43     Anion Gap       14     Ao root annulus             Ao peak mike             Ao VTI             Appearance, UA             AST       30     AORTIC VALVE CUSP SEPERATION             AV mean gradient             AV index (prosthetic)             AV peak gradient             AV Velocity Ratio             Baso #       0.02     Basophil%       0.2     Bilirubin (UA)             BILIRUBIN TOTAL       1.4  Comment:  For infants and newborns, interpretation of results should be based  on gestational age, weight and in agreement with clinical  observations.  Premature Infant recommended reference ranges:  Up to 24 hours.............<8.0 mg/dL  Up to 48 hours............<12.0 mg/dL  3-5 days..................<15.0 mg/dL  6-29 days.................<15.0 mg/dL       Blood Culture, Routine   No Growth to date[P] No Growth to date[P]       BNP       27  Comment:  Values of less than 100 pg/ml are consistent with non-CHF populations.     BSA             BUN, Bld        28     Calcium       10.9     Chloride       99     CO2       23     Color, UA             Creatinine       1.3     Left Ventricle Relative Wall Thickness             Differential Method       Automated     E/A ratio             eGFR if        59     eGFR if non        51  Comment:  Calculation used to obtain the estimated glomerular filtration  rate (eGFR) is the CKD-EPI equation.        Eos #       0.0     Eosinophil%       0.2     E wave decelartion time             FS             Glucose       112     Glucose, UA             Gran # (ANC)       6.9     Gran%       81.2     Hematocrit       43.5     Hemoglobin       14.7     IVRT             IVS             Ketones, UA             Lactate, Isiah     2.6  Comment:  Falsely low lactic acid results can be found in samples   containing >=13.0 mg/dL total bilirubin and/or >=3.5 mg/dL   direct bilirubin.         Left Atrium Major Axis             LA size               Comment:  Results are increased in hemolyzed samples.           Leukocytes, UA             Lipase       18     LV Diastolic Volume             LV Diastolic Volume Index             LVIDD             LVIDS             LV mass             LV Mass Index             LVOT peak mike             LVOT peak VTI             LV Systolic Volume             LV Systolic Volume Index             Lymph #       1.2     Lymph%       14.1     MCH       28.8     MCHC       33.8     MCV       85     Mono #       0.4     Mono%       4.9     MPV       10.2     MV Peak A Mike             MV Peak E Mike             NITRITE UA             Occult Blood UA             pH, UA             Platelets       271     Potassium       4.7     PROTEIN TOTAL       7.1     Protein, UA             PV PEAK VELOCITY             PW             Right Atrial Pressure (from IVC)             RA Major Axis             RA Width             RBC       5.10     RDW       14.3     RV S'             RVDD             Sodium        136     Specific Gravity, UA             Specimen UA             Triscuspid Valve Regurgitation Peak Gradient             TR Max Mike             Troponin I       0.017  Comment:  The reference interval for Troponin I represents the 99th percentile   cutoff   for our facility and is consistent with 3rd generation assay   performance.       TV rest pulmonary artery pressure             Uric Acid 9.6           UROBILINOGEN UA             WBC       8.61           Diagnostic Results:  CT: CAP. Significantly reduced lung parenchyma in left lung fields with effusion, compression of airway. Mediastinal mass. No signfiicant intra-abdominal findings to explain symptoms.

## 2019-09-26 NOTE — ASSESSMENT & PLAN NOTE
Renal function stable based on few readings in system.   Patient received contrast load today, will attempt to minimize further contrasted studies.

## 2019-09-26 NOTE — ASSESSMENT & PLAN NOTE
Requiring 2-4L of O2 via NC to maintain saturations of 92%. Not short of breath  Likely source is lymphoma, pneumonia, pleural effusion, compressive atelectasis  Continue symptomatic support with supplemental oxygen.   Rad onc to perform RT to help alleviate obstruction  Plan to likely start steroids  Will review images to ensure no other interventions must be performed at this time.

## 2019-09-27 LAB
ALBUMIN FLD-MCNC: 2.2 G/DL
ALBUMIN SERPL BCP-MCNC: 3.1 G/DL (ref 3.5–5.2)
ALLENS TEST: ABNORMAL
ALP SERPL-CCNC: 136 U/L (ref 55–135)
ALT SERPL W/O P-5'-P-CCNC: 46 U/L (ref 10–44)
ANION GAP SERPL CALC-SCNC: 16 MMOL/L (ref 8–16)
ANISOCYTOSIS BLD QL SMEAR: SLIGHT
AST SERPL-CCNC: 34 U/L (ref 10–40)
BASOPHILS # BLD AUTO: 0.04 K/UL (ref 0–0.2)
BASOPHILS NFR BLD: 0.4 % (ref 0–1.9)
BILIRUB SERPL-MCNC: 1.1 MG/DL (ref 0.1–1)
BODY FLUID SOURCE, LDH: NORMAL
BODY SITE - BONE MARROW: NORMAL
BONE MARROW IRON STAIN COMMENT: NORMAL
BUN SERPL-MCNC: 27 MG/DL (ref 8–23)
BURR CELLS BLD QL SMEAR: ABNORMAL
CALCIUM SERPL-MCNC: 10.7 MG/DL (ref 8.7–10.5)
CHLORIDE SERPL-SCNC: 105 MMOL/L (ref 95–110)
CLINICAL DIAGNOSIS - BONE MARROW: NORMAL
CO2 SERPL-SCNC: 19 MMOL/L (ref 23–29)
CREAT SERPL-MCNC: 1.2 MG/DL (ref 0.5–1.4)
DELSYS: ABNORMAL
DIFFERENTIAL METHOD: ABNORMAL
EOSINOPHIL # BLD AUTO: 0 K/UL (ref 0–0.5)
EOSINOPHIL NFR BLD: 0.1 % (ref 0–8)
ERYTHROCYTE [DISTWIDTH] IN BLOOD BY AUTOMATED COUNT: 14.6 % (ref 11.5–14.5)
EST. GFR  (AFRICAN AMERICAN): >60 ML/MIN/1.73 M^2
EST. GFR  (NON AFRICAN AMERICAN): 56.4 ML/MIN/1.73 M^2
FLOW CYTOMETRY ANTIBODIES ANALYZED - BONE MARROW: NORMAL
FLOW CYTOMETRY COMMENT - BONE MARROW: NORMAL
FLOW CYTOMETRY INTERPRETATION - BONE MARROW: NORMAL
GLUCOSE FLD-MCNC: 71 MG/DL
GLUCOSE SERPL-MCNC: 101 MG/DL (ref 70–110)
HCO3 UR-SCNC: 20.9 MMOL/L (ref 24–28)
HCT VFR BLD AUTO: 42.8 % (ref 40–54)
HGB BLD-MCNC: 14.1 G/DL (ref 14–18)
IMM GRANULOCYTES # BLD AUTO: 0.15 K/UL (ref 0–0.04)
IMM GRANULOCYTES NFR BLD AUTO: 1.4 % (ref 0–0.5)
LDH FLD L TO P-CCNC: 2323 U/L
LDH SERPL L TO P-CCNC: 2.62 MMOL/L (ref 0.36–1.25)
LDH SERPL L TO P-CCNC: 450 U/L (ref 110–260)
LYMPHOCYTES # BLD AUTO: 0.6 K/UL (ref 1–4.8)
LYMPHOCYTES NFR BLD: 5.8 % (ref 18–48)
MAGNESIUM SERPL-MCNC: 2 MG/DL (ref 1.6–2.6)
MCH RBC QN AUTO: 28.7 PG (ref 27–31)
MCHC RBC AUTO-ENTMCNC: 32.9 G/DL (ref 32–36)
MCV RBC AUTO: 87 FL (ref 82–98)
MONOCYTES # BLD AUTO: 0.9 K/UL (ref 0.3–1)
MONOCYTES NFR BLD: 7.9 % (ref 4–15)
NEUTROPHILS # BLD AUTO: 9.1 K/UL (ref 1.8–7.7)
NEUTROPHILS NFR BLD: 84.4 % (ref 38–73)
NRBC BLD-RTO: 0 /100 WBC
PCO2 BLDA: 36.3 MMHG (ref 35–45)
PH SMN: 7.37 [PH] (ref 7.35–7.45)
PHOSPHATE SERPL-MCNC: 5.1 MG/DL (ref 2.7–4.5)
PLATELET # BLD AUTO: 211 K/UL (ref 150–350)
PLATELET BLD QL SMEAR: ABNORMAL
PMV BLD AUTO: 11.4 FL (ref 9.2–12.9)
PO2 BLDA: 54 MMHG (ref 80–100)
POC BE: -4 MMOL/L
POC SATURATED O2: 87 % (ref 95–100)
POC TCO2: 22 MMOL/L (ref 23–27)
POTASSIUM SERPL-SCNC: 4.5 MMOL/L (ref 3.5–5.1)
PROT FLD-MCNC: 3.5 G/DL
PROT SERPL-MCNC: 6.3 G/DL (ref 6–8.4)
RBC # BLD AUTO: 4.91 M/UL (ref 4.6–6.2)
SAMPLE: ABNORMAL
SITE: ABNORMAL
SODIUM SERPL-SCNC: 140 MMOL/L (ref 136–145)
SPECIMEN SOURCE: NORMAL
URATE SERPL-MCNC: 6.3 MG/DL (ref 3.4–7)
WBC # BLD AUTO: 10.78 K/UL (ref 3.9–12.7)

## 2019-09-27 PROCEDURE — 94761 N-INVAS EAR/PLS OXIMETRY MLT: CPT

## 2019-09-27 PROCEDURE — 82042 OTHER SOURCE ALBUMIN QUAN EA: CPT

## 2019-09-27 PROCEDURE — 84550 ASSAY OF BLOOD/URIC ACID: CPT

## 2019-09-27 PROCEDURE — 63600175 PHARM REV CODE 636 W HCPCS: Performed by: STUDENT IN AN ORGANIZED HEALTH CARE EDUCATION/TRAINING PROGRAM

## 2019-09-27 PROCEDURE — 83605 ASSAY OF LACTIC ACID: CPT

## 2019-09-27 PROCEDURE — 94660 CPAP INITIATION&MGMT: CPT

## 2019-09-27 PROCEDURE — 94640 AIRWAY INHALATION TREATMENT: CPT

## 2019-09-27 PROCEDURE — 99233 SBSQ HOSP IP/OBS HIGH 50: CPT | Mod: ,,, | Performed by: INTERNAL MEDICINE

## 2019-09-27 PROCEDURE — 25000003 PHARM REV CODE 250: Performed by: INTERNAL MEDICINE

## 2019-09-27 PROCEDURE — 88305 TISSUE EXAM BY PATHOLOGIST: CPT | Mod: 26,,, | Performed by: PATHOLOGY

## 2019-09-27 PROCEDURE — 25000242 PHARM REV CODE 250 ALT 637 W/ HCPCS: Performed by: STUDENT IN AN ORGANIZED HEALTH CARE EDUCATION/TRAINING PROGRAM

## 2019-09-27 PROCEDURE — 84157 ASSAY OF PROTEIN OTHER: CPT

## 2019-09-27 PROCEDURE — 99900035 HC TECH TIME PER 15 MIN (STAT)

## 2019-09-27 PROCEDURE — 83735 ASSAY OF MAGNESIUM: CPT

## 2019-09-27 PROCEDURE — 36415 COLL VENOUS BLD VENIPUNCTURE: CPT

## 2019-09-27 PROCEDURE — 85025 COMPLETE CBC W/AUTO DIFF WBC: CPT

## 2019-09-27 PROCEDURE — 88189 FLOWCYTOMETRY/READ 16 & >: CPT | Mod: ,,, | Performed by: PATHOLOGY

## 2019-09-27 PROCEDURE — 27000190 HC CPAP FULL FACE MASK W/VALVE

## 2019-09-27 PROCEDURE — S0028 INJECTION, FAMOTIDINE, 20 MG: HCPCS | Performed by: STUDENT IN AN ORGANIZED HEALTH CARE EDUCATION/TRAINING PROGRAM

## 2019-09-27 PROCEDURE — 88112 CYTOLOGY SPECIMEN- PULMONARY MEDICAL CYTOLOGY: ICD-10-PCS | Mod: 26,,, | Performed by: PATHOLOGY

## 2019-09-27 PROCEDURE — 27100171 HC OXYGEN HIGH FLOW UP TO 24 HOURS

## 2019-09-27 PROCEDURE — 25000003 PHARM REV CODE 250: Performed by: STUDENT IN AN ORGANIZED HEALTH CARE EDUCATION/TRAINING PROGRAM

## 2019-09-27 PROCEDURE — 99233 PR SUBSEQUENT HOSPITAL CARE,LEVL III: ICD-10-PCS | Mod: ,,, | Performed by: INTERNAL MEDICINE

## 2019-09-27 PROCEDURE — 63600175 PHARM REV CODE 636 W HCPCS: Performed by: HOSPITALIST

## 2019-09-27 PROCEDURE — 36600 WITHDRAWAL OF ARTERIAL BLOOD: CPT

## 2019-09-27 PROCEDURE — 63600175 PHARM REV CODE 636 W HCPCS: Performed by: INTERNAL MEDICINE

## 2019-09-27 PROCEDURE — 88305 CYTOLOGY SPECIMEN- PULMONARY MEDICAL CYTOLOGY: ICD-10-PCS | Mod: 26,,, | Performed by: PATHOLOGY

## 2019-09-27 PROCEDURE — 82945 GLUCOSE OTHER FLUID: CPT

## 2019-09-27 PROCEDURE — 82803 BLOOD GASES ANY COMBINATION: CPT

## 2019-09-27 PROCEDURE — 27000221 HC OXYGEN, UP TO 24 HOURS

## 2019-09-27 PROCEDURE — 88185 FLOWCYTOMETRY/TC ADD-ON: CPT | Performed by: PATHOLOGY

## 2019-09-27 PROCEDURE — 88184 FLOWCYTOMETRY/ TC 1 MARKER: CPT | Performed by: PATHOLOGY

## 2019-09-27 PROCEDURE — 88305 TISSUE EXAM BY PATHOLOGIST: CPT | Performed by: PATHOLOGY

## 2019-09-27 PROCEDURE — 88112 CYTOPATH CELL ENHANCE TECH: CPT | Mod: 26,,, | Performed by: PATHOLOGY

## 2019-09-27 PROCEDURE — 88189 PR  FLOWCYTOMETRY/READ, 16 & > MARKERS: ICD-10-PCS | Mod: ,,, | Performed by: PATHOLOGY

## 2019-09-27 PROCEDURE — 80053 COMPREHEN METABOLIC PANEL: CPT

## 2019-09-27 PROCEDURE — 20600001 HC STEP DOWN PRIVATE ROOM

## 2019-09-27 PROCEDURE — 84100 ASSAY OF PHOSPHORUS: CPT

## 2019-09-27 PROCEDURE — 83615 LACTATE (LD) (LDH) ENZYME: CPT

## 2019-09-27 PROCEDURE — 32555 ASPIRATE PLEURA W/ IMAGING: CPT

## 2019-09-27 RX ORDER — HEPARIN 100 UNIT/ML
300 SYRINGE INTRAVENOUS
Status: DISCONTINUED | OUTPATIENT
Start: 2019-09-27 | End: 2019-09-28

## 2019-09-27 RX ORDER — LIDOCAINE HYDROCHLORIDE 10 MG/ML
10 INJECTION INFILTRATION; PERINEURAL ONCE
Status: COMPLETED | OUTPATIENT
Start: 2019-09-27 | End: 2019-09-27

## 2019-09-27 RX ORDER — FUROSEMIDE 10 MG/ML
80 INJECTION INTRAMUSCULAR; INTRAVENOUS ONCE
Status: COMPLETED | OUTPATIENT
Start: 2019-09-27 | End: 2019-09-27

## 2019-09-27 RX ORDER — SODIUM CHLORIDE 0.9 % (FLUSH) 0.9 %
10 SYRINGE (ML) INJECTION
Status: CANCELLED | OUTPATIENT
Start: 2019-09-27

## 2019-09-27 RX ORDER — LORAZEPAM 2 MG/ML
0.5 INJECTION INTRAMUSCULAR EVERY 6 HOURS PRN
Status: DISCONTINUED | OUTPATIENT
Start: 2019-09-27 | End: 2019-09-28

## 2019-09-27 RX ORDER — FUROSEMIDE 10 MG/ML
80 INJECTION INTRAMUSCULAR; INTRAVENOUS ONCE
Status: DISCONTINUED | OUTPATIENT
Start: 2019-09-27 | End: 2019-09-27

## 2019-09-27 RX ORDER — IPRATROPIUM BROMIDE AND ALBUTEROL SULFATE 2.5; .5 MG/3ML; MG/3ML
3 SOLUTION RESPIRATORY (INHALATION)
Status: DISCONTINUED | OUTPATIENT
Start: 2019-09-27 | End: 2019-10-02

## 2019-09-27 RX ORDER — FAMOTIDINE 10 MG/ML
20 INJECTION INTRAVENOUS 2 TIMES DAILY
Status: DISCONTINUED | OUTPATIENT
Start: 2019-09-27 | End: 2019-10-03

## 2019-09-27 RX ADMIN — PIPERACILLIN AND TAZOBACTAM 4.5 G: 4; .5 INJECTION, POWDER, LYOPHILIZED, FOR SOLUTION INTRAVENOUS; PARENTERAL at 02:09

## 2019-09-27 RX ADMIN — PIPERACILLIN AND TAZOBACTAM 4.5 G: 4; .5 INJECTION, POWDER, LYOPHILIZED, FOR SOLUTION INTRAVENOUS; PARENTERAL at 10:09

## 2019-09-27 RX ADMIN — IPRATROPIUM BROMIDE AND ALBUTEROL SULFATE 3 ML: .5; 3 SOLUTION RESPIRATORY (INHALATION) at 07:09

## 2019-09-27 RX ADMIN — PIPERACILLIN AND TAZOBACTAM 4.5 G: 4; .5 INJECTION, POWDER, LYOPHILIZED, FOR SOLUTION INTRAVENOUS; PARENTERAL at 08:09

## 2019-09-27 RX ADMIN — MORPHINE SULFATE 2 MG: 2 INJECTION, SOLUTION INTRAMUSCULAR; INTRAVENOUS at 01:09

## 2019-09-27 RX ADMIN — MORPHINE SULFATE 4 MG: 2 INJECTION, SOLUTION INTRAMUSCULAR; INTRAVENOUS at 08:09

## 2019-09-27 RX ADMIN — ENOXAPARIN SODIUM 40 MG: 100 INJECTION SUBCUTANEOUS at 05:09

## 2019-09-27 RX ADMIN — IPRATROPIUM BROMIDE AND ALBUTEROL SULFATE 3 ML: .5; 3 SOLUTION RESPIRATORY (INHALATION) at 01:09

## 2019-09-27 RX ADMIN — PAMIDRONATE DISODIUM 60 MG: 3 INJECTION INTRAVENOUS at 10:09

## 2019-09-27 RX ADMIN — FAMOTIDINE 20 MG: 10 INJECTION, SOLUTION INTRAVENOUS at 04:09

## 2019-09-27 RX ADMIN — MORPHINE SULFATE 4 MG: 2 INJECTION, SOLUTION INTRAMUSCULAR; INTRAVENOUS at 01:09

## 2019-09-27 RX ADMIN — ONDANSETRON 4 MG: 2 INJECTION INTRAMUSCULAR; INTRAVENOUS at 01:09

## 2019-09-27 RX ADMIN — AZITHROMYCIN MONOHYDRATE 500 MG: 500 INJECTION, POWDER, LYOPHILIZED, FOR SOLUTION INTRAVENOUS at 12:09

## 2019-09-27 RX ADMIN — FAMOTIDINE 20 MG: 10 INJECTION, SOLUTION INTRAVENOUS at 10:09

## 2019-09-27 RX ADMIN — VINCRISTINE SULFATE 2 MG: 1 INJECTION, SOLUTION INTRAVENOUS at 04:09

## 2019-09-27 RX ADMIN — DEXAMETHASONE SODIUM PHOSPHATE 20 MG: 10 INJECTION INTRAMUSCULAR; INTRAVENOUS at 09:09

## 2019-09-27 RX ADMIN — LORAZEPAM 0.5 MG: 2 INJECTION INTRAMUSCULAR; INTRAVENOUS at 10:09

## 2019-09-27 RX ADMIN — LIDOCAINE HYDROCHLORIDE 10 ML: 10 INJECTION, SOLUTION INFILTRATION; PERINEURAL at 03:09

## 2019-09-27 RX ADMIN — FUROSEMIDE 80 MG: 10 INJECTION, SOLUTION INTRAMUSCULAR; INTRAVENOUS at 01:09

## 2019-09-27 NOTE — PROGRESS NOTES
[facility]  Hematology  Bone Marrow Transplant  Progress Note    Patient Name: Robe Cevallos  Admission Date: 9/25/2019  Hospital Length of Stay: 2 days  Code Status: DNR    Subjective:     Interval History: Patient hypoxic overnight, placed on BIPAP. DNR after discussions with family. Planned to start decadron and vincristine today. Patient c/o generalized malaise, fatigue, dyspnea, abdominal pain, dry mouth. Wife at bedside. Pulm consulted to evaluate for thoracentesis.    Objective:     Vital Signs (Most Recent):  Temp: 97.7 °F (36.5 °C) (09/27/19 0704)  Pulse: 109 (09/27/19 0804)  Resp: (!) 21 (09/27/19 0759)  BP: 119/74 (09/27/19 0704)  SpO2: 98 % (09/27/19 0759) Vital Signs (24h Range):  Temp:  [96.8 °F (36 °C)-98 °F (36.7 °C)] 97.7 °F (36.5 °C)  Pulse:  [] 109  Resp:  [18-36] 21  SpO2:  [78 %-98 %] 98 %  BP: (119-144)/(74-85) 119/74     Weight: 89.4 kg (197 lb 3.2 oz)  Body mass index is 27.5 kg/m².  Body surface area is 2.12 meters squared.    ECOG SCORE         [unfilled]    Intake/Output - Last 3 Shifts       09/25 0700 - 09/26 0659 09/26 0700 - 09/27 0659 09/27 0700 - 09/28 0659    P.O.  100 0    I.V. (mL/kg) 925 (10.3)      IV Piggyback 2900 550     Total Intake(mL/kg) 3825 (42.8) 650 (7.3) 0 (0)    Urine (mL/kg/hr) 400 435 (0.2) 2400 (24.1)    Emesis/NG output 0 50     Other 0      Stool 0      Blood 0      Total Output     Net +3425 +165 -2400           Urine Occurrence 0 x      Stool Occurrence 0 x      Emesis Occurrence 0 x            Physical Exam   Constitutional: He appears well-developed and well-nourished. He appears distressed.   HENT:   Head: Normocephalic and atraumatic.   Mouth/Throat: No oropharyngeal exudate.   Eyes: Pupils are equal, round, and reactive to light. EOM are normal. No scleral icterus.   Neck: Neck supple.   Cardiovascular: Normal rate and regular rhythm.   Pulmonary/Chest: He is in respiratory distress (mild).   On BIPAP, + labored  breathing  Decreased breath sounds left>right  No palpable axillary LN   Abdominal: Soft. He exhibits distension (mild). There is no tenderness.   Genitourinary:   Genitourinary Comments: No palpable inguinal LN   Musculoskeletal: Normal range of motion. He exhibits no edema.   Lymphadenopathy:     He has no cervical adenopathy.   Neurological: He is alert.   Skin: Skin is warm and dry.   Psychiatric: He has a normal mood and affect. His behavior is normal.       Significant Labs:   CBC:   Recent Labs   Lab 09/25/19  0808 09/26/19  0414   WBC 8.61 8.64   HGB 14.7 13.3*   HCT 43.5 41.5    228   , CMP:   Recent Labs   Lab 09/25/19  0808 09/26/19  0414 09/27/19  0504    135* 140   K 4.7 4.8 4.5   CL 99 103 105   CO2 23 21* 19*   * 101 101   BUN 28* 23 27*   CREATININE 1.3 1.2 1.2   CALCIUM 10.9* 10.0 10.7*   PROT 7.1 6.2 6.3   ALBUMIN 3.6 3.0* 3.1*   BILITOT 1.4* 1.0 1.1*   ALKPHOS 140* 130 136*   AST 30 28 34   ALT 43 40 46*   ANIONGAP 14 11 16   EGFRNONAA 51* 56.4* 56.4*    and Coagulation: No results for input(s): PT, INR, APTT in the last 48 hours.    Diagnostic Results:  I have reviewed all pertinent imaging results/findings within the past 24 hours.    Assessment/Plan:     * Diffuse large B-cell lymphoma of intrathoracic lymph nodes  -The patient has DLBCL based on the path report from [facility] and left supraclavicular LN biopsied on 9/20/19  -Path has been scanned into the media tab  -This is likely a transformation from his follicular lymphoma diagnosed and treated back in 2004  -BM biopsy from 9/26 results pending  -ECHO done 9/26, EF 60%  -Requested pathology from [facility] on 9/26 to determine subtype additional genetic features which may alter treatment  -PICC team consulted, unable to place PICC, if needs central access will need to be done by IR, hold off today as has PIV and patient unstable  -CT neck to complete staging -hold off for now given unstable. Had CTA chest and CT A/P done at  [facility]  -CBC, CMP, phos, uric acid daily  -Treatment:   -Rad onc consult given airway and esophagus compression, s/p SIM and started RT 9/26, planned 25-35 Gy in 10 fractions   -Start decadron 20mg x4 days d1/4, and vincristine 9/27   -When have final pathology, likely complete CHOP regimen (if DLBCL vs alternative regimen if this is double hit)    Acute respiratory failure with hypoxia  Likely source is lymphoma, pneumonia, pleural effusion, compressive atelectasis  BiPAP PRN  Stopped IVF  S/p lasix overnight  Radiation initiated 9/26  Steroids to start today  Pulm consulted for thoracentesis evaluation  Patient DNR    Sepsis  See PNA    Hyperuricemia  Continue allopurinol    Hypercalcemia  Corrected calcium 11.4  -Pamidronate 9/27  -Stop IVF d/t hypoxia    Stage 3 chronic kidney disease  Renal function stable based on few readings in system.   At baseline Cr ~1.2    Cancer related pain  Oxy 5mg PO PRN Q4  If unrelieved with PO, morphine IV ordered for breakthrough  Starting on bowel regimen.     Pneumonia of left lower lobe due to infectious organism  Suspected post obstructive pneumonia in the setting of malignancy  Coverage with Piperacillin-tazo and azithro will cover most likely organisms, will continue, likely 7 day course (start 9/25)  Cultures NGTD    Ocular myasthenia gravis  Continue pyridostigmine TID      VTE Risk Mitigation (From admission, onward)         Ordered     enoxaparin injection 40 mg  Daily      09/25/19 2241     IP VTE HIGH RISK PATIENT  Once      09/25/19 1455     Place sequential compression device  Until discontinued      09/25/19 1455                Disposition: pending clinical improvement    The following was staffed and discussed with supervising physician Dr. Cross.    Aleja Cuevas MD PGY-V  Hematology/Oncology Fellow

## 2019-09-27 NOTE — CARE UPDATE
Rapid Response Respiratory Therapy Proactive Rounding Note      Time of visit: 1300    Code Status: DNR   : 1940  Age: 79 y.o.  Weight:   Wt Readings from Last 1 Encounters:   19 89.4 kg (197 lb 3.2 oz)     Sex: male  Race: White   Bed: 801/801 A:   MRN: [id number]    SITUATION     Evaluated patient for: Non-Invasive Positive Pressure Ventilation Compliance     BACKGROUND     Patient has a past medical history of AMD (age-related macular degeneration), bilateral, Cancer, Hypertension, Hypertropia of right eye, Mass in chest, and Myasthenia gravis.    ASSESSMENT/INTERVENTIONS     Upon arrival in room pt currently on BIPAP with no adverse reactions or acute issues.      Pulse: 107 Respiratory rate: 20 Temperature: Temp: 96.2 °F (35.7 °C) BP: BP: 130/80 SpO2: 96  Level of Consciousness: Level of Consciousness (AVPU): alert  Respiratory Effort: Respiratory Effort: Moderate, Short of breath Expansion/Accessory Muscle Usage: Expansion/Accessory Muscles/Retractions: no retractions, expansion symmetric, no use of accessory muscles  All Lung Field Breath Sounds: All Lung Fields Breath Sounds: diminished  SHAYLA Breath Sounds: Posterior:, diminished  LLL Breath Sounds: diminished  RUL Breath Sounds: Anterior:, clear  RML Breath Sounds: Posterior:, diminished  RLL Breath Sounds: Posterior:, diminished  Mobility at time of assessment: General Mobility: generalized weakness  O2 Device/Concentration: BIPAP/100%  Most recent blood gas:   Recent Labs     19  0104   PH 7.369   PCO2 36.3   PO2 54*   HCO3 20.9*   POCSATURATED 87*   BE -4        NIPPV: Yes, Type: BIPAP Settings: 15/8 100%   Ambu at bedside: Ambu bag with the patient?: Yes, Adult Ambu    Current Respiratory Care Orders:   19 0800  Inhalation Treatment Q6H WAKE Every 6 hours while awake (3 of 12 released)    Release    19 0053   19 0449  Bipap Continuous Continuous (5 of 23 released)    Release   Question Answer Comment   FiO2% 100     Inspiratory pressure: 8    Expiratory pressure: 15        09/27/19 0130   09/27/19 0054  ACAPELLA TREATMENT Q6H Every 6 hours (4 of 16 released)    Release    09/27/19 0053   09/25/19 1600  Pulse Oximetry Q4H Every 4 hours (15 of 38 released)    Release    09/25/19 1455      IP Meds - Nasal and Inhaled   Comment  Hide   (From admission, onward)      Last edited by  on  at    Start   Stop Status Route Frequency Ordered   09/27/19 0100  albuterol-ipratropium 2.5 mg-0.5 mg/3 mL nebulizer solution 3 mL (albuterol-ipratropium (DUO-NEB) 0.5 - 3 mg (2.5 mg base)/ 3 mL nebulizer panel)    Discontinue    -- Dispensed NEBULIZATION Every 6 hours while awake          RECOMMENDATIONS     We recommend: continue current POC and call with any acute issues.     ESCALATION      Physician Escalation (Yes/No) No   Care discussed with: N/A  Discussed plan of care primary RT, Eamon    FOLLOW-UP     Please call back the Rapid Response RT, Ngozi Ren, CRT at x [phone] for any questions or concerns.

## 2019-09-27 NOTE — PROGRESS NOTES
Admit Assessment    Patient Identification  Robe Cevallos   :  1940  Admit Date:  2019  Attending Provider:  Lobo Cross MD              Referral:   Pt was admitted to  with a diagnosis of Diffuse large B-cell lymphoma of intrathoracic lymph nodes, and was admitted this hospital stay due to Hypercalcemia [E83.52]  Lymphoma [C85.90]  Tumor lysis syndrome [E88.3]  Abdominal pain [R10.9]  Cancer related pain [G89.3]  (HFpEF) heart failure with preserved ejection fraction [I50.30]  Pneumonia of left lower lobe due to infectious organism [J18.1].   is involved was referred to the Social Work Department via routine referral.  Patient presents as a 79 y.o. year old  male.    Persons interviewed: pats. Wife provided all information. Pt. With bi-pap and unable to talk    Living Situation:  Lives with spouse (Bindu CevallosMcelqg-[phone] or [phone]). Per spouse pt. Has been very independent with Adl's prior to admission. Pt. Mowed lawn and played golf several times a week. Wife to help with needs at [place] if necessary. Pt. And wife have 3 daughter, 2 live nearby and 1 in [place].     Resides at [address] [place] [id number] [facility] [id number] phone: [phone] (home).      (RETIRED) Functional Status Prior  Ambulation Prior: 0-->independent  Transferrin-->independent  Toiletin-->independent  Bathin-->independent  Dressin-->independent  Eatin-->independent  Communication: understands/communicates without difficulty  Swallowing: swallows foods/liquids without difficulty    Current or Past Agencies and Description of Services/Supplies    DME  Agency Name: none  Agency Phone Number: n/a  Equipment Currently Used at Home: none    Home Health  Agency Name: none  Agency Phone Number: n/a  Services: n/a    IV Infusion  Agency Name: none  Agency Phone Number: n/a    Nutrition: oral    Outpatient Pharmacy:     [facility]  #[id number] - [place] - [address]  [address]  [place] [address]  Phone: [phone] Fax: [phone]      Patient Preference of agencies include: none noted    Patient/Caregiver informed of right to choose providers or agencies.  Patient provides permission to release any necessary information to [facility] and to [facility] agencies as needed to facilitate patient care, treatment planning, and patient discharge planning.  Written and verbal resources provided.      Coping: unable to fully assess as pt. Needing bi-pap, unable to communicate.           Adjustment to Diagnosis and Treatment: unable to assess      Emotional/Behavioral/Cognitive Issues: none noted            History/Current Symptoms of Anxiety/Depression: No:   History/Current Substance Use:   Social History     Tobacco Use    Smoking status: Former Smoker    Smokeless tobacco: Never Used   Substance and Sexual Activity    Alcohol use: No    Drug use: No    Sexual activity: Not on file       Indications of Abuse/Neglect: No:   Abuse Screen (yes response referral indicated)  Feels Unsafe at Home or Work/School: unable to answer (comment required)(wife at bedside)    Financial:  Payor/Plan Subscr  Sex Relation Sub. Ins. ID Effective Group Num   1. [employer]* NO ROJAS 1940 Male  [id number]                                   [address]                            Other identified concerns/needs: none noted    Plan: to return home with help from spouse.     Interventions/Referrals: TBD  Patient/caregiver engaged in treatment planning process.     providing psychosocial and supportive counseling, resources, education, assistance and discharge planning as appropriate.  Patient/caregiver state understanding of  available resources,  following, remains available. Provided spouse with abner'er phone # and encouraged her to call if needed. Will  follow.

## 2019-09-27 NOTE — ASSESSMENT & PLAN NOTE
Suspected post obstructive pneumonia in the setting of malignancy  Coverage with Piperacillin-tazo and azithro will cover most likely organisms, will continue, likely 7 day course (start 9/25)  Cultures NGTD

## 2019-09-27 NOTE — PROGRESS NOTES
Chemo note: Vincristine 2mg administered via new  right forearm #20g peripheral IV over 5 minutes through free-flowing NS per chemo protocol.  Blood return noted before and after chemo infusion.  Chemo precautions maintained.  Patient and wife had no questions/concerned at this time.  Consent in chart as well as Chemotherapy Administration Record w/calculations double-checked by two chemotherapy certified RNs.

## 2019-09-27 NOTE — PLAN OF CARE
Plan of care reviewed with the patient at the beginning of shift. The patient was initially found to have an O2 sat of 86% on 4lpm. Pt was placed on 6lpm O2 with improvement. Pt stated that he felt better after dislodging a blood clot from hist nose. The pt remained stable until 0045 when his oxygenation dropped to 78%. MD and charge nurse contacted. Pt was placed on 15 lmp O2 via non-rebreather while awaiting bipap. 2mg morphine administered. 80mg lasix administered. CXR and ABG obtained. Pt decided to become a DNR. Pt did show oxygenation improvements with bipap. VSS at this time. Bed in low locked position. Call bell within reach. Will continue to monitor.

## 2019-09-27 NOTE — PT/OT/SLP PROGRESS
Speech Language Pathology      Robe Cevallos  MRN: [id number]    Patient not seen today secondary to Other (MD hold until respiratory status improves.)  Team to re-consult when appropriate.     FREDI Kirk, CCC-SLP  9/27/2019

## 2019-09-27 NOTE — ASSESSMENT & PLAN NOTE
-The patient has DLBCL based on the path report from [facility] and left supraclavicular LN biopsied on 9/20/19  -Path has been scanned into the media tab  -This is likely a transformation from his follicular lymphoma diagnosed and treated back in 2004  -BM biopsy from 9/26 results pending  -ECHO done 9/26, EF 60%  -Requested pathology from [facility] on 9/26 to determine subtype additional genetic features which may alter treatment  -PICC team consulted, unable to place PICC, if needs central access will need to be done by IR, hold off today as has PIV and patient unstable  -CT neck to complete staging -hold off for now given unstable. Had CTA chest and CT A/P done at [facility]  -CBC, CMP, phos, uric acid daily  -Treatment:   -Rad onc consult given airway and esophagus compression, s/p SIM and started RT 9/26, planned 25-35 Gy in 10 fractions   -Start decadron 20mg x4 days d1/4, and vincristine 9/27   -When have final pathology, likely complete CHOP regimen (if DLBCL vs alternative regimen if this is double hit)

## 2019-09-27 NOTE — ASSESSMENT & PLAN NOTE
Likely source is lymphoma, pneumonia, pleural effusion, compressive atelectasis  BiPAP PRN  Stopped IVF  S/p lasix overnight  Radiation initiated 9/26  Steroids to start today  Pulm consulted for thoracentesis evaluation  Patient DNR

## 2019-09-27 NOTE — NURSING
Called to room by staff nurse - pt requiring more oxygen - MD at bedside - NRB in use - oxygen 82% - pt laboring to breath - rapid nurses arrived - ordered ABG/Chest xray/ resp TX and BiPap to bedside

## 2019-09-27 NOTE — CARE UPDATE
"Callled to bedside for acute hypoxia. Patient desaturated to 78% on 10L NC. In severe respiratory distress. Called for bipap. Rapid response activated. While waiting for bipap delivery placed on non-rebreather with modest improvement in oxygenation to low 80s.     I discussed with the patient and his wife that if he does not improve with NIPPV he may require intubation. His wife states that he did not want "anything extraordinary" done and does not want CPR, intubation, mechanical ventilation.     Patient placed on bipap with significant improvement in hypoxia. ABG with very high P:F ratio. Given morphine for dyspnea. Patient improved markedly with these interventions, was comfortable and saturating in mid 90s on 100% 15/8. Will leave patient on bipap overnight and give morphine as needed for dyspnea.     Suspect patient is having worsening obstruction of his airways. May have improved splinting with positive pressure. CXR obtained shows white out of left lung fields. On appropriate antimicrobial coverage. Given 1 time dose of Lasix. PE is possible, no obvious DVT and on appropriate prophylaxis. Too unstable for CT scan, will not empirically anticoagulate at this time as it is not most likely diagnosis.     Dhruv Mays MD   Internal Medicine PGY-3    "

## 2019-09-27 NOTE — NURSING
When patient returned from XRT, he c/o SOB; respirations 20-22 shallow and labored. O2 sat 90%on 2LPM N/C; increased to 4LPM N/C. On rounds with night shift nurse and patient anxious and sat 87% Ox increased to 6LPM N/C. Dr Mays notified; will continue to monitor.

## 2019-09-27 NOTE — NURSING
ICU on call at bedside - morphine given per staff nurse as ordered - BiPap set up per respiratory - ABG done -

## 2019-09-27 NOTE — PLAN OF CARE
MDR's with Dr. Cross. Pt admitted from ED on 9/25 with PNA LLL. Hx myasthenia gravis, htn, follicular lymphoma tx in 2004. Pt with DLBCL of intrathoracic lymph nodes. Bx supraclavicular LN on 9/20. Mediastinal mass. Rad onc consult d/t airway & esophagus compression with RT started 9/26. Decadron x4 days & Vincristine 9/27. Pending for final pathology. Pulmonary consult for thoracentesis eval. Pt is DNR. Hypoxia-Bipap. NPO, Soto, Nebs, CA 10.7-Aredia x1 today.  IV Abx-Zosyn, Azithromycin. Prns-Morphine IV & Oxy for pain & Zofran for n/v. Wife & family at BS. DCP pending improvement, chemotherapy, tx plan, PT/OT rec's. CM will continue to follow for needs.

## 2019-09-27 NOTE — NURSING
Pt is having a critical event. Respiratory distress noted. Charge nurse and MD notified. Rapid responce contacted.

## 2019-09-27 NOTE — MEDICAL/APP STUDENT
Subjective:       Patient ID: Robe Cevallos is a 79 y.o. male.    Chief Complaint: Abdominal Pain (LUQ abdominal pain x 1 week.  Pain more intense over last few days and is worse when laying down.  Recently found out he has a large medistinal mass in his chest.  Hx of lymphoma.)    Mr. Robe Cevallos is a 79 y.o. male with history of myasthenia gravis, low grade (S1) follicular lymphoma (tx 2004, R dianna, Dr. Carcamo treated w/ R-CVP and mainenance with Rituxan), SCC (ODELL soto, 4/2019) who presented initially to the ED 9/25 with severe abdominal pain.    9/16 - CT chest revealed 9.8 x 3.8 x 13 cm posterior mediastinal mass above the GE junction compressing surrounding structures  9/20 - supraclavicular lymph node biopsy performed at [facility] revealed DLBCL, 50% CD19, 20, 22, but no CD2, 3, 5, 10  9/25 - CTA chest and CT abdomen revealed no PE, but extrinsic compression of esophagus, retrocardiac, L-hilum, L low pulmonary veins and cervical adenopathy. Admitted to hospital.    9/26 - Consulted with Dr. Montgomery, and he had one treatment of RT, planned for 2 week course. BMBx performed. PICC line attempted at the bedside, plan to contact IR.    Interval history:  Overnight event, patient became hypoxic at 78% O2 on 10L, and rapid response was called. He was placed on a non-rebreather mask (O2 sat to 80%), and then to BiPAP. CXR revealed opacification of the entire L side of his chest. Discussion with patient at time revealed that he prefers DNR status, and does not want to be intubated, mechanically ventilated or CPR.  This morning, the patient was significantly uncomfortable while on BiPAP and also complaints of his urinary catheter. He states his pain is unchanged and his mouth is dry.    Review of Systems   Constitutional: Negative for fever.   HENT: Negative for sore throat.    Respiratory: Positive for shortness of breath. Negative for cough.    Cardiovascular: Positive for chest pain.    Gastrointestinal: Positive for abdominal pain. Negative for constipation, diarrhea and nausea.   Genitourinary: Negative for hematuria.   Musculoskeletal: Negative for arthralgias.   Skin: Negative for rash.   Neurological: Negative for headaches.       Objective:      Physical Exam   Constitutional: He appears well-developed.   Appears uncomfortable   HENT:   Head: Normocephalic.   Eyes: Conjunctivae and EOM are normal.   Cardiovascular: Normal rate, regular rhythm and normal heart sounds.   No murmur heard.  Pulmonary/Chest: He is in respiratory distress (mild).   On BiPAP. Decreased breath sounds on L w/ diffuse crackles.   Abdominal: Soft. There is no tenderness.   Genitourinary:   Genitourinary Comments: Soto catheter in place   Musculoskeletal: Normal range of motion. He exhibits no edema.   Lymphadenopathy:     He has no cervical adenopathy.   Neurological: He is alert.   Skin: Skin is warm and dry.         Assessment:       1. Diffuse large B-cell lymphoma of intrathoracic lymph nodes    2. Abdominal pain    3. Pneumonia of left lower lobe due to infectious organism    4. (HFpEF) heart failure with preserved ejection fraction    5. Lymphoma    6. Tumor lysis syndrome    7. Hypercalcemia     8. Cancer related pain    9. DLBCL (diffuse large B cell lymphoma)        Plan:       Diffuse large B cell lymphoma of intrathoracic lymph nodes    -  Based on path report from [facility], 9/20   - To start on Decadron 20mg and vincristine, and then rest of CHOP   - Awaiting BMBx result performed 9/26   - Contact IR for PICC line vs Port   - Pending CT neck for staging   - Continue RT treatment    Acute respiratory failure   - on BiPAP 15/8, given 80mg Lasix overnight.   - Starting Decadron 20mg   - Consulted pulmonary for potential thoracentesis    Cancer related pain   - Oxycodone 5mg PRN, and IV morphine    Pneumonia of L lower lobe   - Continue Zosyn and azithromycin (started 9/25), for 7 days.    Hypercalcemia   -  Corrected calcium 11.4    - Given Pamidronate    2 peripheral IV's R arm, urinary catheter  Diet NPO - swallow study performed  DVT prophylaxis - enoxaparin 40mg  GI prophylaxis - famotidine started  Dispo - pending treatment      Ran Tabor MS4

## 2019-09-27 NOTE — MEDICAL/APP STUDENT
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 Jojo Carcamo with R-CVP followed by maintenance rituxan who presented to the ED on [facility] with severe abdominal pain. Pain has been present intermittently since July but worsened in the last week. He has difficulty describing his symptoms and reports he "just feel[s] bad." He has had a cough with small streaks of blood for about 1 week. No significant sputum production. No fevers, chills, night sweats. Some weight loss, difficulty swallowing solid food for about 2 weeks. He had some pain in his right back earlier today which resolved with a massage. +Constipation. No N/V reported (but he became nauseated during my interview). Patient's main complaint at time of my exam is significant left sided abdominal pain which does not radiate.      Patient had a CT of the chest on 9/16 which showed a 9.8 x 3.8 x 13 cm size posterior mediastinal mass. He underwent lymph nose biopsy of supraclavicular node on 9/20, returned DLBCL (see media tab). Patient underwent CTA chest and CT abdomen with contrast at OSH on 9/25 after presentation to the ED. It showed extrinsic compression of the esophagus, a large retrocardiac mass with involvement of the left hilum causing extrinsic compression of the adjacent esophagus, compression of the left upper and lower lobe bronchi and compression/obliteration of the left inferior pulmonary vein. Labs significant for uncorrected Ca 10.9, Uric Acid 9.6, Cr 1.3, Lactate 2.6. He was treated for suspected post obstructive pneumonia with Piperacillin-tazobactam and Azithro and transferred for further oncology management.    Interval history  Patient became hypoxic overnight, he desaturated to 78% on 10L, he was n severe respiratory distress. Called for bipap. " Rapid response activated. While waiting for bipap delivery placed on non-rebreather with modest improvement in oxygenation to low 80s. He as given a one time dose of lasix and placed on BiPAP with significant improvement in his hypoxia.     Pulmonology was consulted to evaluate the need for thoracentesis. Chest XR shows near complete opacfication of the left hemithorax, which likely relates to a large amount of pleural effusion, potentially parenchymal components, as well. Left sided pleural effusion assessed with ultrasound at bedside showed a large pocket of fluid.          Please refer to the H&P for past medical, family, and social history.    OBJECTIVE:     Vital Signs Range (Last 24H):  Temp:  [96.2 °F (35.7 °C)-98 °F (36.7 °C)]   Pulse:  []   Resp:  [17-36]   BP: (119-144)/(74-85)   SpO2:  [78 %-98 %]     Physical Exam:    Pulmonary/Chest: No stridor. No respiratory distress. He has no wheezes. He has no rales. He exhibits no tenderness.   Patient on BiPAP  Normal breath sounds on R  Absent breath sounds of L       Diagnostic Results:  Recent Labs   Lab 09/25/19  0808 09/26/19  0414 09/27/19  0504    135* 140   K 4.7 4.8 4.5   CL 99 103 105   CO2 23 21* 19*   BUN 28* 23 27*   CREATININE 1.3 1.2 1.2   * 101 101   CALCIUM 10.9* 10.0 10.7*   MG  --  2.0 2.0   PHOS  --  5.3* 5.1*     Recent Labs   Lab 09/25/19  0808 09/26/19  0414 09/27/19  0504   ALKPHOS 140* 130 136*   ALT 43 40 46*   AST 30 28 34   ALBUMIN 3.6 3.0* 3.1*   PROT 7.1 6.2 6.3   BILITOT 1.4* 1.0 1.1*     Recent Labs   Lab 09/25/19  0808 09/26/19  0414 09/27/19  0504   WBC 8.61 8.64 10.78   HGB 14.7 13.3* 14.1   HCT 43.5 41.5 42.8    228 211       Imaging    X-Ray Chest 1 View (9/27/19)  Interval progression of abnormal radiographic appearance, near complete opacification of the left hemithorax likely relates to large amount of pleural effusion with potential parenchymal components as discussed above, there is shift of  mediastinal structures towards the right again noted with progression as discussed above.    CT Chest Non-Coronary Study (9/25/19)  1. No CTA findings to suggest a pulmonary arterial thromboembolus.  2. Persistent mediastinal/left hilar mass and extensive left supraclavicular, mediastinal and upper abdominal lymphadenopathy consistent with neoplasm, likely lymphoma given reported history.  When compared to CT dated 09/16/2019, there has been interval increased rightward mediastinal shift and extrinsic compression of the left upper and lower lobe bronchi with worsening pulmonary parenchymal consolidation concerning for postobstructive pneumonia or atelectasis.  3. Increasing left pleural effusion.  4. 1.1 cm indeterminate right hepatic lobe hypodensity, slightly increased in conspicuity when compared to the previous exam.  Stable left hepatic cyst.  5. 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  6. Additional findings as in the body of the report.    Scheduled Meds:   albuterol-ipratropium  3 mL Nebulization Q6H WAKE    allopurinol  300 mg Oral Daily    azithromycin  500 mg Intravenous Q24H    dexamethasone (DECADRON) IVPB  20 mg Intravenous Daily    enoxaparin  40 mg Subcutaneous Daily    lidocaine HCL 10 mg/ml (1%)  10 mL Other Once    pamidronate (AREDIA) IVPB  60 mg Intravenous Once    piperacillin-tazobactam (ZOSYN) IVPB  4.5 g Intravenous Q8H    polyethylene glycol  17 g Oral Daily    pyridostigmine  60 mg Oral TID    senna-docusate 8.6-50 mg  2 tablet Oral BID     Continuous Infusions:  PRN Meds:.acetaminophen, influenza, morphine, ondansetron, oxyCODONE, pneumoc 13-oscar conj-dip cr(PF), sodium chloride 0.9%    ASSESSMENT/PLAN:   79 y.o male, with a medical history of hypertension, myasthenia gravis, and lymphoma. Presented to the ED on [facility] with severe abdominal pain. 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

## 2019-09-27 NOTE — PROGRESS NOTES
OK per Dr. Cross to administer Vincristine through new peripheral IV as PICC team was unsuccessful with PICC placement.

## 2019-09-28 LAB
ALBUMIN SERPL BCP-MCNC: 2.9 G/DL (ref 3.5–5.2)
ALP SERPL-CCNC: 120 U/L (ref 55–135)
ALT SERPL W/O P-5'-P-CCNC: 35 U/L (ref 10–44)
ANION GAP SERPL CALC-SCNC: 11 MMOL/L (ref 8–16)
AST SERPL-CCNC: 30 U/L (ref 10–40)
BASOPHILS # BLD AUTO: 0.01 K/UL (ref 0–0.2)
BASOPHILS NFR BLD: 0.1 % (ref 0–1.9)
BILIRUB SERPL-MCNC: 0.7 MG/DL (ref 0.1–1)
BUN SERPL-MCNC: 36 MG/DL (ref 8–23)
CALCIUM SERPL-MCNC: 10 MG/DL (ref 8.7–10.5)
CHLORIDE SERPL-SCNC: 106 MMOL/L (ref 95–110)
CO2 SERPL-SCNC: 26 MMOL/L (ref 23–29)
CREAT SERPL-MCNC: 1.2 MG/DL (ref 0.5–1.4)
DIFFERENTIAL METHOD: ABNORMAL
EOSINOPHIL # BLD AUTO: 0 K/UL (ref 0–0.5)
EOSINOPHIL NFR BLD: 0 % (ref 0–8)
ERYTHROCYTE [DISTWIDTH] IN BLOOD BY AUTOMATED COUNT: 14.3 % (ref 11.5–14.5)
EST. GFR  (AFRICAN AMERICAN): >60 ML/MIN/1.73 M^2
EST. GFR  (NON AFRICAN AMERICAN): 56.4 ML/MIN/1.73 M^2
GLUCOSE SERPL-MCNC: 107 MG/DL (ref 70–110)
HCT VFR BLD AUTO: 43.3 % (ref 40–54)
HGB BLD-MCNC: 14.1 G/DL (ref 14–18)
IMM GRANULOCYTES # BLD AUTO: 0.06 K/UL (ref 0–0.04)
IMM GRANULOCYTES NFR BLD AUTO: 0.5 % (ref 0–0.5)
LYMPHOCYTES # BLD AUTO: 0.7 K/UL (ref 1–4.8)
LYMPHOCYTES NFR BLD: 6.1 % (ref 18–48)
MAGNESIUM SERPL-MCNC: 2.4 MG/DL (ref 1.6–2.6)
MCH RBC QN AUTO: 28.8 PG (ref 27–31)
MCHC RBC AUTO-ENTMCNC: 32.6 G/DL (ref 32–36)
MCV RBC AUTO: 89 FL (ref 82–98)
MONOCYTES # BLD AUTO: 0.8 K/UL (ref 0.3–1)
MONOCYTES NFR BLD: 7.1 % (ref 4–15)
NEUTROPHILS # BLD AUTO: 9.4 K/UL (ref 1.8–7.7)
NEUTROPHILS NFR BLD: 86.2 % (ref 38–73)
NRBC BLD-RTO: 0 /100 WBC
PHOSPHATE SERPL-MCNC: 3.6 MG/DL (ref 2.7–4.5)
PLATELET # BLD AUTO: 208 K/UL (ref 150–350)
PMV BLD AUTO: 9.9 FL (ref 9.2–12.9)
POTASSIUM SERPL-SCNC: 4 MMOL/L (ref 3.5–5.1)
PROT SERPL-MCNC: 6.1 G/DL (ref 6–8.4)
RBC # BLD AUTO: 4.89 M/UL (ref 4.6–6.2)
SODIUM SERPL-SCNC: 143 MMOL/L (ref 136–145)
URATE SERPL-MCNC: 7.8 MG/DL (ref 3.4–7)
WBC # BLD AUTO: 10.96 K/UL (ref 3.9–12.7)

## 2019-09-28 PROCEDURE — 36415 COLL VENOUS BLD VENIPUNCTURE: CPT

## 2019-09-28 PROCEDURE — 63600175 PHARM REV CODE 636 W HCPCS: Performed by: STUDENT IN AN ORGANIZED HEALTH CARE EDUCATION/TRAINING PROGRAM

## 2019-09-28 PROCEDURE — 27000221 HC OXYGEN, UP TO 24 HOURS

## 2019-09-28 PROCEDURE — 25000003 PHARM REV CODE 250: Performed by: STUDENT IN AN ORGANIZED HEALTH CARE EDUCATION/TRAINING PROGRAM

## 2019-09-28 PROCEDURE — 99233 PR SUBSEQUENT HOSPITAL CARE,LEVL III: ICD-10-PCS | Mod: ,,, | Performed by: INTERNAL MEDICINE

## 2019-09-28 PROCEDURE — 85025 COMPLETE CBC W/AUTO DIFF WBC: CPT

## 2019-09-28 PROCEDURE — 84550 ASSAY OF BLOOD/URIC ACID: CPT

## 2019-09-28 PROCEDURE — 84100 ASSAY OF PHOSPHORUS: CPT

## 2019-09-28 PROCEDURE — 99233 SBSQ HOSP IP/OBS HIGH 50: CPT | Mod: ,,, | Performed by: INTERNAL MEDICINE

## 2019-09-28 PROCEDURE — 20600001 HC STEP DOWN PRIVATE ROOM

## 2019-09-28 PROCEDURE — 80053 COMPREHEN METABOLIC PANEL: CPT

## 2019-09-28 PROCEDURE — 94761 N-INVAS EAR/PLS OXIMETRY MLT: CPT

## 2019-09-28 PROCEDURE — 63600175 PHARM REV CODE 636 W HCPCS: Performed by: HOSPITALIST

## 2019-09-28 PROCEDURE — 94640 AIRWAY INHALATION TREATMENT: CPT

## 2019-09-28 PROCEDURE — 94664 DEMO&/EVAL PT USE INHALER: CPT

## 2019-09-28 PROCEDURE — 99900035 HC TECH TIME PER 15 MIN (STAT)

## 2019-09-28 PROCEDURE — S0028 INJECTION, FAMOTIDINE, 20 MG: HCPCS | Performed by: STUDENT IN AN ORGANIZED HEALTH CARE EDUCATION/TRAINING PROGRAM

## 2019-09-28 PROCEDURE — 83735 ASSAY OF MAGNESIUM: CPT

## 2019-09-28 PROCEDURE — 25000242 PHARM REV CODE 250 ALT 637 W/ HCPCS: Performed by: STUDENT IN AN ORGANIZED HEALTH CARE EDUCATION/TRAINING PROGRAM

## 2019-09-28 RX ORDER — QUETIAPINE FUMARATE 25 MG/1
25 TABLET, FILM COATED ORAL ONCE
Status: DISCONTINUED | OUTPATIENT
Start: 2019-09-28 | End: 2019-09-29

## 2019-09-28 RX ORDER — ALLOPURINOL 100 MG/1
300 TABLET ORAL 2 TIMES DAILY
Status: DISCONTINUED | OUTPATIENT
Start: 2019-09-28 | End: 2019-10-01

## 2019-09-28 RX ADMIN — PIPERACILLIN AND TAZOBACTAM 4.5 G: 4; .5 INJECTION, POWDER, LYOPHILIZED, FOR SOLUTION INTRAVENOUS; PARENTERAL at 03:09

## 2019-09-28 RX ADMIN — IPRATROPIUM BROMIDE AND ALBUTEROL SULFATE 3 ML: .5; 3 SOLUTION RESPIRATORY (INHALATION) at 07:09

## 2019-09-28 RX ADMIN — ENOXAPARIN SODIUM 40 MG: 100 INJECTION SUBCUTANEOUS at 05:09

## 2019-09-28 RX ADMIN — FAMOTIDINE 20 MG: 10 INJECTION, SOLUTION INTRAVENOUS at 08:09

## 2019-09-28 RX ADMIN — AZITHROMYCIN MONOHYDRATE 500 MG: 500 INJECTION, POWDER, LYOPHILIZED, FOR SOLUTION INTRAVENOUS at 01:09

## 2019-09-28 RX ADMIN — IPRATROPIUM BROMIDE AND ALBUTEROL SULFATE 3 ML: .5; 3 SOLUTION RESPIRATORY (INHALATION) at 08:09

## 2019-09-28 RX ADMIN — PIPERACILLIN AND TAZOBACTAM 4.5 G: 4; .5 INJECTION, POWDER, LYOPHILIZED, FOR SOLUTION INTRAVENOUS; PARENTERAL at 10:09

## 2019-09-28 RX ADMIN — IPRATROPIUM BROMIDE AND ALBUTEROL SULFATE 3 ML: .5; 3 SOLUTION RESPIRATORY (INHALATION) at 02:09

## 2019-09-28 RX ADMIN — FAMOTIDINE 20 MG: 10 INJECTION, SOLUTION INTRAVENOUS at 09:09

## 2019-09-28 RX ADMIN — PIPERACILLIN AND TAZOBACTAM 4.5 G: 4; .5 INJECTION, POWDER, LYOPHILIZED, FOR SOLUTION INTRAVENOUS; PARENTERAL at 07:09

## 2019-09-28 RX ADMIN — DEXAMETHASONE SODIUM PHOSPHATE 20 MG: 10 INJECTION INTRAMUSCULAR; INTRAVENOUS at 01:09

## 2019-09-28 NOTE — NURSING
The patient woke up at 2am with acute confusion. On call MD notified. Delirium precautions initiated. VSS. Soto removed by pt. Urinal provided to monitor for urine output. Bed in low locked position. Call bell within reach. Will continue to monitor.

## 2019-09-28 NOTE — PLAN OF CARE
Plan of care reviewed with the patient and his wife at the beginning of shift. The patient is alert and oriented. GCS 15. Pt did experience an episode of acute confusion during the night. VSS. Delirium precautions in place. Bed in low locked position. Call bell within reach. Will continue to monitor.

## 2019-09-28 NOTE — PROGRESS NOTES
[facility]  Hematology  Bone Marrow Transplant  Progress Note    Patient Name: Robe Cevallos  Admission Date: 9/25/2019  Hospital Length of Stay: 3 days  Code Status: DNR    Subjective:     Interval History: doing better with regards to breathing today. Would like to see if he can swallow safely and eat. Was delirious at night but he is more lucid this AM.     Objective:     Vital Signs (Most Recent):  Temp: 97.7 °F (36.5 °C) (09/28/19 1311)  Pulse: 94 (09/28/19 1311)  Resp: 16 (09/28/19 1311)  BP: 122/68 (09/28/19 1311)  SpO2: (!) 89 % (09/28/19 0752) Vital Signs (24h Range):  Temp:  [97.4 °F (36.3 °C)-98.1 °F (36.7 °C)] 97.7 °F (36.5 °C)  Pulse:  [] 94  Resp:  [15-20] 16  SpO2:  [89 %-95 %] 89 %  BP: (117-140)/(68-79) 122/68     Weight: 89.4 kg (197 lb 3.2 oz)  Body mass index is 27.5 kg/m².  Body surface area is 2.12 meters squared.      Intake/Output - Last 3 Shifts       09/26 0700 - 09/27 0659 09/27 0700 - 09/28 0659 09/28 0700 - 09/29 0659    P.O. 100 0     I.V. (mL/kg)       IV Piggyback 550 850     Total Intake(mL/kg) 650 (7.3) 850 (9.5)     Urine (mL/kg/hr) 435 (0.2) 3700 (1.7) 150 (0.2)    Emesis/NG output 50      Other  2160     Stool       Blood       Total Output 485 5860 150    Net +165 -5010 -150                 Physical Exam   Constitutional: He appears well-developed and well-nourished. No distress.   HENT:   Head: Normocephalic and atraumatic.   Mouth/Throat: No oropharyngeal exudate.   Eyes: Pupils are equal, round, and reactive to light. EOM are normal. No scleral icterus.   Cardiovascular: Normal rate and regular rhythm.   Pulmonary/Chest: Effort normal and breath sounds normal. No respiratory distress (mild). He has no wheezes. He has no rales.   Abdominal: Soft. He exhibits distension (mild). There is no tenderness.   Genitourinary:   Genitourinary Comments: No palpable inguinal LN   Musculoskeletal: Normal range of motion. He exhibits no edema.   Lymphadenopathy:      He has no cervical adenopathy.   Neurological: He is alert.   Skin: Skin is warm and dry.   Psychiatric: He has a normal mood and affect. His behavior is normal.   Nursing note and vitals reviewed.      Significant Labs:   CBC:   Recent Labs   Lab 09/27/19  0504 09/28/19 0927   WBC 10.78 10.96   HGB 14.1 14.1   HCT 42.8 43.3    208    and CMP:   Recent Labs   Lab 09/27/19  0504 09/28/19 0927    143   K 4.5 4.0    106   CO2 19* 26    107   BUN 27* 36*   CREATININE 1.2 1.2   CALCIUM 10.7* 10.0   PROT 6.3 6.1   ALBUMIN 3.1* 2.9*   BILITOT 1.1* 0.7   ALKPHOS 136* 120   AST 34 30   ALT 46* 35   ANIONGAP 16 11   EGFRNONAA 56.4* 56.4*     Assessment/Plan:     * Diffuse large B-cell lymphoma of intrathoracic lymph nodes  -The patient has DLBCL based on the path report from [facility] and left supraclavicular LN biopsied on 9/20/19  -Path has been scanned into the media tab  -This is likely a transformation from his follicular lymphoma diagnosed and treated back in 2004  -BM biopsy from 9/26 results pending  -ECHO done 9/26, EF 60%  -Requested pathology from [facility] on 9/26 to determine subtype additional genetic features which may alter treatment  -PICC team consulted, unable to place PICC, if needs central access will need to be done by IR, hold off today as has PIV and patient unstable  -CT neck to complete staging -hold off for now given unstable. Had CTA chest and CT A/P done at [facility]  -CBC, CMP, phos, uric acid daily  -Treatment:   -Rad onc consult given airway and esophagus compression, s/p SIM and started RT 9/26, was planned 25-35 Gy in 10 fractions however we did speak to Dr. Montgomery today and we discussed holding off for now and monitor response to chemotherapy   -Start decadron 20mg x4 days d1/4, and vincristine 9/27   -When have final pathology, likely complete CHOP regimen (if DLBCL vs alternative regimen if this is double hit)    Acute respiratory failure with hypoxia  Likely  source is lymphoma, pneumonia, pleural effusion, compressive atelectasis  BiPAP PRN  Stopped IVF  S/p thoracentesis with drainage of 2 liters of exudative fluid  Radiation initiated 9/26 will hold off for now given clinical improvement with thoracentesis   Patient DNR    Sepsis  See PNA    Hyperuricemia  Continue allopurinol    Hypercalcemia  - Improving  -Pamidronate 9/27  -Stopped IVF d/t hypoxia    Stage 3 chronic kidney disease  Renal function stable based on few readings in system.   At baseline Cr ~1.2    Cancer related pain  Oxy 5mg PO PRN Q4  If unrelieved with PO, morphine IV ordered for breakthrough  Starting on bowel regimen.     Pneumonia of left lower lobe due to infectious organism  Suspected post obstructive pneumonia in the setting of malignancy  Coverage with Piperacillin-tazo and azithro will cover most likely organisms, will continue, likely 7 day course (start 9/25)  Cultures NGTD    Ocular myasthenia gravis  Continue pyridostigmine TID        VTE Risk Mitigation (From admission, onward)         Ordered     heparin, porcine (PF) 100 unit/mL injection flush 300 Units  As needed (PRN)      09/27/19 1406     enoxaparin injection 40 mg  Daily      09/25/19 2241     IP VTE HIGH RISK PATIENT  Once      09/25/19 1455     Place sequential compression device  Until discontinued      09/25/19 1455                Disposition: continue treatment of DLBCL in the BMT unit.     Maxim Rouse MD  Bone Marrow Transplant  [facility]

## 2019-09-28 NOTE — ASSESSMENT & PLAN NOTE
Likely source is lymphoma, pneumonia, pleural effusion, compressive atelectasis  BiPAP PRN  Stopped IVF  S/p thoracentesis with drainage of 2 liters of exudative fluid  Radiation initiated 9/26 will hold off for now given clinical improvement with thoracentesis   Patient DNR

## 2019-09-28 NOTE — PLAN OF CARE
Patient remained alert and oriented throughout the shift, but very drowsy/sleepy. Assessment completed & no alarming findings found.VS remained stable. All schedules/PRN medications administered as ordered.NPO diet maintained. Activity completed with assistance; patient remained in  bed throughout shift. Voids per urinal;adequate output noted. side rails up x2; call bell in place; bed in lowest, locked position; skid proof socks on. Care plan explained to patient and his wife no additional complaints at this time. Will continue routine plan of care.

## 2019-09-28 NOTE — PLAN OF CARE
Patient was able ;to get off continuous BIPAP after thoracentesis, states that he feels exhausted. VS stable Soto catheter draining qs CYU. In no acute distress; will continue to monitor.

## 2019-09-28 NOTE — NURSING
Patient had a thoracentesis left lung by , 2160cc fluid removed, patient tolerated procedure well. CXR done @ 1800, O2 currently at 6LPM N/C and patient O2 sat 92-96%; will continue to monitor.

## 2019-09-28 NOTE — SUBJECTIVE & OBJECTIVE
Subjective:     Interval History: doing better with regards to breathing today. Would like to see if he can swallow safely and eat. Was delirious at night but he is more lucid this AM.     Objective:     Vital Signs (Most Recent):  Temp: 97.7 °F (36.5 °C) (09/28/19 1311)  Pulse: 94 (09/28/19 1311)  Resp: 16 (09/28/19 1311)  BP: 122/68 (09/28/19 1311)  SpO2: (!) 89 % (09/28/19 0752) Vital Signs (24h Range):  Temp:  [97.4 °F (36.3 °C)-98.1 °F (36.7 °C)] 97.7 °F (36.5 °C)  Pulse:  [] 94  Resp:  [15-20] 16  SpO2:  [89 %-95 %] 89 %  BP: (117-140)/(68-79) 122/68     Weight: 89.4 kg (197 lb 3.2 oz)  Body mass index is 27.5 kg/m².  Body surface area is 2.12 meters squared.      Intake/Output - Last 3 Shifts       09/26 0700 - 09/27 0659 09/27 0700 - 09/28 0659 09/28 0700 - 09/29 0659    P.O. 100 0     I.V. (mL/kg)       IV Piggyback 550 850     Total Intake(mL/kg) 650 (7.3) 850 (9.5)     Urine (mL/kg/hr) 435 (0.2) 3700 (1.7) 150 (0.2)    Emesis/NG output 50      Other  2160     Stool       Blood       Total Output 485 5860 150    Net +165 -5010 -150                 Physical Exam   Constitutional: He appears well-developed and well-nourished. No distress.   HENT:   Head: Normocephalic and atraumatic.   Mouth/Throat: No oropharyngeal exudate.   Eyes: Pupils are equal, round, and reactive to light. EOM are normal. No scleral icterus.   Cardiovascular: Normal rate and regular rhythm.   Pulmonary/Chest: Effort normal and breath sounds normal. No respiratory distress (mild). He has no wheezes. He has no rales.   Abdominal: Soft. He exhibits distension (mild). There is no tenderness.   Genitourinary:   Genitourinary Comments: No palpable inguinal LN   Musculoskeletal: Normal range of motion. He exhibits no edema.   Lymphadenopathy:     He has no cervical adenopathy.   Neurological: He is alert.   Skin: Skin is warm and dry.   Psychiatric: He has a normal mood and affect. His behavior is normal.   Nursing note and vitals  reviewed.      Significant Labs:   CBC:   Recent Labs   Lab 09/27/19  0504 09/28/19 0927   WBC 10.78 10.96   HGB 14.1 14.1   HCT 42.8 43.3    208    and CMP:   Recent Labs   Lab 09/27/19  0504 09/28/19 0927    143   K 4.5 4.0    106   CO2 19* 26    107   BUN 27* 36*   CREATININE 1.2 1.2   CALCIUM 10.7* 10.0   PROT 6.3 6.1   ALBUMIN 3.1* 2.9*   BILITOT 1.1* 0.7   ALKPHOS 136* 120   AST 34 30   ALT 46* 35   ANIONGAP 16 11   EGFRNONAA 56.4* 56.4*

## 2019-09-28 NOTE — ASSESSMENT & PLAN NOTE
-The patient has DLBCL based on the path report from [facility] and left supraclavicular LN biopsied on 9/20/19  -Path has been scanned into the media tab  -This is likely a transformation from his follicular lymphoma diagnosed and treated back in 2004  -BM biopsy from 9/26 results pending  -ECHO done 9/26, EF 60%  -Requested pathology from [facility] on 9/26 to determine subtype additional genetic features which may alter treatment  -PICC team consulted, unable to place PICC, if needs central access will need to be done by IR, hold off today as has PIV and patient unstable  -CT neck to complete staging -hold off for now given unstable. Had CTA chest and CT A/P done at [facility]  -CBC, CMP, phos, uric acid daily  -Treatment:   -Rad onc consult given airway and esophagus compression, s/p SIM and started RT 9/26, was planned 25-35 Gy in 10 fractions however we did speak to Dr. Montgomery today and we discussed holding off for now and monitor response to chemotherapy   -Start decadron 20mg x4 days d1/4, and vincristine 9/27   -When have final pathology, likely complete CHOP regimen (if DLBCL vs alternative regimen if this is double hit)

## 2019-09-28 NOTE — MED STUDENT SUBJECTIVE & OBJECTIVE
"Medical Student Subjective & Objective     Principal Problem: Diffuse large B-cell lymphoma of intrathoracic lymph nodes    Chief Complaint:   Chief Complaint   Patient presents with    Abdominal Pain     LUQ abdominal pain x 1 week.  Pain more intense over last few days and is worse when laying down.  Recently found out he has a large medistinal mass in his chest.  Hx of lymphoma.       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 Jojo Carcamo with R-CVP followed by maintenance rituxan who presented to the ED on [facility] with severe abdominal pain. Pain has been present intermittently since July but worsened in the last week. He has difficulty describing his symptoms and reports he "just feel[s] bad." He has had a cough with small streaks of blood for about 1 week. No significant sputum production. No fevers, chills, night sweats. Some weight loss, difficulty swallowing solid food for about 2 weeks. He had some pain in his right back earlier today which resolved with a massage. +Constipation. No N/V reported (but he became nauseated during my interview). Patient's main complaint at time of my exam is significant left sided abdominal pain which does not radiate.     Patient had a CT of the chest on 9/16 which showed a 9.8 x 3.8 x 13 cm size posterior mediastinal mass. He underwent lymph nose biopsy of supraclavicular node on 9/20, returned DLBCL (see media tab). Patient underwent CTA chest and CT abdomen with contrast at OSH on 9/25 after presentation to the ED. It showed extrinsic compression of the esophagus, a large retrocardiac mass with involvement of the left hilum causing extrinsic compression of the adjacent esophagus, compression of the left upper and lower lobe bronchi and compression/obliteration of the left inferior pulmonary vein. Labs significant for uncorrected Ca 10.9, Uric Acid 9.6, Cr 1.3, Lactate 2.6. He was treated for suspected " post obstructive pneumonia with Piperacillin-tazobactam and Azithro and transferred for further oncology management.     Hospital Course: 09/26/2019 Patient admitted, ECHO done, to get neck CT. To get radiation given airway/esophagus compression.  09/27/2019 Hypoxic overnight, requiring BIPAP. To start decadron and vincristine. Pulm consulted for thora.    Interval History: Started decadron 20mg (day 2), vincristine (9/27). Thoracentesis yesterday (-2L), given Duonebs. At 0200 this morning, the patient awoke confused; he now on 6L O2 nasal cannula, and his Soto catheter was removed. Currently, he is mostly answering questions appropriately, and states that he has dysuria and his mouth is dry.      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 Jolley    CATARACT EXTRACTION W/  INTRAOCULAR LENS IMPLANT Left 04/20/2017    Dr. Jolley    CHOLECYSTECTOMY      EYE SURGERY      Rt cataract    KNEE ARTHROSCOPY      Lt knee    TONSILLECTOMY         Review of patient's allergies indicates:  No Known Allergies    No current facility-administered medications on file prior to encounter.      Current Outpatient Medications on File Prior to Encounter   Medication Sig    albuterol (PROVENTIL/VENTOLIN HFA) 90 mcg/actuation inhaler Inhale 2 puffs into the lungs every 6 (six) hours as needed for Wheezing. Rescue    azelastine (ASTELIN) 137 mcg (0.1 %) nasal spray 1 spray (137 mcg total) by Nasal route 2 (two) times daily.    pyridostigmine (MESTINON) 60 mg Tab Take 60 mg by mouth 3 (three) times daily.     fluocinonide 0.05% (LIDEX) 0.05 % cream KARYNA EXT AA ON CHEST BID    lisinopril 10 MG tablet Take 10 mg  by mouth once daily.    meclizine (ANTIVERT) 25 mg tablet Take 1 tablet (25 mg total) by mouth 3 (three) times daily as needed.    sulfamethoxazole-trimethoprim 800-160mg (BACTRIM DS) 800-160 mg Tab Take 1 tablet by mouth 2 (two) times daily.     Family History     Problem Relation (Age of Onset)    No Known Problems Mother, Father, Sister, Brother, Maternal Aunt, Maternal Uncle, Paternal Aunt, Paternal Uncle, Maternal Grandmother, Maternal Grandfather, Paternal Grandmother, Paternal Grandfather        Tobacco Use    Smoking status: Former Smoker    Smokeless tobacco: Never Used   Substance and Sexual Activity    Alcohol use: No    Drug use: No    Sexual activity: Not on file     Review of Systems   Constitutional: Negative for fever.   HENT: Negative for sore throat.         Dry mouth   Respiratory: Positive for shortness of breath. Negative for cough.    Cardiovascular: Positive for chest pain. Negative for leg swelling.   Gastrointestinal: Positive for abdominal pain. Negative for blood in stool, nausea and vomiting.   Genitourinary: Positive for difficulty urinating and dysuria. Negative for hematuria.   Musculoskeletal: Negative for neck pain.   Skin: Negative for rash.   Neurological: Positive for tremors. Negative for headaches.     Objective:     Vital Signs (Most Recent):  Temp: 98.1 °F (36.7 °C) (09/28/19 0721)  Pulse: 90 (09/28/19 0752)  Resp: 15 (09/28/19 0752)  BP: 139/79 (09/28/19 0721)  SpO2: (!) 89 % (09/28/19 0752) Vital Signs (24h Range):  Temp:  [96.2 °F (35.7 °C)-98.1 °F (36.7 °C)] 98.1 °F (36.7 °C)  Pulse:  [] 90  Resp:  [15-20] 15  SpO2:  [89 %-98 %] 89 %  BP: (117-140)/(68-80) 139/79     Weight: 89.4 kg (197 lb 3.2 oz)  Body mass index is 27.5 kg/m².    Intake/Output Summary (Last 24 hours) at 9/28/2019 0815  Last data filed at 9/27/2019 2314  Gross per 24 hour   Intake 850 ml   Output 3460 ml   Net -2610 ml      Physical Exam   Constitutional: He appears well-developed. No  distress.   HENT:   Head: Normocephalic.   Eyes: Conjunctivae and EOM are normal.   Neck: Neck supple.   Cardiovascular: Normal rate, regular rhythm and normal heart sounds.   Pulmonary/Chest: No respiratory distress.   On 6L NC  Decreased breath sounds throughout, L lower lobe with crackles.   Abdominal: Soft. He exhibits no distension. There is no tenderness.   Musculoskeletal: Normal range of motion. He exhibits no edema.   Lymphadenopathy:     He has no cervical adenopathy.   Neurological: He is alert.   Oriented to day of the week, month, person, but not year or location.   Skin: Skin is warm and dry. No rash noted.       Significant Labs:   CBC:   Recent Labs   Lab 09/27/19  0504   WBC 10.78   HGB 14.1   HCT 42.8        CMP:   Recent Labs   Lab 09/27/19  0504      K 4.5      CO2 19*      BUN 27*   CREATININE 1.2   CALCIUM 10.7*   PROT 6.3   ALBUMIN 3.1*   BILITOT 1.1*   ALKPHOS 136*   AST 34   ALT 46*   ANIONGAP 16   EGFRNONAA 56.4*       Significant Imaging: CXR: I have reviewed all pertinent results/findings within the past 24 hours and my personal findings are:  Opacification on the L lung improved after thoracentesis. L upper lobe more visible.    Medical Student Subjective & Objective      Diffuse large B cell lymphoma of intrathoracic lymph nodes    -  Based on path report from [facility], 9/20   - Started on Decadron 20mg and vincristine (9/27), and then rest of CHOP planned for today.   - Awaiting BMBx result performed 9/26   - Pending IR for PICC line vs Port   - Pending CT neck for staging   - Contact speech for swallow study.   - Contact Dr. Montgomery, see if we can discontinue RT and proceed with CHOP     Acute respiratory failure   - on 6L NC   - Starting Decadron 20mg   - Consulted pulmonary for potential thoracentesis     Cancer related pain   - Oxycodone 5mg PRN, and IV morphine     Pneumonia of L lower lobe   - Continue Zosyn and azithromycin (started 9/25), for 7  days.    Traumatic Soto Catheter   - Continue monitoring urine/ output       2 peripheral IV's R arm, urinary catheter  Diet NPO - swallow study performed  DVT prophylaxis - enoxaparin 40mg  GI prophylaxis - famotidine started  Dispo - pending treatment    Ran Tabor MS4

## 2019-09-29 LAB
ALBUMIN SERPL BCP-MCNC: 3 G/DL (ref 3.5–5.2)
ALP SERPL-CCNC: 119 U/L (ref 55–135)
ALT SERPL W/O P-5'-P-CCNC: 38 U/L (ref 10–44)
ANION GAP SERPL CALC-SCNC: 12 MMOL/L (ref 8–16)
AST SERPL-CCNC: 29 U/L (ref 10–40)
BASOPHILS # BLD AUTO: 0.01 K/UL (ref 0–0.2)
BASOPHILS NFR BLD: 0.1 % (ref 0–1.9)
BILIRUB SERPL-MCNC: 0.8 MG/DL (ref 0.1–1)
BUN SERPL-MCNC: 37 MG/DL (ref 8–23)
CALCIUM SERPL-MCNC: 9.5 MG/DL (ref 8.7–10.5)
CHLORIDE SERPL-SCNC: 108 MMOL/L (ref 95–110)
CO2 SERPL-SCNC: 24 MMOL/L (ref 23–29)
CREAT SERPL-MCNC: 1.2 MG/DL (ref 0.5–1.4)
DIFFERENTIAL METHOD: ABNORMAL
EOSINOPHIL # BLD AUTO: 0 K/UL (ref 0–0.5)
EOSINOPHIL NFR BLD: 0 % (ref 0–8)
ERYTHROCYTE [DISTWIDTH] IN BLOOD BY AUTOMATED COUNT: 14 % (ref 11.5–14.5)
EST. GFR  (AFRICAN AMERICAN): >60 ML/MIN/1.73 M^2
EST. GFR  (NON AFRICAN AMERICAN): 56.4 ML/MIN/1.73 M^2
GLUCOSE SERPL-MCNC: 122 MG/DL (ref 70–110)
HCT VFR BLD AUTO: 43.9 % (ref 40–54)
HGB BLD-MCNC: 13.6 G/DL (ref 14–18)
IMM GRANULOCYTES # BLD AUTO: 0.06 K/UL (ref 0–0.04)
IMM GRANULOCYTES NFR BLD AUTO: 0.6 % (ref 0–0.5)
LYMPHOCYTES # BLD AUTO: 0.7 K/UL (ref 1–4.8)
LYMPHOCYTES NFR BLD: 7 % (ref 18–48)
MAGNESIUM SERPL-MCNC: 2.5 MG/DL (ref 1.6–2.6)
MCH RBC QN AUTO: 27.8 PG (ref 27–31)
MCHC RBC AUTO-ENTMCNC: 31 G/DL (ref 32–36)
MCV RBC AUTO: 90 FL (ref 82–98)
MONOCYTES # BLD AUTO: 0.5 K/UL (ref 0.3–1)
MONOCYTES NFR BLD: 5.2 % (ref 4–15)
NEUTROPHILS # BLD AUTO: 8.9 K/UL (ref 1.8–7.7)
NEUTROPHILS NFR BLD: 87.1 % (ref 38–73)
NRBC BLD-RTO: 0 /100 WBC
PHOSPHATE SERPL-MCNC: 3.5 MG/DL (ref 2.7–4.5)
PLATELET # BLD AUTO: 228 K/UL (ref 150–350)
PMV BLD AUTO: 10 FL (ref 9.2–12.9)
POTASSIUM SERPL-SCNC: 4.2 MMOL/L (ref 3.5–5.1)
PROT SERPL-MCNC: 6.1 G/DL (ref 6–8.4)
RBC # BLD AUTO: 4.89 M/UL (ref 4.6–6.2)
SODIUM SERPL-SCNC: 144 MMOL/L (ref 136–145)
URATE SERPL-MCNC: 8.2 MG/DL (ref 3.4–7)
WBC # BLD AUTO: 10.2 K/UL (ref 3.9–12.7)

## 2019-09-29 PROCEDURE — 99233 PR SUBSEQUENT HOSPITAL CARE,LEVL III: ICD-10-PCS | Mod: ,,, | Performed by: INTERNAL MEDICINE

## 2019-09-29 PROCEDURE — 63600175 PHARM REV CODE 636 W HCPCS: Mod: JG

## 2019-09-29 PROCEDURE — 84550 ASSAY OF BLOOD/URIC ACID: CPT

## 2019-09-29 PROCEDURE — 99900035 HC TECH TIME PER 15 MIN (STAT)

## 2019-09-29 PROCEDURE — 27000221 HC OXYGEN, UP TO 24 HOURS

## 2019-09-29 PROCEDURE — 83735 ASSAY OF MAGNESIUM: CPT

## 2019-09-29 PROCEDURE — 85025 COMPLETE CBC W/AUTO DIFF WBC: CPT

## 2019-09-29 PROCEDURE — 63600175 PHARM REV CODE 636 W HCPCS: Performed by: STUDENT IN AN ORGANIZED HEALTH CARE EDUCATION/TRAINING PROGRAM

## 2019-09-29 PROCEDURE — 20600001 HC STEP DOWN PRIVATE ROOM

## 2019-09-29 PROCEDURE — 99233 SBSQ HOSP IP/OBS HIGH 50: CPT | Mod: ,,, | Performed by: INTERNAL MEDICINE

## 2019-09-29 PROCEDURE — 80053 COMPREHEN METABOLIC PANEL: CPT

## 2019-09-29 PROCEDURE — 25000242 PHARM REV CODE 250 ALT 637 W/ HCPCS: Performed by: STUDENT IN AN ORGANIZED HEALTH CARE EDUCATION/TRAINING PROGRAM

## 2019-09-29 PROCEDURE — 92610 EVALUATE SWALLOWING FUNCTION: CPT

## 2019-09-29 PROCEDURE — S0028 INJECTION, FAMOTIDINE, 20 MG: HCPCS | Performed by: STUDENT IN AN ORGANIZED HEALTH CARE EDUCATION/TRAINING PROGRAM

## 2019-09-29 PROCEDURE — 84100 ASSAY OF PHOSPHORUS: CPT

## 2019-09-29 PROCEDURE — 94761 N-INVAS EAR/PLS OXIMETRY MLT: CPT

## 2019-09-29 PROCEDURE — 36415 COLL VENOUS BLD VENIPUNCTURE: CPT

## 2019-09-29 PROCEDURE — 94664 DEMO&/EVAL PT USE INHALER: CPT

## 2019-09-29 PROCEDURE — 63600175 PHARM REV CODE 636 W HCPCS: Performed by: HOSPITALIST

## 2019-09-29 PROCEDURE — 27000646 HC AEROBIKA DEVICE

## 2019-09-29 PROCEDURE — 94640 AIRWAY INHALATION TREATMENT: CPT

## 2019-09-29 PROCEDURE — 25000003 PHARM REV CODE 250: Performed by: STUDENT IN AN ORGANIZED HEALTH CARE EDUCATION/TRAINING PROGRAM

## 2019-09-29 RX ADMIN — FAMOTIDINE 20 MG: 10 INJECTION, SOLUTION INTRAVENOUS at 08:09

## 2019-09-29 RX ADMIN — PIPERACILLIN AND TAZOBACTAM 4.5 G: 4; .5 INJECTION, POWDER, LYOPHILIZED, FOR SOLUTION INTRAVENOUS; PARENTERAL at 02:09

## 2019-09-29 RX ADMIN — SODIUM CHLORIDE 6 MG: 9 INJECTION, SOLUTION INTRAVENOUS at 02:09

## 2019-09-29 RX ADMIN — IPRATROPIUM BROMIDE AND ALBUTEROL SULFATE 3 ML: .5; 3 SOLUTION RESPIRATORY (INHALATION) at 07:09

## 2019-09-29 RX ADMIN — PIPERACILLIN AND TAZOBACTAM 4.5 G: 4; .5 INJECTION, POWDER, LYOPHILIZED, FOR SOLUTION INTRAVENOUS; PARENTERAL at 06:09

## 2019-09-29 RX ADMIN — PIPERACILLIN AND TAZOBACTAM 4.5 G: 4; .5 INJECTION, POWDER, LYOPHILIZED, FOR SOLUTION INTRAVENOUS; PARENTERAL at 10:09

## 2019-09-29 RX ADMIN — DEXAMETHASONE SODIUM PHOSPHATE 20 MG: 10 INJECTION INTRAMUSCULAR; INTRAVENOUS at 08:09

## 2019-09-29 RX ADMIN — IPRATROPIUM BROMIDE AND ALBUTEROL SULFATE 3 ML: .5; 3 SOLUTION RESPIRATORY (INHALATION) at 01:09

## 2019-09-29 RX ADMIN — FAMOTIDINE 20 MG: 10 INJECTION, SOLUTION INTRAVENOUS at 10:09

## 2019-09-29 NOTE — SUBJECTIVE & OBJECTIVE
Subjective:     Interval History: seen and examined this AM. Doing well from breathing standpoint. A little congested. Wants to try and drink liquids today.     Objective:     Vital Signs (Most Recent):  Temp: 97.6 °F (36.4 °C) (09/29/19 1149)  Pulse: 95 (09/29/19 1149)  Resp: 20 (09/29/19 0806)  BP: (!) 150/85 (09/29/19 1149)  SpO2: 96 % (09/29/19 1149) Vital Signs (24h Range):  Temp:  [97.5 °F (36.4 °C)-98.2 °F (36.8 °C)] 97.6 °F (36.4 °C)  Pulse:  [] 95  Resp:  [16-20] 20  SpO2:  [91 %-96 %] 96 %  BP: (122-162)/(65-89) 150/85     Weight: 89.4 kg (197 lb 3.2 oz)  Body mass index is 27.5 kg/m².  Body surface area is 2.12 meters squared.    ECOG SCORE         [unfilled]    Intake/Output - Last 3 Shifts       09/27 0700 - 09/28 0659 09/28 0700 - 09/29 0659 09/29 0700 - 09/30 0659    P.O. 0      IV Piggyback 850  50    Total Intake(mL/kg) 850 (9.5)  50 (0.6)    Urine (mL/kg/hr) 3700 (1.7) 1550 (0.7)     Emesis/NG output       Other 2160      Total Output 5860 1550     Net -5010 -1550 +50                 Physical Exam   Constitutional: He appears well-developed and well-nourished. No distress.   HENT:   Head: Normocephalic and atraumatic.   Mouth/Throat: No oropharyngeal exudate.   Eyes: Pupils are equal, round, and reactive to light. EOM are normal. No scleral icterus.   Cardiovascular: Normal rate and regular rhythm.   Pulmonary/Chest: Effort normal and breath sounds normal. No respiratory distress (mild). He has no wheezes. He has no rales.   Abdominal: Soft. He exhibits distension (mild). There is no tenderness.   Genitourinary:   Genitourinary Comments: No palpable inguinal LN   Musculoskeletal: Normal range of motion. He exhibits no edema.   Lymphadenopathy:     He has no cervical adenopathy.   Neurological: He is alert.   Skin: Skin is warm and dry.   Psychiatric: He has a normal mood and affect. His behavior is normal.   Nursing note and vitals reviewed.      Significant Labs:   CBC:   Recent Labs   Lab  09/28/19 0927 09/29/19  0705   WBC 10.96 10.20   HGB 14.1 13.6*   HCT 43.3 43.9    228   , CMP:   Recent Labs   Lab 09/28/19 0927 09/29/19 0705    144   K 4.0 4.2    108   CO2 26 24    122*   BUN 36* 37*   CREATININE 1.2 1.2   CALCIUM 10.0 9.5   PROT 6.1 6.1   ALBUMIN 2.9* 3.0*   BILITOT 0.7 0.8   ALKPHOS 120 119   AST 30 29   ALT 35 38   ANIONGAP 11 12   EGFRNONAA 56.4* 56.4*

## 2019-09-29 NOTE — SUBJECTIVE & OBJECTIVE
Interval History: seen and examined this morning. Doing better overall. A little bit confused, breathing is better. He would like to try to drink some liquids today.     Oncology Treatment Plan:   IP CHOP + OP RITUXIMAB Q3W    Medications:  Continuous Infusions:  Scheduled Meds:   albuterol-ipratropium  3 mL Nebulization Q6H WAKE    allopurinol  300 mg Oral BID    dexamethasone (DECADRON) IVPB  20 mg Intravenous Daily    famotidine (PF)  20 mg Intravenous BID    piperacillin-tazobactam (ZOSYN) IVPB  4.5 g Intravenous Q8H    polyethylene glycol  17 g Oral Daily    pyridostigmine  60 mg Oral TID    QUEtiapine  25 mg Oral Once    rasburicase (ELITEK) IVPB  6 mg Intravenous Once    senna-docusate 8.6-50 mg  2 tablet Oral BID     PRN Meds:acetaminophen, influenza, morphine, ondansetron, oxyCODONE, pneumoc 13-oscar conj-dip cr(PF), sodium chloride 0.9%     Review of Systems   Constitutional: Positive for activity change, appetite change and fatigue. Negative for chills, fever and unexpected weight change.   HENT: Positive for congestion. Negative for sore throat and trouble swallowing.    Eyes: Negative for photophobia and visual disturbance.   Respiratory: Negative for cough, shortness of breath and wheezing.    Cardiovascular: Negative for chest pain, palpitations and leg swelling.   Gastrointestinal: Positive for abdominal distention and constipation. Negative for abdominal pain, diarrhea, nausea and vomiting.   Genitourinary: Negative for dysuria and hematuria.   Musculoskeletal: Negative for arthralgias and myalgias.   Skin: Negative for rash and wound.   Neurological: Negative for seizures, syncope and headaches.   Psychiatric/Behavioral: Negative for agitation and confusion.     Objective:     Vital Signs (Most Recent):  Temp: 97.6 °F (36.4 °C) (09/29/19 1149)  Pulse: 95 (09/29/19 1149)  Resp: 20 (09/29/19 0806)  BP: (!) 150/85 (09/29/19 1149)  SpO2: 96 % (09/29/19 1149) Vital Signs (24h Range):  Temp:   [97.5 °F (36.4 °C)-98.2 °F (36.8 °C)] 97.6 °F (36.4 °C)  Pulse:  [] 95  Resp:  [16-20] 20  SpO2:  [91 %-96 %] 96 %  BP: (122-162)/(65-89) 150/85     Weight: 89.4 kg (197 lb 3.2 oz)  Body mass index is 27.5 kg/m².  Body surface area is 2.12 meters squared.      Intake/Output Summary (Last 24 hours) at 9/29/2019 1241  Last data filed at 9/29/2019 0811  Gross per 24 hour   Intake 50 ml   Output 1500 ml   Net -1450 ml       Physical Exam   Constitutional: He appears well-developed and well-nourished. No distress.   HENT:   Head: Normocephalic and atraumatic.   Mouth/Throat: No oropharyngeal exudate.   Eyes: Pupils are equal, round, and reactive to light. EOM are normal. No scleral icterus.   Cardiovascular: Normal rate and regular rhythm.   Pulmonary/Chest: Effort normal and breath sounds normal. No respiratory distress (mild). He has no wheezes. He has no rales.   Abdominal: Soft. He exhibits distension (mild). There is no tenderness.   Genitourinary:   Genitourinary Comments: No palpable inguinal LN   Musculoskeletal: Normal range of motion. He exhibits no edema.   Lymphadenopathy:     He has no cervical adenopathy.   Neurological: He is alert.   Skin: Skin is warm and dry.   Psychiatric: He has a normal mood and affect. His behavior is normal.   Nursing note and vitals reviewed.      Significant Labs:   CBC:   Recent Labs   Lab 09/28/19  0927 09/29/19  0705   WBC 10.96 10.20   HGB 14.1 13.6*   HCT 43.3 43.9    228    and CMP:   Recent Labs   Lab 09/28/19  0927 09/29/19  0705    144   K 4.0 4.2    108   CO2 26 24    122*   BUN 36* 37*   CREATININE 1.2 1.2   CALCIUM 10.0 9.5   PROT 6.1 6.1   ALBUMIN 2.9* 3.0*   BILITOT 0.7 0.8   ALKPHOS 120 119   AST 30 29   ALT 35 38   ANIONGAP 11 12   EGFRNONAA 56.4* 56.4*

## 2019-09-29 NOTE — PLAN OF CARE
Patient is AAOx4, up with 2 assist, delirium and fall precautions maintained. Bed alarm set. Wife at bedside. Plan of care reviewed with the patient and wife at the beginning of shift and PRN. Clear liquid diet started today.Zosyn given as ordered. VSS. Afebrile. Bed in low locked position, side rails up x2. Non skid footwear on. Call light within reach. Will continue to monitor.

## 2019-09-29 NOTE — PLAN OF CARE
Problem: SLP Goal  Goal: SLP Goal  Description  Speech Language Pathology Goals  Goals expected to be met by 10/06/2019  1. Pt will participate in ongoing swallow assessment   2. Educate Pt and family on S/S aspiration and aspiration precautions      Outcome: Ongoing, Progressing     SLP Bedside Swallow Study initiated. SLP unable to r/o aspiration 2/2 esophageal concerns for dysphagia. REC: NPO, frequent oral care and ice chips sparingly for comfort.   Pending progress, Pt might benefit from further, objective assessment of swallow fx via MBSS when feasible.  Findings/recs reviewed with Pt, Spouse, RN and MD team.     RO Ch., [facility]-SLP  Speech-Language Pathology  Pager: [phone]  9/29/2019

## 2019-09-29 NOTE — ASSESSMENT & PLAN NOTE
-The patient has DLBCL based on the path report from [facility] and left supraclavicular LN biopsied on 9/20/19  -Path has been scanned into the media tab  -This is likely a transformation from his follicular lymphoma diagnosed and treated back in 2004  -BM biopsy from 9/26 results pending  -ECHO done 9/26, EF 60%  -Requested pathology from [facility] on 9/26 to determine subtype additional genetic features which may alter treatment  -PICC team consulted, unable to place PICC, if needs central access will need to be done by IR, hold off today as has PIV and patient unstable  -CT neck to complete staging -hold off for now given unstable. Had CTA chest and CT A/P done at [facility]  -CBC, CMP, phos, uric acid daily  -Treatment:   -Rad onc consult given airway and esophagus compression, s/p SIM and started RT 9/26, was planned 25-35 Gy in 10 fractions however we did speak to Dr. Montgomery  and we discussed holding off for now and monitor response to chemotherapy   -Start decadron 20mg x4 days d1/4, and vincristine 9/27   -When have final pathology, likely complete CHOP regimen (if DLBCL vs alternative regimen if this is double hit)  Plan to get IR to insert a port tomorrow before we give Adriamycin, PICC was unable to be advanced when attempted last week  One dose of Rasburicase given for uric acid level of 8.2

## 2019-09-29 NOTE — PROGRESS NOTES
[facility]  Hematology  Bone Marrow Transplant  Progress Note    Patient Name: Robe Cevallos  Admission Date: 9/25/2019  Hospital Length of Stay: 4 days  Code Status: DNR    Subjective:     Interval History: seen and examined this AM. Doing well from breathing standpoint. A little congested. Wants to try and drink liquids today.     Objective:     Vital Signs (Most Recent):  Temp: 97.6 °F (36.4 °C) (09/29/19 1149)  Pulse: 95 (09/29/19 1149)  Resp: 20 (09/29/19 0806)  BP: (!) 150/85 (09/29/19 1149)  SpO2: 96 % (09/29/19 1149) Vital Signs (24h Range):  Temp:  [97.5 °F (36.4 °C)-98.2 °F (36.8 °C)] 97.6 °F (36.4 °C)  Pulse:  [] 95  Resp:  [16-20] 20  SpO2:  [91 %-96 %] 96 %  BP: (122-162)/(65-89) 150/85     Weight: 89.4 kg (197 lb 3.2 oz)  Body mass index is 27.5 kg/m².  Body surface area is 2.12 meters squared.    ECOG SCORE         [unfilled]    Intake/Output - Last 3 Shifts       09/27 0700 - 09/28 0659 09/28 0700 - 09/29 0659 09/29 0700 - 09/30 0659    P.O. 0      IV Piggyback 850  50    Total Intake(mL/kg) 850 (9.5)  50 (0.6)    Urine (mL/kg/hr) 3700 (1.7) 1550 (0.7)     Emesis/NG output       Other 2160      Total Output 5860 1550     Net -5010 -1550 +50                 Physical Exam   Constitutional: He appears well-developed and well-nourished. No distress.   HENT:   Head: Normocephalic and atraumatic.   Mouth/Throat: No oropharyngeal exudate.   Eyes: Pupils are equal, round, and reactive to light. EOM are normal. No scleral icterus.   Cardiovascular: Normal rate and regular rhythm.   Pulmonary/Chest: Effort normal and breath sounds normal. No respiratory distress (mild). He has no wheezes. He has no rales.   Abdominal: Soft. He exhibits distension (mild). There is no tenderness.   Genitourinary:   Genitourinary Comments: No palpable inguinal LN   Musculoskeletal: Normal range of motion. He exhibits no edema.   Lymphadenopathy:     He has no cervical adenopathy.   Neurological: He is  alert.   Skin: Skin is warm and dry.   Psychiatric: He has a normal mood and affect. His behavior is normal.   Nursing note and vitals reviewed.      Significant Labs:   CBC:   Recent Labs   Lab 09/28/19  0927 09/29/19  0705   WBC 10.96 10.20   HGB 14.1 13.6*   HCT 43.3 43.9    228   , CMP:   Recent Labs   Lab 09/28/19  0927 09/29/19  0705    144   K 4.0 4.2    108   CO2 26 24    122*   BUN 36* 37*   CREATININE 1.2 1.2   CALCIUM 10.0 9.5   PROT 6.1 6.1   ALBUMIN 2.9* 3.0*   BILITOT 0.7 0.8   ALKPHOS 120 119   AST 30 29   ALT 35 38   ANIONGAP 11 12   EGFRNONAA 56.4* 56.4*     Assessment/Plan:     * Diffuse large B-cell lymphoma of intrathoracic lymph nodes  -The patient has DLBCL based on the path report from [facility] and left supraclavicular LN biopsied on 9/20/19  -Path has been scanned into the media tab  -This is likely a transformation from his follicular lymphoma diagnosed and treated back in 2004  -BM biopsy from 9/26 results pending  -ECHO done 9/26, EF 60%  -Requested pathology from [facility] on 9/26 to determine subtype additional genetic features which may alter treatment  -PICC team consulted, unable to place PICC, if needs central access will need to be done by IR, hold off today as has PIV and patient unstable  -CT neck to complete staging -hold off for now given unstable. Had CTA chest and CT A/P done at [facility]  -CBC, CMP, phos, uric acid daily  -Treatment:   -Rad onc consult given airway and esophagus compression, s/p SIM and started RT 9/26, was planned 25-35 Gy in 10 fractions however we did speak to Dr. Montgomery  and we discussed holding off for now and monitor response to chemotherapy   -Start decadron 20mg x4 days d1/4, and vincristine 9/27   -When have final pathology, likely complete CHOP regimen (if DLBCL vs alternative regimen if this is double hit)  Plan to get IR to insert a port tomorrow before we give Adriamycin, PICC was unable to be advanced when attempted last  week  One dose of Rasburicase given for uric acid level of 8.2    Acute respiratory failure with hypoxia  Likely source is lymphoma, pneumonia, pleural effusion, compressive atelectasis  BiPAP PRN  Stopped IVF  S/p thoracentesis with drainage of 2 liters of exudative fluid  Radiation initiated 9/26 will hold off for now given clinical improvement with thoracentesis   Patient DNR    Sepsis  See PNA    Hyperuricemia  One dose of Rasburicase today and continue allopurinol    Hypercalcemia  - Improving  -Pamidronate 9/27  -Stopped IVF d/t hypoxia    Stage 3 chronic kidney disease  Renal function stable based on few readings in system.   At baseline Cr ~1.2    Cancer related pain  Oxy 5mg PO PRN Q4  If unrelieved with PO, morphine IV ordered for breakthrough  Starting on bowel regimen.     Pneumonia of left lower lobe due to infectious organism  Suspected post obstructive pneumonia in the setting of malignancy  Coverage with Piperacillin-tazo and azithro will cover most likely organisms, will continue, likely 7 day course for Zosyn (start 9/25), Azithromycin discontinued today  Cultures NGTD    Ocular myasthenia gravis  Continue pyridostigmine TID        VTE Risk Mitigation (From admission, onward)         Ordered     IP VTE HIGH RISK PATIENT  Once      09/25/19 1455     Place sequential compression device  Until discontinued      09/25/19 1455                Disposition: continue care in the BMT unit.     Maxim Rouse MD  Bone Marrow Transplant  [facility]

## 2019-09-29 NOTE — PLAN OF CARE
Plan of care reviewed with the patient and wife at the beginning of shift and PRN.  The patient is AAOx4 when awake. GCS 15. Pt did experience an episode of acute confusion during the night. VSS. NPO. IV ABX. continued. Delirium and Fall  precautions  observed. Bed in low locked position, bed alarm set. Call light within reach. Will continue to monitor.

## 2019-09-29 NOTE — PT/OT/SLP EVAL
Speech Language Pathology Evaluation  Bedside Swallow    Patient Name:  Robe Cevallos   MRN:  [id number]  Admitting Diagnosis: . The primary encounter diagnosis was DLBCL (diffuse large B cell lymphoma). Diagnoses of Abdominal pain, Pneumonia of left lower lobe due to infectious organism, (HFpEF) heart failure with preserved ejection fraction, Lymphoma, Tumor lysis syndrome, Hypercalcemia , Cancer related pain, and Diffuse large B-cell lymphoma of intrathoracic lymph nodes ands/p thoracentesis (9/27/19) also pertinent to this visit.     Recommendations:                 General Recommendations:  Ongoing swallow assessment and pending progress Modified barium swallow study  Diet recommendations:  NPO, NPO   Aspiration Precautions: Alternate means of nutrition/hydration, Eliminate distractions, Frequent oral care, Ice chips sparingly, Monitor for s/s of aspiration, Small bites/sips and Strict aspiration precautions and remain upright at least 30-60 minutes following any PO (ice chips) for comfort   General Precautions: Standard, aspiration, respiratory  Communication strategies:  go to room if call light pushed    History:     Past Medical History:   Diagnosis Date    AMD (age-related macular degeneration), bilateral     Cancer 2004    Lymphoma    Hypertension     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 Jolley    CATARACT EXTRACTION W/  INTRAOCULAR LENS IMPLANT Left 04/20/2017    Dr. Jolley    CHOLECYSTECTOMY      EYE SURGERY      Rt cataract    KNEE ARTHROSCOPY      Lt knee    TONSILLECTOMY         Social History: Patient lives with Spouse in home in [place], [place]. Roles include Spouse, father.     Modified Barium Swallow: none prior at this facility     Chest X-Rays: 9/27/2019: Decreased left effusion.  No pneumothorax.  " Improved left upper lung aeration with persistent dense opacity at the left base.    Prior diet: regular, thin. Pt reported decreased appetite and pain in sternum ~ past 3 weeks prior to admit    Occupation/hobbies/homemaking: retired    Subjective     Pt reviewed with RN, RN reported Pt placed on clear liquids, ok for tx  Pt presents calm  He explains, "I coughed up some stuff, it was blood tinged, they said that would happen from the pneumonia."   Patient goals: to drink     Pain/Comfort:  · Pain Rating 1: 0/10    Objective:   Pt found awake and alert in bed with peripheral IVs in place and completed lunch meal tray for clear liquid diet at bedside.  Spouse in room at start of session then exited to doorway to greet family outside of room.      Oral Musculature Evaluation  · Dentition: scattered dentition  · Secretion Management: adequate  · Mucosal Quality: dry  · Mandibular Strength and Mobility: WNL  · Oral Labial Strength and Mobility: functional retraction  · Lingual Strength and Mobility: functional protrusion, impaired left lateral movement, impaired right lateral movement  · Volitional Cough: elicited, non productive   · Volitional Swallow: elicited, timely   · Voice Prior to PO Intake: mildly hoarse  · Oral Musculature Comments: +xerostomia    Bedside Swallow Eval:   Consistencies Assessed:  · Thin liquids ice chipx1, cup edge sips x3         NOTE: Trials held to clear liquids only per MD orders for clears only     Oral Phase:   · Dry mouth    Pharyngeal Phase:   · delayed, non-producitve cough x1 ~ 5 minutes following PO trials     Compensatory Strategies  · small sips    Treatment: Pt with delayed cough, mildly hoarse vocal quality and esophageal concerns for dysphagia. SLP unable to r/o aspiration at the bedside. SLP unable to r/o aspiration with trials of clear liquids this service day. SLP educated Pt on SLP role, S/S aspiration, aspiration precautions and need for ongoing swallow assessment pending " progress. He verbalized partial understanding and would benefit from ongoing review. RN in room at end of session and educated on findings/recs. SLP reviewed findings with Pt's spouse in doorway at end of session. No questions noted. Whiteboard updated. Pt remained with RN at bedside upon SLP exit. MD team notified of findings/recs following session.     Assessment:     Robe Cevallos is a 79 y.o. male with an SLP diagnosis of risk of aspiration and esophageal concerns for dysphagia.  He presents with delayed, unproductive cough with trials of clear liquids this service day.  Findings reviewed with MD team. Pending progress, he might warrant further objective assessment of swallow fx via MBSS to determine safety/feasibility of PO advancement.     Goals:   Multidisciplinary Problems     SLP Goals        Problem: SLP Goal    Goal Priority Disciplines Outcome   SLP Goal     SLP Ongoing, Progressing   Description:  Speech Language Pathology Goals  Goals expected to be met by 10/06/2019  1. Pt will participate in ongoing swallow assessment   2. Educate Pt and family on S/S aspiration and aspiration precautions                       Plan:     · Patient to be seen:  4 x/week   · Plan of Care expires:  10/29/19  · Plan of Care reviewed with:      · SLP Follow-Up:  Yes       Discharge recommendations:  other (see comments)(pending progress)     Time Tracking:     SLP Treatment Date:   09/29/19  Speech Start Time:  1624  Speech Stop Time:  1645     Speech Total Time (min):  21 min    Billable Minutes: Eval Swallow and Oral Function 21    RO Ch., [facility]-SLP  Speech-Language Pathology  Pager: [phone]      09/29/2019

## 2019-09-29 NOTE — ASSESSMENT & PLAN NOTE
Suspected post obstructive pneumonia in the setting of malignancy  Coverage with Piperacillin-tazo and azithro will cover most likely organisms, will continue, likely 7 day course for Zosyn (start 9/25), Azithromycin discontinued today  Cultures NGTD

## 2019-09-30 DIAGNOSIS — C83.32 DIFFUSE LARGE B-CELL LYMPHOMA OF INTRATHORACIC LYMPH NODES: Primary | ICD-10-CM

## 2019-09-30 PROBLEM — R13.19 OTHER DYSPHAGIA: Status: ACTIVE | Noted: 2019-09-30

## 2019-09-30 LAB
ALBUMIN SERPL BCP-MCNC: 3.2 G/DL (ref 3.5–5.2)
ALP SERPL-CCNC: 108 U/L (ref 55–135)
ALT SERPL W/O P-5'-P-CCNC: 47 U/L (ref 10–44)
ANION GAP SERPL CALC-SCNC: 12 MMOL/L (ref 8–16)
AST SERPL-CCNC: 32 U/L (ref 10–40)
BACTERIA BLD CULT: NORMAL
BASOPHILS # BLD AUTO: 0.01 K/UL (ref 0–0.2)
BASOPHILS NFR BLD: 0.1 % (ref 0–1.9)
BILIRUB SERPL-MCNC: 0.8 MG/DL (ref 0.1–1)
BUN SERPL-MCNC: 27 MG/DL (ref 8–23)
CALCIUM SERPL-MCNC: 8.9 MG/DL (ref 8.7–10.5)
CHLORIDE SERPL-SCNC: 104 MMOL/L (ref 95–110)
CO2 SERPL-SCNC: 27 MMOL/L (ref 23–29)
CREAT SERPL-MCNC: 1 MG/DL (ref 0.5–1.4)
DIFFERENTIAL METHOD: ABNORMAL
EOSINOPHIL # BLD AUTO: 0 K/UL (ref 0–0.5)
EOSINOPHIL NFR BLD: 0.2 % (ref 0–8)
ERYTHROCYTE [DISTWIDTH] IN BLOOD BY AUTOMATED COUNT: 13.9 % (ref 11.5–14.5)
EST. GFR  (AFRICAN AMERICAN): >60 ML/MIN/1.73 M^2
EST. GFR  (NON AFRICAN AMERICAN): >60 ML/MIN/1.73 M^2
FLOW CYTOMETRY ANTIBODIES ANALYZED - FLUID: NORMAL
FLOW CYTOMETRY COMMENT - FLUID: NORMAL
FLOW CYTOMETRY INTERPRETATION - FLUID: NORMAL
FLUID TYPE: NORMAL
GLUCOSE SERPL-MCNC: 114 MG/DL (ref 70–110)
HCT VFR BLD AUTO: 41.9 % (ref 40–54)
HGB BLD-MCNC: 14.1 G/DL (ref 14–18)
IMM GRANULOCYTES # BLD AUTO: 0.07 K/UL (ref 0–0.04)
IMM GRANULOCYTES NFR BLD AUTO: 0.7 % (ref 0–0.5)
INR PPP: 1.1 (ref 0.8–1.2)
LYMPHOCYTES # BLD AUTO: 0.8 K/UL (ref 1–4.8)
LYMPHOCYTES NFR BLD: 7.9 % (ref 18–48)
MAGNESIUM SERPL-MCNC: 2.3 MG/DL (ref 1.6–2.6)
MCH RBC QN AUTO: 28.6 PG (ref 27–31)
MCHC RBC AUTO-ENTMCNC: 33.7 G/DL (ref 32–36)
MCV RBC AUTO: 85 FL (ref 82–98)
MONOCYTES # BLD AUTO: 0.8 K/UL (ref 0.3–1)
MONOCYTES NFR BLD: 8.3 % (ref 4–15)
NEUTROPHILS # BLD AUTO: 8.2 K/UL (ref 1.8–7.7)
NEUTROPHILS NFR BLD: 82.8 % (ref 38–73)
NRBC BLD-RTO: 0 /100 WBC
PHOSPHATE SERPL-MCNC: 2.8 MG/DL (ref 2.7–4.5)
PLATELET # BLD AUTO: 231 K/UL (ref 150–350)
PMV BLD AUTO: 10.1 FL (ref 9.2–12.9)
POTASSIUM SERPL-SCNC: 3.8 MMOL/L (ref 3.5–5.1)
PROT SERPL-MCNC: 6.2 G/DL (ref 6–8.4)
PROTHROMBIN TIME: 11.3 SEC (ref 9–12.5)
RBC # BLD AUTO: 4.93 M/UL (ref 4.6–6.2)
SODIUM SERPL-SCNC: 143 MMOL/L (ref 136–145)
URATE SERPL-MCNC: <0.5 MG/DL (ref 3.4–7)
WBC # BLD AUTO: 9.89 K/UL (ref 3.9–12.7)

## 2019-09-30 PROCEDURE — 84100 ASSAY OF PHOSPHORUS: CPT

## 2019-09-30 PROCEDURE — 20600001 HC STEP DOWN PRIVATE ROOM

## 2019-09-30 PROCEDURE — 27000221 HC OXYGEN, UP TO 24 HOURS

## 2019-09-30 PROCEDURE — 25000242 PHARM REV CODE 250 ALT 637 W/ HCPCS: Performed by: STUDENT IN AN ORGANIZED HEALTH CARE EDUCATION/TRAINING PROGRAM

## 2019-09-30 PROCEDURE — 82955 ASSAY OF G6PD ENZYME: CPT

## 2019-09-30 PROCEDURE — 25000003 PHARM REV CODE 250: Performed by: STUDENT IN AN ORGANIZED HEALTH CARE EDUCATION/TRAINING PROGRAM

## 2019-09-30 PROCEDURE — S0028 INJECTION, FAMOTIDINE, 20 MG: HCPCS | Performed by: STUDENT IN AN ORGANIZED HEALTH CARE EDUCATION/TRAINING PROGRAM

## 2019-09-30 PROCEDURE — 99900035 HC TECH TIME PER 15 MIN (STAT)

## 2019-09-30 PROCEDURE — 94761 N-INVAS EAR/PLS OXIMETRY MLT: CPT

## 2019-09-30 PROCEDURE — 97535 SELF CARE MNGMENT TRAINING: CPT

## 2019-09-30 PROCEDURE — 85610 PROTHROMBIN TIME: CPT

## 2019-09-30 PROCEDURE — 63600175 PHARM REV CODE 636 W HCPCS: Performed by: INTERNAL MEDICINE

## 2019-09-30 PROCEDURE — 85025 COMPLETE CBC W/AUTO DIFF WBC: CPT

## 2019-09-30 PROCEDURE — 80053 COMPREHEN METABOLIC PANEL: CPT

## 2019-09-30 PROCEDURE — 99233 PR SUBSEQUENT HOSPITAL CARE,LEVL III: ICD-10-PCS | Mod: ,,, | Performed by: INTERNAL MEDICINE

## 2019-09-30 PROCEDURE — 84550 ASSAY OF BLOOD/URIC ACID: CPT

## 2019-09-30 PROCEDURE — 99233 SBSQ HOSP IP/OBS HIGH 50: CPT | Mod: ,,, | Performed by: INTERNAL MEDICINE

## 2019-09-30 PROCEDURE — 97165 OT EVAL LOW COMPLEX 30 MIN: CPT

## 2019-09-30 PROCEDURE — 36415 COLL VENOUS BLD VENIPUNCTURE: CPT

## 2019-09-30 PROCEDURE — 83735 ASSAY OF MAGNESIUM: CPT

## 2019-09-30 PROCEDURE — 63600175 PHARM REV CODE 636 W HCPCS: Performed by: RADIOLOGY

## 2019-09-30 PROCEDURE — 63600175 PHARM REV CODE 636 W HCPCS: Performed by: STUDENT IN AN ORGANIZED HEALTH CARE EDUCATION/TRAINING PROGRAM

## 2019-09-30 PROCEDURE — 94640 AIRWAY INHALATION TREATMENT: CPT

## 2019-09-30 RX ORDER — HEPARIN 100 UNIT/ML
300 SYRINGE INTRAVENOUS
Status: DISCONTINUED | OUTPATIENT
Start: 2019-09-30 | End: 2019-10-03 | Stop reason: HOSPADM

## 2019-09-30 RX ORDER — IPRATROPIUM BROMIDE AND ALBUTEROL SULFATE 2.5; .5 MG/3ML; MG/3ML
3 SOLUTION RESPIRATORY (INHALATION) ONCE
Status: COMPLETED | OUTPATIENT
Start: 2019-09-30 | End: 2019-09-30

## 2019-09-30 RX ORDER — MIDAZOLAM HYDROCHLORIDE 1 MG/ML
INJECTION INTRAMUSCULAR; INTRAVENOUS CODE/TRAUMA/SEDATION MEDICATION
Status: COMPLETED | OUTPATIENT
Start: 2019-09-30 | End: 2019-09-30

## 2019-09-30 RX ORDER — ONDANSETRON 2 MG/ML
8 INJECTION INTRAMUSCULAR; INTRAVENOUS ONCE
Status: COMPLETED | OUTPATIENT
Start: 2019-09-30 | End: 2019-09-30

## 2019-09-30 RX ORDER — HYDRALAZINE HYDROCHLORIDE 25 MG/1
25 TABLET, FILM COATED ORAL ONCE
Status: DISCONTINUED | OUTPATIENT
Start: 2019-09-30 | End: 2019-09-30

## 2019-09-30 RX ORDER — DOXORUBICIN HYDROCHLORIDE 2 MG/ML
25 INJECTION, SOLUTION INTRAVENOUS
Status: COMPLETED | OUTPATIENT
Start: 2019-09-30 | End: 2019-09-30

## 2019-09-30 RX ORDER — HYDRALAZINE HYDROCHLORIDE 20 MG/ML
10 INJECTION INTRAMUSCULAR; INTRAVENOUS EVERY 8 HOURS PRN
Status: DISCONTINUED | OUTPATIENT
Start: 2019-09-30 | End: 2019-10-01

## 2019-09-30 RX ORDER — SODIUM CHLORIDE 0.9 % (FLUSH) 0.9 %
10 SYRINGE (ML) INJECTION
Status: DISCONTINUED | OUTPATIENT
Start: 2019-09-30 | End: 2019-10-03 | Stop reason: HOSPADM

## 2019-09-30 RX ADMIN — DOXORUBICIN HYDROCHLORIDE 54 MG: 2 INJECTION, SOLUTION INTRAVENOUS at 10:09

## 2019-09-30 RX ADMIN — PIPERACILLIN AND TAZOBACTAM 4.5 G: 4; .5 INJECTION, POWDER, LYOPHILIZED, FOR SOLUTION INTRAVENOUS; PARENTERAL at 10:09

## 2019-09-30 RX ADMIN — ONDANSETRON 8 MG: 2 INJECTION INTRAMUSCULAR; INTRAVENOUS at 09:09

## 2019-09-30 RX ADMIN — DEXAMETHASONE SODIUM PHOSPHATE 20 MG: 10 INJECTION INTRAMUSCULAR; INTRAVENOUS at 09:09

## 2019-09-30 RX ADMIN — IPRATROPIUM BROMIDE AND ALBUTEROL SULFATE 3 ML: .5; 3 SOLUTION RESPIRATORY (INHALATION) at 02:09

## 2019-09-30 RX ADMIN — FAMOTIDINE 20 MG: 10 INJECTION, SOLUTION INTRAVENOUS at 08:09

## 2019-09-30 RX ADMIN — MIDAZOLAM HYDROCHLORIDE 0.5 MG: 1 INJECTION, SOLUTION INTRAMUSCULAR; INTRAVENOUS at 05:09

## 2019-09-30 RX ADMIN — FAMOTIDINE 20 MG: 10 INJECTION, SOLUTION INTRAVENOUS at 09:09

## 2019-09-30 RX ADMIN — IPRATROPIUM BROMIDE AND ALBUTEROL SULFATE 3 ML: .5; 3 SOLUTION RESPIRATORY (INHALATION) at 01:09

## 2019-09-30 RX ADMIN — IPRATROPIUM BROMIDE AND ALBUTEROL SULFATE 3 ML: .5; 3 SOLUTION RESPIRATORY (INHALATION) at 07:09

## 2019-09-30 RX ADMIN — AZITHROMYCIN MONOHYDRATE 500 MG: 500 INJECTION, POWDER, LYOPHILIZED, FOR SOLUTION INTRAVENOUS at 09:09

## 2019-09-30 RX ADMIN — CYCLOPHOSPHAMIDE 850 MG: 1 INJECTION, POWDER, FOR SOLUTION INTRAVENOUS; ORAL at 10:09

## 2019-09-30 RX ADMIN — PIPERACILLIN AND TAZOBACTAM 4.5 G: 4; .5 INJECTION, POWDER, LYOPHILIZED, FOR SOLUTION INTRAVENOUS; PARENTERAL at 06:09

## 2019-09-30 NOTE — ASSESSMENT & PLAN NOTE
-The patient has DLBCL based on the path report from [facility] and left supraclavicular LN biopsied on 9/20/19  -Path has been scanned into the media tab  -This is likely a transformation from his follicular lymphoma diagnosed and treated back in 2004  -BM biopsy from 9/26 results pending  -ECHO done 9/26, EF 60%  -Requested pathology from [facility] on 9/26 to determine subtype additional genetic features which may alter treatment  -CT neck to complete staging -hold off for now. Had CTA chest and CT A/P done at [facility]  -CBC, CMP, phos, uric acid daily. S/p rasburicase 9/29.  -Treatment:   -Rad onc consult given airway and esophagus compression, s/p SIM and started RT 9/26, was planned 25-35 Gy in 10 fractions however we did speak to Dr. Montgomery  and we discussed holding off for now and monitor response to chemotherapy   -Start decadron 20mg x4 days d4/4, and vincristine 9/27   -IR consulted, tunneled PICC placed 9/30   -Adriamycin and cytoxan started over night   -Will need outpatient neulasta (granix script sent 9/30 in case [facility] window for neulasta) and rituxan   -PT/OT consulted

## 2019-09-30 NOTE — PROGRESS NOTES
Pt arrived to room 189 for insertion PICC . Pt awake, alert, and oriented. Awaiting consent. Patient ID'd using 2 identifiers.

## 2019-09-30 NOTE — PLAN OF CARE
Free from falls and injury this shift. Bed in low, locked position with call light in reach. Encouraged to call for assistance when getting out of bed. Patient verbalized understanding. All belongings within reach.Bedbound with bed alarm. Afebrile. IV Abx continued. precautions maintained. will continue to monitor.

## 2019-09-30 NOTE — PT/OT/SLP EVAL
"Occupational Therapy   Evaluation    Name: Robe Cevallos  MRN: [id number]  Admitting Diagnosis:  Diffuse large B-cell lymphoma of intrathoracic lymph nodes      Recommendations:     Discharge Recommendations: home health OT, home health PT  Discharge Equipment Recommendations:  walker, rolling  Barriers to discharge:  Decreased caregiver support(Pt reports wife has Parkinson's and has limtied ability to assist physically)    Assessment:     Robe Cevallos is a 79 y.o. male with a medical diagnosis of Diffuse large B-cell lymphoma of intrathoracic lymph nodes.  He presents with deconditioning, GOODMAN requiring increased time and v/c for PLB to recover. Performance deficits affecting function: weakness, impaired endurance, impaired self care skills, impaired functional mobilty, gait instability, impaired balance, impaired cognition, decreased upper extremity function, decreased lower extremity function, impaired cardiopulmonary response to activity.      Rehab Prognosis: Good; patient would benefit from acute skilled OT services to address these deficits and reach maximum level of function.       Plan:     Patient to be seen 3 x/week to address the above listed problems via self-care/home management, therapeutic activities, therapeutic exercises  · Plan of Care Expires: 10/15/19  · Plan of Care Reviewed with: patient, spouse    Subjective     Chief Complaint: Pt c/o mild SOB upon LE dressing and functional mobility   Patient/Family Comments/goals: To return to   PLOF    Occupational Profile:  Living Environment: Pt lives with spouse in 2SH, pt's typical bed and bath on 2nd floor but bed/bath available on first floor if necessary, tub/sh combo and WIS with SC  Previous level of function: Indep for ADLs and functional mobility   Roles and Routines: Caretaker to self and home, cooks, cleans, drives, does yard work, golfs "as much as he can"  Equipment Used at Home:  shower chair(life alert button; has BSC, SPC, w/c but does " not currently use)  Assistance upon Discharge: Family, pt states that spouse has Parkinson's and cannot provide much physical assistance but that adult children and grandchildren are currently staying with them to assist if necessary upon return home.    Pain/Comfort:  · Pain Rating 1: 0/10    Patients cultural, spiritual, Mormon conflicts given the current situation:      Objective:     Communicated with: nsg prior to session.  Patient found seated EOB, performing sit<>stand t/f to standing scale with nsg with peripheral IV, telemetry, oxygen upon OT entry to room.    General Precautions: Standard, fall, aspiration, respiratory   Orthopedic Precautions:N/A   Braces: N/A     Occupational Performance:    Bed Mobility:    · Patient completed Rolling/Turning to Right with modified independence and supervision  · Patient completed Scooting/Bridging with modified independence and supervision  · Patient completed Sit to Supine with modified independence and supervision    Functional Mobility/Transfers:  · Patient completed Sit <> Stand Transfer with min A using HHA; improved to with contact guard assistance  with  rolling walker   Functional Mobility: Pt with fair to fair+ dynamic seated and standing balance. Pt ambulated to door in room with RW CGA, min/mod v/c for line management as pt with active PIV and O2 tubing.    Activities of Daily Living:  · Upper Body Dressing: minimum assistance to don gown as robe 2/2 active PIV  · Lower Body Dressing: stand by assistance to don B socks and to thread BLE into underpants; CGA for waistband management of underpants    Cognitive/Visual Perceptual:  Cognitive/Psychosocial Skills:     -       Oriented to: Person, Place, Time and Situation though RN reports pt with occasional instances of confusion/ altered safety awareness  -       Follows Commands/attention:Follows multistep  commands  -       Communication: clear/fluent  -       Memory: No Deficits noted  -       Safety  awareness/insight to disability: intact   -       Mood/Affect/Coping skills/emotional control: Appropriate to situation    Physical Exam:  Postural examination/scapula alignment:    -       Rounded shoulders  Motor Planning:    -       WFL  Dominant hand:    -       R handed  Upper Extremity Range of Motion:   BUE WFL  Upper Extremity Strength: BUE grossly 4/5   Strength: B hands 4+/5  Fine Motor Coordination:    -       Intact  Gross motor coordination:   WFL    AMPAC 6 Click ADL:  AMPAC Total Score: 21    Treatment & Education:  Pt educated on role of OT and POC.   Pt performing skills as listed above.   Pt on 5L O2 via nasal cannula throughout session. Pt observed with nsg stepping onto standing scale with no LOB up or while stepping back from scale. Pt initially asked to remain seated EOB at end of session but 2/2 nsg concerns of possible confusion, pt returned to bed with bed alarm on.  Education:    Patient left HOB elevated with all lines intact, call button in reach, bed alarm on, nsg notified and spouse present    GOALS:   Multidisciplinary Problems     Occupational Therapy Goals        Problem: Occupational Therapy Goal    Goal Priority Disciplines Outcome Interventions   Occupational Therapy Goal     OT, PT/OT Ongoing, Progressing    Description:  Goals to be met by: 10/15/2019      Patient will increase functional independence with ADLs by performing:    UE Dressing with Modified [place].  LE Dressing with Modified [place].  Grooming while standing with Modified [place] and Supervision.  Toileting from toilet with Modified [place] for hygiene and clothing management.   Step transfer with Modified [place] and Supervision  Toilet transfer to toilet with Modified [place] and Supervision.  Increased functional strength to WFL for self care.  Upper extremity exercise program x10 reps per handout, with independence.                      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 Jolley    CATARACT EXTRACTION W/  INTRAOCULAR LENS IMPLANT Left 04/20/2017    Dr. Jolley    CHOLECYSTECTOMY      EYE SURGERY      Rt cataract    KNEE ARTHROSCOPY      Lt knee    TONSILLECTOMY         Time Tracking:     OT Date of Treatment: 09/30/19  OT Start Time: 1434  OT Stop Time: 1506  OT Total Time (min): 32 min    Billable Minutes:Evaluation 16  Self Care/Home Management 16    Becca Donnelly OT  9/30/2019

## 2019-09-30 NOTE — H&P
Inpatient Radiology Pre-procedure Note    History of Present Illness:  Robe Cevallos is a 79 y.o. male with h/o 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 Jojo Carcamo with R-CVP followed by maintenance rituxan who presented to the ED on [facility] with severe abdominal pain.  Patient had a CT of the chest on 9/16 which showed a 9.8 x 3.8 x 13 cm size posterior mediastinal mass. He underwent lymph nose biopsy of supraclavicular node on 9/20, returned DLBCL (see media tab).     IR consulted for placement of tunneled PICC for medication administration.    Admission H&P reviewed.    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 Jolley    CATARACT EXTRACTION W/  INTRAOCULAR LENS IMPLANT Left 04/20/2017    Dr. Jolley    CHOLECYSTECTOMY      EYE SURGERY      Rt cataract    KNEE ARTHROSCOPY      Lt knee    TONSILLECTOMY         Review of Systems:   As documented in primary team H&P    Home Meds:   Prior to Admission medications    Medication Sig Start Date End Date Taking? Authorizing Provider   albuterol (PROVENTIL/VENTOLIN HFA) 90 mcg/actuation inhaler Inhale 2 puffs into the lungs every 6 (six) hours as needed for Wheezing. Rescue   Yes Historical Provider, MD   azelastine (ASTELIN) 137 mcg (0.1 %) nasal spray 1 spray (137 mcg total) by Nasal route 2 (two) times daily. 9/12/19 9/11/20 Yes Sumi Bui MD   pyridostigmine (MESTINON) 60 mg Tab Take 60 mg by mouth 3 (three) times daily.    Yes Historical Provider, MD   fluocinonide 0.05% (LIDEX) 0.05 % cream KARYNA EXT AA ON CHEST BID 7/19/19   Historical Provider, MD   lisinopril 10 MG tablet Take 10 mg by mouth  once daily.    Historical Provider, MD   meclizine (ANTIVERT) 25 mg tablet Take 1 tablet (25 mg total) by mouth 3 (three) times daily as needed. 7/23/19   Umberto Payton MD   sulfamethoxazole-trimethoprim 800-160mg (BACTRIM DS) 800-160 mg Tab Take 1 tablet by mouth 2 (two) times daily.    Historical Provider, MD   tbo-filgrastim (GRANIX) 480 mcg/0.8 mL Syrg Inject 0.8 mLs (480 mcg total) into the skin once daily. 9/30/19   Aleja Cuevas MD     Scheduled Meds:    albuterol-ipratropium  3 mL Nebulization Q6H WAKE    allopurinol  300 mg Oral BID    azithromycin  500 mg Intravenous Q24H    famotidine (PF)  20 mg Intravenous BID    piperacillin-tazobactam (ZOSYN) IVPB  4.5 g Intravenous Q8H    pyridostigmine  60 mg Oral TID     Continuous Infusions:   PRN Meds:acetaminophen, influenza, morphine, ondansetron, oxyCODONE, pneumoc 13-oscar conj-dip cr(PF), sodium chloride 0.9%  Anticoagulants/Antiplatelets: no anticoagulation    Allergies: Review of patient's allergies indicates:  No Known Allergies  Sedation Hx: have not been any systemic reactions    Labs:  Recent Labs   Lab 09/30/19  0900   INR 1.1       Recent Labs   Lab 09/30/19  0900   WBC 9.89   HGB 14.1   HCT 41.9   MCV 85         Recent Labs   Lab 09/30/19  0900   *      K 3.8      CO2 27   BUN 27*   CREATININE 1.0   CALCIUM 8.9   MG 2.3   ALT 47*   AST 32   ALBUMIN 3.2*   BILITOT 0.8         Vitals:  Temp: 98.2 °F (36.8 °C) (09/30/19 1212)  Pulse: 93 (09/30/19 1538)  Resp: 18 (09/30/19 1304)  BP: (!) 160/91 (09/30/19 1212)  SpO2: (!) 93 % (09/30/19 1212)     Physical Exam:  ASA: 3  Mallampati: 2    General: no acute distress  Mental Status: alert and oriented to person, place and time  HEENT: normocephalic, atraumatic  Chest: unlabored breathing  Heart: regular heart rate  Abdomen: nondistended  Extremity: moves all extremities      Plan:  Sedation Plan: up to moderate  Patient will undergo tunneled PICC.      Avila Vann MD,  MS  Radiology  PGY-2  Pager: [phone]

## 2019-09-30 NOTE — PROGRESS NOTES
[facility]  Hematology  Bone Marrow Transplant  Progress Note    Patient Name: Robe Cevallos  Admission Date: 9/25/2019  Hospital Length of Stay: 5 days  Code Status: DNR    Subjective:     Interval History: Patient seen this morning, AOx3, less confused but thinks he is in hospital for lung cancer and lymphoma. Denies any pain. Breathing stable. Had liquid BM yesterday. Wife at bedside. To have tunneled PICC placed today, and barium swallow.    Objective:     Vital Signs (Most Recent):  Temp: 98.2 °F (36.8 °C) (09/30/19 0830)  Pulse: 89 (09/30/19 0830)  Resp: 18 (09/30/19 0830)  BP: (!) 153/82 (09/30/19 0830)  SpO2: (!) 92 % (09/30/19 0830) Vital Signs (24h Range):  Temp:  [97.6 °F (36.4 °C)-98.3 °F (36.8 °C)] 98.2 °F (36.8 °C)  Pulse:  [] 89  Resp:  [16-22] 18  SpO2:  [91 %-96 %] 92 %  BP: (150-165)/(78-91) 153/82     Weight: 89.4 kg (197 lb 3.2 oz)  Body mass index is 27.5 kg/m².  Body surface area is 2.12 meters squared.    ECOG SCORE         [unfilled]    Intake/Output - Last 3 Shifts       09/28 0700 - 09/29 0659 09/29 0700 - 09/30 0659 09/30 0700 - 10/01 0659    P.O.       IV Piggyback  300     Total Intake(mL/kg)  300 (3.4)     Urine (mL/kg/hr) 1550 (0.7) 1000 (0.5) 600 (1.8)    Other       Total Output 1550 1000 600    Net -1550 -700 -600           Stool Occurrence  1 x           Physical Exam   Constitutional: He is oriented to person, place, and time. He appears well-developed and well-nourished. No distress.   HENT:   Head: Normocephalic and atraumatic.   Mouth/Throat: No oropharyngeal exudate.   Eyes: Pupils are equal, round, and reactive to light. EOM are normal. No scleral icterus.   Cardiovascular: Normal rate and regular rhythm.   Pulmonary/Chest: Effort normal. No respiratory distress. He has no wheezes. He has no rales.   On nasal cannula, decreased breath sounds left>right   Abdominal: Soft. He exhibits no distension. There is no tenderness.   Musculoskeletal: Normal  range of motion. He exhibits no edema.   Lymphadenopathy:     He has no cervical adenopathy.   Neurological: He is alert and oriented to person, place, and time.   Skin: Skin is warm and dry.   Psychiatric: He has a normal mood and affect. His behavior is normal.   Nursing note and vitals reviewed.      Significant Labs:   CBC:   Recent Labs   Lab 09/29/19  0705 09/30/19  0900   WBC 10.20 9.89   HGB 13.6* 14.1   HCT 43.9 41.9    231   , CMP:   Recent Labs   Lab 09/29/19  0705 09/30/19  0900    143   K 4.2 3.8    104   CO2 24 27   * 114*   BUN 37* 27*   CREATININE 1.2 1.0   CALCIUM 9.5 8.9   PROT 6.1 6.2   ALBUMIN 3.0* 3.2*   BILITOT 0.8 0.8   ALKPHOS 119 108   AST 29 32   ALT 38 47*   ANIONGAP 12 12   EGFRNONAA 56.4* >60.0     Assessment/Plan:     * Diffuse large B-cell lymphoma of intrathoracic lymph nodes  -The patient has DLBCL based on the path report from [facility] and left supraclavicular LN biopsied on 9/20/19  -Path has been scanned into the media tab  -This is likely a transformation from his follicular lymphoma diagnosed and treated back in 2004  -BM biopsy from 9/26 results pending  -ECHO done 9/26, EF 60%  -Requested pathology from [facility] on 9/26 to determine subtype additional genetic features which may alter treatment  -CT neck to complete staging -hold off for now. Had CTA chest and CT A/P done at [facility]  -CBC, CMP, phos, uric acid daily. S/p rasburicase 9/29.  -Treatment:   -Rad onc consult given airway and esophagus compression, s/p SIM and started RT 9/26, was planned 25-35 Gy in 10 fractions however we did speak to Dr. Montgomery  and we discussed holding off for now and monitor response to chemotherapy   -Start decadron 20mg x4 days d4/4, and vincristine 9/27   -IR consulted, tunneled PICC to be placed today   -Adriamycin and cytoxan likely today after PICC placement   -Will need outpatient neulasta (granix script sent 9/30 in case [facility] window for neulasta) and  rituxan   -PT/OT consulted    Other dysphagia  - Seen by ST 9/29  - Barium swallow ordered    Acute respiratory failure with hypoxia  Likely source is lymphoma, pneumonia, pleural effusion, compressive atelectasis  BiPAP PRN  S/p thoracentesis with drainage of 2 liters of exudative fluid on 9/27  Radiation initiated 9/26 will hold off for now given clinical improvement with thoracentesis   Patient DNR    Sepsis  See PNA    Hyperuricemia  One dose of Rasburicase given 9/29 and continue allopurinol    Hypercalcemia  - Improving  -Pamidronate 9/27    Stage 3 chronic kidney disease  Renal function stable based on few readings in system.   At baseline Cr ~1.2    Cancer related pain  Oxy 5mg PO PRN Q4  If unrelieved with PO, morphine IV ordered for breakthrough  Starting on bowel regimen.     Pneumonia of left lower lobe due to infectious organism  Suspected post obstructive pneumonia in the setting of malignancy  Coverage with Piperacillin-tazo and azithro will cover most likely organisms, will continue, planned 7 day course for Zosyn (end date 10/1) + Azithromycin (end date 10/2)  Cultures NGTD    Ocular myasthenia gravis  Continue pyridostigmine TID      VTE Risk Mitigation (From admission, onward)         Ordered     IP VTE HIGH RISK PATIENT  Once      09/25/19 1455     Place sequential compression device  Until discontinued      09/25/19 1455                Disposition: pending clinical improvement    The following was staffed and discussed with supervising physician Dr. Cross.    Aleja Cuevas MD PGY-V  Hematology/Oncology Fellow

## 2019-09-30 NOTE — PT/OT/SLP PROGRESS
Speech Language Pathology      Robe Cevallos  MRN: [id number]    SLP reviewed Pt with RN. Patient not seen today secondary to Other (Pt NPO for procedure). ST to continue to monitor and re-attempt per POC.      RO Ch., [facility]3]-SLP  Speech-Language Pathology  Pager: [phone]  9/30/2019

## 2019-09-30 NOTE — PROGRESS NOTES
placement of tunneled PICC complete. Pt tolerated well. VSS. No signs or symptoms of distress noted.X-ray confirmed placement and report called to floor charge nurse Campuzano.  Awaiting transport.

## 2019-09-30 NOTE — ASSESSMENT & PLAN NOTE
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 [place] [facility]  [place]CBC, CMP, phos, uric acid daily. S/p rasburicase 9/29.  -Treatment:   -Rad onc consult given airway and esophagus compression, s/p SIM and started RT 9/26, was planned 25-35 Gy in 10 fractions however we did speak to Dr. Montgomery  and we discussed holding off for now and monitor response to chemotherapy   -Start decadron 20mg x4 days d1/4, and vincristine 9/27   -IR consulted, tunneled PICC to be placed today   -Adriamycin and cytoxan likely today after PICC placement   -PT/OT consulted

## 2019-09-30 NOTE — ASSESSMENT & PLAN NOTE
Likely source is lymphoma, pneumonia, pleural effusion, compressive atelectasis  BiPAP PRN  S/p thoracentesis with drainage of 2 liters of exudative fluid on 9/27  Radiation initiated 9/26 will hold off for now given clinical improvement with thoracentesis   Patient DNR

## 2019-09-30 NOTE — ASSESSMENT & PLAN NOTE
Suspected post obstructive pneumonia in the setting of malignancy  Coverage with Piperacillin-tazo and azithro will cover most likely organisms, will continue, planned 7 day course for Zosyn (end date 10/1) + Azithromycin (end date 10/2)  Cultures NGTD

## 2019-09-30 NOTE — NURSING
"Patient called nurse for assistance feels " SOB". on Assessment pt anxious, AAOX4. /104, P 88, RR 24, Sats 92% on 6LNC.T97.9 oral in NAD.  Skin dry pale and  cool to touch. Breath sounds clear. Notified on Call resident Dr Santana. See orders. Paged Resp Tx to request TX. Follow up pressure 168/100, , Sat 93% rr 18. Pt calm not anxious.will CTM  "

## 2019-09-30 NOTE — PLAN OF CARE
Pt would benefit from continued OT to address deficits in self care and functional mobility. Recommending HHOT/PT; DME needs likely RW pending progress with therapies.

## 2019-09-30 NOTE — SUBJECTIVE & OBJECTIVE
Subjective:     Interval History: Patient seen this morning, AOx3, less confused but thinks he is in hospital for lung cancer and lymphoma. Denies any pain. Breathing stable. Had liquid BM yesterday. Wife at bedside. To have tunneled PICC placed today, and barium swallow.    Objective:     Vital Signs (Most Recent):  Temp: 98.2 °F (36.8 °C) (09/30/19 0830)  Pulse: 89 (09/30/19 0830)  Resp: 18 (09/30/19 0830)  BP: (!) 153/82 (09/30/19 0830)  SpO2: (!) 92 % (09/30/19 0830) Vital Signs (24h Range):  Temp:  [97.6 °F (36.4 °C)-98.3 °F (36.8 °C)] 98.2 °F (36.8 °C)  Pulse:  [] 89  Resp:  [16-22] 18  SpO2:  [91 %-96 %] 92 %  BP: (150-165)/(78-91) 153/82     Weight: 89.4 kg (197 lb 3.2 oz)  Body mass index is 27.5 kg/m².  Body surface area is 2.12 meters squared.    ECOG SCORE         [unfilled]    Intake/Output - Last 3 Shifts       09/28 0700 - 09/29 0659 09/29 0700 - 09/30 0659 09/30 0700 - 10/01 0659    P.O.       IV Piggyback  300     Total Intake(mL/kg)  300 (3.4)     Urine (mL/kg/hr) 1550 (0.7) 1000 (0.5) 600 (1.8)    Other       Total Output 1550 1000 600    Net -1550 -700 -600           Stool Occurrence  1 x           Physical Exam   Constitutional: He is oriented to person, place, and time. He appears well-developed and well-nourished. No distress.   HENT:   Head: Normocephalic and atraumatic.   Mouth/Throat: No oropharyngeal exudate.   Eyes: Pupils are equal, round, and reactive to light. EOM are normal. No scleral icterus.   Cardiovascular: Normal rate and regular rhythm.   Pulmonary/Chest: Effort normal. No respiratory distress. He has no wheezes. He has no rales.   On nasal cannula, decreased breath sounds left>right   Abdominal: Soft. He exhibits no distension. There is no tenderness.   Musculoskeletal: Normal range of motion. He exhibits no edema.   Lymphadenopathy:     He has no cervical adenopathy.   Neurological: He is alert and oriented to person, place, and time.   Skin: Skin is warm and dry.    Psychiatric: He has a normal mood and affect. His behavior is normal.   Nursing note and vitals reviewed.      Significant Labs:   CBC:   Recent Labs   Lab 09/29/19  0705 09/30/19  0900   WBC 10.20 9.89   HGB 13.6* 14.1   HCT 43.9 41.9    231   , CMP:   Recent Labs   Lab 09/29/19  0705 09/30/19  0900    143   K 4.2 3.8    104   CO2 24 27   * 114*   BUN 37* 27*   CREATININE 1.2 1.0   CALCIUM 9.5 8.9   PROT 6.1 6.2   ALBUMIN 3.0* 3.2*   BILITOT 0.8 0.8   ALKPHOS 119 108   AST 29 32   ALT 38 47*   ANIONGAP 12 12   EGFRNONAA 56.4* >60.0

## 2019-10-01 PROBLEM — R74.01 TRANSAMINITIS: Status: ACTIVE | Noted: 2019-10-01

## 2019-10-01 PROBLEM — I10 HYPERTENSION: Status: ACTIVE | Noted: 2019-10-01

## 2019-10-01 PROBLEM — C44.321 SQUAMOUS CELL CARCINOMA OF NOSE: Status: RESOLVED | Noted: 2019-04-23 | Resolved: 2019-10-01

## 2019-10-01 LAB
ALBUMIN SERPL BCP-MCNC: 2.9 G/DL (ref 3.5–5.2)
ALP SERPL-CCNC: 101 U/L (ref 55–135)
ALT SERPL W/O P-5'-P-CCNC: 78 U/L (ref 10–44)
ANION GAP SERPL CALC-SCNC: 7 MMOL/L (ref 8–16)
AST SERPL-CCNC: 48 U/L (ref 10–40)
BASOPHILS # BLD AUTO: 0.01 K/UL (ref 0–0.2)
BASOPHILS NFR BLD: 0.1 % (ref 0–1.9)
BILIRUB SERPL-MCNC: 0.8 MG/DL (ref 0.1–1)
BUN SERPL-MCNC: 25 MG/DL (ref 8–23)
CALCIUM SERPL-MCNC: 8.1 MG/DL (ref 8.7–10.5)
CHLORIDE SERPL-SCNC: 102 MMOL/L (ref 95–110)
CO2 SERPL-SCNC: 27 MMOL/L (ref 23–29)
CREAT SERPL-MCNC: 0.8 MG/DL (ref 0.5–1.4)
DIFFERENTIAL METHOD: ABNORMAL
EOSINOPHIL # BLD AUTO: 0 K/UL (ref 0–0.5)
EOSINOPHIL NFR BLD: 0.2 % (ref 0–8)
ERYTHROCYTE [DISTWIDTH] IN BLOOD BY AUTOMATED COUNT: 13.7 % (ref 11.5–14.5)
EST. GFR  (AFRICAN AMERICAN): >60 ML/MIN/1.73 M^2
EST. GFR  (NON AFRICAN AMERICAN): >60 ML/MIN/1.73 M^2
GLUCOSE SERPL-MCNC: 94 MG/DL (ref 70–110)
HCT VFR BLD AUTO: 40.5 % (ref 40–54)
HGB BLD-MCNC: 13.4 G/DL (ref 14–18)
IMM GRANULOCYTES # BLD AUTO: 0.1 K/UL (ref 0–0.04)
IMM GRANULOCYTES NFR BLD AUTO: 1 % (ref 0–0.5)
LDH SERPL L TO P-CCNC: 425 U/L (ref 110–260)
LYMPHOCYTES # BLD AUTO: 1 K/UL (ref 1–4.8)
LYMPHOCYTES NFR BLD: 9.8 % (ref 18–48)
MAGNESIUM SERPL-MCNC: 2.4 MG/DL (ref 1.6–2.6)
MCH RBC QN AUTO: 28 PG (ref 27–31)
MCHC RBC AUTO-ENTMCNC: 33.1 G/DL (ref 32–36)
MCV RBC AUTO: 85 FL (ref 82–98)
MONOCYTES # BLD AUTO: 0.8 K/UL (ref 0.3–1)
MONOCYTES NFR BLD: 8.6 % (ref 4–15)
NEUTROPHILS # BLD AUTO: 7.8 K/UL (ref 1.8–7.7)
NEUTROPHILS NFR BLD: 80.3 % (ref 38–73)
NRBC BLD-RTO: 0 /100 WBC
PHOSPHATE SERPL-MCNC: 2.7 MG/DL (ref 2.7–4.5)
PLATELET # BLD AUTO: 239 K/UL (ref 150–350)
PMV BLD AUTO: 10.2 FL (ref 9.2–12.9)
POTASSIUM SERPL-SCNC: 4 MMOL/L (ref 3.5–5.1)
PROT SERPL-MCNC: 5.7 G/DL (ref 6–8.4)
RBC # BLD AUTO: 4.79 M/UL (ref 4.6–6.2)
SODIUM SERPL-SCNC: 136 MMOL/L (ref 136–145)
URATE SERPL-MCNC: 1.1 MG/DL (ref 3.4–7)
WBC # BLD AUTO: 9.69 K/UL (ref 3.9–12.7)

## 2019-10-01 PROCEDURE — 85025 COMPLETE CBC W/AUTO DIFF WBC: CPT

## 2019-10-01 PROCEDURE — 63600175 PHARM REV CODE 636 W HCPCS: Performed by: STUDENT IN AN ORGANIZED HEALTH CARE EDUCATION/TRAINING PROGRAM

## 2019-10-01 PROCEDURE — 94664 DEMO&/EVAL PT USE INHALER: CPT

## 2019-10-01 PROCEDURE — 87340 HEPATITIS B SURFACE AG IA: CPT

## 2019-10-01 PROCEDURE — 84100 ASSAY OF PHOSPHORUS: CPT

## 2019-10-01 PROCEDURE — 99900035 HC TECH TIME PER 15 MIN (STAT)

## 2019-10-01 PROCEDURE — 94660 CPAP INITIATION&MGMT: CPT

## 2019-10-01 PROCEDURE — 83735 ASSAY OF MAGNESIUM: CPT

## 2019-10-01 PROCEDURE — 80053 COMPREHEN METABOLIC PANEL: CPT

## 2019-10-01 PROCEDURE — 97161 PT EVAL LOW COMPLEX 20 MIN: CPT

## 2019-10-01 PROCEDURE — 99233 PR SUBSEQUENT HOSPITAL CARE,LEVL III: ICD-10-PCS | Mod: ,,, | Performed by: INTERNAL MEDICINE

## 2019-10-01 PROCEDURE — 99233 SBSQ HOSP IP/OBS HIGH 50: CPT | Mod: ,,, | Performed by: INTERNAL MEDICINE

## 2019-10-01 PROCEDURE — 83615 LACTATE (LD) (LDH) ENZYME: CPT

## 2019-10-01 PROCEDURE — 27000221 HC OXYGEN, UP TO 24 HOURS

## 2019-10-01 PROCEDURE — 84550 ASSAY OF BLOOD/URIC ACID: CPT

## 2019-10-01 PROCEDURE — 25000003 PHARM REV CODE 250: Performed by: STUDENT IN AN ORGANIZED HEALTH CARE EDUCATION/TRAINING PROGRAM

## 2019-10-01 PROCEDURE — 25000242 PHARM REV CODE 250 ALT 637 W/ HCPCS: Performed by: STUDENT IN AN ORGANIZED HEALTH CARE EDUCATION/TRAINING PROGRAM

## 2019-10-01 PROCEDURE — S0028 INJECTION, FAMOTIDINE, 20 MG: HCPCS | Performed by: STUDENT IN AN ORGANIZED HEALTH CARE EDUCATION/TRAINING PROGRAM

## 2019-10-01 PROCEDURE — 63600175 PHARM REV CODE 636 W HCPCS: Performed by: INTERNAL MEDICINE

## 2019-10-01 PROCEDURE — 36415 COLL VENOUS BLD VENIPUNCTURE: CPT

## 2019-10-01 PROCEDURE — 25000003 PHARM REV CODE 250: Performed by: NURSE PRACTITIONER

## 2019-10-01 PROCEDURE — 27000646 HC AEROBIKA DEVICE

## 2019-10-01 PROCEDURE — 94761 N-INVAS EAR/PLS OXIMETRY MLT: CPT

## 2019-10-01 PROCEDURE — 86704 HEP B CORE ANTIBODY TOTAL: CPT

## 2019-10-01 PROCEDURE — 94668 MNPJ CHEST WALL SBSQ: CPT

## 2019-10-01 PROCEDURE — 94640 AIRWAY INHALATION TREATMENT: CPT

## 2019-10-01 PROCEDURE — 20600001 HC STEP DOWN PRIVATE ROOM

## 2019-10-01 PROCEDURE — 92526 ORAL FUNCTION THERAPY: CPT

## 2019-10-01 RX ORDER — PYRIDOSTIGMINE BROMIDE 60 MG/5ML
60 SOLUTION ORAL EVERY 8 HOURS
Status: DISCONTINUED | OUTPATIENT
Start: 2019-10-01 | End: 2019-10-03 | Stop reason: HOSPADM

## 2019-10-01 RX ORDER — LISINOPRIL 10 MG/1
10 TABLET ORAL DAILY
Status: DISCONTINUED | OUTPATIENT
Start: 2019-10-01 | End: 2019-10-03 | Stop reason: HOSPADM

## 2019-10-01 RX ORDER — ALLOPURINOL 100 MG/1
300 TABLET ORAL DAILY
Status: DISCONTINUED | OUTPATIENT
Start: 2019-10-01 | End: 2019-10-01

## 2019-10-01 RX ORDER — HYDRALAZINE HYDROCHLORIDE 20 MG/ML
10 INJECTION INTRAMUSCULAR; INTRAVENOUS EVERY 8 HOURS PRN
Status: DISCONTINUED | OUTPATIENT
Start: 2019-10-01 | End: 2019-10-03 | Stop reason: HOSPADM

## 2019-10-01 RX ADMIN — PIPERACILLIN AND TAZOBACTAM 4.5 G: 4; .5 INJECTION, POWDER, LYOPHILIZED, FOR SOLUTION INTRAVENOUS; PARENTERAL at 02:10

## 2019-10-01 RX ADMIN — IPRATROPIUM BROMIDE AND ALBUTEROL SULFATE 3 ML: .5; 3 SOLUTION RESPIRATORY (INHALATION) at 02:10

## 2019-10-01 RX ADMIN — IPRATROPIUM BROMIDE AND ALBUTEROL SULFATE 3 ML: .5; 3 SOLUTION RESPIRATORY (INHALATION) at 08:10

## 2019-10-01 RX ADMIN — ALLOPURINOL 300 MG: 300 TABLET ORAL at 02:10

## 2019-10-01 RX ADMIN — PYRIDOSTIGMINE BROMIDE 60 MG: 60 SOLUTION ORAL at 10:10

## 2019-10-01 RX ADMIN — FAMOTIDINE 20 MG: 10 INJECTION, SOLUTION INTRAVENOUS at 08:10

## 2019-10-01 RX ADMIN — PYRIDOSTIGMINE BROMIDE 60 MG: 60 SOLUTION ORAL at 05:10

## 2019-10-01 RX ADMIN — PIPERACILLIN AND TAZOBACTAM 4.5 G: 4; .5 INJECTION, POWDER, LYOPHILIZED, FOR SOLUTION INTRAVENOUS; PARENTERAL at 05:10

## 2019-10-01 RX ADMIN — PIPERACILLIN AND TAZOBACTAM 4.5 G: 4; .5 INJECTION, POWDER, LYOPHILIZED, FOR SOLUTION INTRAVENOUS; PARENTERAL at 10:10

## 2019-10-01 RX ADMIN — AZITHROMYCIN MONOHYDRATE 500 MG: 500 INJECTION, POWDER, LYOPHILIZED, FOR SOLUTION INTRAVENOUS at 09:10

## 2019-10-01 NOTE — PT/OT/SLP EVAL
Physical Therapy Evaluation    Patient Name:  Robe Cevallos   MRN:  [id number]    Recommendations:     Discharge Recommendations:  home health PT   Discharge Equipment Recommendations: walker, rolling   Barriers to discharge: None    Assessment:     Robe Cevallos is a 79 y.o. male admitted with a medical diagnosis of Diffuse large B-cell lymphoma of intrathoracic lymph nodes.  He presents with the following impairments/functional limitations:  weakness, gait instability, impaired endurance, impaired functional mobilty, impaired balance. Despite this, pt is able to transfer and ambulate with use of RW with CGA x 1. Would benefit from home PT with assist of family and use of RW once medically stable. Will continue to follow while in-house.      Rehab Prognosis: Good; patient would benefit from acute skilled PT services to address these deficits and reach maximum level of function.    Recent Surgery: * No surgery found *      Plan:     During this hospitalization, patient to be seen 4 x/week to address the identified rehab impairments via gait training, therapeutic activities, therapeutic exercises, neuromuscular re-education and progress toward the following goals:    · Plan of Care Expires:   11/1/2019    Subjective     Chief Complaint: Fatigue, tiredness  Patient/Family Comments/goals: For pt to get well, return to PLOF (pt was avid golfer and very active before illness).  Pain/Comfort:  · Pain Rating 1: 0/10    Patients cultural, spiritual, Mandaeism conflicts given the current situation: no    Living Environment:  Prior to admission, patients level of function was independent and active.  Pt lives in a two story home (15 stairs with B rails to bedroom and 1 threshold stair to enter) with wife. Has two local daughters and one daughter that lives out of state but is currently local to assist. Equipment used at home: none.   Upon discharge, patient will have assistance from spouse and daughters as needed. Daughter  vocalized that they would like to look into resources for more help in the home.     Objective:     Communicated with RN prior to session.  Patient found up in chair with oxygen, peripheral IV(5L via NC)  upon PT entry to room.    General Precautions: Standard, fall   Orthopedic Precautions:N/A   Braces: N/A     Exams:  · Cognitive Exam:  Patient is oriented to Person, Place and Situation; pleasant and cooperative but soft spoken during session.   · Postural Exam:  Patient presented with the following abnormalities:    · -       Rounded shoulders  · RLE Strength: WFL  · LLE Strength: WFL    Functional Mobility:  · Transfers:     · Sit to Stand:  contact guard assistance with rolling walker  · Gait: 12' x 2 with RW and IV and O2 follow, pt declined further due to fatigue. Decreased daniela and step length noted. However, steady with use of RW.   · Balance: Good static/dynamic balance with use of RW      Therapeutic Activities and Exercises:   Educated pt on safe use of RW with transfers and ambulation, pt able to demonstrate with 100% accuracy.     AM-PAC 6 CLICK MOBILITY  Total Score:18     Patient left up in chair with all lines intact, call button in reach, chair alarm on and RN notified.    GOALS:   Multidisciplinary Problems     Physical Therapy Goals        Problem: Physical Therapy Goal    Goal Priority Disciplines Outcome Goal Variances Interventions   Physical Therapy Goal     PT, PT/OT Ongoing, Progressing     Description:  Goals to be met by: 10/15/19    Patient will increase functional independence with mobility by performing:    Supine to sit with Modified [place]  Sit to supine with Modified [place]  Sit to stand transfer with Modified [place] with Rolling Walker  Bed to chair transfer with Modified [place] using Rolling Walker  Gait  x 100 feet with Modified [place] using Rolling Walker.   Ascend/descend 15 stairs with bilateral Handrails with Modified [place].                       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 Jolley    CATARACT EXTRACTION W/  INTRAOCULAR LENS IMPLANT Left 04/20/2017    Dr. Jolley    CHOLECYSTECTOMY      EYE SURGERY      Rt cataract    KNEE ARTHROSCOPY      Lt knee    TONSILLECTOMY         Time Tracking:     PT Received On: 10/01/19  PT Start Time: 1140     PT Stop Time: 1157  PT Total Time (min): 17 min     Billable Minutes: Evaluation 17 min      Ivett Montemayor, PT, DPT  10/01/2019

## 2019-10-01 NOTE — ASSESSMENT & PLAN NOTE
One dose of Rasburicase given 9/29  Uric acid 1.1 today  Allopurinol dose changed from 300 mg bid to 300 mg daily

## 2019-10-01 NOTE — PLAN OF CARE
Problem: SLP Goal  Goal: SLP Goal  Description  Speech Language Pathology Goals  Goals expected to be met by 10/06/2019  1. Pt will participate in ongoing swallow assessment   2. Educate Pt and family on S/S aspiration and aspiration precautions      Outcome: Ongoing, Progressing       Pt seen for ongoing swallow assessment. Pt with c/o globus sensation and pointed to upper abdomen with trials of puree. Patient deemed safe to initiate pleasure feeds thin liquids provided he is awake/alert, upright at 90 degree angle, use of small bites/sips, slow pace of bites/sips and remains upright for at least 60 minutes following PO; however, risk of aspiration remains 2/2 esophageal concerns for dysphagia, reflux, respiratory status and fatigue. Pt might benefit from consideration of supplemental means of nutrition/hydration to maintain adequate nutrition/hydration.  Ongoing, STRICT aspiration precautions advised. Please continue to monitor for signs and symptoms of aspiration and discontinue oral feeding should you notice any of the following: watery eyes, reddened facial area, wet vocal quality, increased work of breathing, change in respiratory status, increased congestion, coughing, fever, etc.  Findings/recs reviewed with MD team.  ST to continue to follow.    RO Ch., [facility]-SLP  Speech-Language Pathology  Pager: [phone]  10/1/2019

## 2019-10-01 NOTE — PLAN OF CARE
POC reviewed w patient and spouse @ shift change and as needed. Pre med and Chemo administered this shift w/o complication. Free from falls and injury this shift. Bed in low, locked position with call light in reach. Encouraged to call for assistance when getting out of bed. Patient verbalized understanding. All belongings within reach.Bedbound with bed alarm. Afebrile. IV Abx continued. Delirium and fall precautions maintained. will continue to monitor.

## 2019-10-01 NOTE — PLAN OF CARE
Side rails up x2; call bell in place; bed in lowest, locked position; skid proof socks on; no evidence of skin breakdown; care plan explained to patient; pt remains free of injury. Pt remains NPO except meds and ice chips, voids, no BM today, worked with PT and OT, denied pain or n/v. Pt returned for radiology, esophogram completed. Zosyn and Zithromax given as scheduled. Telemetry monitoring maintained, VSS and afebrile. .

## 2019-10-01 NOTE — PT/OT/SLP PROGRESS
"Speech Language Pathology Treatment    Patient Name:  Robe Cevallos   MRN:  [id number]  Admitting Diagnosis: Diffuse large B-cell lymphoma of intrathoracic lymph nodes    Recommendations:                 General Recommendations:  Ongoing f/u with GI, consult with Dietary encouraged to review GERD precautions including refraining from  foods that may incite more acid production or relax LES  ongoing Pt/family education and dysphagia therapy   Diet recommendations: Continue alternative means medication at this time, Provided MD/GI clearance, Pleasure Feeding thin liquids sparingly ok provided strict aspiration/GERD precautions, and patient might benefit from consideration of temporary,supplemental means nutrition/hydration to ensure adequate nutrition/hydration  Aspiration Precautions:  - small bites/sips. Do not overeat.   - Only when awake and alert  - smaller trials t/o day encouraged (3-4 sips at a time) as opposed to full meal tray   - remain upright 60 minutes+ following all PO intake  - remain elevated at 30 degree angle or higher when sleeping  - Continue to monitor for signs and symptoms of aspiration and discontinue oral feeding should you notice any of the following: watery eyes, reddened facial area, wet vocal quality, increased work of breathing, change in respiratory status, increased congestion, coughing, fever, etc.  General Precautions: Standard, aspiration, respiratory  Communication strategies: go to room in call light pushed.    Subjective     SLP reviewed Pt with RN and MD team, Pt cleared for PO trials following procedure (FL Esophagram) earlier service day   Pt presents calm  He explains, "It feels like it is just stopping right about here" [point to abdomen] when eating 3rd trial of tsp bite of apple sauce.   Patient goals: to eat    Pain/Comfort:  · Pain Rating 1: 0/10    Objective:     Has the patient been evaluated by SLP for swallowing?   Yes  Keep patient NPO? No   Current Respiratory " "Status: nasal cannula    FL Esophagram 10/1/19: Small hiatal hernia with gastroesophageal reflux to the midesophagus and associated esophageal tertiary contractions as above.  No evidence of mass or stricture.    9/30/19: Opacity in the left hemothorax has decreased modestly since 09/27/2019 at 17:56, consistent with decreasing volume of pleural fluid on this side.  Allowing for differences in patient positioning, there has been no significant detrimental interval change in the appearance of the chest since that time, with the current examination demonstrating an improved inspiratory depth level.    Pt seen for ongoing swallow assessment. Pt found awake in bed with  Peripheral IVs, central line and nasal canula in place. Spouse and family at bedside. HOB elevated. Pt presents with clear vocal quality and adequate vocal intensity. He reported vocal quality had fluctuated day prior and voice felt dry. Pt further explained he had been enjoin ice chips t/o this service day w/o difficulty.  Pt was alert and oriented x3. He followed basic commands WNL. He demonstrated adequate cough and throat clear upon command. He was seen with trials of thin liquids (ice chips x2, cup edge sips self-regulated x5) and tsp bites puree (apple sauce) x3. Pt with c/o globus sensation and "feeling of it moving up" as he pointed to upper abdomen upon third presentation of puree. No change in vocal quality, increased work of breathing or overt S/S aspiration with trials of thin liquids or puree presented; however, risk of aspiration remains 2/2 esophageal concerns for dysphagia.  Additional trials held. Pt and Spouse were educated on phases of swallow, risk of aspiration, GERD precautions, aspiration precautions, and pending MD clearance possible pleasure feeds of thin liquids sparingly for comfort provided strict aspiration and GERD precautions. Pt verbalized understanding while spouse verbalized partial understanding and inquired about when " a diet would be ordered. No additional questions noted.  Pt remained upright in bed upon SLP exit from room with family at bedside. Whiteboard updated. Findings reviewed with MD team immediately following session.     Assessment:     Robe Cevallos is a 79 y.o. male with an SLP diagnosis of risk of aspiration.  He presents with esophageal concerns for dysphagia and would benefit from ongoing f/u with MD team/GI to determine safest, least restrictive means for nutrition/hydration due to concern for reflux. Pleasure Feedings thin liquids sparingly ok provided strict aspiration/GERD precautions and ongoing monitoring for S/S aspiration. Patient might benefit from consideration of temporary, supplemental means nutrition/hydration to ensure adequate nutrition/hydration as strength and status improve.  Findings reviewed with MD team.       Goals:   Multidisciplinary Problems     SLP Goals        Problem: SLP Goal    Goal Priority Disciplines Outcome   SLP Goal     SLP Ongoing, Progressing   Description:  Speech Language Pathology Goals  Goals expected to be met by 10/06/2019  1. Pt will participate in ongoing swallow assessment   2. Educate Pt and family on S/S aspiration and aspiration precautions                       Plan:     · Patient to be seen:  4 x/week   · Plan of Care expires:  10/29/19  · Plan of Care reviewed with:  patient, spouse, friend   · SLP Follow-Up:  Yes       Discharge recommendations: pending progress,  home health speech therapy     Time Tracking:     SLP Treatment Date:   10/01/19  Speech Start Time:  1503  Speech Stop Time:  1525     Speech Total Time (min):  22 min    Billable Minutes: Treatment Swallowing Dysfunction 22    ELLIOTT Ch, [facility]-SLP  Speech-Language Pathology  Pager: [phone]      10/01/2019

## 2019-10-01 NOTE — PLAN OF CARE
Problem: Physical Therapy Goal  Goal: Physical Therapy Goal  Description  Goals to be met by: 10/15/19    Patient will increase functional independence with mobility by performing:    Supine to sit with Modified [place]- not met  Sit to supine with Modified [place]- not met  Sit to stand transfer with Modified [place] with Rolling Walker- not met  Bed to chair transfer with Modified [place] using Rolling Walker- not met  Gait  x 100 feet with Modified [place] using Rolling Walker- not met  Ascend/descend 15 stairs with bilateral Handrails with Modified [place]- not met     Outcome: Ongoing, Progressing    PT evaluation completed today; pt presents with impaired mobility, decreased endurance and generalized weakness and would benefit from in-house PT at this time. Goals appropriate.  Ivett Montemayor, PT, DPT  10/1/2019

## 2019-10-01 NOTE — SUBJECTIVE & OBJECTIVE
Subjective:     Interval History: PICC placed in IR yesterday. Started cytoxan and daunorubicin over night. Tolerated well. AOx4 this morning. OT rec's home health and inpatient therapy 3x/week. PT consult pending. Scheduled for MBSS today. Uric acid 1.1. Allopurinol changed from 300 mg bid to 300 mg daily.  over night. Home lisinopril ordered. Mild transaminitis. Will monitor for now.    Objective:     Vital Signs (Most Recent):  Temp: 97.5 °F (36.4 °C) (10/01/19 0739)  Pulse: 90 (10/01/19 0827)  Resp: 20 (10/01/19 0827)  BP: (!) 164/84 (10/01/19 0739)  SpO2: 97 % (10/01/19 0827) Vital Signs (24h Range):  Temp:  [97.5 °F (36.4 °C)-98.2 °F (36.8 °C)] 97.5 °F (36.4 °C)  Pulse:  [] 90  Resp:  [14-23] 20  SpO2:  [92 %-97 %] 97 %  BP: (146-175)/() 164/84     Weight: 81.8 kg (180 lb 7.1 oz)  Body mass index is 25.17 kg/m².  Body surface area is 2.02 meters squared.    ECOG SCORE         [unfilled]    Intake/Output - Last 3 Shifts       09/29 0700 - 09/30 0659 09/30 0700 - 10/01 0659 10/01 0700 - 10/02 0659    P.O.  60     IV Piggyback 300 500     Total Intake(mL/kg) 300 (3.4) 560 (6.8)     Urine (mL/kg/hr) 1000 (0.5) 2625 (1.3)     Total Output 1000 2625     Net -700 -2065            Stool Occurrence 1 x            Physical Exam   Constitutional: He is oriented to person, place, and time. He appears well-developed and well-nourished. No distress.   HENT:   Head: Normocephalic and atraumatic.   Mouth/Throat: No oropharyngeal exudate.   Eyes: Pupils are equal, round, and reactive to light. EOM are normal. No scleral icterus.   Cardiovascular: Normal rate and regular rhythm.   Pulmonary/Chest: Effort normal. No respiratory distress. He has no wheezes. He has no rales.   On nasal cannula, decreased breath sounds left>right   Abdominal: Soft. He exhibits no distension. There is no tenderness.   Musculoskeletal: Normal range of motion. He exhibits no edema.   Lymphadenopathy:     He has no cervical  adenopathy.   Neurological: He is alert and oriented to person, place, and time.   Skin: Skin is warm and dry.   Psychiatric: He has a normal mood and affect. His behavior is normal.   Nursing note and vitals reviewed.      Significant Labs:   CBC:   Recent Labs   Lab 09/30/19  0900 10/01/19  0600   WBC 9.89 9.69   HGB 14.1 13.4*   HCT 41.9 40.5    239   , CMP:   Recent Labs   Lab 09/30/19  0900 10/01/19  0600    136   K 3.8 4.0    102   CO2 27 27   * 94   BUN 27* 25*   CREATININE 1.0 0.8   CALCIUM 8.9 8.1*   PROT 6.2 5.7*   ALBUMIN 3.2* 2.9*   BILITOT 0.8 0.8   ALKPHOS 108 101   AST 32 48*   ALT 47* 78*   ANIONGAP 12 7*   EGFRNONAA >60.0 >60.0

## 2019-10-01 NOTE — PLAN OF CARE
Side rails up x2; call bell in place; bed in lowest, locked position; skid proof socks on; no evidence of skin breakdown; care plan explained to patient; pt remains free of injury. Pt remains NPO, voids, no BM today, worked with PT and OT, denied pain or n/v. Pt returned for ROCU for placement of a tunneled jugular picc placement. And Chemo orders released. Zosyn and Zithromax given. Pt with elevated BP noted not BMT MD notified stated to continue to monitor.     Yes

## 2019-10-01 NOTE — NURSING
Administered cytarabine (PF) (CYTOSAR) cyclophosphamide (CYTOXAN) 400 mg/m2 = 850 mg in sodium chloride 0.9% 250 mL chemo infusion  over 3 hours to Right Power Port noted for having positive blood return. Chemotherapy consent signed and on chart. Chemotherapy dosage and BSA checked by two chemotherapy certified nurses prior to administration. Premedications Zofran prior to infusion. Chemotherapy education performed with pt and family;  Understanding verbalized   Chemotherapeutic precautions in place throughout therapy.  Will continue to monitor.

## 2019-10-01 NOTE — PROGRESS NOTES
[facility]  Hematology  Bone Marrow Transplant  Progress Note    Patient Name: Robe Cevallos  Admission Date: 9/25/2019  Hospital Length of Stay: 6 days  Code Status: DNR    Subjective:     Interval History: PICC placed in IR yesterday. Started cytoxan and daunorubicin over night. Tolerated well. AOx4 this morning. OT rec's home health and inpatient therapy 3x/week. PT consult pending. Scheduled for MBSS today. Uric acid 1.1. Allopurinol changed from 300 mg bid to 300 mg daily.  over night. Home lisinopril ordered. Mild transaminitis. Will monitor for now.    Objective:     Vital Signs (Most Recent):  Temp: 97.5 °F (36.4 °C) (10/01/19 0739)  Pulse: 90 (10/01/19 0827)  Resp: 20 (10/01/19 0827)  BP: (!) 164/84 (10/01/19 0739)  SpO2: 97 % (10/01/19 0827) Vital Signs (24h Range):  Temp:  [97.5 °F (36.4 °C)-98.2 °F (36.8 °C)] 97.5 °F (36.4 °C)  Pulse:  [] 90  Resp:  [14-23] 20  SpO2:  [92 %-97 %] 97 %  BP: (146-175)/() 164/84     Weight: 81.8 kg (180 lb 7.1 oz)  Body mass index is 25.17 kg/m².  Body surface area is 2.02 meters squared.    ECOG SCORE         [unfilled]    Intake/Output - Last 3 Shifts       09/29 0700 - 09/30 0659 09/30 0700 - 10/01 0659 10/01 0700 - 10/02 0659    P.O.  60     IV Piggyback 300 500     Total Intake(mL/kg) 300 (3.4) 560 (6.8)     Urine (mL/kg/hr) 1000 (0.5) 2625 (1.3)     Total Output 1000 2625     Net -700 -2065            Stool Occurrence 1 x            Physical Exam   Constitutional: He is oriented to person, place, and time. He appears well-developed and well-nourished. No distress.   HENT:   Head: Normocephalic and atraumatic.   Mouth/Throat: No oropharyngeal exudate.   Eyes: Pupils are equal, round, and reactive to light. EOM are normal. No scleral icterus.   Cardiovascular: Normal rate and regular rhythm.   Pulmonary/Chest: Effort normal. No respiratory distress. He has no wheezes. He has no rales.   On nasal cannula, decreased breath sounds  left>right   Abdominal: Soft. He exhibits no distension. There is no tenderness.   Musculoskeletal: Normal range of motion. He exhibits no edema.   Lymphadenopathy:     He has no cervical adenopathy.   Neurological: He is alert and oriented to person, place, and time.   Skin: Skin is warm and dry.   Psychiatric: He has a normal mood and affect. His behavior is normal.   Nursing note and vitals reviewed.      Significant Labs:   CBC:   Recent Labs   Lab 09/30/19  0900 10/01/19  0600   WBC 9.89 9.69   HGB 14.1 13.4*   HCT 41.9 40.5    239   , CMP:   Recent Labs   Lab 09/30/19  0900 10/01/19  0600    136   K 3.8 4.0    102   CO2 27 27   * 94   BUN 27* 25*   CREATININE 1.0 0.8   CALCIUM 8.9 8.1*   PROT 6.2 5.7*   ALBUMIN 3.2* 2.9*   BILITOT 0.8 0.8   ALKPHOS 108 101   AST 32 48*   ALT 47* 78*   ANIONGAP 12 7*   EGFRNONAA >60.0 >60.0     Assessment/Plan:     * Diffuse large B-cell lymphoma of intrathoracic lymph nodes  -The patient has DLBCL based on the path report from [facility] and left supraclavicular LN biopsied on 9/20/19  -Path has been scanned into the media tab  -This is likely a transformation from his follicular lymphoma diagnosed and treated back in 2004  -BM biopsy from 9/26 results pending  -ECHO done 9/26, EF 60%  -Requested pathology from [facility] on 9/26 to determine subtype additional genetic features which may alter treatment  -CT neck to complete staging -hold off for now. Had CTA chest and [place] A/[facility] [place] [facility]  -CBC, CMP, phos, uric acid daily. S/p rasburicase 9/29.  -Treatment:   -Rad onc consult given airway and esophagus compression, s/p SIM and started RT 9/26, was planned 25-35 Gy in 10 fractions however we did speak to Dr. Montgomery  and we discussed holding off for now and monitor response to chemotherapy   -Start decadron 20mg x4 days d4/4, and vincristine 9/27   -IR consulted, tunneled PICC placed 9/30   -Adriamycin and cytoxan started over night   -Will need  outpatient neulasta (granix script sent 9/30 in case misses window for neulasta) and rituxan   -PT/OT consulted    Mediastinal mass  See DLBCL    Pneumonia of left lower lobe due to infectious organism  Suspected post obstructive pneumonia in the setting of malignancy  Coverage with Piperacillin-tazo and azithro will cover most likely organisms, will continue, planned 7 day course for Zosyn (end date 10/1) + Azithromycin (end date 10/2)  Cultures NGTD    Hypertension  -  over night  - home lisinopril started  - IV hydralazine for SBP > 160    Transaminitis  - mild transaminitis  - AST 48; ALT 78  - may be 2/2 chemo  - will continue to trend    Other dysphagia  - Seen by ST 9/29  - Barium swallow scheduled for 10 am today    Acute respiratory failure with hypoxia  Likely source is lymphoma, pneumonia, pleural effusion, compressive atelectasis  BiPAP PRN  S/p thoracentesis with drainage of 2 liters of exudative fluid on 9/27  Radiation initiated 9/26 will hold off for now given clinical improvement with thoracentesis   Patient DNR  thora cx pending. FLOW positive    Sepsis  See PNA    Hyperuricemia  One dose of Rasburicase given 9/29  Uric acid 1.1 today  Allopurinol dose changed from 300 mg bid to 300 mg daily    Hypercalcemia  - Improving  - Pamidronate 9/27  - corrected calcium 9.0    Stage 3 chronic kidney disease  Renal function stable based on few readings in system.   Creatinine 0.8    Cancer related pain  Oxy 5mg PO PRN Q4  If unrelieved with PO, morphine IV ordered for breakthrough  Starting on bowel regimen.     Ocular myasthenia gravis  Continue pyridostigmine TID        VTE Risk Mitigation (From admission, onward)         Ordered     heparin, porcine (PF) 100 unit/mL injection flush 300 Units  As needed (PRN)      09/30/19 1652     IP VTE HIGH RISK PATIENT  Once      09/25/19 1455     Place sequential compression device  Until discontinued      09/25/19 1455                Disposition: Inpatient for  chemo.    Christin Mullen, NP  Bone Marrow Transplant  [facility]

## 2019-10-02 ENCOUNTER — TELEPHONE (OUTPATIENT)
Dept: PHARMACY | Facility: CLINIC | Age: 79
End: 2019-10-02

## 2019-10-02 LAB
ALBUMIN SERPL BCP-MCNC: 2.3 G/DL (ref 3.5–5.2)
ALP SERPL-CCNC: 81 U/L (ref 55–135)
ALT SERPL W/O P-5'-P-CCNC: 88 U/L (ref 10–44)
ANION GAP SERPL CALC-SCNC: 7 MMOL/L (ref 8–16)
AST SERPL-CCNC: 41 U/L (ref 10–40)
BASOPHILS # BLD AUTO: 0.01 K/UL (ref 0–0.2)
BASOPHILS NFR BLD: 0.1 % (ref 0–1.9)
BILIRUB SERPL-MCNC: 0.6 MG/DL (ref 0.1–1)
BUN SERPL-MCNC: 20 MG/DL (ref 8–23)
CALCIUM SERPL-MCNC: 6.6 MG/DL (ref 8.7–10.5)
CHLORIDE SERPL-SCNC: 110 MMOL/L (ref 95–110)
CO2 SERPL-SCNC: 22 MMOL/L (ref 23–29)
CREAT SERPL-MCNC: 0.7 MG/DL (ref 0.5–1.4)
DIFFERENTIAL METHOD: ABNORMAL
EOSINOPHIL # BLD AUTO: 0.1 K/UL (ref 0–0.5)
EOSINOPHIL NFR BLD: 1.1 % (ref 0–8)
ERYTHROCYTE [DISTWIDTH] IN BLOOD BY AUTOMATED COUNT: 14 % (ref 11.5–14.5)
EST. GFR  (AFRICAN AMERICAN): >60 ML/MIN/1.73 M^2
EST. GFR  (NON AFRICAN AMERICAN): >60 ML/MIN/1.73 M^2
G6PD RBC-CCNT: 15.1 U/G HGB (ref 7–20.5)
GLUCOSE SERPL-MCNC: 88 MG/DL (ref 70–110)
HBV CORE AB SERPL QL IA: NEGATIVE
HBV SURFACE AG SERPL QL IA: NEGATIVE
HCT VFR BLD AUTO: 39.2 % (ref 40–54)
HGB BLD-MCNC: 13 G/DL (ref 14–18)
IMM GRANULOCYTES # BLD AUTO: 0.08 K/UL (ref 0–0.04)
IMM GRANULOCYTES NFR BLD AUTO: 1.1 % (ref 0–0.5)
LYMPHOCYTES # BLD AUTO: 1 K/UL (ref 1–4.8)
LYMPHOCYTES NFR BLD: 13.6 % (ref 18–48)
MAGNESIUM SERPL-MCNC: 2.1 MG/DL (ref 1.6–2.6)
MCH RBC QN AUTO: 27.9 PG (ref 27–31)
MCHC RBC AUTO-ENTMCNC: 33.2 G/DL (ref 32–36)
MCV RBC AUTO: 84 FL (ref 82–98)
MONOCYTES # BLD AUTO: 0.6 K/UL (ref 0.3–1)
MONOCYTES NFR BLD: 8.7 % (ref 4–15)
NEUTROPHILS # BLD AUTO: 5.4 K/UL (ref 1.8–7.7)
NEUTROPHILS NFR BLD: 75.4 % (ref 38–73)
NRBC BLD-RTO: 0 /100 WBC
PHOSPHATE SERPL-MCNC: 2.3 MG/DL (ref 2.7–4.5)
PLATELET # BLD AUTO: 212 K/UL (ref 150–350)
PMV BLD AUTO: 10 FL (ref 9.2–12.9)
POTASSIUM SERPL-SCNC: 3.2 MMOL/L (ref 3.5–5.1)
PROT SERPL-MCNC: 4.4 G/DL (ref 6–8.4)
RBC # BLD AUTO: 4.66 M/UL (ref 4.6–6.2)
SODIUM SERPL-SCNC: 139 MMOL/L (ref 136–145)
URATE SERPL-MCNC: 1.2 MG/DL (ref 3.4–7)
WBC # BLD AUTO: 7.21 K/UL (ref 3.9–12.7)

## 2019-10-02 PROCEDURE — 94761 N-INVAS EAR/PLS OXIMETRY MLT: CPT

## 2019-10-02 PROCEDURE — 84100 ASSAY OF PHOSPHORUS: CPT

## 2019-10-02 PROCEDURE — 25000003 PHARM REV CODE 250: Performed by: STUDENT IN AN ORGANIZED HEALTH CARE EDUCATION/TRAINING PROGRAM

## 2019-10-02 PROCEDURE — 63600175 PHARM REV CODE 636 W HCPCS: Performed by: INTERNAL MEDICINE

## 2019-10-02 PROCEDURE — 85025 COMPLETE CBC W/AUTO DIFF WBC: CPT

## 2019-10-02 PROCEDURE — 97535 SELF CARE MNGMENT TRAINING: CPT

## 2019-10-02 PROCEDURE — 99233 SBSQ HOSP IP/OBS HIGH 50: CPT | Mod: ,,, | Performed by: INTERNAL MEDICINE

## 2019-10-02 PROCEDURE — 99233 PR SUBSEQUENT HOSPITAL CARE,LEVL III: ICD-10-PCS | Mod: ,,, | Performed by: INTERNAL MEDICINE

## 2019-10-02 PROCEDURE — 83735 ASSAY OF MAGNESIUM: CPT

## 2019-10-02 PROCEDURE — 97530 THERAPEUTIC ACTIVITIES: CPT

## 2019-10-02 PROCEDURE — 99900035 HC TECH TIME PER 15 MIN (STAT)

## 2019-10-02 PROCEDURE — 92526 ORAL FUNCTION THERAPY: CPT

## 2019-10-02 PROCEDURE — 87324 CLOSTRIDIUM AG IA: CPT

## 2019-10-02 PROCEDURE — 63600175 PHARM REV CODE 636 W HCPCS: Performed by: NURSE PRACTITIONER

## 2019-10-02 PROCEDURE — 97802 MEDICAL NUTRITION INDIV IN: CPT

## 2019-10-02 PROCEDURE — 94664 DEMO&/EVAL PT USE INHALER: CPT

## 2019-10-02 PROCEDURE — 25000003 PHARM REV CODE 250: Performed by: NURSE PRACTITIONER

## 2019-10-02 PROCEDURE — 63600175 PHARM REV CODE 636 W HCPCS: Performed by: STUDENT IN AN ORGANIZED HEALTH CARE EDUCATION/TRAINING PROGRAM

## 2019-10-02 PROCEDURE — 84550 ASSAY OF BLOOD/URIC ACID: CPT

## 2019-10-02 PROCEDURE — 20600001 HC STEP DOWN PRIVATE ROOM

## 2019-10-02 PROCEDURE — 80053 COMPREHEN METABOLIC PANEL: CPT

## 2019-10-02 PROCEDURE — 97116 GAIT TRAINING THERAPY: CPT

## 2019-10-02 PROCEDURE — S0028 INJECTION, FAMOTIDINE, 20 MG: HCPCS | Performed by: STUDENT IN AN ORGANIZED HEALTH CARE EDUCATION/TRAINING PROGRAM

## 2019-10-02 RX ORDER — CALCIUM CARBONATE 200(500)MG
1000 TABLET,CHEWABLE ORAL DAILY PRN
Status: DISCONTINUED | OUTPATIENT
Start: 2019-10-02 | End: 2019-10-03 | Stop reason: HOSPADM

## 2019-10-02 RX ORDER — RAMELTEON 8 MG/1
8 TABLET ORAL NIGHTLY PRN
Status: DISCONTINUED | OUTPATIENT
Start: 2019-10-02 | End: 2019-10-03 | Stop reason: HOSPADM

## 2019-10-02 RX ORDER — POTASSIUM CHLORIDE 7.45 MG/ML
10 INJECTION INTRAVENOUS
Status: COMPLETED | OUTPATIENT
Start: 2019-10-02 | End: 2019-10-02

## 2019-10-02 RX ORDER — SODIUM,POTASSIUM PHOSPHATES 280-250MG
2 POWDER IN PACKET (EA) ORAL 3 TIMES DAILY
Status: COMPLETED | OUTPATIENT
Start: 2019-10-02 | End: 2019-10-02

## 2019-10-02 RX ORDER — IPRATROPIUM BROMIDE AND ALBUTEROL SULFATE 2.5; .5 MG/3ML; MG/3ML
3 SOLUTION RESPIRATORY (INHALATION) EVERY 6 HOURS PRN
Status: DISCONTINUED | OUTPATIENT
Start: 2019-10-02 | End: 2019-10-03 | Stop reason: HOSPADM

## 2019-10-02 RX ADMIN — POTASSIUM CHLORIDE 10 MEQ: 7.46 INJECTION, SOLUTION INTRAVENOUS at 10:10

## 2019-10-02 RX ADMIN — FAMOTIDINE 20 MG: 10 INJECTION, SOLUTION INTRAVENOUS at 08:10

## 2019-10-02 RX ADMIN — POTASSIUM CHLORIDE 10 MEQ: 7.46 INJECTION, SOLUTION INTRAVENOUS at 09:10

## 2019-10-02 RX ADMIN — POTASSIUM & SODIUM PHOSPHATES POWDER PACK 280-160-250 MG 2 PACKET: 280-160-250 PACK at 09:10

## 2019-10-02 RX ADMIN — PIPERACILLIN AND TAZOBACTAM 4.5 G: 4; .5 INJECTION, POWDER, LYOPHILIZED, FOR SOLUTION INTRAVENOUS; PARENTERAL at 02:10

## 2019-10-02 RX ADMIN — ALLOPURINOL 300 MG: 300 TABLET ORAL at 09:10

## 2019-10-02 RX ADMIN — AZITHROMYCIN MONOHYDRATE 500 MG: 500 INJECTION, POWDER, LYOPHILIZED, FOR SOLUTION INTRAVENOUS at 10:10

## 2019-10-02 RX ADMIN — FAMOTIDINE 20 MG: 10 INJECTION, SOLUTION INTRAVENOUS at 09:10

## 2019-10-02 RX ADMIN — POTASSIUM & SODIUM PHOSPHATES POWDER PACK 280-160-250 MG 2 PACKET: 280-160-250 PACK at 08:10

## 2019-10-02 RX ADMIN — RAMELTEON 8 MG: 8 TABLET ORAL at 11:10

## 2019-10-02 RX ADMIN — CALCIUM CARBONATE (ANTACID) CHEW TAB 500 MG 1000 MG: 500 CHEW TAB at 11:10

## 2019-10-02 RX ADMIN — PYRIDOSTIGMINE BROMIDE 60 MG: 60 SOLUTION ORAL at 09:10

## 2019-10-02 RX ADMIN — POTASSIUM CHLORIDE 10 MEQ: 7.46 INJECTION, SOLUTION INTRAVENOUS at 11:10

## 2019-10-02 RX ADMIN — POTASSIUM & SODIUM PHOSPHATES POWDER PACK 280-160-250 MG 2 PACKET: 280-160-250 PACK at 02:10

## 2019-10-02 RX ADMIN — LISINOPRIL 10 MG: 10 TABLET ORAL at 09:10

## 2019-10-02 RX ADMIN — PYRIDOSTIGMINE BROMIDE 60 MG: 60 SOLUTION ORAL at 01:10

## 2019-10-02 RX ADMIN — PYRIDOSTIGMINE BROMIDE 60 MG: 60 SOLUTION ORAL at 06:10

## 2019-10-02 RX ADMIN — POTASSIUM CHLORIDE 10 MEQ: 7.46 INJECTION, SOLUTION INTRAVENOUS at 01:10

## 2019-10-02 NOTE — ASSESSMENT & PLAN NOTE
One dose of Rasburicase given 9/29  Uric acid 1.2 today  Allopurinol dose changed from 300 mg bid to 300 mg daily

## 2019-10-02 NOTE — PLAN OF CARE
POC reviewed w patient and spouse @ shift change and as needed Free from falls and injury this shift. Bed in low, locked position with call light in reach. Encouraged to call for assistance when getting out of bed. Patient verbalized understanding. All belongings within reach.Bedbound with bed alarm. Afebrile. IV Abx continued. Delirium and fall precautions maintained. will continue to monitor.

## 2019-10-02 NOTE — ASSESSMENT & PLAN NOTE
Likely source is lymphoma, pneumonia, pleural effusion, compressive atelectasis  BiPAP PRN  S/p thoracentesis with drainage of 2 liters of exudative fluid on 9/27  Radiation initiated 9/26 will hold off for now given clinical improvement with thoracentesis   Patient DNR  Cytology on pleural fluid neg

## 2019-10-02 NOTE — PROGRESS NOTES
Consult received to arrange for home health for SN (PICC line care), PT,OT and ST. Prior authorization completed and has been faxed to [facility] ([phone]) for approval and coordination of home health agency. Will await response from [facility] re: name of  agency and infusion company that will provide services. Will follow.

## 2019-10-02 NOTE — PLAN OF CARE
Patient AAOx4, VSS, afebrile, and without injury. Fall precautions maintained. Patient instructed on how to contact the nurse. Wife at bedside. Patient weaned from 5L NC to room air; tolerating well. Patient advanced to regular diet; tolerating well, improving appetite. No complaints of pain or nausea. Patient up with PT ambulating in the miller with walker. IV azithromycin continued; potassium replaced IV. Patient voiding per urinal, BM x1 today. Questions and concerns have been addressed; will continue to monitor.

## 2019-10-02 NOTE — PLAN OF CARE
[facility]    HOME HEALTH ORDERS  FACE TO FACE ENCOUNTER    Patient Name: Robe Cevallos  YOB: 1940    PCP: Kang Reddy MD   PCP Address: [address] / [place] [address]  PCP Phone Number: [phone]  PCP Fax: [phone]    Encounter Date: 10/02/2019    Admit to Home Health    Diagnoses:  Active Hospital Problems    Diagnosis  POA    *Diffuse large B-cell lymphoma of intrathoracic lymph nodes [C83.32]  Yes     Priority: 1 - High    Mediastinal mass [J98.59]  Yes     Priority: 2     Pneumonia of left lower lobe due to infectious organism [J18.1]  Yes     Priority: 3     Transaminitis [R74.0]  No    Hypertension [I10]  Yes    Other dysphagia [R13.19]  Yes    Cancer related pain [G89.3]  Yes    Stage 3 chronic kidney disease [N18.3]  Yes    Hypercalcemia [E83.52]  Yes    Hyperuricemia [E79.0]  Yes    Sepsis [A41.9]  Yes    Acute respiratory failure with hypoxia [J96.01]  Yes    Ocular myasthenia gravis [G70.00]  Yes      Resolved Hospital Problems    Diagnosis Date Resolved POA    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 My clinical findings that support the need for the home health skilled services and home bound status are the following:  Weakness/numbness causing balance and gait disturbance due to Malignancy/Cancer making it taxing to leave home.    Allergies:Review of patient's allergies indicates:  No Known Allergies    Diet: fluid consistency thin    Activities: activity as tolerated    Nursing:   SN to complete comprehensive assessment including routine vital signs. Instruct on disease process and s/s of complications to report to MD. Review/verify medication list sent home with the patient at time of discharge  and instruct  patient/caregiver as needed. Frequency may be adjusted depending on start of care date.    Notify MD if SBP > 160 or < 90; DBP > 90 or < 50; HR > 120 or < 50; Temp > 100.4      CONSULTS:    Physical Therapy to evaluate and treat. Evaluate for home safety and equipment needs; Establish/upgrade home exercise program. Perform / instruct on therapeutic exercises, gait training, transfer training, and Range of Motion.  Occupational Therapy to evaluate and treat. Evaluate home environment for safety and equipment needs. Perform/Instruct on transfers, ADL training, ROM, and therapeutic exercises.  Speech Therapy  to evaluate and treat for  Swallowing.    Medications: Review discharge medications with patient and family and provide education.      Current Discharge Medication List      START taking these medications    Details   tbo-filgrastim (GRANIX) 480 mcg/0.8 mL Syrg Inject 0.8 mLs (480 mcg total) into the skin once daily.  Qty: 5.6 mL, Refills: 0    Associated Diagnoses: Diffuse large B-cell lymphoma of intrathoracic lymph nodes         CONTINUE these medications which have NOT CHANGED    Details   albuterol (PROVENTIL/VENTOLIN HFA) 90 mcg/actuation inhaler Inhale 2 puffs into the lungs every 6 (six) hours as needed for Wheezing. Rescue      azelastine (ASTELIN) 137 mcg (0.1 %) nasal spray 1 spray (137 mcg total) by Nasal route 2 (two) times daily.  Qty: 30 mL, Refills: 3      pyridostigmine (MESTINON) 60 mg Tab Take 60 mg by mouth 3 (three) times daily.       fluocinonide 0.05% (LIDEX) 0.05 % cream KARYNA EXT AA ON CHEST BID  Refills: 1      lisinopril 10 MG tablet Take 10 mg by mouth once daily.      meclizine (ANTIVERT) 25 mg tablet Take 1 tablet (25 mg total) by mouth 3 (three) times daily as needed.  Qty: 20 tablet, Refills: 0      sulfamethoxazole-trimethoprim 800-160mg (BACTRIM DS) 800-160 mg Tab Take 1 tablet by mouth 2 (two) times daily.             I certify that this patient is confined to his home and needs  intermittent skilled nursing care, physical therapy, speech therapy and occupational therapy.

## 2019-10-02 NOTE — ASSESSMENT & PLAN NOTE
-The patient has DLBCL based on the path report from [facility] and left supraclavicular LN biopsied on 9/20/19  -Path has been scanned into the media tab  -This is likely a transformation from his follicular lymphoma diagnosed and treated back in 2004  -BM biopsy from 9/26 results pending  -ECHO done 9/26, EF 60%  -Requested pathology from [facility] on 9/26 to determine subtype additional genetic features which may alter treatment  -CT neck to complete staging -hold off for now. Had CTA chest and CT A/P done at [facility]  -CBC, CMP, phos, uric acid daily. S/p rasburicase 9/29.  -Treatment:  - s/p C1 of CHOP   -Rad onc consult given airway and esophagus compression, s/p SIM and started RT 9/26, was planned 25-35 Gy in 10 fractions however we did speak to Dr. Montgomery  and we discussed holding off for now and monitor response to chemotherapy   -Start decadron 20mg x4 days d4/4, and vincristine 9/27   -IR consulted, tunneled PICC placed 9/30   -Adriamycin and cytoxan 9/30/19   -Will need outpatient neulasta (granix script sent 9/30 in case misses window for neulasta) and rituxan   - will be due for C2 ~ 10/21/19

## 2019-10-02 NOTE — PROGRESS NOTES
O2 sats on RA 94%-97% for four minutes. Patient reported no distress. Will continue walk test with PT.

## 2019-10-02 NOTE — ASSESSMENT & PLAN NOTE
- Seen by ST 9/29  - s/p lyn swallow study. Currently tolerating full liquids, thin liquids, and pills without evidence of aspiration. Will defer to ST regarding upgrading diet.

## 2019-10-02 NOTE — PLAN OF CARE
[facility]    HOME HEALTH ORDERS  FACE TO FACE ENCOUNTER    Patient Name: Robe Cevallos  YOB: 1940    PCP: Kang Reddy MD   PCP Address: [address] / [place] [address]  PCP Phone Number: [phone]  PCP Fax: [phone]    Encounter Date: 10/02/2019    Admit to Home Health    Diagnoses:  Active Hospital Problems    Diagnosis  POA    *Diffuse large B-cell lymphoma of intrathoracic lymph nodes [C83.32]  Yes     Priority: 1 - High    Mediastinal mass [J98.59]  Yes     Priority: 2     Pneumonia of left lower lobe due to infectious organism [J18.1]  Yes     Priority: 3     Transaminitis [R74.0]  No    Hypertension [I10]  Yes    Other dysphagia [R13.19]  Yes    Cancer related pain [G89.3]  Yes    Stage 3 chronic kidney disease [N18.3]  Yes    Hypercalcemia [E83.52]  Yes    Hyperuricemia [E79.0]  Yes    Sepsis [A41.9]  Yes    Acute respiratory failure with hypoxia [J96.01]  Yes    Ocular myasthenia gravis [G70.00]  Yes      Resolved Hospital Problems    Diagnosis Date Resolved POA    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 My clinical findings that support the need for the home health skilled services and home bound status are the following:  Weakness/numbness causing balance and gait disturbance due to Malignancy/Cancer making it taxing to leave home.    Allergies:Review of patient's allergies indicates:  No Known Allergies    Diet: Per ST recs    Activities: activity as tolerated    Nursing:   SN to complete comprehensive assessment including routine vital signs. Instruct on disease process and s/s of complications to report to MD. Review/verify medication list sent home with the patient at time of discharge  and instruct  patient/caregiver as needed. Frequency may be adjusted depending on start of care date.    Notify MD if SBP > 160 or < 90; DBP > 90 or < 50; HR > 120 or < 50; Temp > 100.4      CONSULTS:    Physical Therapy to evaluate and treat. Evaluate for home safety and equipment needs; Establish/upgrade home exercise program. Perform / instruct on therapeutic exercises, gait training, transfer training, and Range of Motion.  Occupational Therapy to evaluate and treat. Evaluate home environment for safety and equipment needs. Perform/Instruct on transfers, ADL training, ROM, and therapeutic exercises.  Speech Therapy  to evaluate and treat for  Swallowing.    MISCELLANEOUS CARE:  Home Infusion Therapy:  Central Line Care.  Review Central Line Care & Central Line Flush with patient.    Scrub the Hub: Prior to accessing the line, always perform a 30 second alcohol scrub  Each lumen of the central line is to be flushed at least daily with 10 mL Normal Saline and 3 mL Heparin flush (10 units/mL)  Skilled Nurse (SN) may draw blood from IV access  Blood Draw Procedure:   - Aspirate at least 5 mL of blood   - Discard   - Obtain specimen   - Change injection cap   - Flush with 20 mL Normal Saline followed by a                 3-5 mL Heparin flush (10 units/mL)  Central :   - Sterile dressing changes are done weekly and as needed.   - Use chlor-hexadine scrub to cleanse site, apply Biopatch to insertion site,       apply securement device dressing   - Injection caps are changed weekly and after EVERY lab draw.   - If sterile gauze is under dressing to control oozing,                 dressing change must be performed every 24 hours until gauze is not needed.    Medications: Review discharge medications with patient and family and provide education.      Current Discharge Medication List      START taking these medications    Details   tbo-filgrastim (GRANIX) 480 mcg/0.8 mL Syrg Inject 0.8 mLs (480 mcg total) into the skin  once daily.  Qty: 5.6 mL, Refills: 0    Associated Diagnoses: Diffuse large B-cell lymphoma of intrathoracic lymph nodes         CONTINUE these medications which have NOT CHANGED    Details   albuterol (PROVENTIL/VENTOLIN HFA) 90 mcg/actuation inhaler Inhale 2 puffs into the lungs every 6 (six) hours as needed for Wheezing. Rescue      azelastine (ASTELIN) 137 mcg (0.1 %) nasal spray 1 spray (137 mcg total) by Nasal route 2 (two) times daily.  Qty: 30 mL, Refills: 3      pyridostigmine (MESTINON) 60 mg Tab Take 60 mg by mouth 3 (three) times daily.       fluocinonide 0.05% (LIDEX) 0.05 % cream KARYNA EXT AA ON CHEST BID  Refills: 1      lisinopril 10 MG tablet Take 10 mg by mouth once daily.      meclizine (ANTIVERT) 25 mg tablet Take 1 tablet (25 mg total) by mouth 3 (three) times daily as needed.  Qty: 20 tablet, Refills: 0      sulfamethoxazole-trimethoprim 800-160mg (BACTRIM DS) 800-160 mg Tab Take 1 tablet by mouth 2 (two) times daily.             I certify that this patient is confined to his home and needs intermittent skilled nursing care, physical therapy, speech therapy and occupational therapy.

## 2019-10-02 NOTE — PROGRESS NOTES
[facility]  Hematology  Bone Marrow Transplant  Progress Note    Patient Name: Robe Cevallos  Admission Date: 9/25/2019  Hospital Length of Stay: 7 days  Code Status: DNR    Subjective:     Interval History: S/P C1 of CHOP. Will receive rituxan outpatient. S/P MBSS on 10/1. Tolerating full liquids well. Able to swallow pills today. Will speak with ST regarding whether we can upgrade diet. PT/OT recommending that patient go home with home health. Home health orders placed. Will assess supplemental O2 needs if any. Completed zosyn and azythromycin for LLL PNA. K+, Ca+, and phos replaced. Tentatively planning to discharge patient home with home health tomorrow. PICC will be left in place at discharge.    Objective:     Vital Signs (Most Recent):  Temp: 97.8 °F (36.6 °C) (10/02/19 0806)  Pulse: 85 (10/02/19 1056)  Resp: 18 (10/02/19 0806)  BP: (!) 150/78 (10/02/19 0806)  SpO2: 95 % (10/02/19 0806) Vital Signs (24h Range):  Temp:  [97.4 °F (36.3 °C)-98.2 °F (36.8 °C)] 97.8 °F (36.6 °C)  Pulse:  [69-94] 85  Resp:  [17-20] 18  SpO2:  [92 %-97 %] 95 %  BP: (119-150)/(68-79) 150/78     Weight: 81.8 kg (180 lb 7.1 oz)  Body mass index is 25.17 kg/m².  Body surface area is 2.02 meters squared.    ECOG SCORE         [unfilled]    Intake/Output - Last 3 Shifts       09/30 0700 - 10/01 0659 10/01 0700 - 10/02 0659 10/02 0700 - 10/03 0659    P.O. 60  60    IV Piggyback 500 350 100    Total Intake(mL/kg) 560 (6.8) 350 (4.3) 160 (2)    Urine (mL/kg/hr) 2625 (1.3) 1150 (0.6) 550 (1.6)    Total Output 2625 1150 550    Net -2542 -541 -227                 Physical Exam   Constitutional: He is oriented to person, place, and time. He appears well-developed and well-nourished. No distress.   HENT:   Head: Normocephalic and atraumatic.   Mouth/Throat: No oropharyngeal exudate.   Eyes: Pupils are equal, round, and reactive to light. EOM are normal. No scleral icterus.   Neck: Normal range of motion. Neck supple.    Cardiovascular: Normal rate and regular rhythm.   Pulmonary/Chest: Effort normal. No respiratory distress. He has no wheezes. He has no rales.   On nasal cannula at 4 L, decreased breath sounds left>right   Abdominal: Soft. He exhibits no distension. There is no tenderness.   Musculoskeletal: Normal range of motion. He exhibits no edema.   Lymphadenopathy:     He has no cervical adenopathy.   Neurological: He is alert and oriented to person, place, and time.   Skin: Skin is warm and dry.   PICC in place to right chest wall   Psychiatric: He has a normal mood and affect. His behavior is normal.   Nursing note and vitals reviewed.      Significant Labs:   CBC:   Recent Labs   Lab 10/01/19  0600 10/02/19  0500   WBC 9.69 7.21   HGB 13.4* 13.0*   HCT 40.5 39.2*    212   , CMP:   Recent Labs   Lab 10/01/19  0600 10/02/19  0500    139   K 4.0 3.2*    110   CO2 27 22*   GLU 94 88   BUN 25* 20   CREATININE 0.8 0.7   CALCIUM 8.1* 6.6*   PROT 5.7* 4.4*   ALBUMIN 2.9* 2.3*   BILITOT 0.8 0.6   ALKPHOS 101 81   AST 48* 41*   ALT 78* 88*   ANIONGAP 7* 7*   EGFRNONAA >60.0 >60.0     Assessment/Plan:     * Diffuse large B-cell lymphoma of intrathoracic lymph nodes  -The patient has DLBCL based on the path report from [facility] and left supraclavicular LN biopsied on 9/20/19  -Path has been scanned into the media tab  -This 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 Had CTA chest and CT A/P done at [facility]  -CBC, CMP, phos, uric acid daily. S/p rasburicase 9/29.  -Treatment:  - s/p C1 of CHOP   -Rad onc consult given airway and esophagus compression, s/p SIM and started RT 9/26, was planned 25-35 Gy in 10 fractions however we did speak to  Dr. Montgomery  and we discussed holding off for now and monitor response to chemotherapy   -Start decadron 20mg x4 days d4/4, and vincristine 9/27   -IR consulted, tunneled PICC placed 9/30   -Adriamycin and cytoxan 9/30/19   -Will need outpatient neulasta (granix script sent 9/30 in case misses window for neulasta) and rituxan   - will be due for C2 ~ 10/21/19    Mediastinal mass  See DLBCL    Pneumonia of left lower lobe due to infectious organism  Suspected post obstructive pneumonia in the setting of malignancy  Completed 7 day course for Zosyn (end date 10/1) + Azithromycin (end date 10/2)  Cultures NGTD    Hypertension  - home lisinopril started  - IV hydralazine for SBP > 160    Transaminitis  - mild transaminitis  - AST 41; ALT 88  - may be 2/2 chemo  - will continue to trend    Other dysphagia  - Seen by ST 9/29  - s/p barrium swallow study. Currently tolerating full liquids, thin liquids, and pills without evidence of aspiration. Will defer to ST regarding upgrading diet.    Acute respiratory failure with hypoxia  Likely source is lymphoma, pneumonia, pleural effusion, compressive atelectasis  BiPAP PRN  S/p thoracentesis with drainage of 2 liters of exudative fluid on 9/27  Radiation initiated 9/26 will hold off for now given clinical improvement with thoracentesis   Patient DNR  Cytology on pleural fluid neg    Sepsis  See PNA    Hyperuricemia  One dose of Rasburicase given 9/29  Uric acid 1.2 today  Allopurinol dose changed from 300 mg bid to 300 mg daily    Hypercalcemia  - Improving  - Pamidronate 9/27  - corrected calcium 8.0. Calcium carbonate ordered.    Stage 3 chronic kidney disease  Renal function stable based on few readings in system.   Creatinine 0.7    Cancer related pain  Oxy 5mg PO PRN Q4  If unrelieved with PO, morphine IV ordered for breakthrough  Starting on bowel regimen.     Ocular myasthenia gravis  Continue pyridostigmine TID        VTE Risk Mitigation (From admission, onward)          Ordered     heparin, porcine (PF) 100 unit/mL injection flush 300 Units  As needed (PRN)      09/30/19 1652     IP VTE HIGH RISK PATIENT  Once      09/25/19 1455     Place sequential compression device  Until discontinued      09/25/19 1455                Disposition: Inapatient    Christin Mullen, NP  Bone Marrow Transplant  [facility]

## 2019-10-02 NOTE — PLAN OF CARE
Recommendations     1. Continue adult regular/thin liquid diet   2. Add boost Plus tid (vanilla)   3. Encourage adequate intake to meet EEN and EPN daily.     Diet education completed    Goals: PO intake to meet >85% of estimated needs.  Nutrition Goal Status: new  Communication of RD Recs: (POC)

## 2019-10-02 NOTE — PT/OT/SLP PROGRESS
"Occupational Therapy      Patient Name:  Robe Cevallos   MRN:  [id number]    Patient not seen today secondary to patient unwilling to participate due to fatigue from just finishing PT session. Pt replied, "Honey, I'm wiped out," requesting to take a nap at this time. Will follow-up on next scheduled visit per OT POC.     Patricia Ridley, OT  10/2/2019  "

## 2019-10-02 NOTE — PROGRESS NOTES
Per Kang Mcmahan PT, patient ambulated on room air for greater than six minutes and maintained a O2 sats of 89% and greater. At rest on room air, patient maintained O2 sats of 96%-97%.

## 2019-10-02 NOTE — PT/OT/SLP PROGRESS
Physical Therapy Treatment    Patient Name:  Robe Cevallos   MRN:  [id number]    Recommendations:     Discharge Recommendations:  home health PT   Discharge Equipment Recommendations: walker, rolling   Barriers to discharge: None    Assessment:     Robe Cevallos is a 79 y.o. male admitted with a medical diagnosis of Diffuse large B-cell lymphoma of intrathoracic lymph nodes.  He presents with the following impairments/functional limitations:  weakness, impaired endurance, impaired self care skills, gait instability, impaired functional mobilty .Pt  motivated and cooperative with treatment session. Pt Progressing with PT Intervention. Pt Progressing with improving gait distance. Pt would continue to benefit from skilled PT to address overall functional mobility and goals. Goals remain appropriate      Rehab Prognosis: Good; patient would benefit from acute skilled PT services to address these deficits and reach maximum level of function.    Recent Surgery: * No surgery found *      Plan:     During this hospitalization, patient to be seen 4 x/week to address the identified rehab impairments via gait training, therapeutic activities, therapeutic exercises, neuromuscular re-education and progress toward the following goals:    · Plan of Care Expires:       Subjective     Patient/Family Comments/goals: I need to use the bathroom  Pain/Comfort:  · Pain Rating 1: 0/10  · Pain Rating Post-Intervention 1: 0/10      Objective:     Communicated with RN prior to session.  Patient found supine with peripheral IV, oxygen upon PT entry to room.     General Precautions: Standard, aspiration, fall   Orthopedic Precautions:N/A   Braces: N/A     Functional Mobility:  · Bed Mobility:     · Rolling Left:  supervision  · Supine to Sit: supervision x 2 trials  · Sit to Supine: supervision  X 2 trials  · Transfers:     · Sit to Stand:  stand by assistance with rolling walker  · Toilet Transfer: stand by assistance with  rolling walker   using  Stand Pivot  · Gait: pt amb with RW x 225 ft with SBa/CGA for safety on RA O2  sats during gait at 89%-91%. pt required vcs for proper breathing and upright posture      AM-PAC 6 CLICK MOBILITY  Turning over in bed (including adjusting bedclothes, sheets and blankets)?: 3  Sitting down on and standing up from a chair with arms (e.g., wheelchair, bedside commode, etc.): 3  Moving from lying on back to sitting on the side of the bed?: 3  Moving to and from a bed to a chair (including a wheelchair)?: 3  Need to walk in hospital room?: 3  Climbing 3-5 steps with a railing?: 3  Basic Mobility Total Score: 18       Therapeutic Activities and Exercises:   educated patient on progress, safety,d/c,PT POC, on the effects of prolonged immobility and the importance of performing OOB activity and exercises to promote healing and reduce recovery time   Updated white board with appropriate PT mobility information for medical team notification  Donned an extra gown and gripping socks  Pt encouraged to ambulate in hallways 3x/day with nursing or family assistance to improve endurance.   Pt safe to ambulate in hallway with RN or PCT assistance   Bedside table in front of patient and area set up for function, convenience, and safety. RN aware of patient's mobility needs and status. Questions/concerns addressed within PTA scope of practice; patient with no further questions. Time was provided for active listening, discussion of health disposition, and discussion of safe discharge. Pt?verbalized?agreement       Patient left supine with all lines intact, call button in reach, nsg notified and family present..    GOALS:   Multidisciplinary Problems     Physical Therapy Goals        Problem: Physical Therapy Goal    Goal Priority Disciplines Outcome Goal Variances Interventions   Physical Therapy Goal     PT, PT/OT Ongoing, Progressing     Description:  Goals to be met by: 10/15/19    Patient will increase functional independence  with mobility by performing:    Supine to sit with Modified [place]  Sit to supine with Modified [place]  Sit to stand transfer with Modified [place] with Rolling Walker  Bed to chair transfer with Modified [place] using Rolling Walker  Gait  x 100 feet with Modified [place] using Rolling Walker.   Ascend/descend 15 stairs with bilateral Handrails with Modified [place].                      Time Tracking:     PT Received On: 10/02/19  PT Start Time: 1335     PT Stop Time: 1417  PT Total Time (min): 42 min     Billable Minutes: Gait Training 12 and Therapeutic Activity 30    Treatment Type: Treatment  PT/PTA: PTA     PTA Visit Number: 1     Kang Mcmahan PTA  10/02/2019

## 2019-10-02 NOTE — PLAN OF CARE
Problem: Physical Therapy Goal  Goal: Physical Therapy Goal  Description  Goals to be met by: 10/15/19    Patient will increase functional independence with mobility by performing:    Supine to sit with Modified [place]  Sit to supine with Modified [place]  Sit to stand transfer with Modified [place] with Rolling Walker  Bed to chair transfer with Modified [place] using Rolling Walker  Gait  x 100 feet with Modified [place] using Rolling Walker.   Ascend/descend 15 stairs with bilateral Handrails with Modified [place].     Outcome: Ongoing, Progressing   Pt progressing towards goals. continue with PT POC.Goals remain appropriate.  Kang Mcmahan PTA

## 2019-10-02 NOTE — TELEPHONE ENCOUNTER
DOCUMENTATION ONLY:  Prior authorization for Granix 480 mcg/0.8 mL Syringe - #5.6 mL/7 days approved from 10/02/2019 to 04/02/2020  Co-pay: $1,082.82    Patient Assistance IS required. Sending to the financial assistance team to investigate assistance options. - FRANCES

## 2019-10-02 NOTE — SUBJECTIVE & OBJECTIVE
Subjective:     Interval History: S/P C1 of CHOP. Will receive rituxan outpatient. S/P MBSS on 10/1. Tolerating full liquids well. Able to swallow pills today. Will speak with ST regarding whether we can upgrade diet. PT/OT recommending that patient go home with home health. Home health orders placed. Will assess supplemental O2 needs if any. Completed zosyn and azythromycin for LLL PNA. K+, Ca+, and phos replaced. Tentatively planning to discharge patient home with home health tomorrow. PICC will be left in place at discharge.    Objective:     Vital Signs (Most Recent):  Temp: 97.8 °F (36.6 °C) (10/02/19 0806)  Pulse: 85 (10/02/19 1056)  Resp: 18 (10/02/19 0806)  BP: (!) 150/78 (10/02/19 0806)  SpO2: 95 % (10/02/19 0806) Vital Signs (24h Range):  Temp:  [97.4 °F (36.3 °C)-98.2 °F (36.8 °C)] 97.8 °F (36.6 °C)  Pulse:  [69-94] 85  Resp:  [17-20] 18  SpO2:  [92 %-97 %] 95 %  BP: (119-150)/(68-79) 150/78     Weight: 81.8 kg (180 lb 7.1 oz)  Body mass index is 25.17 kg/m².  Body surface area is 2.02 meters squared.    ECOG SCORE         [unfilled]    Intake/Output - Last 3 Shifts       09/30 0700 - 10/01 0659 10/01 0700 - 10/02 0659 10/02 0700 - 10/03 0659    P.O. 60  60    IV Piggyback 500 350 100    Total Intake(mL/kg) 560 (6.8) 350 (4.3) 160 (2)    Urine (mL/kg/hr) 2625 (1.3) 1150 (0.6) 550 (1.6)    Total Output 2625 1150 550    Net -2065 -800 -390                 Physical Exam   Constitutional: He is oriented to person, place, and time. He appears well-developed and well-nourished. No distress.   HENT:   Head: Normocephalic and atraumatic.   Mouth/Throat: No oropharyngeal exudate.   Eyes: Pupils are equal, round, and reactive to light. EOM are normal. No scleral icterus.   Neck: Normal range of motion. Neck supple.   Cardiovascular: Normal rate and regular rhythm.   Pulmonary/Chest: Effort normal. No respiratory distress. He has no wheezes. He has no rales.   On nasal cannula at 4 L, decreased breath sounds  left>right   Abdominal: Soft. He exhibits no distension. There is no tenderness.   Musculoskeletal: Normal range of motion. He exhibits no edema.   Lymphadenopathy:     He has no cervical adenopathy.   Neurological: He is alert and oriented to person, place, and time.   Skin: Skin is warm and dry.   PICC in place to right chest wall   Psychiatric: He has a normal mood and affect. His behavior is normal.   Nursing note and vitals reviewed.      Significant Labs:   CBC:   Recent Labs   Lab 10/01/19  0600 10/02/19  0500   WBC 9.69 7.21   HGB 13.4* 13.0*   HCT 40.5 39.2*    212   , CMP:   Recent Labs   Lab 10/01/19  0600 10/02/19  0500    139   K 4.0 3.2*    110   CO2 27 22*   GLU 94 88   BUN 25* 20   CREATININE 0.8 0.7   CALCIUM 8.1* 6.6*   PROT 5.7* 4.4*   ALBUMIN 2.9* 2.3*   BILITOT 0.8 0.6   ALKPHOS 101 81   AST 48* 41*   ALT 78* 88*   ANIONGAP 7* 7*   EGFRNONAA >60.0 >60.0

## 2019-10-02 NOTE — PLAN OF CARE
Problem: SLP Goal  Goal: SLP Goal  Description  Speech Language Pathology Goals  Goals expected to be met by 10/06/2019  1. Pt will tolerate a regular diet/thin liquids with no s/s of aspiration   2. Educate Pt and family on S/S aspiration and aspiration precautions     Outcome: Ongoing, Progressing     Patient seen for ongoing swallow assessment. SLP recommending a regular diet/thin liquids at this time.     Stephen Corona CCC-SLP  Speech-Language Pathology  Pager: [phone]

## 2019-10-02 NOTE — PLAN OF CARE
MDR's with Dr. Cross this am. Pt received PICC yesterday & received chemo last night. Confusion & breathing improved. O2. NPO. BaS planned for today. PT/OT rec home w/HH -left vmm for SW. Will complete Zosyn today &  Azithromycin tomorrow. Wife & dtr at . MD planning OP chemo. DCP home with wife & HH pending for hospital progress, BaS results & tx plan. CM will continue to follow for needs.

## 2019-10-02 NOTE — PROGRESS NOTES
"  [facility]  Adult Nutrition  Consult Note    SUMMARY     Recommendations    1. Continue adult regular/thin liquid diet   2. Boost Plus tid (vanilla)   3. Encourage adequate intake to meet EEN and EPN daily.   Goals: PO intake to meet >85% of estimated needs.  Nutrition Goal Status: new  Communication of RD Recs: (POC)    Reason for Assessment    Reason For Assessment: consult  Diagnosis: cancer diagnosis/related complications  Relevant Medical History: HTN, CA  Interdisciplinary Rounds: did not attend  General Information Comments: Pt in bed with wife and family member at bedside. Pt reported good appetite and was able to eat ~25% of lunch (advanced to adult regular today). NPO for > 5days (since admit). Pt refused NFPE due to report of "no malnutrition here". Noted weight loss of 6 lbs since admit (9/25) and 18 lbs in 5 months (4/23/19). Discussed a focus on high protein foods and small frequent meals. Pt interested in reciving Boost Plus (vanilla). Reported to recive education on GERD and denies that it is a current concern. GERD education material left at bedside with wife.  Nutrition Discharge Planning: Adequate PO intake to meet estimated needs.    Nutrition Risk Screen    Nutrition Risk Screen: (resolved per ST)    Nutrition/Diet History    Spiritual, Cultural Beliefs, Mosque Practices, Values that Affect Care: no    Anthropometrics    Temp: 98.2 °F (36.8 °C)  Height Method: Stated  Height: 5' 11" (180.3 cm)  Height (inches): 71 in  Weight Method: Bed Scale  Weight: 81.8 kg (180 lb 7.1 oz)  Weight (lb): 180.45 lb  Ideal Body Weight (IBW), Male: 172 lb  % Ideal Body Weight, Male (lb): 104.91 lb  BMI (Calculated): 25.2       Lab/Procedures/Meds    Pertinent Labs Reviewed: reviewed  Pertinent Labs Comments: K 3.2, Ca 6.6, Phos 2.3, Alb 1.2, AST 41, ALT 88  Pertinent Medications Reviewed: reviewed  Pertinent Medications Comments: famotidine, lisinopril, KCl, potassium sodium " phosphate      Estimated/Assessed Needs    Weight Used For Calorie Calculations: 81.8 kg (180 lb 5.4 oz)  Energy Calorie Requirements (kcal): 1931  Energy Need Method: [place]-St Jeor(x 1.25)  Protein Requirements: 82-98(1-1.2 g/kg)  Weight Used For Protein Calculations: 81.8 kg (180 lb 5.4 oz)     Estimated Fluid Requirement Method: RDA Method  RDA Method (mL): 1931       Nutrition Prescription Ordered    Current Diet Order: Adult regular/thin liquids    Evaluation of Received Nutrient/Fluid Intake    Energy Calories Required: not meeting needs  Protein Required: not meeting needs  Comments: LBM 10/2  % Intake of Estimated Energy Needs: 0 - 25 %  % Meal Intake: 0 - 25 %    Nutrition Risk    Level of Risk/Frequency of Follow-up: low     Assessment and Plan    Nutrition Problem  Inadequate energy intake    Related to (etiology):   CA    Signs and Symptoms (as evidenced by):   NPO > 5 days, <25% meal intake    Interventions/Recommendations (treatment strategy):  Collaboration with other providers, commercial beverage    Nutrition Diagnosis Status:   New        Monitor and Evaluation    Food and Nutrient Intake: energy intake, food and beverage intake  Food and Nutrient Adminstration: diet order  Anthropometric Measurements: weight, weight change, body mass index  Biochemical Data, Medical Tests and Procedures: gastrointestinal profile, electrolyte and renal panel, glucose/endocrine profile  Nutrition-Focused Physical Findings: overall appearance       Nutrition Follow-Up    RD Follow-up?: Yes

## 2019-10-02 NOTE — PT/OT/SLP PROGRESS
"Speech Language Pathology Treatment    Patient Name:  Robe Cevallos   MRN:  [id number]  Admitting Diagnosis: Diffuse large B-cell lymphoma of intrathoracic lymph nodes    Recommendations:                 General Recommendations:  Ongoing monitoring by GI  Diet recommendations:  Regular, Liquid Diet Level: Thin   Aspiration Precautions:  - small bites/sips. Do not overeat.   - Only when awake and alert  - smaller meals throughout the day are encouraged vs 3 large meals  - remain upright 60 minutes+ following all PO intake  - remain elevated at 30 degree angle or higher when sleeping  - Continue to monitor for signs and symptoms of aspiration and discontinue oral feeding should you notice any of the following: watery eyes, reddened facial area, wet vocal quality, increased work of breathing, change in respiratory status, increased congestion, coughing, fever, etc.  General Precautions: Standard, aspiration  Communication strategies:  none    Subjective     Patient awake and alert during session  "This is the best I've felt in a long time."  Communicated with nurse prior to session     Pain/Comfort:  · Pain Rating 1: 0/10  · Pain Rating Post-Intervention 1: 0/10    Objective:     Has the patient been evaluated by SLP for swallowing?   Yes  Keep patient NPO? No   Current Respiratory Status: nasal cannula      Patient seen for ongoing swallow assessment. He was sitting up in bed with his spouse present during session. Patient and spouse reported significant improvement with PO intake since yesterday. They reported that he had been tolerating thin liquids and puree without overt difficulty or discomfort and patient was excited to return to solid foods. Prior to PO intake, he exhibited a strong volitional cough/throat clear and had a vocal quality/intensity that were WFL. During PO trials, he tolerated thin liquids x7 (via cup-edge and straw) as well as solids x4 (osbaldo crackers) with no overt signs of aspiration. His " swallow was timely with good laryngeal elevation across trials upon palpation. He did not report odynophagia, globus sensation or any discomfort during or following the swallow. SLP educated patient and spouse regarding safe swallow precautions and provided them information about GERD precautions. Both patient and spouse verbalized a good understanding of information. Patient left in room with call light within reach. Whiteboard updated. Diet recs communicated to treatment team.     Assessment:     Robe Cevallos is a 79 y.o. male with an SLP diagnosis of Dysphagia.     Goals:   Multidisciplinary Problems     SLP Goals        Problem: SLP Goal    Goal Priority Disciplines Outcome   SLP Goal     SLP Ongoing, Progressing   Description:  Speech Language Pathology Goals  Goals expected to be met by 10/06/2019  1. Pt will tolerate a regular diet/thin liquids with no s/s of aspiration   2. Educate Pt and family on S/S aspiration and aspiration precautions                        Plan:     · Patient to be seen:  4 x/week   · Plan of Care expires:  10/29/19  · Plan of Care reviewed with:  patient, spouse   · SLP Follow-Up:  Yes       Discharge recommendations:  home health speech therapy   Barriers to Discharge:  None    Time Tracking:     SLP Treatment Date:   10/02/19  Speech Start Time:  1153  Speech Stop Time:  1210     Speech Total Time (min):  17 min    Billable Minutes: Treatment Swallowing Dysfunction 9 and Self Care/Home Management Training 8    Stephen Corona CCC-SLP  Speech-Language Pathology  Pager: [phone]   10/02/2019

## 2019-10-02 NOTE — ASSESSMENT & PLAN NOTE
Suspected post obstructive pneumonia in the setting of malignancy  Completed 7 day course for Zosyn (end date 10/1) + Azithromycin (end date 10/2)  Cultures NGTD

## 2019-10-03 VITALS
HEART RATE: 79 BPM | WEIGHT: 180.44 LBS | TEMPERATURE: 98 F | OXYGEN SATURATION: 95 % | BODY MASS INDEX: 25.26 KG/M2 | DIASTOLIC BLOOD PRESSURE: 66 MMHG | HEIGHT: 71 IN | RESPIRATION RATE: 16 BRPM | SYSTOLIC BLOOD PRESSURE: 120 MMHG

## 2019-10-03 LAB
ALBUMIN SERPL BCP-MCNC: 2.9 G/DL (ref 3.5–5.2)
ALP SERPL-CCNC: 93 U/L (ref 55–135)
ALT SERPL W/O P-5'-P-CCNC: 99 U/L (ref 10–44)
ANION GAP SERPL CALC-SCNC: 9 MMOL/L (ref 8–16)
AST SERPL-CCNC: 40 U/L (ref 10–40)
BASOPHILS # BLD AUTO: 0.02 K/UL (ref 0–0.2)
BASOPHILS NFR BLD: 0.2 % (ref 0–1.9)
BILIRUB SERPL-MCNC: 0.7 MG/DL (ref 0.1–1)
BUN SERPL-MCNC: 18 MG/DL (ref 8–23)
C DIFF GDH STL QL: NEGATIVE
C DIFF TOX A+B STL QL IA: NEGATIVE
CALCIUM SERPL-MCNC: 8.4 MG/DL (ref 8.7–10.5)
CHLORIDE SERPL-SCNC: 104 MMOL/L (ref 95–110)
CO2 SERPL-SCNC: 22 MMOL/L (ref 23–29)
CREAT SERPL-MCNC: 0.8 MG/DL (ref 0.5–1.4)
DIFFERENTIAL METHOD: ABNORMAL
EOSINOPHIL # BLD AUTO: 0.1 K/UL (ref 0–0.5)
EOSINOPHIL NFR BLD: 1.4 % (ref 0–8)
ERYTHROCYTE [DISTWIDTH] IN BLOOD BY AUTOMATED COUNT: 13.6 % (ref 11.5–14.5)
EST. GFR  (AFRICAN AMERICAN): >60 ML/MIN/1.73 M^2
EST. GFR  (NON AFRICAN AMERICAN): >60 ML/MIN/1.73 M^2
GLUCOSE SERPL-MCNC: 96 MG/DL (ref 70–110)
HCT VFR BLD AUTO: 42.5 % (ref 40–54)
HGB BLD-MCNC: 13.6 G/DL (ref 14–18)
IMM GRANULOCYTES # BLD AUTO: 0.07 K/UL (ref 0–0.04)
IMM GRANULOCYTES NFR BLD AUTO: 0.8 % (ref 0–0.5)
LDH SERPL L TO P-CCNC: 318 U/L (ref 110–260)
LYMPHOCYTES # BLD AUTO: 1 K/UL (ref 1–4.8)
LYMPHOCYTES NFR BLD: 11.9 % (ref 18–48)
MAGNESIUM SERPL-MCNC: 2.1 MG/DL (ref 1.6–2.6)
MCH RBC QN AUTO: 27.6 PG (ref 27–31)
MCHC RBC AUTO-ENTMCNC: 32 G/DL (ref 32–36)
MCV RBC AUTO: 86 FL (ref 82–98)
MONOCYTES # BLD AUTO: 0.5 K/UL (ref 0.3–1)
MONOCYTES NFR BLD: 5.9 % (ref 4–15)
NEUTROPHILS # BLD AUTO: 6.8 K/UL (ref 1.8–7.7)
NEUTROPHILS NFR BLD: 79.8 % (ref 38–73)
NRBC BLD-RTO: 0 /100 WBC
PHOSPHATE SERPL-MCNC: 3.1 MG/DL (ref 2.7–4.5)
PLATELET # BLD AUTO: 224 K/UL (ref 150–350)
PMV BLD AUTO: 10.2 FL (ref 9.2–12.9)
POTASSIUM SERPL-SCNC: 4.3 MMOL/L (ref 3.5–5.1)
PROT SERPL-MCNC: 5.5 G/DL (ref 6–8.4)
RBC # BLD AUTO: 4.93 M/UL (ref 4.6–6.2)
SODIUM SERPL-SCNC: 135 MMOL/L (ref 136–145)
URATE SERPL-MCNC: 2 MG/DL (ref 3.4–7)
WBC # BLD AUTO: 8.52 K/UL (ref 3.9–12.7)

## 2019-10-03 PROCEDURE — 99900035 HC TECH TIME PER 15 MIN (STAT)

## 2019-10-03 PROCEDURE — 97110 THERAPEUTIC EXERCISES: CPT

## 2019-10-03 PROCEDURE — 84550 ASSAY OF BLOOD/URIC ACID: CPT

## 2019-10-03 PROCEDURE — 97535 SELF CARE MNGMENT TRAINING: CPT

## 2019-10-03 PROCEDURE — 80053 COMPREHEN METABOLIC PANEL: CPT

## 2019-10-03 PROCEDURE — 83735 ASSAY OF MAGNESIUM: CPT

## 2019-10-03 PROCEDURE — 94664 DEMO&/EVAL PT USE INHALER: CPT

## 2019-10-03 PROCEDURE — 25000003 PHARM REV CODE 250: Performed by: INTERNAL MEDICINE

## 2019-10-03 PROCEDURE — 99238 PR HOSPITAL DISCHARGE DAY,<30 MIN: ICD-10-PCS | Mod: ,,, | Performed by: INTERNAL MEDICINE

## 2019-10-03 PROCEDURE — 85025 COMPLETE CBC W/AUTO DIFF WBC: CPT

## 2019-10-03 PROCEDURE — 83615 LACTATE (LD) (LDH) ENZYME: CPT

## 2019-10-03 PROCEDURE — 84100 ASSAY OF PHOSPHORUS: CPT

## 2019-10-03 PROCEDURE — 99238 HOSP IP/OBS DSCHRG MGMT 30/<: CPT | Mod: ,,, | Performed by: INTERNAL MEDICINE

## 2019-10-03 PROCEDURE — 25000003 PHARM REV CODE 250: Performed by: NURSE PRACTITIONER

## 2019-10-03 PROCEDURE — 97116 GAIT TRAINING THERAPY: CPT

## 2019-10-03 PROCEDURE — 94761 N-INVAS EAR/PLS OXIMETRY MLT: CPT

## 2019-10-03 RX ORDER — ALLOPURINOL 300 MG/1
300 TABLET ORAL DAILY
Status: DISCONTINUED | OUTPATIENT
Start: 2019-10-03 | End: 2019-10-03 | Stop reason: HOSPADM

## 2019-10-03 RX ORDER — FAMOTIDINE 20 MG/1
20 TABLET, FILM COATED ORAL 2 TIMES DAILY
Status: DISCONTINUED | OUTPATIENT
Start: 2019-10-03 | End: 2019-10-03 | Stop reason: HOSPADM

## 2019-10-03 RX ADMIN — OXYCODONE HYDROCHLORIDE 5 MG: 5 TABLET ORAL at 09:10

## 2019-10-03 RX ADMIN — LISINOPRIL 10 MG: 10 TABLET ORAL at 09:10

## 2019-10-03 RX ADMIN — FAMOTIDINE 20 MG: 20 TABLET ORAL at 09:10

## 2019-10-03 RX ADMIN — PYRIDOSTIGMINE BROMIDE 60 MG: 60 SOLUTION ORAL at 05:10

## 2019-10-03 RX ADMIN — ALLOPURINOL 300 MG: 300 TABLET ORAL at 10:10

## 2019-10-03 NOTE — PLAN OF CARE
Problem: Occupational Therapy Goal  Goal: Occupational Therapy Goal  Description  Goals to be met by: 10/15/2019      Patient will increase functional independence with ADLs by performing:    UE Dressing with Modified [place].  LE Dressing with Modified [place].  Grooming while standing with Modified [place] and Supervision.  Toileting from toilet with Modified [place] for hygiene and clothing management.   Step transfer with Modified [place] and Supervision  Toilet transfer to toilet with Modified [place] and Supervision.  Increased functional strength to WFL for self care.  Upper extremity exercise program x10 reps per handout, with independence.     10/3/2019 1106 by Suzette Mills OT  Outcome: Ongoing, Progressing  10/3/2019 1054 by Suzette Mills, OT  Outcome: Ongoing, Progressing

## 2019-10-03 NOTE — NURSING
Pt had pna and flu vacc ordered but are now refusing tx. Pt is being seen via HH and per pt they will get them at their pcp.

## 2019-10-03 NOTE — PLAN OF CARE
Problem: SLP Goal  Goal: SLP Goal  Description  Speech Language Pathology Goals  Goals expected to be met by 10/06/2019  1. Pt will tolerate a regular diet/thin liquids with no s/s of aspiration   2. Educate Pt and family on S/S aspiration and aspiration precautions       Outcome: Ongoing, Progressing    Pt and Spouse seen for follow up education on aspiration and GERD precautions. No additional ST warranted at this time. He might benefit from ongoing f/u with GI and dietician for management of esophageal concerns for Dysphagia upon d/c from acute.      RO Ch., [facility]-SLP  Speech-Language Pathology  Pager: [phone]  10/3/2019

## 2019-10-03 NOTE — PT/OT/SLP PROGRESS
Physical Therapy Treatment    Patient Name:  Robe Cevallos   MRN:  [id number]    Recommendations:     Discharge Recommendations:  home health PT   Discharge Equipment Recommendations: walker, rolling   Barriers to discharge: None- can stay downstairs at home    Assessment:     Robe Cevallos is a 79 y.o. male admitted with a medical diagnosis of Diffuse large B-cell lymphoma of intrathoracic lymph nodes.  He presents with the following impairments/functional limitations:  weakness, impaired endurance, impaired self care skills, gait instability, impaired balance, pain. Pt was able to ambulate ~150 ft today with SBA and use of RW. He needed cues for proper gait technique, as he presents with forward flexion of trunk, elbow extension, and small step length bilaterally. He will benefit from continued PT services, focusing on improving his overall endurance and balance to allow for a return to his active lifestyle.    Rehab Prognosis: Good; patient would benefit from acute skilled PT services to address these deficits and reach maximum level of function.    Recent Surgery: * No surgery found *      Plan:     During this hospitalization, patient to be seen 4 x/week to address the identified rehab impairments via gait training, therapeutic activities, therapeutic exercises, neuromuscular re-education and progress toward the following goals:    · Plan of Care Expires:  11/01/19    Subjective     Chief Complaint: Pain in back from being in bed. Pt reports he won't have to go upstairs upon return home (at least not initially).  Patient/Family Comments/goals: Alleviate pain, get back to golfing.  Pain/Comfort:  · Pain Rating 1: 5/10  · Location - Side 1: Bilateral  · Location - Orientation 1: upper  · Location 1: back  · Pain Addressed 1: Reposition(stretches and exercises)  · Pain Rating Post-Intervention 1: 2/10      Objective:     Communicated with nurse prior to session.  Patient found seated edge of bed with wife present  with telemetry upon PT entry to room.     General Precautions: Standard, aspiration, fall   Orthopedic Precautions:N/A   Braces: N/A     Functional Mobility:  · Bed Mobility:  Supine to Sit: modified independence  · Transfers:  Sit to Stand:  stand by assistance with rolling walker  · Bed to Chair: stand by assistance with  rolling walker  using  Stand Pivot  · Gait: 150 ft with use of RW, swing-through gait pattern with small step length bilaterally, FFP, sustained elbow extension bilaterally (flexed upon cueing).  · Balance: Needs UE support while standing- SBA.      AM-PAC 6 CLICK MOBILITY  Turning over in bed (including adjusting bedclothes, sheets and blankets)?: 4  Sitting down on and standing up from a chair with arms (e.g., wheelchair, bedside commode, etc.): 3  Moving from lying on back to sitting on the side of the bed?: 4  Moving to and from a bed to a chair (including a wheelchair)?: 4  Need to walk in hospital room?: 3  Climbing 3-5 steps with a railing?: 3  Basic Mobility Total Score: 21       Therapeutic Activities and Exercises:  Hip flexion, LAQ (sitting away from back of chair), hamstring/gastroc stretch passively (2x20 second hold), rows with UEs x 20 reps, oblique twists x 20 reps (B sides).  Pt reported rows helped with back pain.    Vitals: 122/64mmHg  85bpm  96% O2 on RA throughout session (pre+post-exericse).    Patient left up in chair with call button in reach and wife present..    GOALS:   Multidisciplinary Problems     Physical Therapy Goals        Problem: Physical Therapy Goal    Goal Priority Disciplines Outcome Goal Variances Interventions   Physical Therapy Goal     PT, PT/OT Ongoing, Progressing     Description:  Goals to be met by: 10/15/19    Patient will increase functional independence with mobility by performing:    Supine to sit with Modified [place]. Met (10/3/2019)  Sit to supine with Modified [place]  Sit to stand transfer with Modified [place] with Rolling  Walker  Bed to chair transfer with Modified [place] using Rolling Walker  Gait  x 100 feet with Modified [place] using Rolling Walker.   Ascend/descend 15 stairs with bilateral Handrails with Modified [place].                       Time Tracking:     PT Received On: 10/03/19  PT Start Time: 0946     PT Stop Time: 1009  PT Total Time (min): 23 min     Billable Minutes: Gait Training 15 and Therapeutic Exercise 8    Treatment Type: Treatment  PT/PTA: PT     PTA Visit Number: 1     Yoselin Merida, PT  10/03/2019

## 2019-10-03 NOTE — NURSING
Pt is dc'd w/ HH, pt vss stable, no c/o pain or s/s of distress noted, pt currently awaiting hh nurse to provide education on central line care, picc care complete, provided extensive education on renal care regarding CKD and infection control pertaining to central line. Pt dc'd via wc with wife and daughter, piv removed and gauze/tape applied.

## 2019-10-03 NOTE — PLAN OF CARE
Patient is oriented X4. Ambulates to bathroom with assist while awake; Uses urinal during HS. On Oxygen @ 2 LPM via NC. Telemetry monitoring in progress; NSR. PIV to Left FA flushed and SL. Right chest khan flushed and SL. No complaints of pain. Complains of insomnia; New PRN order for Ramelteon administered as ordered. Complains of indigestion; PRN Tums administered as ordered. Electrolytes replaced. Stool specimen collected and sent to lab. Delirium precautions maintained. Possible D/C to home health today. All safety precautions in place. Remains free of injury. Wife is at bedside. Patient is stable. Will continue to monitor.

## 2019-10-03 NOTE — PROGRESS NOTES
Spoke to Daria at [facility] ([phone]): She requested that information for home health services and PICC Line care be re-faxed to her since she did not receive all the information yesterday. Re-faxed the information. She informed that they made a referral to [facility] (confirmed the referral with Guilherme at [facility]) and to [facility](spoke to Jacqueline - she stated that they did not receive the referral from [facility] yet - sent the information through [facility] to them). Received a call from Oxana at [facility] confirming that she faxed authorization to [employer]. Discussed with the patient and his family. Anna from [facility] confirmed that he would do PICC line care teaching with the patient. Notified the patient's nurse on the 8th floor (Emely) as well as the BMT team of arrangements.

## 2019-10-03 NOTE — PROGRESS NOTES
Pharmacist Intervention IV to PO Note    Robe Cevallos is a 79 y.o. male being treated with IV medication famotidine    Patient Data:    Vital Signs (Most Recent):  Temp: 98.3 °F (36.8 °C) (10/02/19 1956)  Pulse: 78 (10/03/19 0318)  Resp: 18 (10/02/19 1956)  BP: (!) 157/83 (10/02/19 1956)  SpO2: 96 % (10/02/19 1956)   Vital Signs (72h Range):  Temp:  [97.4 °F (36.3 °C)-98.3 °F (36.8 °C)]   Pulse:  []   Resp:  [14-23]   BP: (118-175)/()   SpO2:  [90 %-97 %]      CBC:  Recent Labs   Lab 10/01/19  0600 10/02/19  0500 10/03/19  0400   WBC 9.69 7.21 8.52   RBC 4.79 4.66 4.93   HGB 13.4* 13.0* 13.6*   HCT 40.5 39.2* 42.5    212 224   MCV 85 84 86   MCH 28.0 27.9 27.6   MCHC 33.1 33.2 32.0     CMP:     Recent Labs   Lab 10/01/19  0600 10/02/19  0500 10/03/19  0400   GLU 94 88 96   CALCIUM 8.1* 6.6* 8.4*   ALBUMIN 2.9* 2.3* 2.9*   PROT 5.7* 4.4* 5.5*    139 135*   K 4.0 3.2* 4.3   CO2 27 22* 22*    110 104   BUN 25* 20 18   CREATININE 0.8 0.7 0.8   ALKPHOS 101 81 93   ALT 78* 88* 99*   AST 48* 41* 40   BILITOT 0.8 0.6 0.7       Dietary Orders:  Diet Orders            Dietary nutrition supplements [facility]; Boost Plus - Vanilla starting at 10/02 1800    Diet Adult Regular (IDDSI Level 7): Regular starting at 10/02 1520            Based on the following criteria, this patient qualifies for intravenous to oral conversion:  [x] The patients gastrointestinal tract is functioning (tolerating medications via oral or enteral route for 24 hours and tolerating food or enteral feeds for 24 hours).      Pepcid 20mg IV BID will be changed to Pepcid 20mg PO BID    Yvon Persaud PharmD, [facility]  Clinical Pharmacist  Spectra Link: [phone]

## 2019-10-03 NOTE — DISCHARGE SUMMARY
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 Jojo Carcamo with R-CVP followed by maintenance rituxan who presented to the ED on [facility] with severe abdominal pain. Pain has been present intermittently since July but worsened in the last week. He has difficulty describing his symptoms and reports he "just feel[s] bad." He has had a cough with small streaks of blood for about 1 week. No significant sputum production. No fevers, chills, night sweats. Some weight loss, difficulty swallowing solid food for about 2 weeks. He had some pain in his right back earlier today which resolved with a massage. +Constipation. No N/V reported (but he became nauseated during my interview). Patient's main complaint at time of my exam is significant left sided abdominal pain which does not radiate.     Patient had a CT of the chest on 9/16 which showed a 9.8 x 3.8 x 13 cm size posterior mediastinal mass. He underwent lymph nose biopsy of supraclavicular node on 9/20, returned DLBCL (see media tab). Patient underwent CTA chest and CT abdomen with contrast at OSH on 9/25 after presentation to the ED. It showed extrinsic compression of the esophagus, a large retrocardiac mass with involvement of the left hilum causing extrinsic compression of the adjacent esophagus, compression of the left upper and lower lobe bronchi and compression/obliteration of the left inferior pulmonary vein. Labs significant for uncorrected Ca 10.9, Uric Acid 9.6, Cr 1.3, Lactate 2.6. He was treated for suspected post " obstructive pneumonia with Piperacillin-tazobactam and Azithro and transferred for further oncology management.     * No surgery found *     Hospital Course: 09/26/2019 Patient admitted, ECHO done, to get neck CT. To get radiation given airway/esophagus compression.  09/27/2019 Hypoxic overnight, requiring BIPAP. To start decadron and vincristine. Pulm consulted for thora. thora performed at the bedside with drainage of 2 liters of exudative fluid.   09/28/2019 Doing better from a respiratory standpoint. Speech eval to see if he can safely swallow. Hold off on RT for now.   09/29/2019 breathing is improving. Passed bedside swallow eval, started on clears. Given one dose of Rasburicase, paln for IR guided prot insertion tomorrow followed by rest of CHOP.  09/30/2019 To get tunneled PICC, and plan for adriamycin and cytoxan. Barium swallow ordered.  10/01/2019: PICC placed in IR yesterday. Started cytoxan and daunorubicin over night. Tolerated well. AOx4 this morning. OT rec's home health and inpatient therapy 3x/week. PT consult pending. Scheduled for MBSS today. Uric acid 1.1. Allopurinol changed from 300 mg bid to 300 mg daily.  over night. Home lisinopril ordered. Mild transaminitis. Will monitor for now.  10/02/2019: S/P C1 of CHOP. Will receive rituxan outpatient. S/P MBSS on 10/1. Tolerating full liquids well. Able to swallow pills today. Will speak with ST regarding whether we can upgrade diet. PT/OT recommending that patient go home with home health. Home health orders placed. Will assess supplemental O2 needs if any. Completed zosyn and azythromycin for LLL PNA. K+, Ca+, and phos replaced. Tentatively planning to discharge patient home with home health tomorrow. PICC will be left in place at discharge.  10/03/2019 Pt is ready for d/c with home health for pt/ot/speech and line care. Will see Dr. Sparrow on 10/8. Have msg'd him and his staff to also schedule labs and rituxan for the same day.     Pt  admitted 9/25/19 to get radiation given airway/esophagus compression. Received radiation on 9/26/19 and no further thereafter due to improvement. Thoracentesis was done 9/27/19 with 2 liters removed, cx no growth. Got rasburicase x1 on 9/29/19 given TLS which improved. Allopurinol able to be stopped on 10/3/19 as levels all normalized. Speech cleared pt. Got tunneled PICC 9/30/19 with CHOP. Completed azithromycin IV 10/2 and zosyn 10/1 for PNA. Weaned off O2. D/C'd home 10/3/19 with home health for pt/ot/speech and line care. He will see Dr. Sparrow 10/8 as scheduled with labs. Dr. Sparrow was notified and states he will set up outpatient rituxan. Neulasta not needed as CHOP was dose reduced and do not expect neutropenia.    Consults (From admission, onward)        Status Ordering Provider     Inpatient consult to Hematology/Oncology  Once     Provider:  Amber Heredia MD    Completed PHILIPPE CHU     Inpatient consult to PICC team (NIAS)  Once     Provider:  (Not yet assigned)    Completed FISH YOU     Inpatient consult to Pulmonology  Once     Provider:  (Not yet assigned)    Completed FISH YOU     Inpatient consult to Radiation Oncology  Once     Provider:  (Not yet assigned)    Completed FISH YOU     Inpatient consult to Registered Dietitian/Nutritionist  Once     Provider:  (Not yet assigned)    Completed IZABELLA BARR        Physical Exam   Constitutional: He is oriented to person, place, and time. He appears well-developed and well-nourished. No distress.   HENT:   Head: Normocephalic and atraumatic.   Mouth/Throat: No oropharyngeal exudate.   Eyes: EOM are normal. No scleral icterus.   Neck: Normal range of motion. Neck supple.   Cardiovascular: Normal rate and regular rhythm.   Pulmonary/Chest: Effort normal. No respiratory distress. He has no wheezes. He has no rales.   Weaned off o2, sating >90% on RA. Breath sounds clear bilaterally   Abdominal: Soft. He exhibits no  distension. There is no tenderness.   Musculoskeletal: Normal range of motion. He exhibits no edema.   Neurological: He is alert and oriented to person, place, and time.   Skin: Skin is warm and dry.   Tunneled PICC in place to right chest wall, c/d/i and will remain in place  Psychiatric: He has a normal mood and affect. His behavior is normal.   Nursing note and vitals reviewed.    Significant Diagnostic Studies: Labs:   CMP   Recent Labs   Lab 10/02/19  0500 10/03/19  0400    135*   K 3.2* 4.3    104   CO2 22* 22*   GLU 88 96   BUN 20 18   CREATININE 0.7 0.8   CALCIUM 6.6* 8.4*   PROT 4.4* 5.5*   ALBUMIN 2.3* 2.9*   BILITOT 0.6 0.7   ALKPHOS 81 93   AST 41* 40   ALT 88* 99*   ANIONGAP 7* 9   ESTGFRAFRICA >60.0 >60.0   EGFRNONAA >60.0 >60.0    and CBC   Recent Labs   Lab 10/02/19  0500 10/03/19  0400   WBC 7.21 8.52   HGB 13.0* 13.6*   HCT 39.2* 42.5    224       Pending Diagnostic Studies:     None        Final Active Diagnoses:    Diagnosis Date Noted POA    PRINCIPAL PROBLEM:  Diffuse large B-cell lymphoma of intrathoracic lymph nodes [C83.32] 09/25/2019 Yes    Transaminitis [R74.0] 10/01/2019 No    Hypertension [I10] 10/01/2019 Yes    Other dysphagia [R13.19] 09/30/2019 Yes    Pneumonia of left lower lobe due to infectious organism [J18.1] 09/25/2019 Yes    Mediastinal mass [J98.59] 09/25/2019 Yes    Cancer related pain [G89.3] 09/25/2019 Yes    Stage 3 chronic kidney disease [N18.3] 09/25/2019 Yes    Hypercalcemia [E83.52] 09/25/2019 Yes    Hyperuricemia [E79.0] 09/25/2019 Yes    Sepsis [A41.9] 09/25/2019 Yes    Acute respiratory failure with hypoxia [J96.01] 09/25/2019 Yes    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 Yes      Problems Resolved During this Admission:    Diagnosis Date Noted Date Resolved POA    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 Sparrow.    Patient Instructions:      Diet Adult Regular     Notify your health care provider if you experience any of the following:  increased confusion or weakness     Notify your health care provider if you experience any of the following:  persistent dizziness, light-headedness, or visual disturbances     Notify your health care provider if you experience any of the following:  severe persistent headache     Notify your health care provider if you experience any of the following:  difficulty breathing or increased cough     Notify your health care provider if you experience any of the following:  redness, tenderness, or signs of infection (pain, swelling, redness, odor or green/yellow discharge around incision site)     Notify your health care provider if you experience any of the following:  severe uncontrolled pain     Notify your health care provider if you experience any of the following:  persistent nausea and vomiting or diarrhea     Notify your health care provider if you experience any of the following:  temperature >100.4     Notify your health care provider if you experience any of the following:  worsening rash     Tissue Specimen To Pathology, Surgery   Standing Status: Future Standing Exp. Date: 11/24/20     Order Specific Question Answer Comments   Directional Terms: Lateral    Directional Terms: Anterior    Clinical Information: supraclavicular lymph node biopsy showing DLBCL; slides are coming from [facility]; requested 9/26/19    Specific Site: left supraclavicular lymphone biopsy      Activity as tolerated     Medications:  Reconciled Home Medications:      Medication List      CONTINUE  taking these medications    albuterol 90 mcg/actuation inhaler  Commonly known as:  PROVENTIL/VENTOLIN HFA  Inhale 2 puffs into the lungs every 6 (six) hours as needed for Wheezing. Rescue     azelastine 137 mcg (0.1 %) nasal spray  Commonly known as:  ASTELIN  1 spray (137 mcg total) by Nasal route 2 (two) times daily.     lisinopril 10 MG tablet  Take 10 mg by mouth once daily.     meclizine 25 mg tablet  Commonly known as:  ANTIVERT  Take 1 tablet (25 mg total) by mouth 3 (three) times daily as needed.     pyridostigmine 60 mg Tab  Commonly known as:  MESTINON  Take 60 mg by mouth 3 (three) times daily.        STOP taking these medications    fluocinonide 0.05% 0.05 % cream  Commonly known as:  LIDEX     sulfamethoxazole-trimethoprim 800-160mg 800-160 mg Tab  Commonly known as:  BACTRIM DS            Petra Lyle, DIANE  Bone Marrow Transplant  [facility]

## 2019-10-03 NOTE — PROGRESS NOTES
Contacted [employer] ([phone]) to check status of HH authorization. Spoke with Tri, who informs that authorization is still pending. Requested that 'er be notified of agency name when auth received.

## 2019-10-03 NOTE — PT/OT/SLP PROGRESS
Occupational Therapy   Treatment    Name: Robe Cevallos  MRN: [id number]  Admitting Diagnosis:  Diffuse large B-cell lymphoma of intrathoracic lymph nodes       Recommendations:     Discharge Recommendations: home health OT  Discharge Equipment Recommendations:  walker, rolling  Barriers to discharge:  Decreased caregiver support(Pts wife with PD)    Assessment:     Robe Cevallos is a 79 y.o. male with a medical diagnosis of Diffuse large B-cell lymphoma of intrathoracic lymph nodes.  He presents with impaired ADL and functional mobility performance. Pt pleasant and willing to participate in bed mobility, UB/LB dressing, sit>stand, and mobility to bedside chair for breakfast setup. Pt required SBA with bed mobility and CGA for mobility using no AD. Pt's wife present during session and very involved in pt's care. Performance deficits affecting function are weakness, impaired endurance, impaired self care skills, impaired functional mobilty, gait instability, impaired balance. Pt would benefit from continued OT skilled services 3x/wk to improve daily living skills to optimize QOL. recommended      Rehab Prognosis:  Good; patient would benefit from acute skilled OT services to address these deficits and reach maximum level of function.       Plan:     Patient to be seen 3 x/week to address the above listed problems via self-care/home management, therapeutic exercises, therapeutic activities  · Plan of Care Expires: 10/15/19  · Plan of Care Reviewed with: patient, spouse    Subjective     Pain/Comfort:  · Pain Rating 1: 0/10    Objective:     Communicated with: RN prior to session.  Patient found HOB elevated with peripheral IV, oxygen upon OT entry to room.    General Precautions: Standard, aspiration, fall   Orthopedic Precautions:N/A   Braces: N/A     Occupational Performance:     Bed Mobility:    · Patient completed Rolling/Turning to Left with  stand by assistance  · Patient completed Scooting/Bridging with stand  by assistance  · Patient completed Supine to Sit with stand by assistance     Functional Mobility/Transfers:  · Patient completed Sit <> Stand Transfer with contact guard assistance  with  no assistive device   · Patient completed Bed <> Chair Transfer using Step Transfer technique with contact guard assistance with no assistive device  · Functional Mobility: Pt completed brief sit>stand from bed to bedside chair for setup with breakfast. Pt required CGA and vc's to correct stooped posture.    Activities of Daily Living:  · Feeding:  setup with pt UIC  · Grooming: setup with face cloth for facial grooming with pt UIC  · Upper Body Dressing: contact guard assistance donning gown at EOB   · Lower Body Dressing: total assistance donning socks at EOB (pt's spouse reporting this is baseline)      [facility] 6 Click ADL: 21    Treatment & Education:  Pt educated on role of occupational therapy, POC, and safety during ADLs and functional mobility. Pt and OT discussed importance of safe, continued mobility to optimize daily living skills. Pt verbalized understanding.   Pt completed the following during session: bed mobility, EOB sitting balance, UB/LB dressing, grooming, feeding, mobility to bedside chair. White board updated during session. Pt given instruction to call for medical staff/nurse for assistance.       Patient left UIC with all lines intact and call button in reachEducation:    Spouse present.   GOALS:   Multidisciplinary Problems     Occupational Therapy Goals        Problem: Occupational Therapy Goal    Goal Priority Disciplines Outcome Interventions   Occupational Therapy Goal     OT, PT/OT Ongoing, Progressing    Description:  Goals to be met by: 10/15/2019      Patient will increase functional independence with ADLs by performing:    UE Dressing with Modified [place].  LE Dressing with Modified [place].  Grooming while standing with Modified [place] and Supervision.  Toileting from toilet with  Modified [place] for hygiene and clothing management.   Step transfer with Modified [place] and Supervision  Toilet transfer to toilet with Modified [place] and Supervision.  Increased functional strength to WFL for self care.  Upper extremity exercise program x10 reps per handout, with independence.                      Time Tracking:     OT Date of Treatment: 10/03/19  OT Start Time: 0815  OT Stop Time: 0827  OT Total Time (min): 12 min    Billable Minutes:Self Care/Home Management 12 min    Suzette Mills OT  10/3/2019

## 2019-10-03 NOTE — PT/OT/SLP PROGRESS
"Speech Language Pathology Treatment & Discharge Summary     Patient Name:  Robe Cevallos   MRN:  [id number]  Admitting Diagnosis: Diffuse large B-cell lymphoma of intrathoracic lymph nodes    Recommendations:                 General Recommendations:  Ongoing f/u with GI and dietician recommended for management of GERD?esophageal concerns for dysphagia. No additional speech therapy warranted at this time   Diet recommendations:  Regular, Liquid Diet Level: Thin   Aspiration Precautions:  - small bites/sips. Do not overeat.   - Only when awake and alert  - smaller snacks t/o day encouraged as opposed to 3 full meals   - remain upright 60 minutes+ following all PO intake  - remain elevated at 30 degree angle or higher when sleeping  - Continue to monitor for signs and symptoms of aspiration and discontinue oral feeding should you notice any of the following: watery eyes, reddened facial area, wet vocal quality, increased work of breathing, change in respiratory status, increased congestion, coughing, fever, etc.  General Precautions: Standard, aspiration, respiratory, GERD  Communication strategies: go to room in call light pushed.    Subjective     SLP reviewed Pt with RN, RN reported Pt tolerating meals and plans for discharge home later service day  Pt presents calm  He explains, "I ate my eggs and a bite or two of sausage"  He denies pain      Pain/Comfort:  · Pain Rating 1: 0/10    Objective:     Has the patient been evaluated by SLP for swallowing?   Yes  Keep patient NPO? No   Current Respiratory Status: room air      Pt seen for follow up monitoring of tolerance of diet. Upon first attempt, PT with RN in restroom. Upon second attempt, Patient found asleep in bed with spouse at bedside. Pt woke per simple verbal cues. He demonstrated a clear vocal quality with adequate intensity. He endorsed a good appetite and denied difficulty with regular solids or thin liquids.  He declined PO trials. He recalled safe swallow " strategies previously reviewed with clinician the day prior including need to eat upright at 90 angle take time, small bites, smaller, more frequent meals t/o day and remain upright following all meals. SLP reviewed possibility for dietary consult to review GERD precautions including refraining from  foods that may incite more acid production or relax LES such as: citrus fruits, tomatoes, onions, vinegar, spicy foods, high fat foods (fried, etc.), nuts, dairy products, coffee, gum or hard candy, coffee, tea and carbonated drinks, peppermint and/or Chocolate.  SLP also reviewed S/S aspiration for ongoing monitoring, safety precautions and plans to d/c ST at this time with recommendations for ongoing f/u with MD team and dietician for management of reflux and esophageal concerns for dysphagia. He verbalized understanding. Spouse verbalized understanding. No additional questions noted. Whiteboard current. Findings/recs reviewed with RN following session. Pt remained in bed with call light in reach upon SLP exit from room.     Assessment:     Robe Cevallos is a 79 y.o. male with an SLP diagnosis of esophageal concerns for Dysphagia..  He denied difficulty with regular textures or thin liquids. He would benefit from ongoing aspiration and GERD precautions with all PO.  He might benefit from f/u with GI and dietician for management of reflux/GERD upon d/c from acute.  No additional ST warranted at this time. Please reconsult if change in status should occur.      Goals:   Multidisciplinary Problems     SLP Goals        Problem: SLP Goal    Goal Priority Disciplines Outcome   SLP Goal     SLP Ongoing, Progressing   Description:  Speech Language Pathology Goals  Goals expected to be met by 10/06/2019  1. Pt will tolerate a regular diet/thin liquids with no s/s of aspiration   2. Educate Pt and family on S/S aspiration and aspiration precautions                        Plan:     · Patient to be seen:  4 x/week   · Plan of  Care expires:  10/29/19  · Plan of Care reviewed with:  patient, spouse   · SLP Follow-Up:  No(pt with anticipated d/c home later service day )       Discharge recommendations:  home with home health     Time Tracking:     SLP Treatment Date:   10/03/19  Speech Start Time:  1115  Speech Stop Time:  1125     Speech Total Time (min):  10 min    Billable Minutes: Self Care/Home Management Training 10    ELLIOTT Ch, [facility]-SLP  Speech-Language Pathology  Pager: [phone]      10/03/2019

## 2019-10-04 ENCOUNTER — HOSPITAL ENCOUNTER (INPATIENT)
Facility: HOSPITAL | Age: 79
LOS: 2 days | Discharge: HOME | DRG: 176 | End: 2019-10-06
Attending: EMERGENCY MEDICINE | Admitting: EMERGENCY MEDICINE
Payer: MEDICARE

## 2019-10-04 DIAGNOSIS — I26.99 OTHER ACUTE PULMONARY EMBOLISM WITHOUT ACUTE COR PULMONALE: Primary | ICD-10-CM

## 2019-10-04 DIAGNOSIS — I26.99 PE (PULMONARY THROMBOEMBOLISM): ICD-10-CM

## 2019-10-04 DIAGNOSIS — R07.9 CHEST PAIN: ICD-10-CM

## 2019-10-04 DIAGNOSIS — C83.32 DIFFUSE LARGE B-CELL LYMPHOMA OF INTRATHORACIC LYMPH NODES: ICD-10-CM

## 2019-10-04 DIAGNOSIS — C85.90 LYMPHOMA: ICD-10-CM

## 2019-10-04 DIAGNOSIS — R06.02 SOB (SHORTNESS OF BREATH): ICD-10-CM

## 2019-10-04 DIAGNOSIS — J90 PLEURAL EFFUSION: ICD-10-CM

## 2019-10-04 PROBLEM — R53.81 DEBILITY: Status: ACTIVE | Noted: 2019-10-04

## 2019-10-04 LAB
ANION GAP SERPL CALC-SCNC: 12 MMOL/L (ref 8–16)
AORTIC ROOT ANNULUS: 3.76 CM
AORTIC VALVE CUSP SEPERATION: 1.73 CM
APTT BLDCRRT: 23.5 SEC (ref 21–32)
APTT BLDCRRT: 63.5 SEC (ref 21–32)
APTT BLDCRRT: 66.9 SEC (ref 21–32)
AV INDEX (PROSTH): 0.97
AV MEAN GRADIENT: 4 MMHG
AV PEAK GRADIENT: 7 MMHG
AV VALVE AREA: 2.9 CM2
AV VELOCITY RATIO: 0.92
BASOPHILS # BLD AUTO: 0.01 K/UL (ref 0–0.2)
BASOPHILS NFR BLD: 0.1 % (ref 0–1.9)
BNP SERPL-MCNC: 20 PG/ML (ref 0–99)
BSA FOR ECHO PROCEDURE: 2.04 M2
BUN SERPL-MCNC: 21 MG/DL (ref 8–23)
CALCIUM SERPL-MCNC: 9 MG/DL (ref 8.7–10.5)
CHLORIDE SERPL-SCNC: 101 MMOL/L (ref 95–110)
CO2 SERPL-SCNC: 21 MMOL/L (ref 23–29)
CREAT SERPL-MCNC: 0.9 MG/DL (ref 0.5–1.4)
CV ECHO LV RWT: 0.75 CM
DIFFERENTIAL METHOD: ABNORMAL
DOP CALC AO PEAK VEL: 1.31 M/S
DOP CALC AO VTI: 24.27 CM
DOP CALC LVOT AREA: 3 CM2
DOP CALC LVOT DIAMETER: 1.95 CM
DOP CALC LVOT PEAK VEL: 1.21 M/S
DOP CALC LVOT STROKE VOLUME: 70.47 CM3
DOP CALCLVOT PEAK VEL VTI: 23.61 CM
E WAVE DECELERATION TIME: 286.1 MSEC
E/A RATIO: 0.71
E/E' RATIO: 10.46 M/S
ECHO LV POSTERIOR WALL: 1.28 CM (ref 0.6–1.1)
EOSINOPHIL # BLD AUTO: 0.1 K/UL (ref 0–0.5)
EOSINOPHIL NFR BLD: 0.7 % (ref 0–8)
ERYTHROCYTE [DISTWIDTH] IN BLOOD BY AUTOMATED COUNT: 13.7 % (ref 11.5–14.5)
EST. GFR  (AFRICAN AMERICAN): >60 ML/MIN/1.73 M^2
EST. GFR  (NON AFRICAN AMERICAN): >60 ML/MIN/1.73 M^2
FRACTIONAL SHORTENING: 32 % (ref 28–44)
GLUCOSE SERPL-MCNC: 103 MG/DL (ref 70–110)
HCT VFR BLD AUTO: 44.4 % (ref 40–54)
HGB BLD-MCNC: 14.8 G/DL (ref 14–18)
INR PPP: 1.1 (ref 0.8–1.2)
INTERVENTRICULAR SEPTUM: 1.28 CM (ref 0.6–1.1)
IVRT: 0.14 MSEC
LA MAJOR: 4.58 CM
LA MINOR: 4.54 CM
LA WIDTH: 3.7 CM
LEFT ATRIUM SIZE: 2.36 CM
LEFT ATRIUM VOLUME INDEX: 16.6 ML/M2
LEFT ATRIUM VOLUME: 33.84 CM3
LEFT INTERNAL DIMENSION IN SYSTOLE: 2.32 CM (ref 2.1–4)
LEFT VENTRICLE DIASTOLIC VOLUME INDEX: 23.27 ML/M2
LEFT VENTRICLE DIASTOLIC VOLUME: 47.41 ML
LEFT VENTRICLE MASS INDEX: 71 G/M2
LEFT VENTRICLE SYSTOLIC VOLUME INDEX: 9.1 ML/M2
LEFT VENTRICLE SYSTOLIC VOLUME: 18.59 ML
LEFT VENTRICULAR INTERNAL DIMENSION IN DIASTOLE: 3.4 CM (ref 3.5–6)
LEFT VENTRICULAR MASS: 144.04 G
LV LATERAL E/E' RATIO: 9.71 M/S
LV SEPTAL E/E' RATIO: 11.33 M/S
LYMPHOCYTES # BLD AUTO: 0.8 K/UL (ref 1–4.8)
LYMPHOCYTES NFR BLD: 9.8 % (ref 18–48)
MAGNESIUM SERPL-MCNC: 2.3 MG/DL (ref 1.6–2.6)
MCH RBC QN AUTO: 28.1 PG (ref 27–31)
MCHC RBC AUTO-ENTMCNC: 33.3 G/DL (ref 32–36)
MCV RBC AUTO: 84 FL (ref 82–98)
MONOCYTES # BLD AUTO: 0.3 K/UL (ref 0.3–1)
MONOCYTES NFR BLD: 3.5 % (ref 4–15)
MV PEAK A VEL: 0.96 M/S
MV PEAK E VEL: 0.68 M/S
NEUTROPHILS # BLD AUTO: 7 K/UL (ref 1.8–7.7)
NEUTROPHILS NFR BLD: 86.4 % (ref 38–73)
PISA TR MAX VEL: 1.85 M/S
PLATELET # BLD AUTO: 218 K/UL (ref 150–350)
PMV BLD AUTO: 10.3 FL (ref 9.2–12.9)
POTASSIUM SERPL-SCNC: 4.5 MMOL/L (ref 3.5–5.1)
PROTHROMBIN TIME: 11.1 SEC (ref 9–12.5)
PULM VEIN S/D RATIO: 1.56
PV PEAK D VEL: 0.32 M/S
PV PEAK S VEL: 0.5 M/S
PV PEAK VELOCITY: 0.82 CM/S
RA MAJOR: 3.98 CM
RA PRESSURE: 3 MMHG
RA WIDTH: 3.82 CM
RBC # BLD AUTO: 5.26 M/UL (ref 4.6–6.2)
RIGHT VENTRICULAR END-DIASTOLIC DIMENSION: 3.29 CM
RV TISSUE DOPPLER FREE WALL SYSTOLIC VELOCITY 1 (APICAL 4 CHAMBER VIEW): 15.55 CM/S
SINUS: 3.72 CM
SODIUM SERPL-SCNC: 134 MMOL/L (ref 136–145)
STJ: 3.34 CM
TDI LATERAL: 0.07 M/S
TDI SEPTAL: 0.06 M/S
TDI: 0.07 M/S
TR MAX PG: 14 MMHG
TRICUSPID ANNULAR PLANE SYSTOLIC EXCURSION: 1.76 CM
TROPONIN I SERPL DL<=0.01 NG/ML-MCNC: 0.03 NG/ML (ref 0–0.03)
TV REST PULMONARY ARTERY PRESSURE: 17 MMHG
WBC # BLD AUTO: 8.19 K/UL (ref 3.9–12.7)

## 2019-10-04 PROCEDURE — 25000003 PHARM REV CODE 250: Performed by: EMERGENCY MEDICINE

## 2019-10-04 PROCEDURE — 63600175 PHARM REV CODE 636 W HCPCS: Performed by: EMERGENCY MEDICINE

## 2019-10-04 PROCEDURE — 85730 THROMBOPLASTIN TIME PARTIAL: CPT

## 2019-10-04 PROCEDURE — 25500020 PHARM REV CODE 255: Performed by: EMERGENCY MEDICINE

## 2019-10-04 PROCEDURE — 99285 EMERGENCY DEPT VISIT HI MDM: CPT | Mod: 25

## 2019-10-04 PROCEDURE — 21400001 HC TELEMETRY ROOM

## 2019-10-04 PROCEDURE — 93005 ELECTROCARDIOGRAM TRACING: CPT

## 2019-10-04 PROCEDURE — 36415 COLL VENOUS BLD VENIPUNCTURE: CPT

## 2019-10-04 PROCEDURE — 85610 PROTHROMBIN TIME: CPT

## 2019-10-04 PROCEDURE — 85730 THROMBOPLASTIN TIME PARTIAL: CPT | Mod: 91

## 2019-10-04 PROCEDURE — 25000003 PHARM REV CODE 250: Performed by: INTERNAL MEDICINE

## 2019-10-04 PROCEDURE — 99223 PR INITIAL HOSPITAL CARE,LEVL III: ICD-10-PCS | Mod: ,,, | Performed by: INTERNAL MEDICINE

## 2019-10-04 PROCEDURE — 93010 ELECTROCARDIOGRAM REPORT: CPT | Mod: ,,, | Performed by: INTERNAL MEDICINE

## 2019-10-04 PROCEDURE — 83880 ASSAY OF NATRIURETIC PEPTIDE: CPT

## 2019-10-04 PROCEDURE — 93010 EKG 12-LEAD: ICD-10-PCS | Mod: ,,, | Performed by: INTERNAL MEDICINE

## 2019-10-04 PROCEDURE — 84484 ASSAY OF TROPONIN QUANT: CPT

## 2019-10-04 PROCEDURE — 80048 BASIC METABOLIC PNL TOTAL CA: CPT

## 2019-10-04 PROCEDURE — 99223 1ST HOSP IP/OBS HIGH 75: CPT | Mod: ,,, | Performed by: INTERNAL MEDICINE

## 2019-10-04 PROCEDURE — 85025 COMPLETE CBC W/AUTO DIFF WBC: CPT

## 2019-10-04 PROCEDURE — 83735 ASSAY OF MAGNESIUM: CPT

## 2019-10-04 RX ORDER — ONDANSETRON 2 MG/ML
4 INJECTION INTRAMUSCULAR; INTRAVENOUS EVERY 8 HOURS PRN
Status: DISCONTINUED | OUTPATIENT
Start: 2019-10-04 | End: 2019-10-06 | Stop reason: HOSPADM

## 2019-10-04 RX ORDER — ACETAMINOPHEN 325 MG/1
650 TABLET ORAL EVERY 8 HOURS PRN
Status: DISCONTINUED | OUTPATIENT
Start: 2019-10-04 | End: 2019-10-04

## 2019-10-04 RX ORDER — SODIUM CHLORIDE 0.9 % (FLUSH) 0.9 %
10 SYRINGE (ML) INJECTION
Status: DISCONTINUED | OUTPATIENT
Start: 2019-10-04 | End: 2019-10-06 | Stop reason: HOSPADM

## 2019-10-04 RX ORDER — ACETAMINOPHEN 500 MG
500 TABLET ORAL EVERY 6 HOURS PRN
Status: DISCONTINUED | OUTPATIENT
Start: 2019-10-04 | End: 2019-10-06 | Stop reason: HOSPADM

## 2019-10-04 RX ORDER — RAMELTEON 8 MG/1
8 TABLET ORAL NIGHTLY PRN
Status: DISCONTINUED | OUTPATIENT
Start: 2019-10-04 | End: 2019-10-06 | Stop reason: HOSPADM

## 2019-10-04 RX ORDER — ONDANSETRON 2 MG/ML
4 INJECTION INTRAMUSCULAR; INTRAVENOUS EVERY 8 HOURS PRN
Status: DISCONTINUED | OUTPATIENT
Start: 2019-10-04 | End: 2019-10-04

## 2019-10-04 RX ORDER — HEPARIN SODIUM,PORCINE/D5W 25000/250
18 INTRAVENOUS SOLUTION INTRAVENOUS CONTINUOUS
Status: DISCONTINUED | OUTPATIENT
Start: 2019-10-04 | End: 2019-10-05

## 2019-10-04 RX ORDER — TRAMADOL HYDROCHLORIDE 50 MG/1
50 TABLET ORAL EVERY 6 HOURS PRN
Status: DISCONTINUED | OUTPATIENT
Start: 2019-10-04 | End: 2019-10-06 | Stop reason: HOSPADM

## 2019-10-04 RX ORDER — IBUPROFEN 200 MG
24 TABLET ORAL
Status: DISCONTINUED | OUTPATIENT
Start: 2019-10-04 | End: 2019-10-06 | Stop reason: HOSPADM

## 2019-10-04 RX ORDER — FAMOTIDINE 20 MG/1
20 TABLET, FILM COATED ORAL 2 TIMES DAILY
Status: DISCONTINUED | OUTPATIENT
Start: 2019-10-04 | End: 2019-10-06 | Stop reason: HOSPADM

## 2019-10-04 RX ORDER — GLUCAGON 1 MG
1 KIT INJECTION
Status: DISCONTINUED | OUTPATIENT
Start: 2019-10-04 | End: 2019-10-06 | Stop reason: HOSPADM

## 2019-10-04 RX ORDER — IBUPROFEN 200 MG
16 TABLET ORAL
Status: DISCONTINUED | OUTPATIENT
Start: 2019-10-04 | End: 2019-10-06 | Stop reason: HOSPADM

## 2019-10-04 RX ADMIN — HEPARIN SODIUM 18 UNITS/KG/HR: 10000 INJECTION, SOLUTION INTRAVENOUS at 06:10

## 2019-10-04 RX ADMIN — HEPARIN SODIUM 18 UNITS/KG/HR: 10000 INJECTION, SOLUTION INTRAVENOUS at 07:10

## 2019-10-04 RX ADMIN — FAMOTIDINE 20 MG: 20 TABLET ORAL at 08:10

## 2019-10-04 RX ADMIN — HEPARIN SODIUM 18 UNITS/KG/HR: 10000 INJECTION, SOLUTION INTRAVENOUS at 08:10

## 2019-10-04 RX ADMIN — IOHEXOL 70 ML: 350 INJECTION, SOLUTION INTRAVENOUS at 04:10

## 2019-10-04 RX ADMIN — RAMELTEON 8 MG: 8 TABLET, FILM COATED ORAL at 07:10

## 2019-10-04 RX ADMIN — TRAMADOL HYDROCHLORIDE 50 MG: 50 TABLET ORAL at 07:10

## 2019-10-04 NOTE — SUBJECTIVE & OBJECTIVE
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 Jolley    CATARACT EXTRACTION W/  INTRAOCULAR LENS IMPLANT Left 04/20/2017    Dr. Jolley    CHOLECYSTECTOMY      EYE SURGERY      Rt cataract    KNEE ARTHROSCOPY      Lt knee    TONSILLECTOMY       Family History     Problem Relation (Age of Onset)    Heart disease Father    No Known Problems Mother, Sister, Brother, Maternal Aunt, Maternal Uncle, Paternal Aunt, Paternal Uncle, Maternal Grandmother, Maternal Grandfather, Paternal Grandmother, Paternal Grandfather        Tobacco Use    Smoking status: Former Smoker    Smokeless tobacco: Never Used   Substance and Sexual Activity    Alcohol use: No    Drug use: No    Sexual activity: Never     Partners: Female       Review of Systems   Constitutional: Positive for activity change, appetite change, fatigue and unexpected weight change. Negative for fever.   HENT: Negative for mouth sores.    Eyes: Negative for visual disturbance.   Respiratory: Positive for cough and shortness of breath.    Cardiovascular: Negative for chest pain.   Gastrointestinal: Negative for abdominal pain and diarrhea.   Genitourinary: Negative for frequency.   Musculoskeletal: Negative for back pain.   Skin: Negative for rash.   Neurological: Positive for  weakness. Negative for headaches.   Hematological: Negative for adenopathy.   Psychiatric/Behavioral: The patient is not nervous/anxious.      Objective:     Vital Signs (Most Recent):  Temp: 97.1 °F (36.2 °C) (10/04/19 0742)  Pulse: 87 (10/04/19 0742)  Resp: 18 (10/04/19 0742)  BP: (!) 159/96 (10/04/19 0742)  SpO2: 95 % (10/04/19 0742) Vital Signs (24h Range):  Temp:  [97.1 °F (36.2 °C)-97.8 °F (36.6 °C)] 97.1 °F (36.2 °C)  Pulse:  [] 87  Resp:  [16-24] 18  SpO2:  [95 %-97 %] 95 %  BP: (140-164)/(70-96) 159/96     Weight: 81.6 kg (180 lb)  Body mass index is 24.41 kg/m².  Body surface area is 2.04 meters squared.      Intake/Output Summary (Last 24 hours) at 10/4/2019 1241  Last data filed at 10/4/2019 1100  Gross per 24 hour   Intake 311.16 ml   Output 400 ml   Net -88.84 ml       Physical Exam   Constitutional: He is oriented to person, place, and time. He appears well-developed. He has a sickly appearance.   HENT:   Head: Normocephalic.   Eyes: Pupils are equal, round, and reactive to light. Conjunctivae are normal. No scleral icterus.   Neck: Normal range of motion. Neck supple. No thyromegaly present.   Cardiovascular: Normal rate, regular rhythm and normal heart sounds.   No murmur heard.  Pulmonary/Chest: Effort normal. He has decreased breath sounds. He has no wheezes. He has no rales.   Abdominal: Soft. Bowel sounds are normal. He exhibits no distension and no mass. There is no hepatosplenomegaly. There is no tenderness. There is no rebound and no guarding.   Musculoskeletal: Normal range of motion. He exhibits no edema.   Neurological: He is alert and oriented to person, place, and time. No cranial nerve deficit.   Skin: No rash noted. No erythema.   Psychiatric: He has a normal mood and affect.       Significant Labs:   All pertinent labs within the past 24 hours have been reviewed    Diagnostic Results:  I have reviewed and interpreted all pertinent imaging results/findings within the past 24  hours    CTA chest 10?4/2019   1. Pulmonary thromboembolism involving the segmental and subsegmental branches supplying the right upper, middle and lower lobes.  No evidence of right pulmonary infarction or compelling evidence of right heart strain at this time.  Further evaluation with echocardiography can be performed.  2. Redemonstration of large subcarinal mediastinal/retrocardiac mass with involvement of the left hilum, which appears decreased in overall size compared to prior examination with continued associated mass effect on adjacent structures as detailed above.  3. Large volume of left pleural fluid which is slightly decreased from prior examination with continued underlying atelectasis/volume loss of the left upper lobe and essentially complete collapse of the left lower lobe possibly relating to postobstructive change.  4. Redemonstration of lymphadenopathy involving the chest and visualized upper abdomen noting previously identified left supraclavicular lymph node, prevascular space lymph node and lymph node at the gastroesophageal junction are decreased in short axis diameter compared to prior examination with the tail de burrow.

## 2019-10-04 NOTE — NURSING
0735- patient arrived to floor , placed our telemetry monitor  box on patient , oriented patient to room and call light system patient very  Tired and weak , bed alarm on for safety , bed rails up x 3 head of bed , oxygen in use at 2  Liters nasal canula. Placed urinal in reach . Heparin gtt infusing and verified . Wife and daughter at bedside too.         0958- diet order updated per patients request , hematology contacted per order consult  Spoke with harleen montero rn at clinic number I called was [phone] will let md know patient is at this campus. No acute telemetry changes call light and urinal in reach , head up rails up x 3 bed alarm on . Oxygen in use at 2 liters.

## 2019-10-04 NOTE — ED TRIAGE NOTES
"Pt presents via EMS with c/o SOB. Pt states that he was released from [facility] main campus earlier today. while at home pt begin to feel SOB, EMS contacted by wife. Upon arrival EMS states "pt O2 sat was 93% on RA. administered albuterol 5 mg and atrovent 5 mg. Pt O2 sat 98% after." no acute distress noted.   "

## 2019-10-04 NOTE — ASSESSMENT & PLAN NOTE
CTA chest shows Pulmonary thromboembolism involving the segmental and subsegmental branches supplying the right upper, middle and lower lobes.  No evidence of right pulmonary infarction or compelling evidence of right heart strain at this time.   No prior history of clots  Cont heparin and transition of po anticoagulation with Eliquis

## 2019-10-04 NOTE — ED NOTES
While initiating heparin bolus and infusion, hard stop on guardrail pump would not allow for ordered bolus rate of 80 Units/kg. Called pharmacy for advice who stated the doctor needed to change the order to 60 Units/kg as that is the highest the pump will allow. MD notified and will change order for bolus to 60 Units/kg.

## 2019-10-04 NOTE — HPI
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 Jojo Carcamo with R-CVP followed by maintenance rituxan who presented to the ED [facility] with shortness of breath started approximately approx 6 hrs after he was discharged from hospital. He reports SOB   awoke him from sleep.  He was not short of breath when he went sleep.  CTA shows acute pulmonary embolism in the right lung.  Improvement in size of mediastinal mass. No significant change in pleural effusion. He was started on anticoagulation with heparin.He was  discharged from [facility] on 10/3/2019       CT of the chest on 9/16 which showed a 9.8 x 3.8 x 13 cm size posterior mediastinal mass. He underwent lymph nose biopsy of supraclavicular node on 9/20, returned DLBCL (see media tab). Patient underwent CTA chest and CT abdomen with contrast at OSH on 9/25 after presentation to the ED. It showed extrinsic compression of the esophagus, a large retrocardiac mass with involvement of the left hilum causing extrinsic compression of the adjacent esophagus, compression of the left upper and lower lobe bronchi and compression/obliteration of the left inferior pulmonary vein  Pt admitted 9/25/19 to get radiation given airway/esophagus compression. Received radiation on 9/26/19 and no further thereafter due to improvement. Thoracentesis was done 9/27/19 with 2 liters removed, cx no growth. 10/02/2019: S/P C1 of CHOP. Pt will receive  rituxan outpatient.

## 2019-10-04 NOTE — CONSULTS
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 Jojo Carcamo with R-CVP followed by maintenance rituxan who presented to the ED on [facility] with shortness of breath started approximately approx 6 hrs after he was discharged from hospital. He reports SOB   awoke him from sleep.  He was not short of breath when he went sleep.  CTA shows acute pulmonary embolism in the right lung.  Improvement in size of mediastinal mass. No significant change in pleural effusion. He was started on anticoagulation with heparin.He was  discharged from [facility] on 10/3/2019       CT of the chest on 9/16 which showed a 9.8 x 3.8 x 13 cm size posterior mediastinal mass. He underwent lymph nose biopsy of supraclavicular node on 9/20, returned DLBCL (see media tab). Patient underwent CTA chest and CT abdomen with contrast at OSH on 9/25 after presentation to the ED. It showed extrinsic compression of the esophagus, a large retrocardiac mass with involvement of the left hilum causing extrinsic compression of the adjacent esophagus, compression of the left upper and lower lobe bronchi and compression/obliteration of the left inferior pulmonary vein  Pt admitted 9/25/19 to get radiation given airway/esophagus compression. Received radiation on 9/26/19 and no further thereafter due to improvement. Thoracentesis was done 9/27/19 with 2  liters removed, cx no growth. 10/02/2019: S/P C1 of [facility]. Pt will receive  rituxan outpatient.     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 Jolley    CATARACT EXTRACTION W/  INTRAOCULAR LENS IMPLANT Left 04/20/2017    Dr. Jolley    CHOLECYSTECTOMY      EYE SURGERY      Rt cataract    KNEE ARTHROSCOPY      Lt knee    TONSILLECTOMY       Family History     Problem Relation (Age of Onset)    Heart disease Father    No Known Problems Mother, Sister, Brother, Maternal Aunt, Maternal Uncle, Paternal Aunt, Paternal Uncle, Maternal Grandmother, Maternal Grandfather, Paternal Grandmother, Paternal Grandfather        Tobacco Use    Smoking status: Former Smoker    Smokeless tobacco: Never Used   Substance and Sexual Activity    Alcohol use: No    Drug use: No    Sexual activity: Never     Partners: Female       Review of Systems   Constitutional: Positive for activity change, appetite change, fatigue and unexpected weight change. Negative for fever.   HENT: Negative for mouth sores.    Eyes: Negative for visual disturbance.   Respiratory: Positive for cough and shortness of breath.    Cardiovascular: Negative for chest pain.   Gastrointestinal: Negative for abdominal pain and diarrhea.   Genitourinary: Negative for frequency.    Musculoskeletal: Negative for back pain.   Skin: Negative for rash.   Neurological: Positive for weakness. Negative for headaches.   Hematological: Negative for adenopathy.   Psychiatric/Behavioral: The patient is not nervous/anxious.      Objective:     Vital Signs (Most Recent):  Temp: 97.1 °F (36.2 °C) (10/04/19 0742)  Pulse: 87 (10/04/19 0742)  Resp: 18 (10/04/19 0742)  BP: (!) 159/96 (10/04/19 0742)  SpO2: 95 % (10/04/19 0742) Vital Signs (24h Range):  Temp:  [97.1 °F (36.2 °C)-97.8 °F (36.6 °C)] 97.1 °F (36.2 °C)  Pulse:  [] 87  Resp:  [16-24] 18  SpO2:  [95 %-97 %] 95 %  BP: (140-164)/(70-96) 159/96     Weight: 81.6 kg (180 lb)  Body mass index is 24.41 kg/m².  Body surface area is 2.04 meters squared.      Intake/Output Summary (Last 24 hours) at 10/4/2019 1241  Last data filed at 10/4/2019 1100  Gross per 24 hour   Intake 311.16 ml   Output 400 ml   Net -88.84 ml       Physical Exam   Constitutional: He is oriented to person, place, and time. He appears frail and has a sickly apperance  HENT:   Head: Normocephalic.   Eyes:  Conjunctivae are normal. No scleral icterus.   Neck: Normal range of motion. Neck supple. No thyromegaly present.   Cardiovascular: Normal rate, regular rhythm and normal heart sounds. No murmur heard.  Pulmonary/Chest: Effort normal. He has decreased breath sounds on left.  He has no wheezes. He has no rales.   Abdominal: Soft. Bowel sounds are normal. He exhibits no distension and no mass.  There is no tenderness. There is no rebound and no guarding.   Musculoskeletal: Normal range of motion. He exhibits no edema.   Neurological: He is alert and oriented to person, place, and time. No cranial nerve deficit.   Skin: No rash noted. No erythema.   Psychiatric: He has a normal mood and affect.       Significant Labs:   All pertinent labs within the past 24 hours have been reviewed    Diagnostic Results:  I have reviewed and interpreted all pertinent imaging results/findings within  the past 24 hours    CTA chest 10?4/2019   1. Pulmonary thromboembolism involving the segmental and subsegmental branches supplying the right upper, middle and lower lobes.  No evidence of right pulmonary infarction or compelling evidence of right heart strain at this time.  Further evaluation with echocardiography can be performed.  2. Redemonstration of large subcarinal mediastinal/retrocardiac mass with involvement of the left hilum, which appears decreased in overall size compared to prior examination with continued associated mass effect on adjacent structures as detailed above.  3. Large volume of left pleural fluid which is slightly decreased from prior examination with continued underlying atelectasis/volume loss of the left upper lobe and essentially complete collapse of the left lower lobe possibly relating to postobstructive change.  4. Redemonstration of lymphadenopathy involving the chest and visualized upper abdomen noting previously identified left supraclavicular lymph node, prevascular space lymph node and lymph node at the gastroesophageal junction are decreased in short axis diameter compared to prior examination with the tail de burrow.    Assessment/Plan:     Pulmonary embolus  CTA chest shows Pulmonary thromboembolism involving the segmental and subsegmental branches supplying the right upper, middle and lower lobes.  No evidence of right pulmonary infarction or compelling evidence of right heart strain at this time.   No prior history of clots  Cont heparin and transition to po anticoagulation with Eliquis  No indication for thrombophilia workup         Diffuse large B-cell lymphoma of intrathoracic lymph nodes  10/02/2019: S/P C1 of [facility]. Will receive rituxan outpatient.   Follow up with his treating Oncologist , Dr. Spann as planned      Dr. Britton covering over weekend for any questions and/or concerns    Thank you for your consult.     Amber Heredia  MD  Hematology/Oncology  [facility]

## 2019-10-04 NOTE — ED PROVIDER NOTES
Encounter Date: 10/4/2019       History     Chief Complaint   Patient presents with    Shortness of Breath     woke up PTA c/o shortness of breath; 93% on RA, 98% after albuterol/atrovent; pt with hx of lymphoma, recent discharge from [facility]     Patient presents with complaints of acute onset of shortness of breath started approximately 1.5 hr ago.  States it awoke him from sleep.  He was not short of breath when he went sleep.  Patient was recently discharged from [facility] after treatment for pleural effusion and recurrence of lymphoma.  He was started on chemotherapy during the admission.  He required a thoracentesis.  He was discharged yesterday.  At that time he states that he was weak but he denied shortness of breath or chest pain. He does not have chest pain at this time.  No abdominal pain.  No back pain. No palpitations.  No leg swelling or calf pain. No fever.  No cough.        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 Jolley    CATARACT EXTRACTION W/  INTRAOCULAR LENS IMPLANT Left 04/20/2017    Dr. Jolley    CHOLECYSTECTOMY      EYE SURGERY      Rt cataract    KNEE ARTHROSCOPY      Lt knee    TONSILLECTOMY       Family History   Problem Relation Age of Onset    No Known Problems Mother     No Known Problems Father     No Known Problems Sister     No Known Problems Brother     No Known Problems Maternal Aunt     No Known Problems Maternal Uncle     No Known Problems Paternal Aunt     No Known Problems Paternal Uncle     No Known Problems Maternal Grandmother     No Known Problems Maternal Grandfather      No Known Problems Paternal Grandmother     No Known Problems Paternal Grandfather     Amblyopia Neg Hx     Blindness Neg Hx     Cataracts Neg Hx     Glaucoma Neg Hx     Macular degeneration Neg Hx     Retinal detachment Neg Hx     Strabismus Neg Hx     Cancer Neg Hx     Diabetes Neg Hx     Hypertension Neg Hx     Stroke Neg Hx     Thyroid disease Neg Hx      Social History     Tobacco Use    Smoking status: Former Smoker    Smokeless tobacco: Never Used   Substance Use Topics    Alcohol use: No    Drug use: No     Review of Systems   Constitutional: Negative for chills, diaphoresis and fever.   HENT: Negative for congestion.    Eyes: Negative.    Respiratory: Positive for shortness of breath. Negative for cough and wheezing.    Cardiovascular: Negative for chest pain, palpitations and leg swelling.   Gastrointestinal: Negative for abdominal pain, diarrhea, nausea and vomiting.        NO melena or rectal bleeding   Genitourinary: Negative for dysuria and flank pain.   Musculoskeletal: Negative for back pain.   Skin: Negative for wound.   Neurological: Positive for weakness. Negative for syncope and headaches.        No numbness   Psychiatric/Behavioral: Negative for confusion.   All other systems reviewed and are negative.      Physical Exam     Initial Vitals [10/04/19 0234]   BP Pulse Resp Temp SpO2   (!) 154/92 88 (!) 24 -- 97 %      MAP       --         Physical Exam    Nursing note and vitals reviewed.  Constitutional: He appears well-developed and well-nourished. He is not diaphoretic. No distress.   HENT:   Head: Normocephalic and atraumatic.   Right Ear: External ear normal.   Left Ear: External ear normal.   Nose: Nose normal.   Mouth/Throat: Oropharynx is clear and moist.   Eyes: Conjunctivae and EOM are normal. Pupils are equal, round, and reactive to light. Right eye exhibits no discharge. Left eye exhibits no discharge. No scleral icterus.   Neck: Neck supple. No tracheal deviation  present.   Cardiovascular: Normal rate, regular rhythm and normal heart sounds.   No murmur heard.  Pulmonary/Chest: Breath sounds normal. No stridor. No respiratory distress. He has no wheezes. He has no rhonchi. He has no rales. He exhibits no tenderness.   Decreased breath sounds the left thorax.   Abdominal: Soft. He exhibits no distension and no mass. There is no tenderness. There is no rebound and no guarding.   Musculoskeletal: Normal range of motion. He exhibits no edema or tenderness.   Neurological: He is alert and oriented to person, place, and time. He has normal strength. No cranial nerve deficit. GCS score is 15. GCS eye subscore is 4. GCS verbal subscore is 5. GCS motor subscore is 6.   Skin: Skin is warm and dry. No rash noted.   Psychiatric: He has a normal mood and affect. His behavior is normal. Judgment and thought content normal.         ED Course   Procedures  Labs Reviewed   CBC W/ AUTO DIFFERENTIAL   BASIC METABOLIC PANEL   MAGNESIUM   B-TYPE NATRIURETIC PEPTIDE   TROPONIN I     EKG Readings: (Independently Interpreted)   Initial Reading: No STEMI. Rhythm: Sinus Tachycardia. Heart Rate: 101. Ectopy: No Ectopy. ST Segments: Normal ST Segments. T Waves: Normal. Axis: Normal.   Incomplete right bundle-branch block.  No acute ischemic changes.       Imaging Results    None          Medical Decision Making:   Initial Assessment:   Patient presents with shortness of breath. Patient recently had a thoracentesis for pleural effusion.  Breath sounds are decreased on left.  Will rule out pneumothorax and recurrence of effusion.  Will also rule out acute coronary syndrome and pulmonary embolism if another etiology of his shortness of breath is not identified.  O2 sat is 93% on room air.  Patient appears mildly dyspneic.  Will place on 2 L nasal cannula.  No clinical evidence of DVT.  Will review medical records from recent admission.  Differential Diagnosis:   Pneumothorax, pleural effusion, hemothorax,  pulmonary embolism, acute coronary syndrome, CHF, pericardial effusion  Clinical Tests:   Lab Tests: Ordered and Reviewed  The following lab test(s) were unremarkable: CBC, CMP and Troponin  Radiological Study: Ordered and Reviewed  Medical Tests: Ordered and Reviewed  ED Management:  0530:  EKG shows sinus tachycardia without ischemic changes.  Troponin mildly elevated when compared to previous.  Patient continues to deny chest pain. Chest x-ray shows no change in pleural effusion in the left thorax.  No significant change in H&H.  No significant change white count.  Significant change in renal function. CTA shows acute pulmonary embolism in the right lung.  Improvement in size of mediastinal mass. No significant change in pleural effusion.  Will admit on anticoagulants.  Discussed plan with patient and family.              Attending Attestation:         Attending Critical Care:   Critical Care Times:   Direct Patient Care (initial evaluation, reassessments, and time considering the case)................................................................15 minutes.   Additional History from reviewing old medical records or taking additional history from the family, EMS, PCP, etc.......................10 minutes.   Ordering, Reviewing, and Interpreting Diagnostic Studies...............................................................................................................10 minutes.   Documentation..................................................................................................................................................................................10 minutes.   Consultation with other Physicians. .................................................................................................................................................5 minutes.   ==============================================================  · Total Critical Care Time - exclusive of procedural time: 50  minutes.  ==============================================================  Critical care reasons: Pulmonary embolism.   The following critical care procedures were done by me (see procedure notes): pulse oximetry.   Critical care was time spent personally by me on the following activities: obtaining history from patient or relative, examination of patient, review of x-rays / CT sent with the patient, review of old charts, ordering lab, x-rays, and/or EKG, development of treatment plan with patient or relative, discussion with consultants and re-evaluation of patient's conition.   Critical Care Condition: potentially life-threatening                  Clinical Impression:       ICD-10-CM ICD-9-CM   1. Other acute pulmonary embolism without acute cor pulmonale I26.99 415.19   2. SOB (shortness of breath) R06.02 786.05   3. Pleural effusion J90 511.9         Disposition:   Disposition: Admitted  Condition: Serious                        Inderjit Segal MD  10/04/19 0543

## 2019-10-04 NOTE — ASSESSMENT & PLAN NOTE
10/02/2019: S/P C1 of [facility]. Will receive rituxan outpatient.   Follow up with his treating Oncologist , Dr. Spann as planned

## 2019-10-04 NOTE — NURSING
1300- ptt result back no changes in heparin regimen next ptt at 1800 today . Patient tolerating well. Urine clear yellow . Patient resting in bed on his side assisted up to bathroom he did have a bowel movement no blood noted in toilet patient instructed to report any bleeding. Bed alarm on call light in reach head of bed up rails up x 3.         1457- no acute distress or changes on telemetry call light in reach , wife called gave status update only . No bleeding noted bed alarm on call light in reach head of bed up rails up x 3 . No changes on telemetry . Urinal in reach .

## 2019-10-04 NOTE — PLAN OF CARE
MDR's with Dr. Cross. S/p C1 of [facility] for diffuse large B-cell lymphoma (previously with follicular lymphoma), PNA. Pt cleared by Dr. Cross to discharge home with HH for IV Abx/line care, PT/OT/ST & to follow-[place] next week [place] [facility] SW coordinated referral for HH & IV Abx to pt's insurance & pt was set up for HH with [facility] & IV [facility]x thru [facility]. NP requested follow-up appts. Wife at BS.     Future Appointments   Date Time Provider Department Center   10/7/2019 10:25 AM LAB,  HOSPITAL [facility] LAB [facility]   10/8/2019  2:00 PM Jojo Carcamo MD [facility] HEM ONC [facility]   10/14/2019  9:15 AM Charles Kapoor MD [facility]           10/04/19 1802   Final Note   Assessment Type Final Discharge Note   Anticipated Discharge Disposition Home-Health   Hospital Follow Up  Appt(s) scheduled? Yes   Discharge plans and expectations educations in teach back method with documentation complete? Yes   Right Care Referral Info   Post Acute Recommendation Home-care   Referral Type SW - Skilled Nurse IV Abx/line care, PT/OT & Home IV Abx   Facility Name [facility]

## 2019-10-04 NOTE — PROGRESS NOTES
Chief Complaint :  Follicular lymphoma.    Hx of Present illness :  Patient is a 79 y.o. year old male who presents to the clinic today for  Oncology followup. Has been seen at [facility] in the past. Dx'd to have Low grade , Stage 1, follicular lymphoma Right groin in 2004. Good response to R-CVP Chemotherapy and maintenance rituxan.  Recently had lesion excised from Left side of nostril. Dx'd to have  Superficially invasive squamous cell [id number]. Invasive. In Aug/swep 2019 evaluated by physician. Had  Bx of Lymph node from left neck. 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 CT scans revealed a large mediastinal mass. Patient had sifnificant sx. Transferred to [facility]. Was given emergency mediastinal radiation. Started on mini CHOP on 10/2/19.  Admitted over this weekend with Shortness of breath. CTA chest revealed Emboli in right upper, middle and lower lobes. Th left mediastinal mass had decreased in size    Allergies :    Review of patient's allergies indicates:  No Known Allergies    Occupation :  Law enforcement.    Transfusion :  None.    Menstrual & obstetric Hx :  N/A      Present Meds :   Medication List with Changes/Refills   Current Medications    ALBUTEROL (PROVENTIL/VENTOLIN HFA) 90 MCG/ACTUATION INHALER    Inhale 2 puffs into the lungs every 6 (six) hours as needed for Wheezing. Rescue    AZELASTINE (ASTELIN) 137 MCG (0.1 %) NASAL SPRAY    1 spray (137 mcg total) by Nasal route 2 (two) times daily.    LISINOPRIL 10 MG TABLET    Take 10 mg by mouth once daily.    MECLIZINE (ANTIVERT) 25 MG TABLET    Take 1 tablet (25 mg total) by mouth 3 (three) times daily as needed.    PYRIDOSTIGMINE (MESTINON) 60 MG TAB    Take 60 mg by mouth 3 (three) times daily.        Past Medical Hx :  Hx of Folliculoar Lymphoma. Hypertension; age related Macular degeneration;  Hypertropia right eye. No hx of DM, PUD, Hepatitis, Liver disease, thyroid dysfunction., TB, Sarcoid, Lupus, rheumatoid arthritis, seizure  disorder, CVA. No Hx of DVT or Pulmonary embolism/\. No past Hx of radiation therapy.     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    Family Hx :  Family History   Problem Relation Age of Onset    No Known Problems Mother     Heart disease Father     No Known Problems Sister     No Known Problems Brother     No Known Problems Maternal Aunt     No Known Problems Maternal Uncle     No Known Problems Paternal Aunt     No Known Problems Paternal Uncle     No Known Problems Maternal Grandmother     No Known Problems Maternal Grandfather     No Known Problems Paternal Grandmother     No Known Problems Paternal Grandfather     Amblyopia Neg Hx     Blindness Neg Hx     Cataracts Neg Hx     Glaucoma Neg Hx     Macular degeneration Neg Hx     Retinal detachment Neg Hx     Strabismus Neg Hx     Cancer Neg Hx     Diabetes Neg Hx     Hypertension Neg Hx     Stroke Neg Hx     Thyroid disease Neg Hx        Social Hx :  Social History     Socioeconomic History    Marital status:      Spouse name: Not on file    Number of children: Not on file    Years of education: Not on file    Highest education level: Not on file   Occupational History    Not on file   Social Needs    Financial resource strain: Not on file    Food insecurity:     Worry: Not on file     Inability: Not on file    Transportation needs:     Medical: Not on file     Non-medical: Not on file   Tobacco Use    Smoking status: Former Smoker    Smokeless tobacco: Never Used   Substance and Sexual Activity    Alcohol use: No    Drug use: No    Sexual activity: Never     Partners: Female    Lifestyle    Physical activity:     Days per week: Not on file     Minutes per session: Not on file    Stress: Not on file   Relationships    Social connections:     Talks on phone: Not on file     Gets together: Not on file     Attends Sikh service: Not on file     Active member of club or organization: Not on file     Attends meetings of clubs or organizations: Not on file     Relationship status: Not on file   Other Topics Concern    Not on file   Social History Narrative    Not on file       Surgery :  Lymph node Bx; Portacath. Bone Marrow Bx Cholecystectomy. Bilateral Cataract surgery  Excision of skin cancer from nostril.     Symptoms :    Review of Systems   Constitutional: Positive for malaise/fatigue. Negative for chills, diaphoresis, fever and weight loss.   HENT: Negative for congestion, hearing loss, nosebleeds, sore throat and tinnitus.    Eyes: Negative for blurred vision, double vision and photophobia.   Respiratory: Negative for cough, hemoptysis and shortness of breath.    Cardiovascular: Negative for chest pain, palpitations, orthopnea, claudication, leg swelling and PND.   Gastrointestinal: Positive for diarrhea (Moderate). Negative for abdominal pain, blood in stool, constipation, heartburn, nausea and vomiting.   Genitourinary: Positive for frequency (Related to BPH). Negative for dysuria, flank pain, hematuria and urgency.   Musculoskeletal: Positive for joint pain (rthritis). Negative for back pain, falls, myalgias and neck pain.   Skin: Negative for itching and rash.   Neurological: Positive for tingling and sensory change (Related to Chemotherapy). Negative for dizziness, tremors, speech change, focal weakness, seizures, loss of consciousness, weakness and headaches.   Endo/Heme/Allergies: Negative for environmental allergies and polydipsia. Does not bruise/bleed easily.   Psychiatric/Behavioral: Negative for depression and memory loss. The patient is not nervous/anxious and does  not have insomnia.        Physical Exam :   Physical Exam   Constitutional: He is oriented to person, place, and time and well-developed, well-nourished, and in no distress. Vital signs are normal. No distress.   HENT:   Head: Normocephalic and atraumatic.   Right Ear: External ear normal.   Left Ear: External ear normal.   Nose: Nose normal.   Mouth/Throat: Oropharynx is clear and moist. No oropharyngeal exudate.   Eyes: Conjunctivae, EOM and lids are normal. Lids are everted and swept, no foreign bodies found. Right eye exhibits no discharge. Left eye exhibits no discharge. No scleral icterus. Pupils are unequal.       Neck: Trachea normal, normal range of motion, full passive range of motion without pain and phonation normal. Neck supple. Normal carotid pulses, no hepatojugular reflux and no JVD present. No tracheal tenderness present. Carotid bruit is not present. No tracheal deviation present. No thyroid mass and no thyromegaly present.   Cardiovascular: Normal rate, regular rhythm, normal heart sounds, intact distal pulses and normal pulses. PMI is not displaced. Exam reveals no friction rub.   No murmur heard.  Pulmonary/Chest: Effort normal and breath sounds normal. No stridor. No apnea. No respiratory distress. He has no wheezes. He has no rales. He exhibits no tenderness.   Abdominal: Soft. Bowel sounds are normal. He exhibits no distension, no ascites and no mass. There is no hepatosplenomegaly, splenomegaly or hepatomegaly. There is no tenderness. There is no rebound, no guarding and no CVA tenderness. No hernia.   Genitourinary:   Genitourinary Comments: Not Examined   Musculoskeletal: Normal range of motion. He exhibits no edema, tenderness or deformity.   Lymphadenopathy:        Head (right side): No submental, no submandibular, no tonsillar, no preauricular, no posterior auricular and no occipital adenopathy present.        Head (left side): No submental, no submandibular, no tonsillar, no  preauricular, no posterior auricular and no occipital adenopathy present.     He has no cervical adenopathy.     He has no axillary adenopathy.        Right: No inguinal, no supraclavicular and no epitrochlear adenopathy present.        Left: No inguinal, no supraclavicular and no epitrochlear adenopathy present.   Neurological: He is alert and oriented to person, place, and time. He has normal motor skills, normal sensation, normal strength, normal reflexes and intact cranial nerves. No cranial nerve deficit. He exhibits normal muscle tone. Gait normal. Coordination normal. GCS score is 15.   Skin: Skin is warm and dry. He is not diaphoretic. No cyanosis. Nails show no clubbing.   Psychiatric: Mood, memory, affect and judgment normal.         Labs & Imaging : 04/13/18 :  NFBS 98; Cr.1.44. Bili 0.6. ALP 61. WBC 5,600. Hgb 14.6; Hct 45.0. Plts 158,000. ANC 3,545.        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  Remote Hx of Follicular Lymphoma. In Remission. 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  Patient has PICC line. [facility] for PICC maintenance.  [facility] referral for PT. RTC 2 weeks with CBC,CMP,Uric acid. And then initiate cycle 2.    Advance Care Planning     Power of   I initiated the process of advance care planning today and explained the importance of this process to the patient.  I introduced the concept of advance directives to the patient, as well. Then the patient received detailed information about the importance of designating a Health Care Power of  (HCPOA). He was also instructed to communicate with this person about their wishes for future healthcare, should he become sick and lose decision-making capacity. The patient  has not: previously appointed a HCPOA. .         Living Will  During this visit, I engaged the patient in the advance care planning process.  The patient and I  reviewed the role for advance directives and their purpose in directing future healthcare if the patient's unable to speak for him/herself.  At this point in time, the patient does have full decision-making capacity.  We discussed different extreme health states that he could experience, and reviewed what kind of medical care he would want in those situations.  The patient communicated that if he were comatose and had little chance of a meaningful recovery, does not  want machines/life-sustaining treatments used.    The patient  HAS NOTcompleted a living will to reflect these preferences.       Brochures given.

## 2019-10-05 LAB
ALBUMIN SERPL BCP-MCNC: 2.9 G/DL (ref 3.5–5.2)
ALP SERPL-CCNC: 81 U/L (ref 55–135)
ALT SERPL W/O P-5'-P-CCNC: 81 U/L (ref 10–44)
ANION GAP SERPL CALC-SCNC: 8 MMOL/L (ref 8–16)
APTT BLDCRRT: 28.2 SEC (ref 21–32)
APTT BLDCRRT: 93.3 SEC (ref 21–32)
AST SERPL-CCNC: 27 U/L (ref 10–40)
BASOPHILS # BLD AUTO: 0.01 K/UL (ref 0–0.2)
BASOPHILS # BLD AUTO: 0.01 K/UL (ref 0–0.2)
BASOPHILS NFR BLD: 0.2 % (ref 0–1.9)
BASOPHILS NFR BLD: 0.2 % (ref 0–1.9)
BILIRUB SERPL-MCNC: 0.7 MG/DL (ref 0.1–1)
BUN SERPL-MCNC: 17 MG/DL (ref 8–23)
CALCIUM SERPL-MCNC: 8.4 MG/DL (ref 8.7–10.5)
CHLORIDE SERPL-SCNC: 102 MMOL/L (ref 95–110)
CO2 SERPL-SCNC: 22 MMOL/L (ref 23–29)
CREAT SERPL-MCNC: 0.8 MG/DL (ref 0.5–1.4)
DIFFERENTIAL METHOD: ABNORMAL
DIFFERENTIAL METHOD: ABNORMAL
EOSINOPHIL # BLD AUTO: 0.1 K/UL (ref 0–0.5)
EOSINOPHIL # BLD AUTO: 0.1 K/UL (ref 0–0.5)
EOSINOPHIL NFR BLD: 1.1 % (ref 0–8)
EOSINOPHIL NFR BLD: 1.1 % (ref 0–8)
ERYTHROCYTE [DISTWIDTH] IN BLOOD BY AUTOMATED COUNT: 13.8 % (ref 11.5–14.5)
ERYTHROCYTE [DISTWIDTH] IN BLOOD BY AUTOMATED COUNT: 13.8 % (ref 11.5–14.5)
EST. GFR  (AFRICAN AMERICAN): >60 ML/MIN/1.73 M^2
EST. GFR  (NON AFRICAN AMERICAN): >60 ML/MIN/1.73 M^2
GLUCOSE SERPL-MCNC: 99 MG/DL (ref 70–110)
HCT VFR BLD AUTO: 39.5 % (ref 40–54)
HCT VFR BLD AUTO: 39.5 % (ref 40–54)
HGB BLD-MCNC: 13.1 G/DL (ref 14–18)
HGB BLD-MCNC: 13.1 G/DL (ref 14–18)
LYMPHOCYTES # BLD AUTO: 1 K/UL (ref 1–4.8)
LYMPHOCYTES # BLD AUTO: 1 K/UL (ref 1–4.8)
LYMPHOCYTES NFR BLD: 16 % (ref 18–48)
LYMPHOCYTES NFR BLD: 16 % (ref 18–48)
MAGNESIUM SERPL-MCNC: 2.2 MG/DL (ref 1.6–2.6)
MCH RBC QN AUTO: 27.9 PG (ref 27–31)
MCH RBC QN AUTO: 27.9 PG (ref 27–31)
MCHC RBC AUTO-ENTMCNC: 33.2 G/DL (ref 32–36)
MCHC RBC AUTO-ENTMCNC: 33.2 G/DL (ref 32–36)
MCV RBC AUTO: 84 FL (ref 82–98)
MCV RBC AUTO: 84 FL (ref 82–98)
MONOCYTES # BLD AUTO: 0.1 K/UL (ref 0.3–1)
MONOCYTES # BLD AUTO: 0.1 K/UL (ref 0.3–1)
MONOCYTES NFR BLD: 1.7 % (ref 4–15)
MONOCYTES NFR BLD: 1.7 % (ref 4–15)
NEUTROPHILS # BLD AUTO: 5.2 K/UL (ref 1.8–7.7)
NEUTROPHILS # BLD AUTO: 5.2 K/UL (ref 1.8–7.7)
NEUTROPHILS NFR BLD: 81.5 % (ref 38–73)
NEUTROPHILS NFR BLD: 81.5 % (ref 38–73)
PHOSPHATE SERPL-MCNC: 3.5 MG/DL (ref 2.7–4.5)
PLATELET # BLD AUTO: 224 K/UL (ref 150–350)
PLATELET # BLD AUTO: 224 K/UL (ref 150–350)
PMV BLD AUTO: 10.8 FL (ref 9.2–12.9)
PMV BLD AUTO: 10.8 FL (ref 9.2–12.9)
POTASSIUM SERPL-SCNC: 4.1 MMOL/L (ref 3.5–5.1)
PROT SERPL-MCNC: 5.4 G/DL (ref 6–8.4)
RBC # BLD AUTO: 4.7 M/UL (ref 4.6–6.2)
RBC # BLD AUTO: 4.7 M/UL (ref 4.6–6.2)
SODIUM SERPL-SCNC: 132 MMOL/L (ref 136–145)
TROPONIN I SERPL DL<=0.01 NG/ML-MCNC: 0.02 NG/ML (ref 0–0.03)
WBC # BLD AUTO: 6.44 K/UL (ref 3.9–12.7)
WBC # BLD AUTO: 6.44 K/UL (ref 3.9–12.7)

## 2019-10-05 PROCEDURE — 21400001 HC TELEMETRY ROOM

## 2019-10-05 PROCEDURE — 83615 LACTATE (LD) (LDH) ENZYME: CPT

## 2019-10-05 PROCEDURE — 85025 COMPLETE CBC W/AUTO DIFF WBC: CPT

## 2019-10-05 PROCEDURE — 36415 COLL VENOUS BLD VENIPUNCTURE: CPT

## 2019-10-05 PROCEDURE — 84484 ASSAY OF TROPONIN QUANT: CPT

## 2019-10-05 PROCEDURE — 83735 ASSAY OF MAGNESIUM: CPT

## 2019-10-05 PROCEDURE — 25000003 PHARM REV CODE 250: Performed by: INTERNAL MEDICINE

## 2019-10-05 PROCEDURE — 85730 THROMBOPLASTIN TIME PARTIAL: CPT

## 2019-10-05 PROCEDURE — 25000003 PHARM REV CODE 250: Performed by: EMERGENCY MEDICINE

## 2019-10-05 PROCEDURE — 94761 N-INVAS EAR/PLS OXIMETRY MLT: CPT

## 2019-10-05 PROCEDURE — 85730 THROMBOPLASTIN TIME PARTIAL: CPT | Mod: 91

## 2019-10-05 PROCEDURE — 80053 COMPREHEN METABOLIC PANEL: CPT

## 2019-10-05 PROCEDURE — 84100 ASSAY OF PHOSPHORUS: CPT

## 2019-10-05 RX ADMIN — APIXABAN 10 MG: 5 TABLET, FILM COATED ORAL at 09:10

## 2019-10-05 RX ADMIN — RAMELTEON 8 MG: 8 TABLET, FILM COATED ORAL at 09:10

## 2019-10-05 RX ADMIN — HEPARIN SODIUM 14 UNITS/KG/HR: 10000 INJECTION, SOLUTION INTRAVENOUS at 07:10

## 2019-10-05 RX ADMIN — FAMOTIDINE 20 MG: 20 TABLET ORAL at 09:10

## 2019-10-05 RX ADMIN — TRAMADOL HYDROCHLORIDE 50 MG: 50 TABLET ORAL at 09:10

## 2019-10-05 NOTE — ASSESSMENT & PLAN NOTE
Patient presented with sudden onset shortness of breath with known malignancy and decreased mobility  Workup with CT of the chest consistent with acute pulmonary embolism  Case discussed with Hematology/Oncology, and will transition to p.o. Eliquis after initial stabilization  Echo showed normal right vent systolic function  Has clinically improved. Wean O2 as tolerated. Transition to PO eliquis today pending repeat aPTT level  Risk for bleeding discussed with patient. Currently has no contraindications.   Verbalized understanding and agrees with treatment  ANUSHA WHITTAKER pending

## 2019-10-05 NOTE — NURSING
Bedside shift report received from WARD Malone. Communication board has been updated. NAD noted. Will continue to monitor. Pt is sleeping at this time. Heparin drip has been held per nonogram for 2 hours. Will restart at 0718. O2 at 3 liters NC

## 2019-10-05 NOTE — ASSESSMENT & PLAN NOTE
Recent CT of the chest on 09/16/2019 showed a large posterior mediastinal mass causing extrinsic compression of the esophagus, left upper and lower lobe bronchi and compression/obliteration of the left inferior pulmonary vein  s/p biopsy of supraclavicular node on 9/20/19  Pathology showing DLBCL   S/p XRT on 9/26/19 with improvement.   S/p thoracentesis on 9/27/19 with 2 liters removed  s/p chemotherapy on 10/02/2019 with [facility]  Hematology-Oncology consulted and appreciate their input  Plan for patient to get rituxan as outpatient under the care of Dr. Spann

## 2019-10-05 NOTE — ASSESSMENT & PLAN NOTE
Patient presented with sudden onset shortness of breath with known malignancy and decreased mobility  Workup with CT of the chest consistent with acute pulmonary embolism  Will continue empiric treatment with heparin infusion for anticoagulation  Will continue support with oxygen and wean as tolerated  Case discussed with Hematology/Oncology, and will transition to p.o. Eliquis after initial stabilization  Will further workup with 2D echo to assess for right heart strain  Will check ultrasound of lower extremities to assess for DVT

## 2019-10-05 NOTE — PLAN OF CARE
"TN introduced herself and explained the 's role. TN utilized 2 patient identifiers: Name & .  TN placed Name and spectralink number on whiteboard.TN provided and reviewed with patient "[employer] My Health Packet". TN discussed what Help At Home means to the patient and the name of the person  Bindu, who helps at home. TN discussed with patient the things the patient is responsible for to HELP manage patient's  healthcare at home. TN taught Symptoms and Problems with patient for HYPOXIA .Patient verbalized understanding & teachback:1.SOB, 2.DIZZINESS, 3. WEAKNESS . Patient prefers morning or after noon doctor appointments.     10/05/19 1026   Discharge Assessment   Assessment Type Discharge Planning Assessment   Confirmed/corrected address and phone number on facesheet? Yes   Assessment information obtained from? Patient   Communicated expected length of stay with patient/caregiver no   Prior to hospitilization cognitive status: Alert/Oriented   Prior to hospitalization functional status: Independent   Current cognitive status: Alert/Oriented   Current Functional Status: Independent   Lives With spouse   Able to Return to Prior Arrangements yes   Is patient able to care for self after discharge? Yes   Who are your caregiver(s) and their phone number(s)?   (spouse, Bindu Cevallos [phone])   Patient's perception of discharge disposition admitted as an inpatient   Readmission Within the Last 30 Days unable to assess   Patient currently being followed by outpatient case management? No   Patient currently receives any other outside agency services? No   Equipment Currently Used at Home none   Do you have any problems affording any of your prescribed medications? No   Is the patient taking medications as prescribed? yes   Does the patient have transportation home? Yes   Transportation Anticipated family or friend will provide   Does the patient receive services at the [facility]? No   Discharge Plan A Home " with family   Discharge Plan B Home with family;Home Health   DME Needed Upon Discharge  other (see comments)  (TBD)   Patient/Family in Agreement with Plan yes     [id number] [place] [facility] [address]  [facility] [address] [place] [address]  Phone: [phone] Fax: [phone]

## 2019-10-05 NOTE — NURSING
Ordered eliquis not given per Dr. Gallegos orders. Heparin was stopped at 0738. PTT is scheduled for 1200. Notify Dr. Gallegos of results to decide if eliquis is going to be given.

## 2019-10-05 NOTE — H&P
[facility] Medicine  History & Physical    Patient Name: Robe Cevallos  MRN: [id number]  Admission Date: 10/4/2019  Attending Physician: Cheyenne Billings MD   Primary Care Provider: Kang Reddy MD         Patient information was obtained from patient, past medical records and ER records.     Subjective:     Principal Problem:Pulmonary embolus    Chief Complaint:   Chief Complaint   Patient presents with    Shortness of Breath     woke up PTA c/o shortness of breath; 93% on RA, 98% after albuterol/atrovent; pt with hx of lymphoma, recent discharge from [facility]        HPI: 79-year-old male with Myasthenia gravis, stage 1 follicular lymphoma in the right groin, and recent diagnosis of posterior mediastinal mass on 9/16/19 s/p biopsy of supraclavicular node on 9/20/19 with pathology showing DLBCL and with mass causing extrinsic compression of the esophagus, left upper and lower lobe bronchi and compression/obliteration of the left inferior pulmonary vein who presented with sudden onset of SOB around midnight after being discharged from [facility] the same day.  He was home for maybe 6 hrs before he developed symptoms.  Patient reported he was not discharged on oxygen. He was just discharged on 10/3/10 after being hospitalized on 9/25/19 to get radiation given airway/esophagus compression. XRT performed on 9/26/19 and he did not need further treatment due to improvement. Thoracentesis was performed on 9/27/19 with 2 liters removed.  He received chemotherapy on 10/02/2019 with [facility].  Patient reports his symptoms came on him abruptly and woke him up from sleep.  No reported fevers or chills.  In the ER, CTA of the chest showed acute pulmonary embolism in the right lung, improvement in size of mediastinal mass, and no significant change in pleural effusion.  BNP 20, and no elevation in his WBC count.    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 Jolley    CATARACT EXTRACTION W/  INTRAOCULAR LENS IMPLANT Left 04/20/2017    Dr. Jolley    CHOLECYSTECTOMY      EYE SURGERY      Rt cataract    KNEE ARTHROSCOPY      Lt knee    TONSILLECTOMY         Review of patient's allergies indicates:  No Known Allergies    No current facility-administered medications on file prior to encounter.      Current Outpatient Medications on File Prior to Encounter   Medication Sig    albuterol (PROVENTIL/VENTOLIN HFA) 90 mcg/actuation inhaler Inhale 2 puffs into the lungs every 6 (six) hours as needed for Wheezing. Rescue    azelastine (ASTELIN) 137 mcg (0.1 %) nasal spray 1 spray (137 mcg total) by Nasal route 2 (two) times daily.    lisinopril 10 MG tablet Take 10 mg by mouth once daily.    meclizine (ANTIVERT) 25 mg tablet Take 1 tablet (25 mg total) by mouth 3 (three) times daily as needed.    pyridostigmine (MESTINON) 60 mg Tab Take 60 mg by mouth 3 (three) times daily.      Family History     Problem Relation (Age of Onset)    Heart disease Father    No Known Problems Mother, Sister, Brother, Maternal Aunt, Maternal Uncle, Paternal Aunt, Paternal Uncle, Maternal Grandmother, Maternal Grandfather, Paternal Grandmother, Paternal Grandfather        Tobacco Use    Smoking status: Former Smoker    Smokeless tobacco: Never Used   Substance and Sexual Activity    Alcohol use: No    Drug use: No    Sexual activity: Never     Partners: Female     Review of Systems   Constitutional: Positive for activity change, fatigue and unexpected weight change. Negative for chills and fever.   HENT: Negative for congestion.    Eyes: Negative for visual disturbance.   Respiratory: Positive for shortness of breath.  Negative for cough and wheezing.    Cardiovascular: Negative for chest pain.   Gastrointestinal: Negative for abdominal pain, blood in stool, nausea and vomiting.   Endocrine: Negative for polydipsia.   Genitourinary: Negative for dysuria.   Musculoskeletal: Negative for back pain.   Skin: Negative for rash.   Neurological: Positive for weakness.   Hematological: Does not bruise/bleed easily.   Psychiatric/Behavioral: Negative for confusion.     Objective:     Vital Signs (Most Recent):  Temp: 98 °F (36.7 °C) (10/04/19 2323)  Pulse: 79 (10/04/19 2323)  Resp: 18 (10/04/19 2323)  BP: (!) 149/88 (10/04/19 2323)  SpO2: (!) 93 % (10/04/19 2323) Vital Signs (24h Range):  Temp:  [97.1 °F (36.2 °C)-98 °F (36.7 °C)] 98 °F (36.7 °C)  Pulse:  [] 79  Resp:  [16-24] 18  SpO2:  [93 %-97 %] 93 %  BP: (140-164)/(70-96) 149/88     Weight: 81.6 kg (180 lb)  Body mass index is 24.41 kg/m².    Physical Exam   Constitutional: He is oriented to person, place, and time. He appears well-developed. No distress.   Chronically ill-appearing and weak   HENT:   Head: Normocephalic and atraumatic.   Mouth/Throat: No oropharyngeal exudate.   Eyes: Pupils are equal, round, and reactive to light. EOM are normal. No scleral icterus.   Neck: Normal range of motion. Neck supple.   Cardiovascular: Normal rate and regular rhythm.   Pulmonary/Chest: Effort normal. No respiratory distress. He has no wheezes. He has no rales.   On nasal cannula at 4 L, decreased breath sounds left>right, dullness to percussion, and E to a changes on left side   Abdominal: Soft. He exhibits no distension. There is no tenderness.   Musculoskeletal: Normal range of motion. He exhibits no edema.   Lymphadenopathy:     He has no cervical adenopathy.   Neurological: He is alert and oriented to person, place, and time.   Skin: Skin is warm and dry.   PICC in place to right chest wall   Psychiatric: He has a normal mood and affect. His behavior is normal.   Nursing note and  vitals reviewed.        CRANIAL NERVES     CN III, IV, VI   Pupils are equal, round, and reactive to light.  Extraocular motions are normal.        Significant Labs: All pertinent labs within the past 24 hours have been reviewed.    Significant Imaging: I have reviewed and interpreted all pertinent imaging results/findings within the past 24 hours.    Assessment/Plan:     * Pulmonary embolus  Patient presented with sudden onset shortness of breath with known malignancy and decreased mobility  Workup with CT of the chest consistent with acute pulmonary embolism  Will continue empiric treatment with heparin infusion for anticoagulation  Will continue support with oxygen and wean as tolerated  Case discussed with Hematology/Oncology, and will transition to p.o. Eliquis after initial stabilization  Will further workup with 2D echo to assess for right heart strain  Will check ultrasound of lower extremities to assess for DVT    Diffuse large B-cell lymphoma of intrathoracic lymph nodes  Recent CT of the chest on 09/16/2019 showed a large posterior mediastinal mass causing extrinsic compression of the esophagus, left upper and lower lobe bronchi and compression/obliteration of the left inferior pulmonary vein  s/p biopsy of supraclavicular node on 9/20/19  Pathology showing DLBCL   S/p XRT on 9/26/19 with improvement.   S/p thoracentesis on 9/27/19 with 2 liters removed  s/p chemotherapy on 10/02/2019 with [facility]  Hematology-Oncology consulted and appreciate their input  Plan for patient to get rituxan as outpatient under the care of Dr. Zana Marr  He reports continued weakness since his recent diagnosis and hospitalization  He was working with therapy on previous admit and was discharged with home health  Will resume PT and OT and during this hospital stay    Other dysphagia  Due to extrinsic compression which has improved with XRT as discussed above  He worked with speech therapy on previous hospital admission  and he is stable for a soft diet with thin liquids    Stage 3 chronic kidney disease  Creatinine stable at baseline  Will continue to monitor    Ocular myasthenia gravis  Stable, not an active issue at this time    Essential hypertension  Continue lisinopril      VTE Risk Mitigation (From admission, onward)         Ordered     IP VTE HIGH RISK PATIENT  Once      10/04/19 1344     Place sequential compression device  Until discontinued      10/04/19 1344     Place HARINI hose  Until discontinued      10/04/19 0754     heparin 25,000 units in dextrose 5% (100 units/ml) IV bolus from bag - ADDITIONAL PRN BOLUS - 60 units/kg  As needed (PRN)      10/04/19 0754     heparin 25,000 units in dextrose 5% (100 units/ml) IV bolus from bag - ADDITIONAL PRN BOLUS - 30 units/kg  As needed (PRN)      10/04/19 0754     heparin 25,000 units in dextrose 5% 250 mL (100 units/mL) infusion HIGH INTENSITY nomogram - [facility]  Continuous      10/04/19 0553                   Cheyenne Billings MD  Department of Hospital Medicine   [facility]

## 2019-10-05 NOTE — PROGRESS NOTES
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  He was home for maybe 6 hrs before he developed symptoms.  Patient reported he was not discharged on oxygen. He was just discharged on 10/3/10 after being hospitalized on 9/25/19 to get radiation given airway/esophagus compression. XRT performed on 9/26/19 and he did not need further treatment due to improvement. Thoracentesis was performed on 9/27/19 with 2 liters removed.  He received chemotherapy on 10/02/2019 with [facility].  Patient reports his symptoms came on him abruptly and woke him up from sleep.  No reported fevers or chills.  In the ER, CTA of the chest showed acute pulmonary embolism in the right lung, improvement in size of mediastinal mass, and no significant change in pleural effusion.  BNP 20, and no elevation in his WBC count.    Overview/Hospital Course:  Mr Cevallos presented with acute pulmonary embolism. Per CT, it involved segmental and subsegmental branches of right upper, middle and lower lobes. No evidence of infarction or right heart strain. He was initiated on heparin infusion and supplemental O2. Had no evidence of bleeding. Overall did well with therapy. LE venous  ultrasound ordered to r/o DVT.     Interval History: feels ok. Stable. Still on O2. Sats stable. US LE pending. Cough at times. Non bloody    Review of Systems   Respiratory: Positive for cough. Negative for shortness of breath.    Cardiovascular: Negative.    Gastrointestinal: Negative.      Objective:     Vital Signs (Most Recent):  Temp: 97.9 °F (36.6 °C) (10/05/19 0510)  Pulse: 78 (10/05/19 0510)  Resp: 18 (10/05/19 0510)  BP: 120/63 (10/05/19 0510)  SpO2: 95 % (10/05/19 0510) Vital Signs (24h Range):  Temp:  [97.1 °F (36.2 °C)-98 °F (36.7 °C)] 97.9 °F (36.6 °C)  Pulse:  [78-97] 78  Resp:  [18] 18  SpO2:  [93 %-95 %] 95 %  BP: (120-159)/(63-96) 120/63     Weight: 83.8 kg (184 lb 11.9 oz)  Body mass index is 25.06 kg/m².    Intake/Output Summary (Last 24 hours) at 10/5/2019 0732  Last data filed at 10/5/2019 0726  Gross per 24 hour   Intake 1374.01 ml   Output 2325 ml   Net -950.99 ml      Physical Exam   Constitutional: He is oriented to person, place, and time. He appears well-developed. No distress.   Well appearing   Cardiovascular: Normal rate and regular rhythm.   Pulmonary/Chest: Effort normal and breath sounds normal.   Breathing comfortably on low flow NC  Speaking full sentences   Abdominal: Soft. Bowel sounds are normal.   Musculoskeletal: Normal range of motion. He exhibits no edema or tenderness.   Neurological: He is alert and oriented to person, place, and time.   Skin: He is not diaphoretic.   Nursing note and vitals reviewed.      Significant Labs: All pertinent labs within the past 24 hours have been reviewed.    Significant Imaging: I have reviewed all pertinent imaging results/findings within the past 24 hours.  I have reviewed and interpreted all pertinent imaging results/findings within the past 24 hours.      Assessment/Plan:      * Pulmonary embolus  Patient presented with sudden onset shortness of breath with known malignancy and decreased mobility  Workup with CT of the chest consistent  with acute pulmonary embolism  Case discussed with Hematology/Oncology, and will transition to p.o. Eliquis after initial stabilization  Echo showed normal right vent systolic function  Has clinically improved. Wean O2 as tolerated. Transition to PO eliquis today pending repeat aPTT level  Risk for bleeding discussed with patient. Currently has no contraindications.   Verbalized understanding and agrees with treatment  LE  pending    Debility  He reports continued weakness since his recent diagnosis and hospitalization  He was working with therapy on previous admit and was discharged with home health  Patient seen by nurse ambulating independently in room     Other dysphagia  Due to extrinsic compression which has improved with XRT as discussed above  He worked with speech therapy on previous hospital admission and he is stable for a soft diet with thin liquids    Stage 3 chronic kidney disease  Creatinine stable at baseline  Will continue to monitor    Diffuse large B-cell lymphoma of intrathoracic lymph nodes  Recent CT of the chest on 09/16/2019 showed a large posterior mediastinal mass causing extrinsic compression of the esophagus, left upper and lower lobe bronchi and compression/obliteration of the left inferior pulmonary vein  s/p biopsy of supraclavicular node on 9/20/19  Pathology showing DLBCL   S/p XRT on 9/26/19 with improvement.   S/p thoracentesis on 9/27/19 with 2 liters removed  s/p chemotherapy on 10/02/2019 with [facility]  Hematology-Oncology consulted. No additional recs for inpatient treatment from their standpoint  Plan for patient to get rituxan as outpatient under the care of Dr. Spann    Ocular myasthenia gravis  Stable, not an active issue at this time      Essential hypertension  Continue lisinopril      VTE Risk Mitigation (From admission, onward)         Ordered     apixaban tablet 10 mg  2 times daily      10/05/19 0729     IP VTE HIGH RISK PATIENT  Once      10/04/19 1344     Place  sequential compression device  Until discontinued      10/04/19 1344     Place HARINI hose  Until discontinued      10/04/19 5142                  Dispo: home soon    Ivett Gallegos MD  Department of Hospital Medicine   [facility]

## 2019-10-05 NOTE — HOSPITAL COURSE
Mr Cevallos presented with acute pulmonary embolism. Per CT, it involved segmental and subsegmental branches of right upper, middle and lower lobes. No evidence of infarction or right heart strain. He was initiated on heparin infusion and supplemental O2. Had no evidence of bleeding. Discussed risk for bleeding. Patient denies history of bleeding. Agreed with anticoagulation treatment. Has no obvious contraindication for this. Overall did well with therapy. LE venous ultrasound ordered to r/o DVT. Showed occlusive thrombophlebitis of the left peroneal vein. Patient did not complain of pain or discomfort in this area. Had good pulses and no edema. Did well overall and remained stable at room air. Did complain about constipation. Laxatives given. Patient was discharged on apixaban to take while on active treatment for cancer as he would be at risk for recurrence with active cancer. Low sodium diet. Activity as tolerated.

## 2019-10-05 NOTE — ASSESSMENT & PLAN NOTE
He reports continued weakness since his recent diagnosis and hospitalization  He was working with therapy on previous admit and was discharged with home health  Will resume PT and OT and during this hospital stay

## 2019-10-05 NOTE — NURSING
Oxygen decreased from 3 liters to 1 liter NC. O2 sat 98% on 3 liters NC. O2 sat 87% on 1 liter NC.

## 2019-10-05 NOTE — SUBJECTIVE & OBJECTIVE
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 Jolley    CATARACT EXTRACTION W/  INTRAOCULAR LENS IMPLANT Left 04/20/2017    Dr. Jolley    CHOLECYSTECTOMY      EYE SURGERY      Rt cataract    KNEE ARTHROSCOPY      Lt knee    TONSILLECTOMY         Review of patient's allergies indicates:  No Known Allergies    No current facility-administered medications on file prior to encounter.      Current Outpatient Medications on File Prior to Encounter   Medication Sig    albuterol (PROVENTIL/VENTOLIN HFA) 90 mcg/actuation inhaler Inhale 2 puffs into the lungs every 6 (six) hours as needed for Wheezing. Rescue    azelastine (ASTELIN) 137 mcg (0.1 %) nasal spray 1 spray (137 mcg total) by Nasal route 2 (two) times daily.    lisinopril 10 MG tablet Take 10 mg by mouth once daily.    meclizine (ANTIVERT) 25 mg tablet Take 1 tablet (25 mg total) by mouth 3 (three) times daily as needed.    pyridostigmine (MESTINON) 60 mg Tab Take 60 mg by mouth 3 (three) times daily.      Family History     Problem Relation (Age of Onset)    Heart disease Father    No Known Problems Mother, Sister, Brother, Maternal Aunt, Maternal Uncle, Paternal Aunt, Paternal Uncle, Maternal Grandmother, Maternal Grandfather, Paternal Grandmother, Paternal Grandfather        Tobacco Use    Smoking status: Former Smoker    Smokeless tobacco: Never Used   Substance and Sexual Activity    Alcohol use: No    Drug use: No    Sexual activity: Never     Partners: Female     Review of Systems   Constitutional: Positive for activity change, fatigue and unexpected weight change. Negative for chills and fever.   HENT: Negative for  congestion.    Eyes: Negative for visual disturbance.   Respiratory: Positive for shortness of breath. Negative for cough and wheezing.    Cardiovascular: Negative for chest pain.   Gastrointestinal: Negative for abdominal pain, blood in stool, nausea and vomiting.   Endocrine: Negative for polydipsia.   Genitourinary: Negative for dysuria.   Musculoskeletal: Negative for back pain.   Skin: Negative for rash.   Neurological: Positive for weakness.   Hematological: Does not bruise/bleed easily.   Psychiatric/Behavioral: Negative for confusion.     Objective:     Vital Signs (Most Recent):  Temp: 98 °F (36.7 °C) (10/04/19 2323)  Pulse: 79 (10/04/19 2323)  Resp: 18 (10/04/19 2323)  BP: (!) 149/88 (10/04/19 2323)  SpO2: (!) 93 % (10/04/19 2323) Vital Signs (24h Range):  Temp:  [97.1 °F (36.2 °C)-98 °F (36.7 °C)] 98 °F (36.7 °C)  Pulse:  [] 79  Resp:  [16-24] 18  SpO2:  [93 %-97 %] 93 %  BP: (140-164)/(70-96) 149/88     Weight: 81.6 kg (180 lb)  Body mass index is 24.41 kg/m².    Physical Exam   Constitutional: He is oriented to person, place, and time. He appears well-developed. No distress.   Chronically ill-appearing and weak   HENT:   Head: Normocephalic and atraumatic.   Mouth/Throat: No oropharyngeal exudate.   Eyes: Pupils are equal, round, and reactive to light. EOM are normal. No scleral icterus.   Neck: Normal range of motion. Neck supple.   Cardiovascular: Normal rate and regular rhythm.   Pulmonary/Chest: Effort normal. No respiratory distress. He has no wheezes. He has no rales.   On nasal cannula at 4 L, decreased breath sounds left>right, dullness to percussion, and E to a changes on left side   Abdominal: Soft. He exhibits no distension. There is no tenderness.   Musculoskeletal: Normal range of motion. He exhibits no edema.   Lymphadenopathy:     He has no cervical adenopathy.   Neurological: He is alert and oriented to person, place, and time.   Skin: Skin is warm and dry.   PICC in place to right  chest wall   Psychiatric: He has a normal mood and affect. His behavior is normal.   Nursing note and vitals reviewed.        CRANIAL NERVES     CN III, IV, VI   Pupils are equal, round, and reactive to light.  Extraocular motions are normal.        Significant Labs: All pertinent labs within the past 24 hours have been reviewed.    Significant Imaging: I have reviewed and interpreted all pertinent imaging results/findings within the past 24 hours.

## 2019-10-05 NOTE — PLAN OF CARE
Problem: Gas Exchange Impaired  Goal: Optimal Gas Exchange  Outcome: Met  Intervention: Optimize Oxygenation and Ventilation  Flowsheets (Taken 10/5/2019 1837)  Airway/Ventilation Management: airway patency maintained  Head of Bed (HOB): HOB at 30-45 degrees   Oxygen decreased from 3 liters to room air. O2 sat 98% on RA. Only one c/o SOB throughout the shift that pt believes was from stomach gas.

## 2019-10-05 NOTE — NURSING
1950- daughter visiting with patient . No needs voiced offered tylenol earlier today patient did decline, however , now he reports his back is feeling achy and he is tired too secure chat to nocturnist for tramadol and sleep aid . Rounding report given to Kira HOPPER on patients progress and updated handoff report sheet , handoff verification done on MAR on heparin gtt medication and on the heparin infusion by pump too correct rate and dosage . No bleeding or adverse side effect . Patient sitting on edge of bed visiting with his daughter . Call light in reach . Rails up x 3.

## 2019-10-05 NOTE — PLAN OF CARE
10/05/19 1040   Readmission Questionnaire   At the time of your discharge, did someone talk to you about what your health problems were? Yes   At the time of discharge, did someone talk to you about what to watch out for regarding worsening of your health problem? Yes   At the time of discharge, did someone talk to you about what to do if you experienced worsening of your health problem? Yes   At the time of discharge, did someone talk to you about which medication to take when you left the hospital and which ones to stop taking? Yes   At the time of discharge, did someone talk to you about when and where to follow up with a doctor after you left the hospital? Yes   What do you believe caused you to be sick enough to be re-admitted?   (A blood clot)   How often do you need to have someone help you when you read instructions, pamphlets, or other written material from your doctor or pharmacy? Rarely   Do you have problems taking your medications as prescribed? No   Do you have any problems affording any of  your prescribed medications? No   Do you have problems obtaining/receiving your medications? No   Does the patient have transportation to healthcare appointments? Yes  (spouse/Bindu or RNs who ae neighbors)   Lives With spouse   Living Arrangements house   Does the patient have family/friends to help with healtcare needs after discharge? other (comments)   Does your caregiver provide all the help you need? I don't know   Are you currently feeling confused? No   Are you currently having problems thinking? No   Are you currently having memory problems? No   Have you felt down, depressed, or hopeless? 1   Have you felt little interest or pleasure in doing things? 1   In the last 7 days, my sleep quality was: fair

## 2019-10-05 NOTE — ASSESSMENT & PLAN NOTE
He reports continued weakness since his recent diagnosis and hospitalization  He was working with therapy on previous admit and was discharged with home health  Patient seen by nurse ambulating independently in room

## 2019-10-05 NOTE — NURSING
Patient is leaving the floor to ultrasound for scheduled BLE US r/o DVT via wheelchair. NAD noted. O2 at 3 liters NC. Pt transfers from bed to wheelchair without difficulty.

## 2019-10-05 NOTE — PLAN OF CARE
Problem: Infection  Goal: Infection Symptom Resolution  Intervention: Prevent or Manage Infection  Flowsheets (Taken 10/5/2019 1610)  Fever Reduction/Comfort Measures: lightweight bedding  Infection Management: aseptic technique maintained  Isolation Precautions: other (see comments)

## 2019-10-05 NOTE — SUBJECTIVE & OBJECTIVE
Interval History: feels ok. Stable. Still on O2. Sats stable. US LE pending. Cough at times. Non bloody    Review of Systems   Respiratory: Positive for cough. Negative for shortness of breath.    Cardiovascular: Negative.    Gastrointestinal: Negative.      Objective:     Vital Signs (Most Recent):  Temp: 97.9 °F (36.6 °C) (10/05/19 0510)  Pulse: 78 (10/05/19 0510)  Resp: 18 (10/05/19 0510)  BP: 120/63 (10/05/19 0510)  SpO2: 95 % (10/05/19 0510) Vital Signs (24h Range):  Temp:  [97.1 °F (36.2 °C)-98 °F (36.7 °C)] 97.9 °F (36.6 °C)  Pulse:  [78-97] 78  Resp:  [18] 18  SpO2:  [93 %-95 %] 95 %  BP: (120-159)/(63-96) 120/63     Weight: 83.8 kg (184 lb 11.9 oz)  Body mass index is 25.06 kg/m².    Intake/Output Summary (Last 24 hours) at 10/5/2019 0732  Last data filed at 10/5/2019 0726  Gross per 24 hour   Intake 1374.01 ml   Output 2325 ml   Net -950.99 ml      Physical Exam   Constitutional: He is oriented to person, place, and time. He appears well-developed. No distress.   Well appearing   Cardiovascular: Normal rate and regular rhythm.   Pulmonary/Chest: Effort normal and breath sounds normal.   Breathing comfortably on low flow NC  Speaking full sentences   Abdominal: Soft. Bowel sounds are normal.   Musculoskeletal: Normal range of motion. He exhibits no edema or tenderness.   Neurological: He is alert and oriented to person, place, and time.   Skin: He is not diaphoretic.   Nursing note and vitals reviewed.      Significant Labs: All pertinent labs within the past 24 hours have been reviewed.    Significant Imaging: I have reviewed all pertinent imaging results/findings within the past 24 hours.  I have reviewed and interpreted all pertinent imaging results/findings within the past 24 hours.

## 2019-10-05 NOTE — HPI
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  He was home for maybe 6 hrs before he developed symptoms.  Patient reported he was not discharged on oxygen. He was just discharged on 10/3/10 after being hospitalized on 9/25/19 to get radiation given airway/esophagus compression. XRT performed on 9/26/19 and he did not need further treatment due to improvement. Thoracentesis was performed on 9/27/19 with 2 liters removed.  He received chemotherapy on 10/02/2019 with CHOP.  Patient reports his symptoms came on him abruptly and woke him up from sleep.  No reported fevers or chills.  In the ER, CTA of the chest showed acute pulmonary embolism in the right lung, improvement in size of mediastinal mass, and no significant change in pleural effusion.  BNP 20, and no elevation in his WBC count.

## 2019-10-05 NOTE — NURSING
APTT 93.3 with 0400 labs. Per heparin normogram, hold heparin infusion for 2 hours, then decrease rate by 4 units at 0718.

## 2019-10-05 NOTE — NURSING
Pt c/o SOB no chest pain and states that he just finished eating. O2 sat at 98% on RA. Pt is concerned about going home and still having SOB. This is the first reporting of SOB for this shift. I educated pt on sitting up after eating and eating smaller meals.

## 2019-10-05 NOTE — PLAN OF CARE
Problem: Fall Injury Risk  Goal: Absence of Fall and Fall-Related Injury  Outcome: Ongoing, Progressing     Problem: Adult Inpatient Plan of Care  Goal: Plan of Care Review  Outcome: Ongoing, Progressing     Problem: Adult Inpatient Plan of Care  Goal: Absence of Hospital-Acquired Illness or Injury  Outcome: Ongoing, Progressing     Problem: Adult Inpatient Plan of Care  Goal: Optimal Comfort and Wellbeing  Outcome: Ongoing, Progressing     Problem: Adult Inpatient Plan of Care  Goal: Readiness for Transition of Care  Outcome: Ongoing, Progressing     Problem: Adult Inpatient Plan of Care  Goal: Rounds/Family Conference  Outcome: Ongoing, Progressing     Problem: Infection  Goal: Infection Symptom Resolution  Outcome: Ongoing, Progressing

## 2019-10-05 NOTE — ASSESSMENT & PLAN NOTE
Recent CT of the chest on 09/16/2019 showed a large posterior mediastinal mass causing extrinsic compression of the esophagus, left upper and lower lobe bronchi and compression/obliteration of the left inferior pulmonary vein  s/p biopsy of supraclavicular node on 9/20/19  Pathology showing DLBCL   S/p XRT on 9/26/19 with improvement.   S/p thoracentesis on 9/27/19 with 2 liters removed  s/p chemotherapy on 10/02/2019 with [facility]  Hematology-Oncology consulted. No additional recs for inpatient treatment from their standpoint  Plan for patient to get rituxan as outpatient under the care of Dr. Spann

## 2019-10-05 NOTE — PLAN OF CARE
Readmit:     10/05/19 1047   Post-Acute Status   Post-Acute Authorization Other   Other Status Awaiting f/u Appts  (WEEKEND)   Discharge Delays (!) Procedure Scheduling (IR, OR, Labs, Echo, Cath, Echo, EEG)   .Bindu Novak RN, BSN, STN [facility]  10/5/2019

## 2019-10-05 NOTE — PROGRESS NOTES
Follow-up Information     Schedule an appointment as soon as possible for a visit with Kang Reddy MD.    Specialty:  Internal Medicine  Why:  out patient services  Contact information:  [address]  [address]  [facility] [id number] [phone]               .Bindu Novak RN, BSN, [facility]  10/5/2019

## 2019-10-06 VITALS
HEIGHT: 72 IN | OXYGEN SATURATION: 95 % | TEMPERATURE: 98 F | DIASTOLIC BLOOD PRESSURE: 72 MMHG | SYSTOLIC BLOOD PRESSURE: 129 MMHG | WEIGHT: 173.75 LBS | BODY MASS INDEX: 23.53 KG/M2 | RESPIRATION RATE: 18 BRPM | HEART RATE: 79 BPM

## 2019-10-06 PROBLEM — K59.00 CONSTIPATION: Status: ACTIVE | Noted: 2019-10-06

## 2019-10-06 LAB
ALBUMIN SERPL BCP-MCNC: 3 G/DL (ref 3.5–5.2)
ALP SERPL-CCNC: 78 U/L (ref 55–135)
ALT SERPL W/O P-5'-P-CCNC: 74 U/L (ref 10–44)
ANION GAP SERPL CALC-SCNC: 8 MMOL/L (ref 8–16)
AST SERPL-CCNC: 22 U/L (ref 10–40)
BASOPHILS # BLD AUTO: 0.01 K/UL (ref 0–0.2)
BASOPHILS NFR BLD: 0.1 % (ref 0–1.9)
BILIRUB SERPL-MCNC: 0.8 MG/DL (ref 0.1–1)
BUN SERPL-MCNC: 16 MG/DL (ref 8–23)
CALCIUM SERPL-MCNC: 8.4 MG/DL (ref 8.7–10.5)
CHLORIDE SERPL-SCNC: 104 MMOL/L (ref 95–110)
CO2 SERPL-SCNC: 21 MMOL/L (ref 23–29)
CREAT SERPL-MCNC: 0.8 MG/DL (ref 0.5–1.4)
DIFFERENTIAL METHOD: ABNORMAL
EOSINOPHIL # BLD AUTO: 0 K/UL (ref 0–0.5)
EOSINOPHIL NFR BLD: 0.5 % (ref 0–8)
ERYTHROCYTE [DISTWIDTH] IN BLOOD BY AUTOMATED COUNT: 14.1 % (ref 11.5–14.5)
EST. GFR  (AFRICAN AMERICAN): >60 ML/MIN/1.73 M^2
EST. GFR  (NON AFRICAN AMERICAN): >60 ML/MIN/1.73 M^2
GLUCOSE SERPL-MCNC: 96 MG/DL (ref 70–110)
HCT VFR BLD AUTO: 39.7 % (ref 40–54)
HGB BLD-MCNC: 13 G/DL (ref 14–18)
LDH SERPL L TO P-CCNC: 329 U/L (ref 110–260)
LYMPHOCYTES # BLD AUTO: 1 K/UL (ref 1–4.8)
LYMPHOCYTES NFR BLD: 12.3 % (ref 18–48)
MCH RBC QN AUTO: 27.6 PG (ref 27–31)
MCHC RBC AUTO-ENTMCNC: 32.7 G/DL (ref 32–36)
MCV RBC AUTO: 84 FL (ref 82–98)
MONOCYTES # BLD AUTO: 0.1 K/UL (ref 0.3–1)
MONOCYTES NFR BLD: 1.6 % (ref 4–15)
NEUTROPHILS # BLD AUTO: 6.7 K/UL (ref 1.8–7.7)
NEUTROPHILS NFR BLD: 85.5 % (ref 38–73)
PLATELET # BLD AUTO: 239 K/UL (ref 150–350)
PMV BLD AUTO: 10.3 FL (ref 9.2–12.9)
POTASSIUM SERPL-SCNC: 4.2 MMOL/L (ref 3.5–5.1)
PROT SERPL-MCNC: 5.4 G/DL (ref 6–8.4)
RBC # BLD AUTO: 4.71 M/UL (ref 4.6–6.2)
SODIUM SERPL-SCNC: 133 MMOL/L (ref 136–145)
TROPONIN I SERPL DL<=0.01 NG/ML-MCNC: 0.01 NG/ML (ref 0–0.03)
TROPONIN I SERPL DL<=0.01 NG/ML-MCNC: 0.03 NG/ML (ref 0–0.03)
URATE SERPL-MCNC: 3.6 MG/DL (ref 3.4–7)
WBC # BLD AUTO: 7.91 K/UL (ref 3.9–12.7)

## 2019-10-06 PROCEDURE — 25000003 PHARM REV CODE 250: Performed by: EMERGENCY MEDICINE

## 2019-10-06 PROCEDURE — 36415 COLL VENOUS BLD VENIPUNCTURE: CPT

## 2019-10-06 PROCEDURE — 85025 COMPLETE CBC W/AUTO DIFF WBC: CPT

## 2019-10-06 PROCEDURE — 84484 ASSAY OF TROPONIN QUANT: CPT

## 2019-10-06 PROCEDURE — 84550 ASSAY OF BLOOD/URIC ACID: CPT

## 2019-10-06 PROCEDURE — 99232 PR SUBSEQUENT HOSPITAL CARE,LEVL II: ICD-10-PCS | Mod: ,,, | Performed by: INTERNAL MEDICINE

## 2019-10-06 PROCEDURE — 80053 COMPREHEN METABOLIC PANEL: CPT

## 2019-10-06 PROCEDURE — 99232 SBSQ HOSP IP/OBS MODERATE 35: CPT | Mod: ,,, | Performed by: INTERNAL MEDICINE

## 2019-10-06 PROCEDURE — 25000003 PHARM REV CODE 250: Performed by: INTERNAL MEDICINE

## 2019-10-06 RX ORDER — POLYETHYLENE GLYCOL 3350 17 G/17G
17 POWDER, FOR SOLUTION ORAL 2 TIMES DAILY
Status: DISCONTINUED | OUTPATIENT
Start: 2019-10-06 | End: 2019-10-06 | Stop reason: HOSPADM

## 2019-10-06 RX ADMIN — FAMOTIDINE 20 MG: 20 TABLET ORAL at 08:10

## 2019-10-06 RX ADMIN — POLYETHYLENE GLYCOL 3350 17 G: 17 POWDER, FOR SOLUTION ORAL at 01:10

## 2019-10-06 RX ADMIN — APIXABAN 10 MG: 5 TABLET, FILM COATED ORAL at 08:10

## 2019-10-06 NOTE — PLAN OF CARE
Problem: Fall Injury Risk  Goal: Absence of Fall and Fall-Related Injury  Outcome: Ongoing, Progressing  Intervention: Identify and Manage Contributors to Fall Injury Risk  Flowsheets (Taken 10/6/2019 2630)  Self-Care Promotion: BADL personal objects within reach  Medication Review/Management: medications reviewed     Pt with no injuries this shift. Pt with complaint of chest pain at beginning of shift, doctor notified and order for EKG and troponins given. PRN pain medication given once per pt request. Bed in locked and low position with bed alarm set and call bell within reach, will continue with current plan of care.

## 2019-10-06 NOTE — PLAN OF CARE
"EDUCATION:  SW provided patient with educational information on PE.  Information was reviewed and placed in "My Healthcare Packet" to be brought home for use as a resource after discharge.  Information included: signs and symptoms to look for and call the doctor if experiencing, and symptoms that may indicate a medical emergency: CALL 911.  All questions answered.  Teach back method used. Patient stated that he/she will remember the following signs and symptoms: fainting, shortness of breath. GONZALO instructed patient to call PCP to schedule F/U appointment. GONZALO spoke with nurse Covarrubias and informed her that patient was ready for discharge from  standpoint.        10/06/19 1600   Final Note   Assessment Type Final Discharge Note   Anticipated Discharge Disposition Home   What phone number can be called within the next 1-3 days to see how you are doing after discharge? [phone]   Hospital Follow Up  Appt(s) scheduled? Yes   Discharge plans and expectations educations in teach back method with documentation complete? Yes   Right Care Referral Info   Post Acute Recommendation No Care                                                 "

## 2019-10-06 NOTE — PROGRESS NOTES
[facility]NAG[facility]                  WRITTEN DISCHARGE INFORMATION      APPOINTMENTS AND RESOURCES TO HELP YOU MANAGE YOUR CARE AT HOME BASED ON YOUR PREFERENCES:  Follow-up Information     Schedule an appointment as soon as possible for a visit with Kang Reddy MD.    Specialty:  Internal Medicine  Why:  out patient services  Contact information:  [address]  [address]  [place] [address]  [phone]               Healthy Living Instructions to HELP MANAGE YOUR CARE AT HOME:  Things You are responsible for:  1.    Getting your prescriptions filled   2.    Taking your medications as directed, DO NOT MISS ANY DOSES!  3.    Following the diet and exercise recommended by your doctor  4.    Going to your follow-up doctor appointment. This is important because it allows the doctor to monitor your progress and determine if any changes need to made to your treatment plan.  5. If you have any questions about MANAGING YOUR CARE AT HOME Call the Nurse Care Line for 24/7 Assistance [phone]       Please answer any calls you may receive from [facility]. We want to continue to support you as you manage your healthcare needs. [facility] is happy to have the opportunity to serve you.      Thank you for choosing [facility] for your healthcare needs!  Your [facility] Case Management Team,    Leyda Duarte   II  Care Management  [phone]

## 2019-10-06 NOTE — NURSING
Discharge instructions given to patient in blue folder. Belongings given to patient. Peripheral IV and cardiac monitor removed. Right chest wall PICC line used for chemo has remained intact. Wife and daughter have been updated on medications and upcoming appointments. Education on how to take eliquis has been provided and highlighted in paperwork.  No prescriptions were written for the patient. Patient folder at desk was cleaned and put in filing bin.

## 2019-10-06 NOTE — NURSING
Patient bedside report has been completed and given to WARD Garcia. Chart check has been completed. NAD noted. Pt is lying in bed. No O2 or IV therapy at this time.

## 2019-10-06 NOTE — NURSING
Pt called and complained of chest pain. VS taken with /84 and pulse of 94. Pt describes as pressure in L chest on side he has his lung issues and it does not travel down his arm or into his jaw. Respiratory called for STAT EKG.

## 2019-10-06 NOTE — ASSESSMENT & PLAN NOTE
Pt with last BM last Thursday  -instructed the patient on the importance of ambulation for the assistance in having BM's  -Also recommended he take miralax daily upon d/c.

## 2019-10-06 NOTE — ASSESSMENT & PLAN NOTE
10/02/2019: S/P C1 of [facility]. Will receive rituxan outpatient.   Follow up with his treating Oncologist , Dr. Spann as planned on Tuesday

## 2019-10-06 NOTE — PLAN OF CARE
GONZALO contacted [facility] to inquire about price for Eliquis for patient. GONZALO was informed that it would be $30 for 30 day supply and $90 for 90 day supply. GONZALO sent message to Dr. Gallegos to clarify which amount patient needs and whether or not she is tapering the patient's medication.  GONZALO waiting on response.      10/06/19 9924   Post-Acute Status   Post-Acute Authorization Medications   Other Status See Comments  (Medication Price)

## 2019-10-06 NOTE — NURSING
Spoke with Dr. Britton and notified him that pt's  family is requesting to speak with Hemo-oncology before pt is discharged to get some clarifications.

## 2019-10-06 NOTE — ASSESSMENT & PLAN NOTE
CTA chest shows Pulmonary thromboembolism involving the segmental and subsegmental branches supplying the right upper, middle and lower lobes.  No evidence of right pulmonary infarction or compelling evidence of right heart strain at this time.   No prior history of clots  -Pt will need Apixaban 10mg BID for 7 days starting dose with transition to 5mg BID thereafter

## 2019-10-06 NOTE — NURSING
Bedside shift report received from WARD Garcia. Communication board has been updated. NAD noted. Will continue to monitor.

## 2019-10-06 NOTE — PROGRESS NOTES
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 He reports SOB   awoke him from sleep.  He was not short of breath when he went sleep.  CTA shows acute pulmonary embolism in the right lung.  Improvement in size of mediastinal mass. No significant change in pleural effusion. He was started on anticoagulation with heparin.He was  discharged from [facility] on 10/3/2019       CT of the chest on 9/16 which showed a 9.8 x 3.8 x 13 cm size posterior mediastinal mass. He underwent lymph nose biopsy of supraclavicular node on 9/20, returned DLBCL (see media tab). Patient underwent CTA chest and CT abdomen with contrast at OSH on 9/25 after presentation to the ED. It showed extrinsic compression of the esophagus, a large retrocardiac mass with involvement of the left hilum causing extrinsic compression of the adjacent esophagus, compression of the left upper and lower lobe bronchi and compression/obliteration of the left inferior pulmonary vein  Pt admitted 9/25/19 to get radiation given airway/esophagus compression. Received radiation on 9/26/19 and no further thereafter due to improvement. Thoracentesis was done 9/27/19 with 2 liters removed, cx no growth. 10/02/2019: S/P C1 of [facility]. Pt will receive  rituxan outpatient.     Interval History: Pt with pain in his abdomen this AM.  The patient states his breathing has improved since admission.  He denies fever, chills, CP, SOB, cough, N/V.  He does endorse constipation.    Oncology Treatment Plan:   IP CHOP + OP  RITUXIMAB Q3W    Medications:  Continuous Infusions:  Scheduled Meds:   apixaban  10 mg Oral BID    [START ON 10/12/2019] apixaban  5 mg Oral BID    famotidine  20 mg Oral BID    polyethylene glycol  17 g Oral BID     PRN Meds:acetaminophen, dextrose 50%, dextrose 50%, glucagon (human recombinant), glucose, glucose, ondansetron, ramelteon, sodium chloride 0.9%, sodium chloride 0.9%, traMADol     Review of Systems   Constitutional: Negative for chills and fever.   Respiratory: Negative for cough and shortness of breath.    Cardiovascular: Negative for chest pain and palpitations.   Gastrointestinal: Positive for abdominal pain and constipation. Negative for diarrhea, nausea and vomiting.   Neurological: Negative for headaches.     Objective:     Vital Signs (Most Recent):  Temp: 98 °F (36.7 °C) (10/06/19 1151)  Pulse: 79 (10/06/19 1151)  Resp: 18 (10/06/19 1151)  BP: 129/72 (10/06/19 1151)  SpO2: 95 % (10/06/19 1151) Vital Signs (24h Range):  Temp:  [97.1 °F (36.2 °C)-98.3 °F (36.8 °C)] 98 °F (36.7 °C)  Pulse:  [76-84] 79  Resp:  [16-18] 18  SpO2:  [90 %-96 %] 95 %  BP: (118-152)/(62-91) 129/72     Weight: 78.8 kg (173 lb 11.6 oz)  Body mass index is 23.56 kg/m².  Body surface area is 2 meters squared.      Intake/Output Summary (Last 24 hours) at 10/6/2019 1754  Last data filed at 10/6/2019 1328  Gross per 24 hour   Intake 720 ml   Output 1780 ml   Net -1060 ml       Physical Exam   Constitutional: He is oriented to person, place, and time. He appears well-developed and well-nourished. No distress.   HENT:   Head: Normocephalic and atraumatic.   Poor dentition   Cardiovascular: Normal rate, regular rhythm and normal heart sounds. Exam reveals no gallop and no friction rub.   No murmur heard.  Pulmonary/Chest: Effort normal and breath sounds normal. No respiratory distress. He has no wheezes. He has no rales. He exhibits no tenderness.   Abdominal: Soft. Bowel sounds are normal. He exhibits no distension. There  is no tenderness.   Neurological: He is alert and oriented to person, place, and time.   Skin: He is not diaphoretic.       Significant Labs:   Recent Results (from the past 24 hour(s))   Troponin I    Collection Time: 10/05/19  8:06 PM   Result Value Ref Range    Troponin I 0.020 0.000 - 0.026 ng/mL   CBC with Automated Differential    Collection Time: 10/06/19  2:29 AM   Result Value Ref Range    WBC 7.91 3.90 - 12.70 K/uL    RBC 4.71 4.60 - 6.20 M/uL    Hemoglobin 13.0 (L) 14.0 - 18.0 g/dL    Hematocrit 39.7 (L) 40.0 - 54.0 %    Mean Corpuscular Volume 84 82 - 98 fL    Mean Corpuscular Hemoglobin 27.6 27.0 - 31.0 pg    Mean Corpuscular Hemoglobin Conc 32.7 32.0 - 36.0 g/dL    RDW 14.1 11.5 - 14.5 %    Platelets 239 150 - 350 K/uL    MPV 10.3 9.2 - 12.9 fL    Gran # (ANC) 6.7 1.8 - 7.7 K/uL    Lymph # 1.0 1.0 - 4.8 K/uL    Mono # 0.1 (L) 0.3 - 1.0 K/uL    Eos # 0.0 0.0 - 0.5 K/uL    Baso # 0.01 0.00 - 0.20 K/uL    Gran% 85.5 (H) 38.0 - 73.0 %    Lymph% 12.3 (L) 18.0 - 48.0 %    Mono% 1.6 (L) 4.0 - 15.0 %    Eosinophil% 0.5 0.0 - 8.0 %    Basophil% 0.1 0.0 - 1.9 %    Differential Method Automated    Comprehensive Metabolic Panel (CMP)    Collection Time: 10/06/19  2:29 AM   Result Value Ref Range    Sodium 133 (L) 136 - 145 mmol/L    Potassium 4.2 3.5 - 5.1 mmol/L    Chloride 104 95 - 110 mmol/L    CO2 21 (L) 23 - 29 mmol/L    Glucose 96 70 - 110 mg/dL    BUN, Bld 16 8 - 23 mg/dL    Creatinine 0.8 0.5 - 1.4 mg/dL    Calcium 8.4 (L) 8.7 - 10.5 mg/dL    Total Protein 5.4 (L) 6.0 - 8.4 g/dL    Albumin 3.0 (L) 3.5 - 5.2 g/dL    Total Bilirubin 0.8 0.1 - 1.0 mg/dL    Alkaline Phosphatase 78 55 - 135 U/L    AST 22 10 - 40 U/L    ALT 74 (H) 10 - 44 U/L    Anion Gap 8 8 - 16 mmol/L    eGFR if African American >60 >60 mL/min/1.73 m^2    eGFR if non African American >60 >60 mL/min/1.73 m^2   Troponin I    Collection Time: 10/06/19  2:30 AM   Result Value Ref Range    Troponin I 0.033 (H) 0.000 - 0.026 ng/mL   Troponin I     Collection Time: 10/06/19  8:12 AM   Result Value Ref Range    Troponin I 0.011 0.000 - 0.026 ng/mL   Uric acid    Collection Time: 10/06/19  8:12 AM   Result Value Ref Range    Uric Acid 3.6 3.4 - 7.0 mg/dL         Diagnostic Results:  Reviewed    Assessment/Plan:     * Pulmonary embolus  CTA chest shows Pulmonary thromboembolism involving the segmental and subsegmental branches supplying the right upper, middle and lower lobes.  No evidence of right pulmonary infarction or compelling evidence of right heart strain at this time.   No prior history of clots  -Pt will need Apixaban 10mg BID for 7 days starting dose with transition to 5mg BID thereafter      Diffuse large B-cell lymphoma of intrathoracic lymph nodes  10/02/2019: S/P C1 of [facility]. Will receive rituxan outpatient.   Follow up with his treating Oncologist , Dr. Spann as planned on Tuesday    Constipation  Pt with last BM last Thursday  -instructed the patient on the importance of ambulation for the assistance in having BM's  -Also recommended he take miralax daily upon d/c.        Thank you for your consult. I will follow-up with patient. Please contact us if you have any additional questions.     Willis Britton MD  Hematology/Oncology  [facility]

## 2019-10-06 NOTE — SUBJECTIVE & OBJECTIVE
Interval History: Pt with pain in his abdomen this AM.  The patient states his breathing has improved since admission.  He denies fever, chills, CP, SOB, cough, N/V.  He does endorse constipation.    Oncology Treatment Plan:   IP CHOP + OP RITUXIMAB Q3W    Medications:  Continuous Infusions:  Scheduled Meds:   apixaban  10 mg Oral BID    [START ON 10/12/2019] apixaban  5 mg Oral BID    famotidine  20 mg Oral BID    polyethylene glycol  17 g Oral BID     PRN Meds:acetaminophen, dextrose 50%, dextrose 50%, glucagon (human recombinant), glucose, glucose, ondansetron, ramelteon, sodium chloride 0.9%, sodium chloride 0.9%, traMADol     Review of Systems   Constitutional: Negative for chills and fever.   Respiratory: Negative for cough and shortness of breath.    Cardiovascular: Negative for chest pain and palpitations.   Gastrointestinal: Positive for abdominal pain and constipation. Negative for diarrhea, nausea and vomiting.   Neurological: Negative for headaches.     Objective:     Vital Signs (Most Recent):  Temp: 98 °F (36.7 °C) (10/06/19 1151)  Pulse: 79 (10/06/19 1151)  Resp: 18 (10/06/19 1151)  BP: 129/72 (10/06/19 1151)  SpO2: 95 % (10/06/19 1151) Vital Signs (24h Range):  Temp:  [97.1 °F (36.2 °C)-98.3 °F (36.8 °C)] 98 °F (36.7 °C)  Pulse:  [76-84] 79  Resp:  [16-18] 18  SpO2:  [90 %-96 %] 95 %  BP: (118-152)/(62-91) 129/72     Weight: 78.8 kg (173 lb 11.6 oz)  Body mass index is 23.56 kg/m².  Body surface area is 2 meters squared.      Intake/Output Summary (Last 24 hours) at 10/6/2019 1754  Last data filed at 10/6/2019 1328  Gross per 24 hour   Intake 720 ml   Output 1780 ml   Net -1060 ml       Physical Exam   Constitutional: He is oriented to person, place, and time. He appears well-developed and well-nourished. No distress.   HENT:   Head: Normocephalic and atraumatic.   Poor dentition   Cardiovascular: Normal rate, regular rhythm and normal heart sounds. Exam reveals no gallop and no friction rub.    No murmur heard.  Pulmonary/Chest: Effort normal and breath sounds normal. No respiratory distress. He has no wheezes. He has no rales. He exhibits no tenderness.   Abdominal: Soft. Bowel sounds are normal. He exhibits no distension. There is no tenderness.   Neurological: He is alert and oriented to person, place, and time.   Skin: He is not diaphoretic.       Significant Labs:   Recent Results (from the past 24 hour(s))   Troponin I    Collection Time: 10/05/19  8:06 PM   Result Value Ref Range    Troponin I 0.020 0.000 - 0.026 ng/mL   CBC with Automated Differential    Collection Time: 10/06/19  2:29 AM   Result Value Ref Range    WBC 7.91 3.90 - 12.70 K/uL    RBC 4.71 4.60 - 6.20 M/uL    Hemoglobin 13.0 (L) 14.0 - 18.0 g/dL    Hematocrit 39.7 (L) 40.0 - 54.0 %    Mean Corpuscular Volume 84 82 - 98 fL    Mean Corpuscular Hemoglobin 27.6 27.0 - 31.0 pg    Mean Corpuscular Hemoglobin Conc 32.7 32.0 - 36.0 g/dL    RDW 14.1 11.5 - 14.5 %    Platelets 239 150 - 350 K/uL    MPV 10.3 9.2 - 12.9 fL    Gran # (ANC) 6.7 1.8 - 7.7 K/uL    Lymph # 1.0 1.0 - 4.8 K/uL    Mono # 0.1 (L) 0.3 - 1.0 K/uL    Eos # 0.0 0.0 - 0.5 K/uL    Baso # 0.01 0.00 - 0.20 K/uL    Gran% 85.5 (H) 38.0 - 73.0 %    Lymph% 12.3 (L) 18.0 - 48.0 %    Mono% 1.6 (L) 4.0 - 15.0 %    Eosinophil% 0.5 0.0 - 8.0 %    Basophil% 0.1 0.0 - 1.9 %    Differential Method Automated    Comprehensive Metabolic Panel (CMP)    Collection Time: 10/06/19  2:29 AM   Result Value Ref Range    Sodium 133 (L) 136 - 145 mmol/L    Potassium 4.2 3.5 - 5.1 mmol/L    Chloride 104 95 - 110 mmol/L    CO2 21 (L) 23 - 29 mmol/L    Glucose 96 70 - 110 mg/dL    BUN, Bld 16 8 - 23 mg/dL    Creatinine 0.8 0.5 - 1.4 mg/dL    Calcium 8.4 (L) 8.7 - 10.5 mg/dL    Total Protein 5.4 (L) 6.0 - 8.4 g/dL    Albumin 3.0 (L) 3.5 - 5.2 g/dL    Total Bilirubin 0.8 0.1 - 1.0 mg/dL    Alkaline Phosphatase 78 55 - 135 U/L    AST 22 10 - 40 U/L    ALT 74 (H) 10 - 44 U/L    Anion Gap 8 8 - 16 mmol/L     eGFR if African American >60 >60 mL/min/1.73 m^2    eGFR if non African American >60 >60 mL/min/1.73 m^2   Troponin I    Collection Time: 10/06/19  2:30 AM   Result Value Ref Range    Troponin I 0.033 (H) 0.000 - 0.026 ng/mL   Troponin I    Collection Time: 10/06/19  8:12 AM   Result Value Ref Range    Troponin I 0.011 0.000 - 0.026 ng/mL   Uric acid    Collection Time: 10/06/19  8:12 AM   Result Value Ref Range    Uric Acid 3.6 3.4 - 7.0 mg/dL         Diagnostic Results:  Reviewed

## 2019-10-07 ENCOUNTER — HOSPITAL ENCOUNTER (OUTPATIENT)
Facility: HOSPITAL | Age: 79
Discharge: HOME HEALTH | End: 2019-10-08
Attending: EMERGENCY MEDICINE | Admitting: HOSPITALIST
Payer: MEDICARE

## 2019-10-07 DIAGNOSIS — R06.02 SHORTNESS OF BREATH: ICD-10-CM

## 2019-10-07 DIAGNOSIS — I10 HYPERTENSION, UNSPECIFIED TYPE: ICD-10-CM

## 2019-10-07 DIAGNOSIS — I10 ESSENTIAL HYPERTENSION: ICD-10-CM

## 2019-10-07 DIAGNOSIS — R41.0 CONFUSION: Primary | ICD-10-CM

## 2019-10-07 DIAGNOSIS — R53.81 DEBILITY: ICD-10-CM

## 2019-10-07 DIAGNOSIS — N18.30 STAGE 3 CHRONIC KIDNEY DISEASE: ICD-10-CM

## 2019-10-07 PROBLEM — R09.02 HYPOXIA: Status: ACTIVE | Noted: 2019-10-07

## 2019-10-07 PROBLEM — R93.0 ABNORMAL MRI OF HEAD: Status: ACTIVE | Noted: 2019-10-07

## 2019-10-07 PROBLEM — K59.01 SLOW TRANSIT CONSTIPATION: Status: ACTIVE | Noted: 2019-10-07

## 2019-10-07 LAB
ALBUMIN SERPL BCP-MCNC: 3.6 G/DL (ref 3.5–5.2)
ALP SERPL-CCNC: 96 U/L (ref 55–135)
ALT SERPL W/O P-5'-P-CCNC: 76 U/L (ref 10–44)
AMMONIA PLAS-SCNC: 30 UMOL/L (ref 10–50)
ANION GAP SERPL CALC-SCNC: 9 MMOL/L (ref 8–16)
APTT BLDCRRT: 32.4 SEC (ref 21–32)
AST SERPL-CCNC: 24 U/L (ref 10–40)
BASOPHILS # BLD AUTO: 0.01 K/UL (ref 0–0.2)
BASOPHILS NFR BLD: 0.1 % (ref 0–1.9)
BILIRUB SERPL-MCNC: 1.2 MG/DL (ref 0.1–1)
BILIRUB UR QL STRIP: NEGATIVE
BNP SERPL-MCNC: 13 PG/ML (ref 0–99)
BUN SERPL-MCNC: 16 MG/DL (ref 8–23)
CALCIUM SERPL-MCNC: 9 MG/DL (ref 8.7–10.5)
CHLORIDE SERPL-SCNC: 103 MMOL/L (ref 95–110)
CHROM BANDING METHOD: NORMAL
CHROMOSOME ANALYSIS BM ADDITIONAL INFORMATION: NORMAL
CHROMOSOME ANALYSIS BM RELEASED BY: NORMAL
CHROMOSOME ANALYSIS BM RESULT SUMMARY: NORMAL
CLARITY UR: CLEAR
CLINICAL CYTOGENETICIST REVIEW: NORMAL
CO2 SERPL-SCNC: 21 MMOL/L (ref 23–29)
COLOR UR: YELLOW
CREAT SERPL-MCNC: 0.9 MG/DL (ref 0.5–1.4)
CTP QC/QA: YES
DIFFERENTIAL METHOD: ABNORMAL
EOSINOPHIL # BLD AUTO: 0 K/UL (ref 0–0.5)
EOSINOPHIL NFR BLD: 0.4 % (ref 0–8)
ERYTHROCYTE [DISTWIDTH] IN BLOOD BY AUTOMATED COUNT: 13.9 % (ref 11.5–14.5)
EST. GFR  (AFRICAN AMERICAN): >60 ML/MIN/1.73 M^2
EST. GFR  (NON AFRICAN AMERICAN): >60 ML/MIN/1.73 M^2
GLUCOSE SERPL-MCNC: 100 MG/DL (ref 70–110)
GLUCOSE UR QL STRIP: NEGATIVE
HCT VFR BLD AUTO: 42.1 % (ref 40–54)
HGB BLD-MCNC: 14.2 G/DL (ref 14–18)
HGB UR QL STRIP: ABNORMAL
INR PPP: 1.1 (ref 0.8–1.2)
KARYOTYP MAR: NORMAL
KETONES UR QL STRIP: ABNORMAL
LACTATE SERPL-SCNC: 1.8 MMOL/L (ref 0.5–2.2)
LEUKOCYTE ESTERASE UR QL STRIP: NEGATIVE
LYMPHOCYTES # BLD AUTO: 0.8 K/UL (ref 1–4.8)
LYMPHOCYTES NFR BLD: 11.2 % (ref 18–48)
MCH RBC QN AUTO: 28.3 PG (ref 27–31)
MCHC RBC AUTO-ENTMCNC: 33.7 G/DL (ref 32–36)
MCV RBC AUTO: 84 FL (ref 82–98)
MICROSCOPIC COMMENT: ABNORMAL
MONOCYTES # BLD AUTO: 0.1 K/UL (ref 0.3–1)
MONOCYTES NFR BLD: 2 % (ref 4–15)
NEUTROPHILS # BLD AUTO: 6.2 K/UL (ref 1.8–7.7)
NEUTROPHILS NFR BLD: 86.9 % (ref 38–73)
NITRITE UR QL STRIP: NEGATIVE
PH UR STRIP: 8 [PH] (ref 5–8)
PLATELET # BLD AUTO: 255 K/UL (ref 150–350)
PMV BLD AUTO: 10.7 FL (ref 9.2–12.9)
POC MOLECULAR INFLUENZA A AGN: NEGATIVE
POC MOLECULAR INFLUENZA B AGN: NEGATIVE
POTASSIUM SERPL-SCNC: 4.3 MMOL/L (ref 3.5–5.1)
PROT SERPL-MCNC: 6.5 G/DL (ref 6–8.4)
PROT UR QL STRIP: NEGATIVE
PROTHROMBIN TIME: 11.9 SEC (ref 9–12.5)
RBC # BLD AUTO: 5.02 M/UL (ref 4.6–6.2)
RBC #/AREA URNS HPF: 20 /HPF (ref 0–4)
REASON FOR REFERRAL (NARRATIVE): NORMAL
REF LAB TEST METHOD: NORMAL
SODIUM SERPL-SCNC: 133 MMOL/L (ref 136–145)
SP GR UR STRIP: 1.01 (ref 1–1.03)
SPECIMEN SOURCE: NORMAL
SPECIMEN: NORMAL
TROPONIN I SERPL DL<=0.01 NG/ML-MCNC: <0.006 NG/ML (ref 0–0.03)
TSH SERPL DL<=0.005 MIU/L-ACNC: 2.64 UIU/ML (ref 0.4–4)
URN SPEC COLLECT METH UR: ABNORMAL
UROBILINOGEN UR STRIP-ACNC: NEGATIVE EU/DL
WBC # BLD AUTO: 7.16 K/UL (ref 3.9–12.7)

## 2019-10-07 PROCEDURE — 84443 ASSAY THYROID STIM HORMONE: CPT

## 2019-10-07 PROCEDURE — 87040 BLOOD CULTURE FOR BACTERIA: CPT

## 2019-10-07 PROCEDURE — 83605 ASSAY OF LACTIC ACID: CPT

## 2019-10-07 PROCEDURE — G0378 HOSPITAL OBSERVATION PER HR: HCPCS

## 2019-10-07 PROCEDURE — 96375 TX/PRO/DX INJ NEW DRUG ADDON: CPT

## 2019-10-07 PROCEDURE — 93010 ELECTROCARDIOGRAM REPORT: CPT | Mod: ,,, | Performed by: INTERNAL MEDICINE

## 2019-10-07 PROCEDURE — 99285 EMERGENCY DEPT VISIT HI MDM: CPT | Mod: 25

## 2019-10-07 PROCEDURE — 99204 PR OFFICE/OUTPT VISIT, NEW, LEVL IV, 45-59 MIN: ICD-10-PCS | Mod: ,,, | Performed by: PSYCHIATRY & NEUROLOGY

## 2019-10-07 PROCEDURE — 93005 ELECTROCARDIOGRAM TRACING: CPT

## 2019-10-07 PROCEDURE — 80053 COMPREHEN METABOLIC PANEL: CPT

## 2019-10-07 PROCEDURE — 85025 COMPLETE CBC W/AUTO DIFF WBC: CPT

## 2019-10-07 PROCEDURE — 87502 INFLUENZA DNA AMP PROBE: CPT

## 2019-10-07 PROCEDURE — 99204 OFFICE O/P NEW MOD 45 MIN: CPT | Mod: ,,, | Performed by: PSYCHIATRY & NEUROLOGY

## 2019-10-07 PROCEDURE — 82140 ASSAY OF AMMONIA: CPT

## 2019-10-07 PROCEDURE — 96374 THER/PROPH/DIAG INJ IV PUSH: CPT

## 2019-10-07 PROCEDURE — 93010 EKG 12-LEAD: ICD-10-PCS | Mod: ,,, | Performed by: INTERNAL MEDICINE

## 2019-10-07 PROCEDURE — 63600175 PHARM REV CODE 636 W HCPCS: Performed by: EMERGENCY MEDICINE

## 2019-10-07 PROCEDURE — 25000003 PHARM REV CODE 250: Performed by: NURSE PRACTITIONER

## 2019-10-07 PROCEDURE — 85730 THROMBOPLASTIN TIME PARTIAL: CPT

## 2019-10-07 PROCEDURE — 85610 PROTHROMBIN TIME: CPT

## 2019-10-07 PROCEDURE — 84484 ASSAY OF TROPONIN QUANT: CPT

## 2019-10-07 PROCEDURE — 81000 URINALYSIS NONAUTO W/SCOPE: CPT

## 2019-10-07 PROCEDURE — 83880 ASSAY OF NATRIURETIC PEPTIDE: CPT

## 2019-10-07 RX ORDER — LISINOPRIL 5 MG/1
10 TABLET ORAL DAILY
Status: DISCONTINUED | OUTPATIENT
Start: 2019-10-07 | End: 2019-10-08 | Stop reason: HOSPADM

## 2019-10-07 RX ORDER — FENTANYL CITRATE 50 UG/ML
50 INJECTION, SOLUTION INTRAMUSCULAR; INTRAVENOUS
Status: COMPLETED | OUTPATIENT
Start: 2019-10-07 | End: 2019-10-07

## 2019-10-07 RX ORDER — DICYCLOMINE HYDROCHLORIDE 10 MG/1
10 CAPSULE ORAL ONCE
Status: COMPLETED | OUTPATIENT
Start: 2019-10-07 | End: 2019-10-07

## 2019-10-07 RX ORDER — ONDANSETRON 2 MG/ML
4 INJECTION INTRAMUSCULAR; INTRAVENOUS
Status: COMPLETED | OUTPATIENT
Start: 2019-10-07 | End: 2019-10-07

## 2019-10-07 RX ORDER — SODIUM CHLORIDE 0.9 % (FLUSH) 0.9 %
10 SYRINGE (ML) INJECTION
Status: DISCONTINUED | OUTPATIENT
Start: 2019-10-07 | End: 2019-10-08 | Stop reason: HOSPADM

## 2019-10-07 RX ORDER — PYRIDOSTIGMINE BROMIDE 60 MG/1
60 TABLET ORAL 3 TIMES DAILY
Status: DISCONTINUED | OUTPATIENT
Start: 2019-10-07 | End: 2019-10-08 | Stop reason: HOSPADM

## 2019-10-07 RX ADMIN — DICYCLOMINE HYDROCHLORIDE 10 MG: 10 CAPSULE ORAL at 04:10

## 2019-10-07 RX ADMIN — FENTANYL CITRATE 50 MCG: 50 INJECTION INTRAMUSCULAR; INTRAVENOUS at 10:10

## 2019-10-07 RX ADMIN — PYRIDOSTIGMINE BROMIDE 60 MG: 60 TABLET ORAL at 06:10

## 2019-10-07 RX ADMIN — ONDANSETRON HYDROCHLORIDE 4 MG: 2 SOLUTION INTRAMUSCULAR; INTRAVENOUS at 10:10

## 2019-10-07 RX ADMIN — PYRIDOSTIGMINE BROMIDE 60 MG: 60 TABLET ORAL at 11:10

## 2019-10-07 RX ADMIN — APIXABAN 10 MG: 5 TABLET, FILM COATED ORAL at 09:10

## 2019-10-07 RX ADMIN — APIXABAN 10 MG: 5 TABLET, FILM COATED ORAL at 01:10

## 2019-10-07 RX ADMIN — LISINOPRIL 10 MG: 5 TABLET ORAL at 04:10

## 2019-10-07 NOTE — H&P
"[facility] Hospital Medicine  History & Physical    Patient Name: Robe Cevallos  MRN: [id number]  Admission Date: 10/7/2019  Attending Physician: Devin Da Silva MD   Primary Care Provider: Kang Reddy MD         Patient information was obtained from patient and ER records.     Subjective:     Principal Problem:Confusion    Chief Complaint:   Chief Complaint   Patient presents with    Shortness of Breath     per family pt discharged from ED yesterday for PE, upon EMS arrival  RA sats 96%, placed on 3L 99%, EMS report diminished breath sounds on lower Left        HPI: Mr. 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, recent diagnosis of posterior mediastinal mass on 9/16/19 sp biopsy of supraclavicular noted on 9/20/19 with pathology showing DLBCL (received CHOP on 10/2/19  with mass causing extrinsic compression of esophagus (Had radiation on 9/25 for airway/esophageal compression then repeat XR on 9/26 improvement so no further treatment warranted) /left upper and lower lobe brochi and compression/obliteration of the left inferior pukmonary vein  now on chemo, recent thoracentesis 9/27/19 with 2 L removal, hypertension, myasthenia gravis and recent PE 10/4/19 discharge yesterday 10/6 now presents to hospital for shortness of breath and confusion "felt not right" at 4 am. Denies chest pain. O2 sat 96% with home pulse ox. Patient received miralax prior to discharge and had multiple BMs' loose brown watery stool into this am.     EKG NSR . Noted in ED hypoxic 86-87 % RA then placed on oxygen with improvement in O2 sat 99%.   Afebrile and no leukcystosis  Lactic acid 1.8, THS 2.6  UA no infection  CT head "Small region of hypoattenuation involving the left susu, possibly artifactual although if there is clinical concern for acute ischemia further evaluation with MRI is advised if there are no " "contraindications.  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   US carotid no significant stenosis  Confusion resolved in ED without intervention.     10/4/19 2 D echo showed EF 60%, normal diastolic function, normal wall motion.            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 Jolley    CATARACT EXTRACTION W/  INTRAOCULAR LENS IMPLANT Left 04/20/2017    Dr. Jolley    CHOLECYSTECTOMY      EYE SURGERY      Rt cataract    KNEE ARTHROSCOPY      Lt knee    TONSILLECTOMY      Tunneled PICC line Right 09/30/2019    Via IJ, 23cm  length       Review of patient's allergies indicates:  No Known Allergies    Current Facility-Administered Medications on File Prior to Encounter   Medication    [DISCONTINUED] acetaminophen tablet 500 mg    [DISCONTINUED] apixaban tablet 10 mg    [DISCONTINUED] apixaban tablet 5 mg    [DISCONTINUED] dextrose 50% injection 12.5 g    [DISCONTINUED] dextrose 50% injection 25 g    [DISCONTINUED] famotidine tablet 20 mg    [DISCONTINUED] glucagon (human recombinant) injection 1 mg    [DISCONTINUED] glucose chewable tablet 16 g    [DISCONTINUED] glucose chewable tablet 24 g    [DISCONTINUED] ondansetron injection 4 mg    [DISCONTINUED] polyethylene glycol packet 17 g    [DISCONTINUED] ramelteon tablet 8 mg    [DISCONTINUED] sodium chloride 0.9% flush 10 mL    [DISCONTINUED] " sodium chloride 0.9% flush 10 mL    [DISCONTINUED] traMADol tablet 50 mg     Current Outpatient Medications on File Prior to Encounter   Medication Sig    albuterol (PROVENTIL/VENTOLIN HFA) 90 mcg/actuation inhaler Inhale 2 puffs into the lungs every 6 (six) hours as needed for Wheezing. Rescue    apixaban (ELIQUIS) 5 mg Tab Take 2 tablets (10 mg total) by mouth 2 (two) times daily. Start first dose tonight for 13 doses    [START ON 10/13/2019] apixaban (ELIQUIS) 5 mg Tab Take 1 tablet (5 mg total) by mouth 2 (two) times daily.    azelastine (ASTELIN) 137 mcg (0.1 %) nasal spray 1 spray (137 mcg total) by Nasal route 2 (two) times daily.    lisinopril 10 MG tablet Take 10 mg by mouth once daily.    meclizine (ANTIVERT) 25 mg tablet Take 1 tablet (25 mg total) by mouth 3 (three) times daily as needed.    pyridostigmine (MESTINON) 60 mg Tab Take 60 mg by mouth 3 (three) times daily.      Family History     Problem Relation (Age of Onset)    Heart disease Father    No Known Problems Mother, Sister, Brother, Maternal Aunt, Maternal Uncle, Paternal Aunt, Paternal Uncle, Maternal Grandmother, Maternal Grandfather, Paternal Grandmother, Paternal Grandfather        Tobacco Use    Smoking status: Former Smoker    Smokeless tobacco: Never Used   Substance and Sexual Activity    Alcohol use: No    Drug use: Never    Sexual activity: Not Currently     Partners: Female     Review of Systems   Constitutional: Positive for activity change, appetite change, fatigue and unexpected weight change. Negative for chills and fever.   HENT: Negative.    Respiratory: Positive for shortness of breath. Negative for cough and chest tightness.    Cardiovascular: Negative.    Gastrointestinal: Positive for constipation. Negative for nausea and vomiting.   Endocrine: Negative.    Genitourinary: Negative.    Musculoskeletal: Positive for arthralgias.   Neurological: Positive for weakness.   Hematological: Negative.      Objective:      Vital Signs (Most Recent):  Temp: 97.4 °F (36.3 °C) (10/07/19 1010)  Pulse: 88 (10/07/19 1152)  Resp: (!) 25 (10/07/19 1152)  BP: (!) 147/80 (10/07/19 1154)  SpO2: 99 % (10/07/19 1152) Vital Signs (24h Range):  Temp:  [97.4 °F (36.3 °C)-97.9 °F (36.6 °C)] 97.4 °F (36.3 °C)  Pulse:  [78-92] 88  Resp:  [12-25] 25  SpO2:  [86 %-99 %] 99 %  BP: (128-183)/(76-94) 147/80     Weight: 78.5 kg (173 lb)  Body mass index is 23.46 kg/m².    Physical Exam   Constitutional: He is oriented to person, place, and time. He appears well-developed and well-nourished. No distress.   HENT:   Head: Atraumatic.   Eyes: EOM are normal.   Neck: Neck supple.   Cardiovascular: Regular rhythm.   Pulmonary/Chest: Effort normal and breath sounds normal. No respiratory distress.   Abdominal: Soft. Bowel sounds are normal. He exhibits no distension.   Musculoskeletal: Normal range of motion. He exhibits no edema.   Neurological: He is alert and oriented to person, place, and time.   Skin: Skin is warm and dry. Right upper CW central line   Psychiatric: He has a normal mood and affect.         CRANIAL NERVES     CN III, IV, VI   Extraocular motions are normal.        Significant Labs: All pertinent labs within the past 24 hours have been reviewed.    Significant Imaging: I have reviewed and interpreted all pertinent imaging results/findings within the past 24 hours.    Assessment/Plan:     * Confusion  No obvious infectious process . Volume depletion vs hypoxia?   Confusion resolved in ED without intervention however MRI brain showed tiny punctate acute/subacute infarct right aspect of the medulla  Appreciate Neurology consult Not sure if above MRI finding cause of confustion, MRI finding possible from acute PE last admission?? artifact  Continue Eliquis. Obtain lipid panel.   Neuro checks/telemetry,  Hold off IVF as eating/drinking  If persistent hypoxia, my need repeat thoracentesis. See below #2.       Hypoxia  Not sure why received fent in  ED . O2 86-87 % after with improvement with 2 L NC.  CXR left pleural effusion? However improved compared to previous CXR. Lungs diminished at bases otherwise clear.   Recent left thoracentesis 9/27 with 2 L removal.    Wean as tolerated, if persistent hypoxia may need repeat thoracentesis.       Slow transit constipation  Relief with miralax yesterday. Continue to monitor and PRN colace if need as very sensitive to miralax.    Addendum 1358  Patient with abdominal cramps after full liquid diet. Bentyl x 1. Obtain KUB.      Abnormal MRI of head  See above #1.       Pulmonary embolus  New diagnosis 10/4/19 CTA showed segmental and subsegmental branches supplying the right upper, middle and lower lobes.   10/4/19 2 D echo showed normal EF,normal diastolic and no WMA.   Continue Eliquis 10 mg BID x 7 days (until Oct 13) then 5 mg BID       Hypertension  Continue lisinopril.       Other dysphagia  Denies problems at moment. Family wishes to start full liquid and advance as tolerated       Diffuse large B-cell lymphoma of intrathoracic lymph nodes  10/02/2019: S/P C1 of [facility]. Will receive rituxan outpatient.   Follow up with his treating Oncologist , Dr. Spann as planned on Tuesday. Will need to reschedule if not able to make it at 2 pm tomorrow     Ocular myasthenia gravis  Continue mestinon.      VTE Risk Mitigation (From admission, onward)         Ordered     apixaban tablet 10 mg  2 times daily      10/07/19 1125     Place sequential compression device  Until discontinued      10/07/19 1112     IP VTE HIGH RISK PATIENT  Once      10/07/19 1112                   Lynette Nash NP  Department of Hospital Medicine   [facility]

## 2019-10-07 NOTE — NURSING
Received report from WARD Ramey.  Per report pt saturation have been fine, but decrease when sleeping. Pt is alert and oriented, but becomes confused at times. Awaiting pt arrival to unit

## 2019-10-07 NOTE — ASSESSMENT & PLAN NOTE
10/02/2019: S/P C1 of [facility]. Will receive rituxan outpatient.   Follow up with his treating Oncologist , Dr. Spann as planned on Tuesday. Will need to reschedule if not able to make it at 2 pm tomorrow

## 2019-10-07 NOTE — ED NOTES
Patient provided urinal to void in. Patient reported he would try, seemed to understand what he needed to do. Family remains at bs

## 2019-10-07 NOTE — ASSESSMENT & PLAN NOTE
Relief with miralax yesterday. Continue to monitor and PRN colace if need as very sensitive to miralax.    Addendum 1358  Patient with abdominal cramps after full liquid diet. Bentyl x 1. Obtain KUB.

## 2019-10-07 NOTE — SUBJECTIVE & OBJECTIVE
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 Jolley    CATARACT EXTRACTION W/  INTRAOCULAR LENS IMPLANT Left 04/20/2017    Dr. Jolley    CHOLECYSTECTOMY      EYE SURGERY      Rt cataract    KNEE ARTHROSCOPY      Lt knee    TONSILLECTOMY      Tunneled PICC line Right 09/30/2019    Via IJ, 23cm  length       Review of patient's allergies indicates:  No Known Allergies    Current Facility-Administered Medications on File Prior to Encounter   Medication    [DISCONTINUED] acetaminophen tablet 500 mg    [DISCONTINUED] apixaban tablet 10 mg    [DISCONTINUED] apixaban tablet 5 mg    [DISCONTINUED] dextrose 50% injection 12.5 g    [DISCONTINUED] dextrose 50% injection 25 g    [DISCONTINUED] famotidine tablet 20 mg    [DISCONTINUED] glucagon (human recombinant) injection 1 mg    [DISCONTINUED] glucose chewable tablet 16 g    [DISCONTINUED] glucose chewable tablet 24 g    [DISCONTINUED] ondansetron injection 4 mg    [DISCONTINUED] polyethylene glycol packet 17 g    [DISCONTINUED] ramelteon tablet 8 mg    [DISCONTINUED] sodium chloride 0.9% flush 10 mL    [DISCONTINUED] sodium chloride 0.9% flush 10 mL    [DISCONTINUED] traMADol tablet 50 mg     Current Outpatient Medications on File Prior to Encounter   Medication Sig    albuterol (PROVENTIL/VENTOLIN HFA) 90 mcg/actuation inhaler Inhale 2 puffs into the lungs every 6 (six) hours as needed for Wheezing. Rescue    apixaban  (ELIQUIS) 5 mg Tab Take 2 tablets (10 mg total) by mouth 2 (two) times daily. Start first dose tonight for 13 doses    [START ON 10/13/2019] apixaban (ELIQUIS) 5 mg Tab Take 1 tablet (5 mg total) by mouth 2 (two) times daily.    azelastine (ASTELIN) 137 mcg (0.1 %) nasal spray 1 spray (137 mcg total) by Nasal route 2 (two) times daily.    lisinopril 10 MG tablet Take 10 mg by mouth once daily.    meclizine (ANTIVERT) 25 mg tablet Take 1 tablet (25 mg total) by mouth 3 (three) times daily as needed.    pyridostigmine (MESTINON) 60 mg Tab Take 60 mg by mouth 3 (three) times daily.      Family History     Problem Relation (Age of Onset)    Heart disease Father    No Known Problems Mother, Sister, Brother, Maternal Aunt, Maternal Uncle, Paternal Aunt, Paternal Uncle, Maternal Grandmother, Maternal Grandfather, Paternal Grandmother, Paternal Grandfather        Tobacco Use    Smoking status: Former Smoker    Smokeless tobacco: Never Used   Substance and Sexual Activity    Alcohol use: No    Drug use: Never    Sexual activity: Not Currently     Partners: Female     Review of Systems   Constitutional: Positive for activity change, appetite change, fatigue and unexpected weight change. Negative for chills and fever.   HENT: Negative.    Respiratory: Positive for shortness of breath. Negative for cough and chest tightness.    Cardiovascular: Negative.    Gastrointestinal: Positive for constipation. Negative for nausea and vomiting.   Endocrine: Negative.    Genitourinary: Negative.    Musculoskeletal: Positive for arthralgias.   Neurological: Positive for weakness.   Hematological: Negative.      Objective:     Vital Signs (Most Recent):  Temp: 97.4 °F (36.3 °C) (10/07/19 1010)  Pulse: 88 (10/07/19 1152)  Resp: (!) 25 (10/07/19 1152)  BP: (!) 147/80 (10/07/19 1154)  SpO2: 99 % (10/07/19 1152) Vital Signs (24h Range):  Temp:  [97.4 °F (36.3 °C)-97.9 °F (36.6 °C)] 97.4 °F (36.3 °C)  Pulse:  [78-92] 88  Resp:  [12-25]  25  SpO2:  [86 %-99 %] 99 %  BP: (128-183)/(76-94) 147/80     Weight: 78.5 kg (173 lb)  Body mass index is 23.46 kg/m².    Physical Exam   Constitutional: He is oriented to person, place, and time. He appears well-developed and well-nourished. No distress.   HENT:   Head: Atraumatic.   Eyes: EOM are normal.   Neck: Neck supple.   Cardiovascular: Regular rhythm.   Pulmonary/Chest: Effort normal and breath sounds normal. No respiratory distress.   Abdominal: Soft. Bowel sounds are normal. He exhibits no distension.   Musculoskeletal: Normal range of motion. He exhibits no edema.   Neurological: He is alert and oriented to person, place, and time.   Skin: Skin is warm and dry.   Psychiatric: He has a normal mood and affect.         CRANIAL NERVES     CN III, IV, VI   Extraocular motions are normal.        Significant Labs: All pertinent labs within the past 24 hours have been reviewed.    Significant Imaging: I have reviewed and interpreted all pertinent imaging results/findings within the past 24 hours.

## 2019-10-07 NOTE — DISCHARGE SUMMARY
[facility] Hospital Medicine  Discharge Summary      Patient Name: Robe Cevallos  MRN: [id number]  Admission Date: 10/4/2019  Hospital Length of Stay: 2 days  Discharge Date and Time:  10/06/2019 7:44 AM  Attending Physician: No att. providers found   Discharging Provider: Ivett Gallegos MD  Primary Care Provider: Kang Reddy MD      HPI:   79-year-old male with Myasthenia gravis, stage 1 follicular lymphoma in the right groin, and recent diagnosis of posterior mediastinal mass on 9/16/19 s/p biopsy of supraclavicular node on 9/20/19 with pathology showing DLBCL and with mass causing extrinsic compression of the esophagus, left upper and lower lobe bronchi and compression/obliteration of the left inferior pulmonary vein who presented with sudden onset of SOB around midnight after being discharged from [facility] the same day.  He was home for maybe 6 hrs before he developed symptoms.  Patient reported he was not discharged on oxygen. He was just discharged on 10/3/10 after being hospitalized on 9/25/19 to get radiation given airway/esophagus compression. XRT performed on 9/26/19 and he did not need further treatment due to improvement. Thoracentesis was performed on 9/27/19 with 2 liters removed.  He received chemotherapy on 10/02/2019 with CHOP.  Patient reports his symptoms came on him abruptly and woke him up from sleep.  No reported fevers or chills.  In the ER, CTA of the chest showed acute pulmonary embolism in the right lung, improvement in size of mediastinal mass, and no significant change in pleural effusion.  BNP 20, and no elevation in his WBC count.    * No surgery found *      Hospital Course:   Mr Cevallos presented with acute pulmonary embolism. Per CT, it involved segmental and subsegmental branches of right upper, middle and lower lobes. No evidence of infarction or right heart strain. He was initiated on heparin infusion and supplemental O2. Had no evidence of bleeding. Discussed risk  for bleeding. Patient denies history of bleeding. Agreed with anticoagulation treatment. Has no obvious contraindication for this. Overall did well with therapy. LE venous ultrasound ordered to r/o DVT. Showed occlusive thrombophlebitis of the left peroneal vein. Patient did not complain of pain or discomfort in this area. Had good pulses and no edema. Did well overall and remained stable at room air. Did complain about constipation. Laxatives given but it seems like they did not want to wait until he had a BM prior to discharge. Patient was discharged on apixaban to take while on active treatment for cancer as he would be at risk for recurrence with active cancer. Low sodium diet. Activity as tolerated. Has follow up with Dr Spann scheduled for Tuesday. Assessment and plan discussed with patient, patient's wife and daughter. All questions answered to satisfaction except for questions regarding his cancer. Requested discussion with oncologist prior to discharge. This was done.     Consults:   Consults (From admission, onward)        Status Ordering Provider     Inpatient consult to Hematology  Once     Provider:  Amber Heredia MD    Completed ELIZABETH BADILLO        Final Active Diagnoses:    Diagnosis Date Noted POA    PRINCIPAL PROBLEM:  Pulmonary embolus [I26.99] 10/04/2019 Yes    Constipation [K59.00] 10/06/2019 Unknown    Debility [R53.81] 10/04/2019 Yes    Other dysphagia [R13.19] 09/30/2019 Yes    Diffuse large B-cell lymphoma of intrathoracic lymph nodes [C83.32] 09/25/2019 Yes    Stage 3 chronic kidney disease [N18.3] 09/25/2019 Yes    Essential hypertension [I10] 01/06/2017 Yes    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 Yes      Problems Resolved During this Admission:       Discharged Condition: stable    Disposition: Home or Self Care    Follow Up:  Follow-up Information     Schedule an appointment as soon as possible for a visit with Kang Reddy MD.    Specialty:  Internal  Medicine  Why:  out patient services  Contact information:  [address]  [address]  [place] [address]  [phone]                 Medications:  Reconciled Home Medications:      Medication List      START taking these medications    * apixaban 5 mg Tab  Commonly known as:  ELIQUIS  Take 2 tablets (10 mg total) by mouth 2 (two) times daily. Start first dose tonight for 13 doses     * apixaban 5 mg Tab  Commonly known as:  ELIQUIS  Take 1 tablet (5 mg total) by mouth 2 (two) times daily.  Start taking on:  October 13, 2019         * This list has 2 medication(s) that are the same as other medications prescribed for you. Read the directions carefully, and ask your doctor or other care provider to review them with you.            CONTINUE taking these medications    albuterol 90 mcg/actuation inhaler  Commonly known as:  PROVENTIL/VENTOLIN HFA  Inhale 2 puffs into the lungs every 6 (six) hours as needed for Wheezing. Rescue     azelastine 137 mcg (0.1 %) nasal spray  Commonly known as:  ASTELIN  1 spray (137 mcg total) by Nasal route 2 (two) times daily.     meclizine 25 mg tablet  Commonly known as:  ANTIVERT  Take 1 tablet (25 mg total) by mouth 3 (three) times daily as needed.     pyridostigmine 60 mg Tab  Commonly known as:  MESTINON  Take 60 mg by mouth 3 (three) times daily.        ASK your doctor about these medications    lisinopril 10 MG tablet  Take 10 mg by mouth once daily.            Indwelling Lines/Drains at time of discharge:   Lines/Drains/Airways     Central Venous Catheter Line                 Percutaneous Central Line Insertion/Assessment - double lumen  09/30/19 1720 right internal jugular 6 days                Time spent on the discharge of patient: > 35 minutes  Patient was seen and examined on the date of discharge and determined to be suitable for discharge.         Ivett Gallegos MD  Department of Hospital Medicine  [facility]

## 2019-10-07 NOTE — NURSING
Pt O2 was removed and was 97% on room air after a few minutes. Pt has c/o of aching pain across abdomen and tender to touch 3/10 w/ MD. Pt family states that PICC line dressing needs to be changed every 3 days. Pt family was informed that MD would be notified regarding line and that a order must be placed from MD regarding changing or flushing line that was present prior to pt admission.

## 2019-10-07 NOTE — ASSESSMENT & PLAN NOTE
New diagnosis 10/4/19 CTA showed segmental and subsegmental branches supplying the right upper, middle and lower lobes.   10/4/19 2 D echo showed normal EF,normal diastolic and no WMA.   Continue Eliquis 10 mg BID x 7 days (until Oct 13) then 5 mg BID

## 2019-10-07 NOTE — ASSESSMENT & PLAN NOTE
No obvious infectious process . Volume depletion vs hypoxia?   Confusion resolved in ED without intervention however MRI brain showed tiny punctate acute/subacute infarct right aspect of the medulla  Appreciate Neurology consult Not sure if above MRI finding cause of confustion, MRI finding possible from acute PE last admission?? artifact  Continue Eliquis. Obtain lipid panel.   Neuro checks/telemetry,  Hold off IVF as eating/drinking  If persistent hypoxia, my need repeat thoracentesis. See below #2.

## 2019-10-07 NOTE — ASSESSMENT & PLAN NOTE
Not sure why received fent in ED . O2 86-87 % after with improvement with 2 L NC.  CXR left pleural effusion? However improved compared to previous CXR. Lungs diminished at bases otherwise clear.   Recent left thoracentesis 9/27 with 2 L removal.    Wean as tolerated, if persistent hypoxia may need repeat thoracentesis.

## 2019-10-07 NOTE — PLAN OF CARE
Problem: Fall Injury Risk  Goal: Absence of Fall and Fall-Related Injury  Outcome: Ongoing, Progressing  Intervention: Identify and Manage Contributors to Fall Injury Risk  Flowsheets (Taken 10/7/2019 1837)  Self-Care Promotion: independence encouraged  Medication Review/Management: medications reviewed; high risk medications identified     Problem: Infection  Goal: Infection Symptom Resolution  Outcome: Ongoing, Progressing     Problem: Skin Injury Risk Increased  Goal: Skin Health and Integrity  Outcome: Ongoing, Progressing  Intervention: Optimize Skin Protection  Flowsheets (Taken 10/7/2019 1837)  Pressure Reduction Techniques: frequent weight shift encouraged  Head of Bed (HOB): HOB elevated    Pt VS remained stable during shift. Pt was notified regarding confusion that Neurlogy consult was place. Pt remained AAOx4 and no confusion noted during shift or any neuro deficits. Pt did have c/o abdominal pain 3/10 that was relieved w/ bentyl. PICC line dsg change was performed and lines were flushed. Pt refused bed bath and night shift will be notified regarding pt request. Pt free of falls ths shift and rested comfortably at bedside. Pt was NS w/ 1 degree AV block on monitor.

## 2019-10-07 NOTE — ASSESSMENT & PLAN NOTE
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 artifact  Continue Eliquis. Obtain lipid panel.   Neuro checks/telemetry,  Hold off IVF as eating/drinking

## 2019-10-07 NOTE — PLAN OF CARE
10/07/19 1218   Discharge Assessment   Assessment Type Discharge Planning Assessment   Confirmed/corrected address and phone number on facesheet? Yes   Assessment information obtained from? Patient;Caregiver   Prior to hospitilization cognitive status: Alert/Oriented   Prior to hospitalization functional status: Independent   Current cognitive status: Alert/Oriented   Current Functional Status: Independent   Facility Arrived From: home   Lives With spouse   Able to Return to Prior Arrangements yes   Is patient able to care for self after discharge? Unable to determine at this time (comments)   Who are your caregiver(s) and their phone number(s)? Bindu [phone]   Patient's perception of discharge disposition home or selfcare   Readmission Within the Last 30 Days no previous admission in last 30 days   Patient currently being followed by outpatient case management? No   Patient currently receives any other outside agency services? No   Name and contact number of agency or person providing outside services Bindu [phone]   Is it the patient/care giver preference to resume care with the current outside agency? No   Do you have any problems affording any of your prescribed medications? No   Is the patient taking medications as prescribed? yes   Does the patient receive services at the [facility]in Clinic? No   Discharge Plan A Home with family;Home Health   DME Needed Upon Discharge  none   Patient/Family in Agreement with Plan yes   Does the patient have transportation to healthcare appointments? Yes   Readmission Questionnaire   At the time of your discharge, did someone talk to you about what your health problems were? Yes   At the time of discharge, did someone talk to you about what to watch out for regarding worsening of your health problem? Yes   At the time of discharge, did someone talk to you about what to do if you experienced worsening of your health problem? Yes   At the time of discharge, did someone talk  to you about which medication to take when you left the hospital and which ones to stop taking? Yes   At the time of discharge, did someone talk to you about when and where to follow up with a doctor after you left the hospital? Yes   How often do you need to have someone help you when you read instructions, pamphlets, or other written material from your doctor or pharmacy? Always   Do you have problems taking your medications as prescribed? Yes   Do you have any problems affording any of  your prescribed medications? No   Do you have problems obtaining/receiving your medications? No   Living Arrangements [id number] - [place], [place] - [address] [place] [address] AT SEC OF [place] & [address]  [place] [address]  Phone: [phone] Fax: [phone]

## 2019-10-07 NOTE — ED PROVIDER NOTES
Encounter Date: 10/7/2019       History     Chief Complaint   Patient presents with    Shortness of Breath     per family pt discharged from ED yesterday for PE, upon EMS arrival  RA sats 96%, placed on 3L 99%, EMS report diminished breath sounds on lower Left     HPI     79 y.o.male with PMHx Lymphoma(currently on chemo), HTN, Myasthenia gravis presents with SOB, confusion x 1 day. Per family, patient was hospitalized over 3 weeks prior when he was diagnosed with lymphoma and started on chemotherapy at that time, with f/u appt with his oncologist the next day.  They report he was hospitalized two day ago, found to have a PE and was discharged to home yesterday evening.  They report he was doing ok, did not need any oxygen and states he was started on eliquis and discharged at his and his family's request.  They report he had trouble sleeping while in the hospital and report he would become confused and then clear up as the day went on.  He denies any chest pain, n/v/d/c, abdominal pain, fevers/chills, lightheadedness/dizziness, headache, rashes, or any further complaints.    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 Jolley    CATARACT EXTRACTION W/  INTRAOCULAR LENS IMPLANT Left 04/20/2017    Dr. Jolley    CHOLECYSTECTOMY      EYE SURGERY      Rt cataract    KNEE ARTHROSCOPY      Lt knee    TONSILLECTOMY      Tunneled  PICC line Right 09/30/2019    Via IJ, 23cm  length     Family History   Problem Relation Age of Onset    No Known Problems Mother     Heart disease Father     No Known Problems Sister     No Known Problems Brother     No Known Problems Maternal Aunt     No Known Problems Maternal Uncle     No Known Problems Paternal Aunt     No Known Problems Paternal Uncle     No Known Problems Maternal Grandmother     No Known Problems Maternal Grandfather     No Known Problems Paternal Grandmother     No Known Problems Paternal Grandfather     Amblyopia Neg Hx     Blindness Neg Hx     Cataracts Neg Hx     Glaucoma Neg Hx     Macular degeneration Neg Hx     Retinal detachment Neg Hx     Strabismus Neg Hx     Cancer Neg Hx     Diabetes Neg Hx     Hypertension Neg Hx     Stroke Neg Hx     Thyroid disease Neg Hx      Social History     Tobacco Use    Smoking status: Former Smoker    Smokeless tobacco: Never Used   Substance Use Topics    Alcohol use: No    Drug use: Never     Review of Systems   Constitutional: Positive for activity change.   HENT: Negative.    Eyes: Negative.    Respiratory: Positive for shortness of breath.    Cardiovascular: Negative.    Gastrointestinal: Negative.    Genitourinary: Negative.    Musculoskeletal: Negative.    Skin: Negative.    Neurological: Negative.    Psychiatric/Behavioral: Positive for confusion. The patient is nervous/anxious.        Physical Exam     Initial Vitals [10/07/19 0530]   BP Pulse Resp Temp SpO2   128/76 92 (!) 25 97.9 °F (36.6 °C) 99 %      MAP       --         Physical Exam    Nursing note and vitals reviewed.  Constitutional: He appears well-developed and well-nourished. He is not diaphoretic. No distress.   HENT:   Head: Normocephalic and atraumatic.   Nose: Nose normal.   Mouth/Throat: No oropharyngeal exudate.   Eyes: EOM are normal. Pupils are equal, round, and reactive to light.   Neck: Normal range of motion. Neck supple. No tracheal deviation  present. No JVD present.   Cardiovascular: Normal rate, regular rhythm and normal heart sounds.   No murmur heard.  Pulmonary/Chest: No respiratory distress. He has decreased breath sounds (throughout). He has no wheezes. He has no rhonchi. He has no rales.   Abdominal: Soft. Bowel sounds are normal. He exhibits no distension. There is no tenderness. There is no rebound and no guarding.   Musculoskeletal: Normal range of motion. He exhibits no edema or tenderness.   Neurological: He is alert and oriented to person, place, and time. He has normal strength. No cranial nerve deficit.   Skin: Skin is warm and dry. Capillary refill takes less than 2 seconds. No rash noted. No erythema.         ED Course   Procedures  Labs Reviewed   COMPREHENSIVE METABOLIC PANEL - Abnormal; Notable for the following components:       Result Value    Sodium 133 (*)     CO2 21 (*)     Total Bilirubin 1.2 (*)     ALT 76 (*)     All other components within normal limits   CBC W/ AUTO DIFFERENTIAL - Abnormal; Notable for the following components:    Lymph # 0.8 (*)     Mono # 0.1 (*)     Gran% 86.9 (*)     Lymph% 11.2 (*)     Mono% 2.0 (*)     All other components within normal limits   APTT - Abnormal; Notable for the following components:    aPTT 32.4 (*)     All other components within normal limits   URINALYSIS, REFLEX TO URINE CULTURE - Abnormal; Notable for the following components:    Ketones, UA 1+ (*)     Occult Blood UA 1+ (*)     All other components within normal limits    Narrative:     Preferred Collection Type->Urine, Clean Catch   URINALYSIS MICROSCOPIC - Abnormal; Notable for the following components:    RBC, UA 20 (*)     All other components within normal limits    Narrative:     Preferred Collection Type->Urine, Clean Catch   CULTURE, BLOOD   B-TYPE NATRIURETIC PEPTIDE   PROTIME-INR   TROPONIN I   TSH   LACTIC ACID, PLASMA   AMMONIA   POCT INFLUENZA A/B MOLECULAR        ECG Results          EKG 12-lead (Final result)   Result time 10/08/19 18:27:07    Final result by Interface, Lab In [facility] (10/08/19 18:27:07)                 Narrative:    Test Reason : R06.02,    Vent. Rate : 084 BPM     Atrial Rate : 084 BPM     P-R Int : 162 ms          QRS Dur : 098 ms      QT Int : 388 ms       P-R-T Axes : 058 006 057 degrees     QTc Int : 458 ms    Normal sinus rhythm  Incomplete right bundle branch block  Borderline Abnormal ECG  When compared with ECG of 04-OCT-2019 03:23,  No significant change was found  Confirmed by Jerson Grajeda MD ([id number]) on 10/8/2019 6:26:59 PM    Referred By: [email or web address]   SELF           Confirmed By:Jerson Grajeda MD                            Imaging Results          US Carotid Bilateral (Final result)  Result time 10/07/19 12:06:49    Final result by Jose Luis Acevedo MD (10/07/19 12:06:49)                 Impression:      1. No sonographic evidence of hemodynamically significant stenosis of the bilateral extracranial internal carotid arteries.      Electronically signed by: Jose Luis Acevedo  Date:    10/07/2019  Time:    12:06             Narrative:    EXAMINATION:  US CAROTID BILATERAL    CLINICAL HISTORY:  Disorientation, unspecified    TECHNIQUE:  Grayscale and color Doppler ultrasound examination of the carotid and vertebral artery systems bilaterally.  Stenosis estimates are per the NASCET measurement criteria.    COMPARISON:  None    FINDINGS:  Right:    CCA PSV: 68-cm/sec    ICA PSV: 68-cm/sec    ICA EDV: 20-cm/sec    ECA PSV: 90-cm/sec    ICA/CCA PSV ratio: 1.0    Plaque formation: No significant    Vertebral artery: Antegrade flow with normal monophasic waveforms    Left:    CCA PSV: 84-cm/sec    ICA PSV: 78-cm/sec    ICA EDV: 21-cm/sec    ECA PSV: 57-cm/sec    ICA/CCA PSV ratio: 0.9    Plaque formation: No significant    Vertebral artery: Antegrade flow with normal monophasic waveforms.                                MRI Brain Without Contrast (Final result)  Result time 10/07/19 10:03:46   Procedure changed  from MRI Brain W WO Contrast     Final result by Prasanna Donnelly MD (10/07/19 10:03:46)                 Impression:      Tiny punctate acute/subacute infarction in the right aspect of the medulla.    Bilateral mastoid air cell disease.    This report was flagged in Epic as abnormal.      Electronically signed by: Prasanna Donnelly MD  Date:    10/07/2019  Time:    10:03             Narrative:    EXAMINATION:  MRI BRAIN WITHOUT CONTRAST    CLINICAL HISTORY:  Confusion/delirium, altered LOC, unexplained;    TECHNIQUE:  Multiplanar multisequence MR imaging of the brain was performed without contrast.    COMPARISON:  CT head exams dating back to January 6, 2009.    FINDINGS:  Punctate acute/subacute infarction in the right aspect of the medulla.    No intracranial hemorrhage.  No intracranial mass or mass effect.    Mild diffuse brain atrophy.    Small amount of fluid and mucosal hypertrophy in the bilateral mastoid air cells.  Paranasal sinuses are clear.    Unremarkable scalp and calvarium.    Expected flow voids are identified in all major vascular territories.                               CT Head Without Contrast (Final result)  Result time 10/07/19 06:39:45    Final result by Skyla Paez MD (10/07/19 06:39:45)                 Impression:      Small region of hypoattenuation involving the left susu, possibly artifactual although if there is clinical concern for acute ischemia further evaluation with MRI is advised if there are no contraindications.  No evidence of acute intracranial hemorrhage.      Electronically signed by: Skyla Paez MD  Date:    10/07/2019  Time:    06:39             Narrative:    EXAMINATION:  CT HEAD WITHOUT CONTRAST    CLINICAL HISTORY:  Confusion/delirium, altered LOC, unexplained;    TECHNIQUE:  Low dose axial images were obtained through the head.  Coronal and sagittal reformations were also performed. Contrast was not administered.    COMPARISON:  Head CT 07/23/2019,  01/06/2009    FINDINGS:  Image quality is degraded by patient motion artifact.  Allowing for this, there is no evidence of acute intracranial hemorrhage, hydrocephalus, midline shift or mass effect.  There is mild generalized cerebral volume loss.  The supratentorial brain parenchyma appears stable.  There is a small focal region of hypoattenuation involving the left aspect of the susu, possibly artifactual noting evaluation of the posterior fossa is limited due to motion artifact.  However, if there is clinical concern for acute ischemia further evaluation with MRI is advised if there are no clinical contraindications.  The basilar cisterns are patent.  There is a stable small calcification along the left frontal convexity, unchanged.  The visualized paranasal sinuses and mastoid air cells are clear of acute process.  The osseous calvarium is intact.                               X-Ray Chest AP Portable (Final result)  Result time 10/07/19 06:24:54    Final result by Skyla Paez MD (10/07/19 06:24:54)                 Impression:      Please see above.      Electronically signed by: Skyla Paez MD  Date:    10/07/2019  Time:    06:24             Narrative:    EXAMINATION:  XR CHEST AP PORTABLE    CLINICAL HISTORY:  shortness of breath;    TECHNIQUE:  Single frontal view of the chest was performed.    COMPARISON:  Chest radiograph and CTA chest 10/04/2019.    FINDINGS:  Cardiac monitoring leads overlie the chest.  There is a stably positioned right central venous catheter in place.  The cardiomediastinal silhouette is stable.  There is persistent increased opacity in the left lower lung zone in keeping with pleural fluid and adjacent atelectatic change similar to prior imaging examinations.  The left upper lung remains relatively clear.  The right lung is essentially clear.  No evidence of pneumothorax.  Visualized osseous structures are intact.                                                MDM:    79 y.o.male  with PMHx Lymphoma(currently on chemo), HTN, Myasthenia gravis presents with SOB, confusion x 1 day. Physical exam remarkable for well appearing male, in NAD, conversign with ease, diminshed breath sounds noted bilaterally.  ED workup remarkable for CT showing possible new area of hypoattenuation in his ROZINA, MRI ordered after showing acute/subacute CVA, without deficits noted on exam. Discussed with inpatient medicine with recent discharge and will place on observation at this time with neurochecks, ultrasound carotids ordered. Pt transferred to the floor in stable condition.                 Clinical Impression:       ICD-10-CM ICD-9-CM   1. Confusion R41.0 298.9   2. Shortness of breath R06.02 786.05   3. Hypertension, unspecified type I10 401.9   4. Essential hypertension I10 401.9   5. Stage 3 chronic kidney disease N18.3 585.3   6. Debility R53.81 799.3                                Malcolm Eden MD  10/09/19 0939

## 2019-10-07 NOTE — ASSESSMENT & PLAN NOTE
Not sure why received fent in ED . O2 86-87 % after with improvement with 2 L NC.  CXR clear. Lungs are clear  Wean as tolerated.

## 2019-10-07 NOTE — PLAN OF CARE
10/07/19 1148   Discharge Assessment   Assessment Type Discharge Planning Assessment   Assessment information obtained from? Medical Record   Communicated expected length of stay with patient/caregiver yes   Prior to hospitilization cognitive status: Alert/Oriented   Prior to hospitalization functional status: Independent   Current cognitive status: Alert/Oriented   Current Functional Status: Independent   Facility Arrived From: home   Lives With spouse   Able to Return to Prior Arrangements yes   Is patient able to care for self after discharge? Yes   Who are your caregiver(s) and their phone number(s)? Bindu [phone]   Patient's perception of discharge disposition home or selfcare   Readmission Within the Last 30 Days   (pt just discharged on 10/6/19 brought back for confusion )   Patient currently being followed by outpatient case management? No   Patient currently receives any other outside agency services? Yes   Name and contact number of agency or person providing outside services   ([facility])   Is it the patient/care giver preference to resume care with the current outside agency? Yes   Equipment Currently Used at Home none   Do you have any problems affording any of your prescribed medications? No   Is the patient taking medications as prescribed? yes   Does the patient have transportation home? Yes   Transportation Anticipated family or friend will provide   Does the patient receive services at the Coumadin Clinic? No   Discharge Plan A Home with family;Home Health  (follow up appointments)   Discharge Plan B Home with family;Home Health   DME Needed Upon Discharge  none   Patient/Family in Agreement with Plan yes   Does the patient have transportation to healthcare appointments? Yes   Readmission Questionnaire   At the time of your discharge, did someone talk to you about what your health problems were? Yes   At the time of discharge, did someone talk to you about what to watch  out for regarding worsening of your health problem? Yes   At the time of discharge, did someone talk to you about what to do if you experienced worsening of your health problem? Yes   At the time of discharge, did someone talk to you about which medication to take when you left the hospital and which ones to stop taking? Yes   At the time of discharge, did someone talk to you about when and where to follow up with a doctor after you left the hospital? Yes   How often do you need to have someone help you when you read instructions, pamphlets, or other written material from your doctor or pharmacy? Rarely   Do you have problems taking your medications as prescribed? No   Do you have any problems affording any of  your prescribed medications? No   Do you have problems obtaining/receiving your medications? No   Living Arrangements [id number] - [place] - [place] AT [facility] & [place] [address]  Phone: [phone] Fax: [phone]

## 2019-10-07 NOTE — CONSULTS
[facility]  Neurology  Consult Note    Patient Name: Robe Cevallos  MRN: [id number]  Admission Date: 10/7/2019  Hospital Length of Stay: 0 days  Code Status: Full Code   Attending Provider: Devin Da Silva MD   Consulting Provider: Leonard Ch MD  Primary Care Physician: Kang Reddy MD  Principal Problem:Confusion    Inpatient consult to Neurology  Consult performed by: Leonard Ch MD  Consult ordered by: Lynette Nash NP        Subjective:     Chief Complaint: Confusion     HPI: 80 y/o male with medical Hx as listed below comes to ED after an episode confusion last night. Pt had been recently D/C home after being found to have bilateral PE's. He was put on apixaban. Overnight pt suddenly became confused and could not recognize family. Episode resolved by the time he arrived to ED. No visual or speech disturbances, vertigo, unilateral weakness or numbness.    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 Jolley    CATARACT EXTRACTION W/  INTRAOCULAR LENS IMPLANT Left 04/20/2017    Dr. Jolley    CHOLECYSTECTOMY      EYE SURGERY      Rt cataract    KNEE ARTHROSCOPY      Lt knee    TONSILLECTOMY      Tunneled PICC line Right 09/30/2019    Via IJ, 23cm  length       Review of patient's allergies indicates:  No Known Allergies    Current Neurological Medications:     Current Facility-Administered Medications on File Prior to Encounter   Medication     [DISCONTINUED] acetaminophen tablet 500 mg    [DISCONTINUED] apixaban tablet 10 mg    [DISCONTINUED] apixaban tablet 5 mg    [DISCONTINUED] dextrose 50% injection 12.5 g    [DISCONTINUED] dextrose 50% injection 25 g    [DISCONTINUED] famotidine tablet 20 mg    [DISCONTINUED] glucagon (human recombinant) injection 1 mg    [DISCONTINUED] glucose chewable tablet 16 g    [DISCONTINUED] glucose chewable tablet 24 g    [DISCONTINUED] ondansetron injection 4 mg    [DISCONTINUED] polyethylene glycol packet 17 g    [DISCONTINUED] ramelteon tablet 8 mg    [DISCONTINUED] sodium chloride 0.9% flush 10 mL    [DISCONTINUED] sodium chloride 0.9% flush 10 mL    [DISCONTINUED] traMADol tablet 50 mg     Current Outpatient Medications on File Prior to Encounter   Medication Sig    albuterol (PROVENTIL/VENTOLIN HFA) 90 mcg/actuation inhaler Inhale 2 puffs into the lungs every 6 (six) hours as needed for Wheezing. Rescue    apixaban (ELIQUIS) 5 mg Tab Take 2 tablets (10 mg total) by mouth 2 (two) times daily. Start first dose tonight for 13 doses    [START ON 10/13/2019] apixaban (ELIQUIS) 5 mg Tab Take 1 tablet (5 mg total) by mouth 2 (two) times daily.    azelastine (ASTELIN) 137 mcg (0.1 %) nasal spray 1 spray (137 mcg total) by Nasal route 2 (two) times daily.    lisinopril 10 MG tablet Take 10 mg by mouth once daily.    meclizine (ANTIVERT) 25 mg tablet Take 1 tablet (25 mg total) by mouth 3 (three) times daily as needed.    pyridostigmine (MESTINON) 60 mg Tab Take 60 mg by mouth 3 (three) times daily.       Family History     Problem Relation (Age of Onset)    Heart disease Father    No Known Problems Mother, Sister, Brother, Maternal Aunt, Maternal Uncle, Paternal Aunt, Paternal Uncle, Maternal Grandmother, Maternal Grandfather, Paternal Grandmother, Paternal Grandfather        Tobacco Use    Smoking status: Former Smoker    Smokeless tobacco: Never Used   Substance and Sexual Activity    Alcohol use: No     Drug use: Never    Sexual activity: Not Currently     Partners: Female     Review of Systems   Constitutional: Negative for fever.   HENT: Negative for trouble swallowing.    Eyes: Negative for photophobia.   Respiratory: Negative for shortness of breath.    Gastrointestinal: Negative for abdominal pain.   Genitourinary: Negative for dysuria.   Musculoskeletal: Negative for back pain.   Neurological: Negative for headaches.     Objective:     Vital Signs (Most Recent):  Temp: 98 °F (36.7 °C) (10/07/19 1231)  Pulse: 84 (10/07/19 1231)  Resp: 17 (10/07/19 1231)  BP: 137/74 (10/07/19 1231)  SpO2: 99 % (10/07/19 1231) Vital Signs (24h Range):  Temp:  [97.4 °F (36.3 °C)-98 °F (36.7 °C)] 98 °F (36.7 °C)  Pulse:  [78-92] 84  Resp:  [12-25] 17  SpO2:  [86 %-99 %] 99 %  BP: (128-183)/(74-94) 137/74     Weight: 78.5 kg (173 lb)  Body mass index is 23.46 kg/m².    Physical Exam   Constitutional: He is oriented to person, place, and time. No distress.   HENT:   Head: Normocephalic.   Eyes: Right eye exhibits no discharge. Left eye exhibits no discharge.   Neck: Normal range of motion.   Cardiovascular: Normal rate.   Pulmonary/Chest: Breath sounds normal.   Abdominal: Bowel sounds are normal.   Musculoskeletal: He exhibits no edema.   Neurological: He is oriented to person, place, and time. He has normal strength.   Skin: He is not diaphoretic.   Psychiatric: His speech is normal.       NEUROLOGICAL EXAMINATION:     MENTAL STATUS   Oriented to person, place, and time.   Speech: speech is normal   Level of consciousness: alert    CRANIAL NERVES     CN III, IV, VI   Right pupil: Shape: regular. Reactivity: brisk. Consensual response: intact.   Left pupil: Shape: regular. Reactivity: brisk. Consensual response: intact.   Nystagmus: none   Ophthalmoparesis: none  Conjugate gaze: present    CN VII   Right facial weakness: none  Left facial weakness: none    CN IX, X   Palate: symmetric    CN XI   Right trapezius strength:  normal  Left trapezius strength: normal    CN XII   Tongue deviation: none    MOTOR EXAM     Strength   Strength 5/5 throughout.     SENSORY EXAM   Right arm light touch: normal  Left arm light touch: normal  Right leg light touch: normal  Left leg light touch: normal      Significant Labs:   CBC:   Recent Labs   Lab 10/06/19  0229 10/07/19  0549   WBC 7.91 7.16   HGB 13.0* 14.2   HCT 39.7* 42.1    255     CMP:   Recent Labs   Lab 10/06/19  0229 10/07/19  0549   GLU 96 100   * 133*   K 4.2 4.3    103   CO2 21* 21*   BUN 16 16   CREATININE 0.8 0.9   CALCIUM 8.4* 9.0   PROT 5.4* 6.5   ALBUMIN 3.0* 3.6   BILITOT 0.8 1.2*   ALKPHOS 78 96   AST 22 24   ALT 74* 76*   ANIONGAP 8 9   EGFRNONAA >60 >60       Significant Imaging:  Head CT  Impression       Tiny punctate acute/subacute infarction in the right aspect of the medulla.    Bilateral mastoid air cell disease.    This report was flagged in Epic as abnormal.      Electronically signed by: Prasanna Donnelly MD  Date: 10/07/2019  Time: 10:03         Assessment and Plan:     78 y/o male consulted for stroke    1. Stroke: possible area of infarction on right pontomedullary region. Region has no FLAIR correlation though. This would not be the cause of his sudden confusion. No focal findings on exam.   -He is already on apixaban.   -Will monitor for now.    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 I will follow-up with patient. Please contact us if you have any additional questions.    Leonard Ch MD  Neurology  [facility]

## 2019-10-07 NOTE — PT/OT/SLP DISCHARGE
Occupational Therapy Discharge Summary    Robe Cevallos  MRN: [id number]   Principal Problem: Pulmonary embolus      Patient Discharged from acute Occupational Therapy on 10/6/19.  Please refer to prior OT note dated 10/3/19 for functional status.    Assessment:      Goals partially met.    Objective:     GOALS:   Multidisciplinary Problems     Occupational Therapy Goals     Not on file                Reasons for Discontinuation of Therapy Services  Transfer to alternate level of care.      Plan:     Patient Discharged to: Home with Home Health Service    Suzette Mills OT  10/7/2019

## 2019-10-07 NOTE — NURSING
PICC line dsg change performed by WARD Minaya. Pt tolerated well. PICC line caps were changed as well and both lines flushed. Pt peripheral IVs were flushed and tegaderm was place over right neck steri strip from first attempt fail for PICC. Pt and family was educated regarding dressing change and notified that for PICC line it is every 7 days.

## 2019-10-07 NOTE — NURSING
Pt arrived on unit from the ED dept ia w/c accompanied per nurse and family. Pt aaox4 with no c/o .telemetry monitor in place. Saline lock in place to site is clear. Pt  Admission assessment in progress, pt informed of md orders, oriented to environment and safety maintained with bed low side rails up x with nurse call bell within reach

## 2019-10-07 NOTE — NURSING
Pt wife mentioned earlier about her concerns giving pt a bath. Pt and wife informed that we can return between [id number] for bath. Pt refused bath at that time and night shift will be notified to ask pt for bath.

## 2019-10-07 NOTE — PROGRESS NOTES
WRITTEN HEALTHCARE DISCHARGE INFORMATION      Things that YOU are RESPONSIBLE for to Manage Your Care At Home:     1. Getting your prescriptions filled.  2. Taking you medications as directed. DO NOT MISS ANY DOSES!  3. Going to your follow-up doctor appointments. This is important because it allows the doctor to monitor your progress and to determine if any changes need to be made to your treatment plan.     If you are unable to make your follow up appointments, please call the number listed and reschedule this appointment.      ____________HELP AT HOME____________________     Experiencing any SIGNS or SYMPTOMS: YOU CAN     Schedule a same day appopintment with your Primary Care Doctor or  you can call [facility] On [facility] Care Line for 24/7 assistance at [phone]     If you are experience any signs or symptoms that have become severe, Call 911 and come to your nearest Emergency Room.     Thank you for choosing [facility] and allowing us to care for you.   From your care management team:      You should receive a call from [facility] within 48-72 hours to help manage your care after discharge. Please try to make sure that you answer your phone for this important phone call.      Follow-up Information     Jojo Carcamo MD On 10/8/2019.    Specialties:  Hematology and Oncology, Hematology, Oncology  Why:  Appointmetn scheduled for October 8th at 2pm  Contact information:  [address]  [address]  [place] [id number]  [phone]             Kang Reddy MD.    Specialty:  Internal Medicine  Why:  call to schedule PCP follow up as needed  Contact information:  [address]  [address]  [place] [id number]  [phone]

## 2019-10-07 NOTE — ED NOTES
Patient pulsox 96% on room air when awake and talking. Dropped to 86% on room air when he drifts off to sleep. Reapplied O2@2L/nc and pulsox back up to 98%. Notified Dr. Eden.

## 2019-10-07 NOTE — HPI
"Mr. 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, recent diagnosis of posterior mediastinal mass on 9/16/19 sp biopsy of supraclavicular noted on 9/20/19 with pathology showing DLBCL (received CHOP on 10/2/19  with mass causing extrinsic compression of esophagus (Had radiation on 9/25 for airway/esophageal compression then repeat XR on 9/26 improvement so no further treatment warranted) /left upper and lower lobe brochi and compression/obliteration of the left inferior pukmonary vein  now on chemo, recent thoracentesis 9/27/19 with 2 L removal, hypertension, myasthenia gravis and recent PE 10/4/19 discharge yesterday 10/6 now presents to hospital for shortness of breath and confusion "felt not right" at 4 am. Denies chest pain. O2 sat 96% with home pulse ox. Patient received miralax prior to discharge and had multiple BMs' loose brown watery stool into this am.     EKG NSR . Noted in ED hypoxic 86-87 % RA then placed on oxygen with improvement in O2 sat 99%.   Afebrile and no leukcystosis  Lactic acid 1.8, THS 2.6  UA no infection  CT head "Small region of hypoattenuation involving the left susu, possibly artifactual although if there is clinical concern for acute ischemia further evaluation with MRI is advised if there are no contraindications.  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   US carotid no significant stenosis  Confusion resolved in ED without intervention.     10/4/19 2 D echo showed EF 60%, normal diastolic function, normal wall motion.        "

## 2019-10-07 NOTE — ED TRIAGE NOTES
"Patient present this morning with confusion. Per EMS, family reports that around 4 this morning, patient became confused and "not himself." Patient was just discharged from hospital, with delirium. Patient's family report that he was vomiting this morning, no fever. Patient's family also reports that he not been sleeping, he only sleeps a few hours at a time. Patient is confused, not oriented. Patient was only able to states his name, then he continues to repeats words that are unclear. Upon initial exam, patient would not follow commands. Once family arrived into room, patient was able to follow commands for a neuro exam, but still unable to state where he was, date, etc. Patient, at times appears anxious. TATYANA. Patients family denies any recent falls. Not febrile. Denies pain.   "

## 2019-10-08 ENCOUNTER — OFFICE VISIT (OUTPATIENT)
Dept: HEMATOLOGY/ONCOLOGY | Facility: CLINIC | Age: 79
End: 2019-10-08
Payer: MEDICARE

## 2019-10-08 VITALS
HEART RATE: 73 BPM | BODY MASS INDEX: 22.15 KG/M2 | TEMPERATURE: 98 F | RESPIRATION RATE: 18 BRPM | SYSTOLIC BLOOD PRESSURE: 109 MMHG | HEIGHT: 72 IN | WEIGHT: 163.56 LBS | DIASTOLIC BLOOD PRESSURE: 54 MMHG | OXYGEN SATURATION: 93 %

## 2019-10-08 VITALS
OXYGEN SATURATION: 97 % | HEIGHT: 71 IN | DIASTOLIC BLOOD PRESSURE: 63 MMHG | BODY MASS INDEX: 22.81 KG/M2 | HEART RATE: 84 BPM | SYSTOLIC BLOOD PRESSURE: 109 MMHG | TEMPERATURE: 98 F

## 2019-10-08 DIAGNOSIS — Z09 CHEMOTHERAPY FOLLOW-UP EXAMINATION: ICD-10-CM

## 2019-10-08 DIAGNOSIS — Z85.72 HISTORY OF FOLLICULAR LYMPHOMA: Primary | ICD-10-CM

## 2019-10-08 DIAGNOSIS — C83.32 DIFFUSE LARGE B-CELL LYMPHOMA OF INTRATHORACIC LYMPH NODES: ICD-10-CM

## 2019-10-08 DIAGNOSIS — C44.321 SQUAMOUS CELL CARCINOMA OF NOSE: ICD-10-CM

## 2019-10-08 PROCEDURE — 3074F PR MOST RECENT SYSTOLIC BLOOD PRESSURE < 130 MM HG: ICD-10-PCS | Mod: CPTII,S$GLB,, | Performed by: INTERNAL MEDICINE

## 2019-10-08 PROCEDURE — G0378 HOSPITAL OBSERVATION PER HR: HCPCS

## 2019-10-08 PROCEDURE — 99999 PR PBB SHADOW E&M-EST. PATIENT-LVL III: CPT | Mod: PBBFAC,,, | Performed by: INTERNAL MEDICINE

## 2019-10-08 PROCEDURE — 99214 OFFICE O/P EST MOD 30 MIN: CPT | Mod: S$GLB,,, | Performed by: INTERNAL MEDICINE

## 2019-10-08 PROCEDURE — 3078F DIAST BP <80 MM HG: CPT | Mod: CPTII,S$GLB,, | Performed by: INTERNAL MEDICINE

## 2019-10-08 PROCEDURE — 1101F PR PT FALLS ASSESS DOC 0-1 FALLS W/OUT INJ PAST YR: ICD-10-PCS | Mod: CPTII,S$GLB,, | Performed by: INTERNAL MEDICINE

## 2019-10-08 PROCEDURE — 99214 PR OFFICE/OUTPT VISIT, EST, LEVL IV, 30-39 MIN: ICD-10-PCS | Mod: S$GLB,,, | Performed by: INTERNAL MEDICINE

## 2019-10-08 PROCEDURE — 1101F PT FALLS ASSESS-DOCD LE1/YR: CPT | Mod: CPTII,S$GLB,, | Performed by: INTERNAL MEDICINE

## 2019-10-08 PROCEDURE — 25000003 PHARM REV CODE 250: Performed by: NURSE PRACTITIONER

## 2019-10-08 PROCEDURE — 25000003 PHARM REV CODE 250: Performed by: INTERNAL MEDICINE

## 2019-10-08 PROCEDURE — 99999 PR PBB SHADOW E&M-EST. PATIENT-LVL III: ICD-10-PCS | Mod: PBBFAC,,, | Performed by: INTERNAL MEDICINE

## 2019-10-08 PROCEDURE — 3078F PR MOST RECENT DIASTOLIC BLOOD PRESSURE < 80 MM HG: ICD-10-PCS | Mod: CPTII,S$GLB,, | Performed by: INTERNAL MEDICINE

## 2019-10-08 PROCEDURE — 3074F SYST BP LT 130 MM HG: CPT | Mod: CPTII,S$GLB,, | Performed by: INTERNAL MEDICINE

## 2019-10-08 RX ORDER — DOXORUBICIN HYDROCHLORIDE 2 MG/ML
25 INJECTION, SOLUTION INTRAVENOUS
Status: CANCELLED | OUTPATIENT
Start: 2019-11-01

## 2019-10-08 RX ORDER — DIPHENHYDRAMINE HCL 25 MG
25 CAPSULE ORAL EVERY 6 HOURS PRN
Status: DISCONTINUED | OUTPATIENT
Start: 2019-10-08 | End: 2019-10-08 | Stop reason: HOSPADM

## 2019-10-08 RX ORDER — SODIUM CHLORIDE 0.9 % (FLUSH) 0.9 %
10 SYRINGE (ML) INJECTION
Status: CANCELLED | OUTPATIENT
Start: 2019-11-01

## 2019-10-08 RX ORDER — HEPARIN 100 UNIT/ML
500 SYRINGE INTRAVENOUS
Status: CANCELLED | OUTPATIENT
Start: 2019-11-01

## 2019-10-08 RX ORDER — MEPERIDINE HYDROCHLORIDE 50 MG/ML
25 INJECTION INTRAMUSCULAR; INTRAVENOUS; SUBCUTANEOUS EVERY 30 MIN PRN
Status: CANCELLED | OUTPATIENT
Start: 2019-11-01

## 2019-10-08 RX ORDER — ACETAMINOPHEN 325 MG/1
650 TABLET ORAL
Status: CANCELLED | OUTPATIENT
Start: 2019-11-01

## 2019-10-08 RX ORDER — FAMOTIDINE 10 MG/ML
20 INJECTION INTRAVENOUS
Status: CANCELLED | OUTPATIENT
Start: 2019-11-01

## 2019-10-08 RX ADMIN — LISINOPRIL 10 MG: 5 TABLET ORAL at 09:10

## 2019-10-08 RX ADMIN — APIXABAN 10 MG: 5 TABLET, FILM COATED ORAL at 09:10

## 2019-10-08 RX ADMIN — PYRIDOSTIGMINE BROMIDE 60 MG: 60 TABLET ORAL at 09:10

## 2019-10-08 RX ADMIN — DIPHENHYDRAMINE HYDROCHLORIDE 25 MG: 25 CAPSULE ORAL at 02:10

## 2019-10-08 NOTE — PROGRESS NOTES
Mrs Ruano (pt's spouse) and daughter is here. I discussed f/u visit and medications including Eliquis. Verbalizes understanding.

## 2019-10-08 NOTE — NURSING
"Pt reported having severe itching to back stating, "it's eczema." Pt is unable to sleep and is frequently asking for back to be scratched. Dr. Ontiveros notified and order placed for prn benadryl for itching. Will continue to monitor pt closely.   "

## 2019-10-08 NOTE — PROGRESS NOTES
I spoke to pt's spouse (Bindu). I informed her that pt is ready for discharge from Nursing and CM standpoint. Bindu stated they were headed to hospital .

## 2019-10-08 NOTE — PROGRESS NOTES
WRITTEN HEALTHCARE DISCHARGE INFORMATION      Things that YOU are RESPONSIBLE for to Manage Your Care At Home:     1. Getting your prescriptions filled.  2. Taking you medications as directed. DO NOT MISS ANY DOSES!  3. Going to your follow-up doctor appointments. This is important because it allows the doctor to monitor your progress and to determine if any changes need to be made to your treatment plan.     If you are unable to make your follow up appointments, please call the number listed and reschedule this appointment.      ____________HELP AT HOME____________________     Experiencing any SIGNS or SYMPTOMS: YOU CAN     Schedule a same day appopintment with your Primary Care Doctor or  you can call [facility] Care Line for 24/7 assistance at [phone]     If you are experience any signs or symptoms that have become severe, Call 911 and come to your nearest Emergency Room.     Thank you for choosing [facility] and allowing us to care for you.   From your care management team:      You should receive a call from [facility] within 48-72 hours to help manage your care after discharge. Please try to make sure that you answer your phone for this important phone call.    Follow-up Information     Jojo Carcamo MD On 10/8/2019.    Specialties:  Hematology and Oncology, Hematology, Oncology  Why:  Appointmetn scheduled for October 8th at 2pm  Contact information:  [address]  [address]  [place] [id number]  [phone]             Kang Reddy MD.    Specialty:  Internal Medicine  Why:  call to schedule PCP follow up as needed  Contact information:  [address]  [address]  [place] [id number]  [phone]

## 2019-10-08 NOTE — HOSPITAL COURSE
80 y/o male with a PMHx of confusion, myasthenia gravis, lymphoma, PE presented to the ED with AMS and SOB following recent hospital discharge on 10/4/19. MRI head on 10/7 showed tiny punctate acute/subacute infarct right aspect of medulla. Neurology doubts his symptoms are due to subacute stroke, as shown on MRI 10/7, as it is unclear when he sustained the infarct as his last CT was 7/2019. He has been receiving chemo since that time, suspect disease process is largely contributory to thrombolytic event as he had heparin infusion and was started on apixaban during his prior hospitalization. His symptoms are suspected multifactorial +/- chemo +/- lymphoma +/- ??compliance apixaban?? Patient appears at his baseline mentation this morning, no acute focal deficits noted when comparing documentation from 2 days ago. Patient has been breathing comfortably, SpO2 at 94% with no respiratory distress noted. Patient will be discharged in a stable condition. He will follow-up with PCP.

## 2019-10-08 NOTE — PROGRESS NOTES
Confusion  Confusion is a change in a persons ability to think clearly. There may be trouble recognizing familiar people and places or knowing what day it is. Memory, judgment, and decision-making may also be affected. In severe cases, the person may have limited or no response to being spoken to.  Confusion is usually a sign of an underlying problem. It may occur suddenly. Or it may develop gradually over time. Causes of confusion include brain injury, medicines, alcohol, withdrawal from certain medicines or illegal drugs, and infection. Heart attack and stroke may cause it. Confusion can also be a sign of dementia or a mental illness.  Treatment will depend on the cause of the problem. If the issue is a medicine, stopping the medicine may help. The healthcare provider will perform an evaluation and certain tests may be done. Follow up with the healthcare provider for the results.  Home care  · Be sure someone is with the confused person at all times. He or she should not be left alone or unsupervised.  · Tell the healthcare provider about all medicines that the person takes. These include prescription, over-the-counter, herbs, and supplements.  · Dehydration can increase confusion. Ask the healthcare provider how much fluid the person should be drinking. Offer liquids and ensure that they are taken.  · Keep all medicines in a secure place under the caregivers control. To prevent overdose, a confused person should take medicines only under the supervision of a caregiver.  · To help a person with confusion:  ? Establish a daily routine. Change can be a source of stress for someone with confusion. Make and keep a time schedule for common tasks such as bathing, dressing, taking medicines, meals, going for walks, shopping, naps and bed time.  ? Speak slowly and clearly with a gentle tone of voice. Use short simple words and sentences. Ask one question at a time. Do not interrupt, criticize or argue. Be calm and  supportive. Use friendly facial expressions. Use pointing and touching to help communicate. If there has been loss of long-term memory, do not ask questions about past events. This would only cause frustration for the person.  ? Use lists, signs, family photos, clocks and calendars as memory aids. Label cabinets and drawers. Try to distract, not confront, the patient. When he/she becomes frustrated or upset, redirect his/her attention to eating or some other activity of interest.  ? If this proves to be due to a permanent condition, talk to the healthcare provider or a  about getting a Power of  for healthcare and for financial decisions. It is best to do this while the person can still sign legal documents and make his or her own decisions. Otherwise, a court order will be required.  Follow-up care  Follow up with the person's healthcare provider or as advised for further testing or changes in medical care.  When to seek medical advice  Call the healthcare provider for any of the following:  · Frequent falling  · Refusal to eat or drink  · Violent behavior or behavior too difficult to manage at home  · New hallucinations or delusions  · Increased drowsiness  · Complaints of headache or numbness or weakness of the face, arm or leg  · Nausea or vomiting  · Slurred speech or trouble speaking, walking, or seeing  · Fainting spell, dizziness or seizure  · Unexplained fever over 100.4º F (38.0º C) or as directed by the healthcare provider

## 2019-10-08 NOTE — DISCHARGE SUMMARY
"[facility] Hospital Medicine  Discharge Summary      Patient Name: Robe Cevallos  MRN: [id number]  Admission Date: 10/7/2019  Hospital Length of Stay: 0 days  Discharge Date and Time:  10/08/2019 10:34 AM  Attending Physician: Chari Sarkar MD   Discharging Provider: ADELA Herron  Primary Care Provider: Kang Reddy MD      HPI:   Mr. 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, recent diagnosis of posterior mediastinal mass on 9/16/19 sp biopsy of supraclavicular noted on 9/20/19 with pathology showing DLBCL (received [facility]OP on 10/2/19  with mass causing extrinsic compression of esophagus (Had radiation on 9/25 for airway/esophageal compression then repeat XR on 9/26 improvement so no further treatment warranted) /left upper and lower lobe brochi and compression/obliteration of the left inferior pukmonary vein  now on chemo, recent thoracentesis 9/27/19 with 2 L removal, hypertension, myasthenia gravis and recent PE 10/4/19 discharge yesterday 10/6 now presents to hospital for shortness of breath and confusion "felt not right" at 4 am. Denies chest pain. O2 sat 96% with home pulse ox. Patient received miralax prior to discharge and had multiple BMs' loose brown watery stool into this am.     EKG NSR . Noted in ED hypoxic 86-87 % RA then placed on oxygen with improvement in O2 sat 99%.   Afebrile and no leukcystosis  Lactic acid 1.8, THS 2.6  UA no infection  CT head "Small region of hypoattenuation involving the left susu, possibly artifactual although if there is clinical concern for acute ischemia further evaluation with MRI is advised if there are no contraindications.  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   US carotid no significant stenosis  Confusion resolved in ED without intervention.     10/4/19 " 2 D echo showed EF 60%, normal diastolic function, normal wall motion.          * No surgery found *      Hospital Course:   80 y/o male with a PMHx of confusion, myasthenia gravis, lymphoma, PE presented to the ED with AMS and SOB following recent hospital discharge on 10/4/19. MRI head on 10/7 showed tiny punctate acute/subacute infarct right aspect of medulla. Neurology doubts his symptoms are due to subacute stroke, as shown on MRI 10/7, as it is unclear when he sustained the infarct as his last CT was 7/2019. He has been receiving chemo since that time, suspect disease process is largely contributory to thrombolytic event as he had heparin infusion and was started on apixaban during his prior hospitalization. His symptoms are suspected multifactorial +/- chemo +/- lymphoma +/- ??compliance apixaban?? Patient appears at his baseline mentation this morning, no acute focal deficits noted when comparing documentation from 2 days ago. Patient has been breathing comfortably, SpO2 at 94% with no respiratory distress noted. Patient will be discharged in a stable condition. He will follow-up with PCP.     Consults:   Consults (From admission, onward)        Status Ordering Provider     Inpatient consult to Neurology  Once     Provider:  Leonard Ch MD    Completed SONJA JOHN consult to case management  Once     Provider:  (Not yet assigned)    Completed SYLVAIN TRAN          * Confusion  No obvious infectious process . Volume depletion vs hypoxia?   Confusion resolved in ED without intervention however MRI brain showed tiny punctate acute/subacute infarct right aspect of the medulla  Appreciate Neurology consult Not sure if above MRI finding cause of confustion, MRI finding possible from acute PE last admission?? artifact  Continue Eliquis. Obtain lipid panel.   Neuro checks/telemetry,  Hold off IVF as eating/drinking  If persistent hypoxia, my need repeat thoracentesis. See below #2.        Hypoxia  Not sure why received fent in ED . O2 86-87 % after with improvement with 2 L NC.  CXR left pleural effusion? However improved compared to previous CXR. Lungs diminished at bases otherwise clear.   Recent left thoracentesis 9/27 with 2 L removal.    Wean as tolerated, if persistent hypoxia may need repeat thoracentesis.         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    Specialties:  Hematology and Oncology, Hematology, Oncology  Why:  Appointmetn scheduled for October 8th at 2pm  Contact information:  [address]  [address]  [place] [id number] [phone]             Kang Reddy MD.    Specialty:  Internal Medicine  Why:  call to schedule PCP follow up as needed  Contact information:  [address]  [address]  [place] [address]  [phone]                 Patient Instructions:      Diet Cardiac     Activity as tolerated       Significant Diagnostic Studies: Labs:   CMP   Recent Labs   Lab 10/07/19  0549   *   K 4.3      CO2 21*      BUN 16   CREATININE 0.9   CALCIUM 9.0   PROT 6.5   ALBUMIN 3.6   BILITOT 1.2*   ALKPHOS 96   AST 24   ALT 76*   ANIONGAP 9   ESTGFRAFRICA >60   EGFRNONAA >60   , CBC   Recent Labs   Lab 10/07/19  0549   WBC 7.16   HGB 14.2   HCT 42.1      , INR   Lab Results   Component  Value Date    INR 1.1 10/07/2019    INR 1.1 10/04/2019    INR 1.1 09/30/2019   , Lipid Panel No results found for: CHOL, HDL, LDLCALC, TRIG, CHOLHDL, Troponin   Recent Labs   Lab 10/07/19  0549   TROPONINI <0.006    and A1C: No results for input(s): HGBA1C in the last 4320 hours.    Pending Diagnostic Studies:     None         Medications:  Reconciled Home Medications:      Medication List      CHANGE how you take these medications    * apixaban 5 mg Tab  Commonly known as:  ELIQUIS  Take 2 tablets (10 mg total) by mouth 2 (two) times daily. for 7 days  What changed:  additional instructions     * apixaban 5 mg Tab  Commonly known as:  ELIQUIS  Take 1 tablet (5 mg total) by mouth 2 (two) times daily.  Start taking on:  October 16, 2019  What changed:  These instructions start on October 16, 2019. If you are unsure what to do until then, ask your doctor or other care provider.         * This list has 2 medication(s) that are the same as other medications prescribed for you. Read the directions carefully, and ask your doctor or other care provider to review them with you.            CONTINUE taking these medications    albuterol 90 mcg/actuation inhaler  Commonly known as:  PROVENTIL/VENTOLIN HFA  Inhale 2 puffs into the lungs every 6 (six) hours as needed for Wheezing. Rescue     azelastine 137 mcg (0.1 %) nasal spray  Commonly known as:  ASTELIN  1 spray (137 mcg total) by Nasal route 2 (two) times daily.     lisinopril 10 MG tablet  Take 10 mg by mouth once daily.     meclizine 25 mg tablet  Commonly known as:  ANTIVERT  Take 1 tablet (25 mg total) by mouth 3 (three) times daily as needed.     pyridostigmine 60 mg Tab  Commonly known as:  MESTINON  Take 60 mg by mouth 3 (three) times daily.            Indwelling Lines/Drains at time of discharge:   Lines/Drains/Airways     Central Venous Catheter Line                 Percutaneous Central Line Insertion/Assessment - double lumen  09/30/19 1720 right internal jugular  7 days                Time spent on the discharge of patient: 35 minutes  Patient was seen and examined on the date of discharge and determined to be suitable for discharge.         VICTORIA Chandra, FNP-C  Hospitalist - Department of Hospital Medicine  [address] [place] [address]  Office [phone]; Pager [phone]

## 2019-10-08 NOTE — PROGRESS NOTES
I called pt's spouse again for ride home. No answer. Message left on answering phone to come . Pt has been ready for discharge for at least 2 hours.

## 2019-10-08 NOTE — PROGRESS NOTES
10/08/19 0848   Output (mL)   Urine 150 mL   Urine Assessment   Bladder Scan Volume (mL) 91 mL

## 2019-10-08 NOTE — NURSING
PER handoff received from MARV Meier RN. Responds spontaneously and is AAO x4. Denies having any pain at current time and is in no distress. Assessment completed per Doc Flowsheets; reference if needed. Fall and safety precautions maintained. Bed alarm activated and audible. Bed locked in lowest position, with side rails up x3. Family is at pt's bedside at current time. Call bell and personal items within reach. Will continue to monitor pt for any changes.

## 2019-10-08 NOTE — PLAN OF CARE
"   10/08/19 0936   Final Note   Assessment Type Final Discharge Note   Anticipated Discharge Disposition Home   Hospital Follow Up  Appt(s) scheduled? Yes   Discharge plans and expectations educations in teach back method with documentation complete? Yes   Right Care Referral Info   Post Acute Recommendation No Care   pts nurse Torey notified that pt can d/c from CM standpoint.      EDUCATION:   provided with educational information on confusion.  Information reviewed and placed in :My Healthcare Packet" to be brought home  to use as resource after discharge.  Information included:  signs and symptoms to look for and call the doctor if experiencing, and symptoms that may indicate a medical emergency: CALL 911.      All questions answered.  Teach back method used.    Patient stated, " he will be leaving here to go to his scheduled doctors appointment and take medications as prescribed  ".        "

## 2019-10-08 NOTE — PLAN OF CARE
Problem: Adult Inpatient Plan of Care  Goal: Plan of Care Review  Outcome: Ongoing, Progressing  Pt remained free of falls during current shift. Denied pain and did not receive any prn pain medications. However, pt reported having itching to back and received prn benadryl. Neuro consulted d/t pt's previous confusion which has resolved. Plan of care and fall precautions reviewed with pt and verbalized understanding. Bed locked, lowered, SR up x2 and call light placed within reach.

## 2019-10-09 ENCOUNTER — DOCUMENTATION ONLY (OUTPATIENT)
Dept: HEMATOLOGY/ONCOLOGY | Facility: CLINIC | Age: 79
End: 2019-10-09

## 2019-10-09 PROCEDURE — G0180 PR HOME HEALTH MD CERTIFICATION: ICD-10-PCS | Mod: ,,, | Performed by: INTERNAL MEDICINE

## 2019-10-09 PROCEDURE — G0180 MD CERTIFICATION HHA PATIENT: HCPCS | Mod: ,,, | Performed by: INTERNAL MEDICINE

## 2019-10-09 NOTE — PROGRESS NOTES
Received referral from the clinic that patient needed assistance with transportation. Contacted the patient and he asked that I discuss it with this wife. Spoke with wife. Explained the jose and the cab transportation. She declined the transportation assistance. Did notify the clinic staff.

## 2019-10-11 ENCOUNTER — INFUSION (OUTPATIENT)
Dept: INFUSION THERAPY | Facility: HOSPITAL | Age: 79
End: 2019-10-11
Attending: INTERNAL MEDICINE
Payer: MEDICARE

## 2019-10-11 VITALS
HEART RATE: 69 BPM | OXYGEN SATURATION: 98 % | RESPIRATION RATE: 17 BRPM | SYSTOLIC BLOOD PRESSURE: 154 MMHG | TEMPERATURE: 98 F | DIASTOLIC BLOOD PRESSURE: 70 MMHG

## 2019-10-11 DIAGNOSIS — C83.32 DIFFUSE LARGE B-CELL LYMPHOMA OF INTRATHORACIC LYMPH NODES: Primary | ICD-10-CM

## 2019-10-11 PROCEDURE — 96375 TX/PRO/DX INJ NEW DRUG ADDON: CPT

## 2019-10-11 PROCEDURE — 96365 THER/PROPH/DIAG IV INF INIT: CPT

## 2019-10-11 PROCEDURE — S0028 INJECTION, FAMOTIDINE, 20 MG: HCPCS | Performed by: INTERNAL MEDICINE

## 2019-10-11 PROCEDURE — 96367 TX/PROPH/DG ADDL SEQ IV INF: CPT

## 2019-10-11 PROCEDURE — 63600175 PHARM REV CODE 636 W HCPCS: Performed by: INTERNAL MEDICINE

## 2019-10-11 PROCEDURE — 96401 CHEMO ANTI-NEOPL SQ/IM: CPT

## 2019-10-11 PROCEDURE — 25000003 PHARM REV CODE 250: Performed by: INTERNAL MEDICINE

## 2019-10-11 PROCEDURE — A4216 STERILE WATER/SALINE, 10 ML: HCPCS | Performed by: INTERNAL MEDICINE

## 2019-10-11 RX ORDER — FAMOTIDINE 10 MG/ML
20 INJECTION INTRAVENOUS
Status: CANCELLED | OUTPATIENT
Start: 2019-10-11

## 2019-10-11 RX ORDER — MEPERIDINE HYDROCHLORIDE 50 MG/ML
25 INJECTION INTRAMUSCULAR; INTRAVENOUS; SUBCUTANEOUS EVERY 30 MIN PRN
Status: CANCELLED | OUTPATIENT
Start: 2019-10-11

## 2019-10-11 RX ORDER — FAMOTIDINE 10 MG/ML
20 INJECTION INTRAVENOUS
Status: COMPLETED | OUTPATIENT
Start: 2019-10-11 | End: 2019-10-11

## 2019-10-11 RX ORDER — ACETAMINOPHEN 325 MG/1
650 TABLET ORAL
Status: COMPLETED | OUTPATIENT
Start: 2019-10-11 | End: 2019-10-11

## 2019-10-11 RX ORDER — HEPARIN 100 UNIT/ML
500 SYRINGE INTRAVENOUS
Status: DISCONTINUED | OUTPATIENT
Start: 2019-10-11 | End: 2019-10-11 | Stop reason: HOSPADM

## 2019-10-11 RX ORDER — SODIUM CHLORIDE 0.9 % (FLUSH) 0.9 %
10 SYRINGE (ML) INJECTION
Status: CANCELLED | OUTPATIENT
Start: 2019-10-11

## 2019-10-11 RX ORDER — ACETAMINOPHEN 325 MG/1
650 TABLET ORAL
Status: CANCELLED | OUTPATIENT
Start: 2019-10-11

## 2019-10-11 RX ORDER — HEPARIN 100 UNIT/ML
500 SYRINGE INTRAVENOUS
Status: CANCELLED | OUTPATIENT
Start: 2019-10-11

## 2019-10-11 RX ORDER — SODIUM CHLORIDE 0.9 % (FLUSH) 0.9 %
10 SYRINGE (ML) INJECTION
Status: DISCONTINUED | OUTPATIENT
Start: 2019-10-11 | End: 2019-10-11 | Stop reason: HOSPADM

## 2019-10-11 RX ADMIN — HEPARIN SODIUM (PORCINE) LOCK FLUSH IV SOLN 100 UNIT/ML 500 UNITS: 100 SOLUTION at 10:10

## 2019-10-11 RX ADMIN — FAMOTIDINE 20 MG: 10 INJECTION INTRAVENOUS at 09:10

## 2019-10-11 RX ADMIN — DIPHENHYDRAMINE HYDROCHLORIDE 50 MG: 50 INJECTION INTRAMUSCULAR; INTRAVENOUS at 09:10

## 2019-10-11 RX ADMIN — ACETAMINOPHEN 650 MG: 325 TABLET ORAL at 09:10

## 2019-10-11 RX ADMIN — RITUXIMAB AND HYALURONIDASE 1400 MG: 120; 2000 INJECTION, SOLUTION SUBCUTANEOUS at 10:10

## 2019-10-11 RX ADMIN — Medication 10 ML: at 10:10

## 2019-10-11 NOTE — PLAN OF CARE
Patient tolerated Rituxan Hycela injection to abdomen. VSS. Patient reports significant weight loss, GOODMAN, and weakness since diagnosis. Patient received initial rituximab therapy in 2004. Reviewed possible side effects and symptoms management with patient. Discussed when to contact MD or head to the ER. Patient verbalized understanding. Received discharge instructions and follow up appointments. Verbalized understanding and assisted off unit via wheelchair by RN. Accompanied by wife and family friend. Patient in NAD at time of discharge.

## 2019-10-12 LAB — BACTERIA BLD CULT: NORMAL

## 2019-10-15 LAB — BACTERIA BLD CULT: NORMAL

## 2019-10-21 ENCOUNTER — TELEPHONE (OUTPATIENT)
Dept: HOME HEALTH SERVICES | Facility: HOSPITAL | Age: 79
End: 2019-10-21

## 2019-10-22 ENCOUNTER — OFFICE VISIT (OUTPATIENT)
Dept: HEMATOLOGY/ONCOLOGY | Facility: CLINIC | Age: 79
End: 2019-10-22
Payer: MEDICARE

## 2019-10-22 VITALS
HEIGHT: 71 IN | BODY MASS INDEX: 24.47 KG/M2 | SYSTOLIC BLOOD PRESSURE: 139 MMHG | HEART RATE: 89 BPM | WEIGHT: 174.81 LBS | TEMPERATURE: 98 F | DIASTOLIC BLOOD PRESSURE: 82 MMHG | OXYGEN SATURATION: 96 %

## 2019-10-22 DIAGNOSIS — C83.32 DIFFUSE LARGE B-CELL LYMPHOMA OF INTRATHORACIC LYMPH NODES: Primary | ICD-10-CM

## 2019-10-22 DIAGNOSIS — Z09 CHEMOTHERAPY FOLLOW-UP EXAMINATION: ICD-10-CM

## 2019-10-22 DIAGNOSIS — Z85.72 HISTORY OF FOLLICULAR LYMPHOMA: ICD-10-CM

## 2019-10-22 PROCEDURE — 3075F SYST BP GE 130 - 139MM HG: CPT | Mod: CPTII,S$GLB,, | Performed by: INTERNAL MEDICINE

## 2019-10-22 PROCEDURE — 1101F PT FALLS ASSESS-DOCD LE1/YR: CPT | Mod: CPTII,S$GLB,, | Performed by: INTERNAL MEDICINE

## 2019-10-22 PROCEDURE — 99214 OFFICE O/P EST MOD 30 MIN: CPT | Mod: S$GLB,,, | Performed by: INTERNAL MEDICINE

## 2019-10-22 PROCEDURE — 99999 PR PBB SHADOW E&M-EST. PATIENT-LVL III: CPT | Mod: PBBFAC,,, | Performed by: INTERNAL MEDICINE

## 2019-10-22 PROCEDURE — 99214 PR OFFICE/OUTPT VISIT, EST, LEVL IV, 30-39 MIN: ICD-10-PCS | Mod: S$GLB,,, | Performed by: INTERNAL MEDICINE

## 2019-10-22 PROCEDURE — 3075F PR MOST RECENT SYSTOLIC BLOOD PRESS GE 130-139MM HG: ICD-10-PCS | Mod: CPTII,S$GLB,, | Performed by: INTERNAL MEDICINE

## 2019-10-22 PROCEDURE — 1101F PR PT FALLS ASSESS DOC 0-1 FALLS W/OUT INJ PAST YR: ICD-10-PCS | Mod: CPTII,S$GLB,, | Performed by: INTERNAL MEDICINE

## 2019-10-22 PROCEDURE — 3079F PR MOST RECENT DIASTOLIC BLOOD PRESSURE 80-89 MM HG: ICD-10-PCS | Mod: CPTII,S$GLB,, | Performed by: INTERNAL MEDICINE

## 2019-10-22 PROCEDURE — 3079F DIAST BP 80-89 MM HG: CPT | Mod: CPTII,S$GLB,, | Performed by: INTERNAL MEDICINE

## 2019-10-22 PROCEDURE — 99999 PR PBB SHADOW E&M-EST. PATIENT-LVL III: ICD-10-PCS | Mod: PBBFAC,,, | Performed by: INTERNAL MEDICINE

## 2019-10-22 NOTE — PROGRESS NOTES
Chief Complaint :  Follicular lymphoma.    Hx of Present illness :  Patient is a 79 y.o. year old male who presents to the clinic today for  Oncology followup. Has been seen at [facility] in the past. Dx'd to have Low grade , Stage 1, follicular lymphoma Right groin in 2004. Good response to R-CVP Chemotherapy and maintenance rituxan.  Recently had lesion excised from Left side of nostril. Dx'd to have  Superficially invasive squamous cell [id number]. Invasive. In Aug/swep 2019 evaluated by physician. Had  Bx of Lymph node from left neck. 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 CT scans revealed a large mediastinal mass. Patient had sifnificant sx. Transferred to [facility]. Was given emergency mediastinal radiation. Started on mini CHOP on 10/2/19.  Admitted  Two weeks  Shortness of breath. CTA chest revealed Emboli in right upper, middle and lower lobes. The  left mediastinal mass had decreased in size    Allergies :    Review of patient's allergies indicates:  No Known Allergies    Occupation :  Law enforcement.    Transfusion :  None.    Menstrual & obstetric Hx :  N/A      Present Meds :   Medication List with Changes/Refills   Current Medications    MECLIZINE (ANTIVERT) 25 MG TABLET    Take 1 tablet (25 mg total) by mouth 3 (three) times daily as needed.    PYRIDOSTIGMINE (MESTINON) 60 MG TAB    Take 60 mg by mouth 3 (three) times daily.        Past Medical Hx :  Hx of Folliculoar Lymphoma. Hypertension; age related Macular degeneration;  Hypertropia right eye. No hx of DM, PUD, Hepatitis, Liver disease, thyroid dysfunction., TB, Sarcoid, Lupus, rheumatoid arthritis, seizure disorder, CVA. No Hx of DVT or Pulmonary embolism/\. No past Hx of radiation therapy.     Past Medical Hx :  Past Medical History:   Diagnosis Date    AMD (age-related macular degeneration), bilateral     Cancer 2004,2019    Lymphoma    Decreased appetite 09/2019    Hypertension     Hypertropia of right eye 12/7/2017    Mass in  GENERAL SURGERY CONSULTATION NOTE    Yanci Luther   [address]   [facility] [address]  73 year old  female    Primary Care Provider:  Janes Gunter      HPI: Yanci Luther presents to day surgery in need of screening colonoscopy.   Yanci Luther denies family history of colon cancer. Patient denies change in bowel habits or blood in stools. Previous colonoscopy was 2009, normal.     REVIEW OF SYSTEMS:    GENERAL: No fevers or chills. Denies fatigue, recent weight loss.  HEENT: No sinus drainage. No changes with vision or hearing. No difficulty swallowing.   LYMPHATICS:  Noswollen nodes in axilla, neck or groin.  CARDIOVASCULAR: Denies chest pain, palpitations and dyspnea on exertion.  PULMONARY: No shortness of breath or cough. No increase in sputum production.  GI: Denies melena,bright red blood in stools. No hematemesis. No constipation or diarrhea.  : No dysuria or hematuria.  SKIN: No recent rashes or ulcers.   HEMATOLOGY:  No history of easy bruising or bleeding.  ENDOCRINE:  No history of diabetes or thyroid problems.  NEUROLOGY:  No history of seizures or headaches. No motor or sensory changes.        Patient Active Problem List   Diagnosis     BPPV (benign paroxysmal positional vertigo)     Cerebrovascular small vessel disease     Mixed hyperlipidemia     Obstructive lung disease (H)     Personal history of malignant neoplasm of tongue     Refusal of aspirin by patient     Refusal of statin medication by patient     Seborrheic keratosis     Senile osteoporosis       Past Medical History:   Diagnosis Date     Age-related osteoporosis without current pathological fracture     No Comments Provided     Personal history of malignant neoplasm of unspecified site of lip, oral cavity, and pharynx     2006,radiation       Past Surgical History:   Procedure Laterality Date     ARTHROSCOPY KNEE      4-2012,[address]     ARTHROSCOPY KNEE      9-2012,meniscus tear, Dr. Vitale     ARTHROTOMY WRIST      2011,L  "radius/ulna fracture, s/p plating     COLONOSCOPY  08/06/2009    follow up 10 years, 8/6/19       Family History   Adopted: Yes   Problem Relation Age of Onset     Genetic Disorder Other         Genetic,Adopted     Breast Cancer No family hx of         Cancer-breast       Social History     Social History Narrative    She is originally from [place].   She lives in [place] in the summer with her daughter and fun[place].  Formerly worked with stained glass.       Social History     Socioeconomic History     Marital status:      Spouse name: Not on file     Number of children: Not on file     Years of education: Not on file     Highest education level: Not on file   Occupational History     Not on file   Social Needs     Financial resource strain: Not on file     Food insecurity:     Worry: Not on file     Inability: Not on file     Transportation needs:     Medical: Not on file     Non-medical: Not on file   Tobacco Use     Smoking status: Former Smoker     Smokeless tobacco: Never Used     Tobacco comment: \"quit about 30 years ago\"   Substance and Sexual Activity     Alcohol use: Yes     Comment: 1-2 wine or beer daily     Drug use: Unknown     Types: Other     Comment: Drug use: Not Asked     Sexual activity: Yes     Partners: Male   Lifestyle     Physical activity:     Days per week: Not on file     Minutes per session: Not on file     Stress: Not on file   Relationships     Social connections:     Talks on phone: Not on file     Gets together: Not on file     Attends Restorationist service: Not on file     Active member of club or organization: Not on file     Attends meetings of clubs or organizations: Not on file     Relationship status: Not on file     Intimate partner violence:     Fear of current or ex partner: Not on file     Emotionally abused: Not on file     Physically abused: Not on file     Forced sexual activity: Not on file   Other Topics Concern     Parent/sibling " chest 09/2019    Myasthenia gravis     Requires assistance with activities of daily living (ADL)     Unintended weight loss 09/2019    Unsteady gait     Wheelchair dependence        Travel Hx :   N/A    Immunization :  There is no immunization history for the selected administration types on file for this patient.    Family Hx :  Family History   Problem Relation Age of Onset    No Known Problems Mother     Heart disease Father     No Known Problems Sister     No Known Problems Brother     No Known Problems Maternal Aunt     No Known Problems Maternal Uncle     No Known Problems Paternal Aunt     No Known Problems Paternal Uncle     No Known Problems Maternal Grandmother     No Known Problems Maternal Grandfather     No Known Problems Paternal Grandmother     No Known Problems Paternal Grandfather     Amblyopia Neg Hx     Blindness Neg Hx     Cataracts Neg Hx     Glaucoma Neg Hx     Macular degeneration Neg Hx     Retinal detachment Neg Hx     Strabismus Neg Hx     Cancer Neg Hx     Diabetes Neg Hx     Hypertension Neg Hx     Stroke Neg Hx     Thyroid disease Neg Hx        Social Hx :  Social History     Socioeconomic History    Marital status:      Spouse name: Not on file    Number of children: Not on file    Years of education: Not on file    Highest education level: Not on file   Occupational History    Not on file   Social Needs    Financial resource strain: Not on file    Food insecurity:     Worry: Not on file     Inability: Not on file    Transportation needs:     Medical: Not on file     Non-medical: Not on file   Tobacco Use    Smoking status: Former Smoker    Smokeless tobacco: Never Used   Substance and Sexual Activity    Alcohol use: No    Drug use: Never    Sexual activity: Not Currently     Partners: Female   Lifestyle    Physical activity:     Days per week: Not on file     Minutes per session: Not on file    Stress: Not on file   Relationships     Social connections:     Talks on phone: Not on file     Gets together: Not on file     Attends Restorationist service: Not on file     Active member of club or organization: Not on file     Attends meetings of clubs or organizations: Not on file     Relationship status: Not on file   Other Topics Concern    Not on file   Social History Narrative    Not on file       Surgery :  Lymph node Bx; Portacath. Bone Marrow Bx Cholecystectomy. Bilateral Cataract surgery  Excision of skin cancer from nostril.     Symptoms :    Review of Systems   Constitutional: Positive for malaise/fatigue. Negative for chills, diaphoresis, fever and weight loss.   HENT: Negative for congestion, hearing loss, nosebleeds, sore throat and tinnitus.    Eyes: Negative for blurred vision, double vision and photophobia.   Respiratory: Negative for cough, hemoptysis and shortness of breath.    Cardiovascular: Negative for chest pain, palpitations, orthopnea, claudication, leg swelling and PND.   Gastrointestinal: Positive for diarrhea (Moderate). Negative for abdominal pain, blood in stool, constipation, heartburn, nausea and vomiting.   Genitourinary: Positive for frequency (Related to BPH). Negative for dysuria, flank pain, hematuria and urgency.   Musculoskeletal: Positive for joint pain (rthritis). Negative for back pain, falls, myalgias and neck pain.   Skin: Negative for itching and rash.   Neurological: Positive for tingling and sensory change (Related to Chemotherapy). Negative for dizziness, tremors, speech change, focal weakness, seizures, loss of consciousness, weakness and headaches.   Endo/Heme/Allergies: Negative for environmental allergies and polydipsia. Does not bruise/bleed easily.   Psychiatric/Behavioral: Negative for depression and memory loss. The patient is not nervous/anxious and does not have insomnia.        Physical Exam :   Physical Exam   Constitutional: He is oriented to person, place, and time and well-developed,  w/ CABG, MI or angioplasty before 63F 55M? Not Asked   Social History Narrative    She is originally from [place].   She lives in [place] in the summer with her daughter and fung in the [place].  Formerly worked with stained glass.         No current facility-administered medications on file prior to encounter.   Current Outpatient Medications on File Prior to Encounter:  bisacodyl (DULCOLAX) 5 MG EC tablet Use as directed per colonoscopy prep.   calcium-magnesium (CALMAG) 500-250 MG TABS per tablet Take 1 tablet by mouth 2 times daily   Cholecalciferol (VITAMIN D) 2000 units CAPS Take 1 capsule by mouth daily   [] polyethylene glycol-electrolytes (NULYTELY) 420 g solution Take 4,000 mLs by mouth once for 1 dose         ALLERGIES/SENSITIVITIES:   Allergies   Allergen Reactions     Aspirin Other (See Comments)     -- Refuses as of 2017  Other reaction(s): Other - Describe In Comment Field  -- Refuses as of 2017     Hmg-Coa-R Inhibitors Other (See Comments)     -- Refuses as of 2017  Other reaction(s): Other - Describe In Comment Field  -- Refuses as of 2017       PHYSICAL EXAM:     /86   Temp 98.5  F (36.9  C) (Tympanic)   Resp 16   SpO2 95%     General Appearance:   Sitting up in bed, no apparent distress  HEENT: Pupils are equal and reactive, no scleral icterus   Heart & CV:  RRR, no murmur.  LUNGS: No increased work of breathing. Lungs are CTA B/L, no wheezing or crackles.  Abd:  soft, non-tender, no masses   Ext: no lower extremity edema   Neuro: alert and oriented, normal speech and mentation         CONSULTATION ASSESSMENT AND PLAN:    73 year old female with average risk for colon cancer here for screening colonoscopy.      The technical details of colonoscopy were discussed with the patient along with the risks and benefits to include bleeding, perforation and incomplete study. Yanci Luther demonstrated understanding and is willing to proceed.        Prince Paez MD on 9/11/2019 at 7:16 AM       well-nourished, and in no distress. Vital signs are normal. No distress.   HENT:   Head: Normocephalic and atraumatic.   Right Ear: External ear normal.   Left Ear: External ear normal.   Nose: Nose normal.   Mouth/Throat: Oropharynx is clear and moist. No oropharyngeal exudate.   Eyes: Conjunctivae, EOM and lids are normal. Lids are everted and swept, no foreign bodies found. Right eye exhibits no discharge. Left eye exhibits no discharge. No scleral icterus. Pupils are unequal.       Neck: Trachea normal, normal range of motion, full passive range of motion without pain and phonation normal. Neck supple. Normal carotid pulses, no hepatojugular reflux and no JVD present. No tracheal tenderness present. Carotid bruit is not present. No tracheal deviation present. No thyroid mass and no thyromegaly present.   Cardiovascular: Normal rate, regular rhythm, normal heart sounds, intact distal pulses and normal pulses. PMI is not displaced. Exam reveals no friction rub.   No murmur heard.  Pulmonary/Chest: Effort normal and breath sounds normal. No stridor. No apnea. No respiratory distress. He has no wheezes. He has no rales. He exhibits no tenderness.   Abdominal: Soft. Bowel sounds are normal. He exhibits no distension, no ascites and no mass. There is no hepatosplenomegaly, splenomegaly or hepatomegaly. There is no tenderness. There is no rebound, no guarding and no CVA tenderness. No hernia.   Genitourinary:   Genitourinary Comments: Not Examined   Musculoskeletal: Normal range of motion. He exhibits no edema, tenderness or deformity.   Lymphadenopathy:        Head (right side): No submental, no submandibular, no tonsillar, no preauricular, no posterior auricular and no occipital adenopathy present.        Head (left side): No submental, no submandibular, no tonsillar, no preauricular, no posterior auricular and no occipital adenopathy present.     He has no cervical adenopathy.     He has no axillary adenopathy.         Right: No inguinal, no supraclavicular and no epitrochlear adenopathy present.        Left: No inguinal, no supraclavicular and no epitrochlear adenopathy present.   Neurological: He is alert and oriented to person, place, and time. He has normal motor skills, normal sensation, normal strength, normal reflexes and intact cranial nerves. No cranial nerve deficit. He exhibits normal muscle tone. Gait normal. Coordination normal. GCS score is 15.   Skin: Skin is warm and dry. He is not diaphoretic. No cyanosis. Nails show no clubbing.   Psychiatric: Mood, memory, affect and judgment normal.         Labs & Imaging : 04/13/18 :  NFBS 98; Cr.1.44. Bili 0.6. ALP 61. WBC 5,600. Hgb 14.6; Hct 45.0. Plts 158,000. ANC 3,545.        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  Remote Hx of Follicular Lymphoma. In Remission. 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   Patient has PICC line. [facility] for PICC maintenance.  Cycle 2 scheduled for 11/1/19. CBC,CMP,Uric acid on 10/30.19.  RTC 2 weeks with CBC,CMP,Uric acid. And then initiate cycle 2.    Advance Care Planning     Power of   I initiated the process of advance care planning today and explained the importance of this process to the patient.  I introduced the concept of advance directives to the patient, as well. Then the patient received detailed information about the importance of designating a Health Care Power of  (HCPOA). He was also instructed to communicate with this person about their wishes for future healthcare, should he become sick and lose decision-making capacity. The patient  has not: previously appointed a HCPOA. .         Living Will  During this visit, I engaged the patient in the advance care planning process.  The patient and I reviewed the role for advance directives and their purpose in directing future healthcare if the  patient's unable to speak for him/herself.  At this point in time, the patient does have full decision-making capacity.  We discussed different extreme health states that he could experience, and reviewed what kind of medical care he would want in those situations.  The patient communicated that if he were comatose and had little chance of a meaningful recovery, does not  want machines/life-sustaining treatments used.    The patient  HAS NOTcompleted a living will to reflect these preferences.       Brochures given.

## 2019-11-01 ENCOUNTER — INFUSION (OUTPATIENT)
Dept: INFUSION THERAPY | Facility: HOSPITAL | Age: 79
End: 2019-11-01
Attending: INTERNAL MEDICINE
Payer: MEDICARE

## 2019-11-01 ENCOUNTER — OFFICE VISIT (OUTPATIENT)
Dept: HEMATOLOGY/ONCOLOGY | Facility: CLINIC | Age: 79
End: 2019-11-01
Payer: MEDICARE

## 2019-11-01 VITALS
SYSTOLIC BLOOD PRESSURE: 141 MMHG | HEIGHT: 71 IN | OXYGEN SATURATION: 99 % | TEMPERATURE: 98 F | BODY MASS INDEX: 23.74 KG/M2 | DIASTOLIC BLOOD PRESSURE: 79 MMHG | WEIGHT: 169.56 LBS | HEART RATE: 84 BPM

## 2019-11-01 VITALS
TEMPERATURE: 98 F | DIASTOLIC BLOOD PRESSURE: 76 MMHG | RESPIRATION RATE: 17 BRPM | OXYGEN SATURATION: 96 % | HEART RATE: 75 BPM | SYSTOLIC BLOOD PRESSURE: 143 MMHG

## 2019-11-01 DIAGNOSIS — C83.32 DIFFUSE LARGE B-CELL LYMPHOMA OF INTRATHORACIC LYMPH NODES: Primary | ICD-10-CM

## 2019-11-01 DIAGNOSIS — Z85.72 HISTORY OF FOLLICULAR LYMPHOMA: ICD-10-CM

## 2019-11-01 DIAGNOSIS — Z09 CHEMOTHERAPY FOLLOW-UP EXAMINATION: ICD-10-CM

## 2019-11-01 PROCEDURE — 3077F PR MOST RECENT SYSTOLIC BLOOD PRESSURE >= 140 MM HG: ICD-10-PCS | Mod: CPTII,S$GLB,, | Performed by: INTERNAL MEDICINE

## 2019-11-01 PROCEDURE — 63600175 PHARM REV CODE 636 W HCPCS: Mod: JG | Performed by: INTERNAL MEDICINE

## 2019-11-01 PROCEDURE — 99214 OFFICE O/P EST MOD 30 MIN: CPT | Mod: S$GLB,,, | Performed by: INTERNAL MEDICINE

## 2019-11-01 PROCEDURE — 96367 TX/PROPH/DG ADDL SEQ IV INF: CPT

## 2019-11-01 PROCEDURE — 1101F PT FALLS ASSESS-DOCD LE1/YR: CPT | Mod: CPTII,S$GLB,, | Performed by: INTERNAL MEDICINE

## 2019-11-01 PROCEDURE — 99999 PR PBB SHADOW E&M-EST. PATIENT-LVL III: CPT | Mod: PBBFAC,,, | Performed by: INTERNAL MEDICINE

## 2019-11-01 PROCEDURE — 99999 PR PBB SHADOW E&M-EST. PATIENT-LVL III: ICD-10-PCS | Mod: PBBFAC,,, | Performed by: INTERNAL MEDICINE

## 2019-11-01 PROCEDURE — 96413 CHEMO IV INFUSION 1 HR: CPT

## 2019-11-01 PROCEDURE — 3078F DIAST BP <80 MM HG: CPT | Mod: CPTII,S$GLB,, | Performed by: INTERNAL MEDICINE

## 2019-11-01 PROCEDURE — 3077F SYST BP >= 140 MM HG: CPT | Mod: CPTII,S$GLB,, | Performed by: INTERNAL MEDICINE

## 2019-11-01 PROCEDURE — 96401 CHEMO ANTI-NEOPL SQ/IM: CPT

## 2019-11-01 PROCEDURE — 1101F PR PT FALLS ASSESS DOC 0-1 FALLS W/OUT INJ PAST YR: ICD-10-PCS | Mod: CPTII,S$GLB,, | Performed by: INTERNAL MEDICINE

## 2019-11-01 PROCEDURE — 96411 CHEMO IV PUSH ADDL DRUG: CPT

## 2019-11-01 PROCEDURE — S0028 INJECTION, FAMOTIDINE, 20 MG: HCPCS | Performed by: INTERNAL MEDICINE

## 2019-11-01 PROCEDURE — 99214 PR OFFICE/OUTPT VISIT, EST, LEVL IV, 30-39 MIN: ICD-10-PCS | Mod: S$GLB,,, | Performed by: INTERNAL MEDICINE

## 2019-11-01 PROCEDURE — 96375 TX/PRO/DX INJ NEW DRUG ADDON: CPT

## 2019-11-01 PROCEDURE — 25000003 PHARM REV CODE 250: Performed by: INTERNAL MEDICINE

## 2019-11-01 PROCEDURE — 3078F PR MOST RECENT DIASTOLIC BLOOD PRESSURE < 80 MM HG: ICD-10-PCS | Mod: CPTII,S$GLB,, | Performed by: INTERNAL MEDICINE

## 2019-11-01 RX ORDER — DOXORUBICIN HYDROCHLORIDE 2 MG/ML
25 INJECTION, SOLUTION INTRAVENOUS
Status: CANCELLED | OUTPATIENT
Start: 2019-11-01

## 2019-11-01 RX ORDER — SODIUM CHLORIDE 0.9 % (FLUSH) 0.9 %
10 SYRINGE (ML) INJECTION
Status: DISCONTINUED | OUTPATIENT
Start: 2019-11-01 | End: 2019-11-01 | Stop reason: HOSPADM

## 2019-11-01 RX ORDER — FAMOTIDINE 10 MG/ML
20 INJECTION INTRAVENOUS
Status: COMPLETED | OUTPATIENT
Start: 2019-11-01 | End: 2019-11-01

## 2019-11-01 RX ORDER — HEPARIN 100 UNIT/ML
500 SYRINGE INTRAVENOUS
Status: DISCONTINUED | OUTPATIENT
Start: 2019-11-01 | End: 2019-11-01 | Stop reason: HOSPADM

## 2019-11-01 RX ORDER — DOXORUBICIN HYDROCHLORIDE 2 MG/ML
25 INJECTION, SOLUTION INTRAVENOUS
Status: COMPLETED | OUTPATIENT
Start: 2019-11-01 | End: 2019-11-01

## 2019-11-01 RX ORDER — ACETAMINOPHEN 325 MG/1
650 TABLET ORAL
Status: COMPLETED | OUTPATIENT
Start: 2019-11-01 | End: 2019-11-01

## 2019-11-01 RX ORDER — PREDNISONE 50 MG/1
100 TABLET ORAL DAILY
Qty: 20 TABLET | Refills: 4 | Status: ON HOLD | OUTPATIENT
Start: 2019-11-01 | End: 2020-04-17

## 2019-11-01 RX ADMIN — FAMOTIDINE 20 MG: 10 INJECTION INTRAVENOUS at 11:11

## 2019-11-01 RX ADMIN — RITUXIMAB AND HYALURONIDASE 1400 MG: 120; 2000 INJECTION, SOLUTION SUBCUTANEOUS at 12:11

## 2019-11-01 RX ADMIN — VINCRISTINE SULFATE 1 MG: 1 INJECTION, SOLUTION INTRAVENOUS at 01:11

## 2019-11-01 RX ADMIN — CYCLOPHOSPHAMIDE 785 MG: 1 INJECTION, POWDER, FOR SOLUTION INTRAVENOUS; ORAL at 01:11

## 2019-11-01 RX ADMIN — ACETAMINOPHEN 650 MG: 325 TABLET ORAL at 11:11

## 2019-11-01 RX ADMIN — DIPHENHYDRAMINE HYDROCHLORIDE 50 MG: 50 INJECTION INTRAMUSCULAR; INTRAVENOUS at 11:11

## 2019-11-01 RX ADMIN — DOXORUBICIN HYDROCHLORIDE 50 MG: 2 INJECTION, SOLUTION INTRAVENOUS at 01:11

## 2019-11-01 RX ADMIN — PALONOSETRON HYDROCHLORIDE: 0.25 INJECTION, SOLUTION INTRAVENOUS at 12:11

## 2019-11-01 NOTE — PLAN OF CARE
Patient arrive to unit for C2 RCHOP. Today (11/01/19) patient ambulated to unit with cane accompanied by wife. For C1 hycela patient arrived via wheelchair and was too weak/unsteady to ambulate without assistance. He denies any adverse side effects following Cycle 1. He reports an increase in appetite and less discomfort with swallowing since last visit. Patient denies any new or worsening symptoms at this time. He received C2 R+CHOP. Tolerated well. VSS during visit. PO Prednisone sent to preferred pharmacy for D2-5. Patient received discharge instructions and follow up appointments. [facility] scheduled at [facility]. Patient verbalized understanding and ambulated with cane off unit accompanied by spouse. Patient in NAD at time of discharge.

## 2019-11-01 NOTE — PROGRESS NOTES
Chief Complaint :  Follicular lymphoma.    Hx of Present illness :  Patient is a 79 y.o. year old male who presents to the clinic today for  Oncology followup. Has been seen at [facility] in the past. Dx'd to have Low grade , Stage 1, follicular lymphoma Right groin in 2004. Good response to R-CVP Chemotherapy and maintenance rituxan.  Recently had lesion excised from Left side of nostril. Dx'd to have  Superficially invasive squamous cell [id number]. Invasive. In Aug/swep 2019 evaluated by physician. Had  Bx of Lymph node from left neck. 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 CT scans revealed a large mediastinal mass. Patient had sifnificant sx. Transferred to [facility]. Was given emergency mediastinal radiation. Started on mini CHOP on 10/2/19.  Admitted  Two weeks  Shortness of breath. CTA chest revealed Emboli in right upper, middle and lower lobes. The  left mediastinal mass had decreased in size    Allergies :    Review of patient's allergies indicates:  No Known Allergies    Occupation :  Law enforcement.    Transfusion :  None.    Menstrual & obstetric Hx :  N/A      Present Meds :   Medication List with Changes/Refills   Current Medications    MECLIZINE (ANTIVERT) 25 MG TABLET    Take 1 tablet (25 mg total) by mouth 3 (three) times daily as needed.    PYRIDOSTIGMINE (MESTINON) 60 MG TAB    Take 60 mg by mouth 3 (three) times daily.        Past Medical Hx :  Hx of Folliculoar Lymphoma. Hypertension; age related Macular degeneration;  Hypertropia right eye. No hx of DM, PUD, Hepatitis, Liver disease, thyroid dysfunction., TB, Sarcoid, Lupus, rheumatoid arthritis, seizure disorder, CVA. No Hx of DVT or Pulmonary embolism/\. No past Hx of radiation therapy.     Past Medical Hx :  Past Medical History:   Diagnosis Date    AMD (age-related macular degeneration), bilateral     Cancer 2004,2019    Lymphoma    Decreased appetite 09/2019    Hypertension     Hypertropia of right eye 12/7/2017    Mass in  chest 09/2019    Myasthenia gravis     Requires assistance with activities of daily living (ADL)     Unintended weight loss 09/2019    Unsteady gait     Wheelchair dependence        Travel Hx :   N/A    Immunization :  There is no immunization history for the selected administration types on file for this patient.    Family Hx :  Family History   Problem Relation Age of Onset    No Known Problems Mother     Heart disease Father     No Known Problems Sister     No Known Problems Brother     No Known Problems Maternal Aunt     No Known Problems Maternal Uncle     No Known Problems Paternal Aunt     No Known Problems Paternal Uncle     No Known Problems Maternal Grandmother     No Known Problems Maternal Grandfather     No Known Problems Paternal Grandmother     No Known Problems Paternal Grandfather     Amblyopia Neg Hx     Blindness Neg Hx     Cataracts Neg Hx     Glaucoma Neg Hx     Macular degeneration Neg Hx     Retinal detachment Neg Hx     Strabismus Neg Hx     Cancer Neg Hx     Diabetes Neg Hx     Hypertension Neg Hx     Stroke Neg Hx     Thyroid disease Neg Hx        Social Hx :  Social History     Socioeconomic History    Marital status:      Spouse name: Not on file    Number of children: Not on file    Years of education: Not on file    Highest education level: Not on file   Occupational History    Not on file   Social Needs    Financial resource strain: Not on file    Food insecurity:     Worry: Not on file     Inability: Not on file    Transportation needs:     Medical: Not on file     Non-medical: Not on file   Tobacco Use    Smoking status: Former Smoker    Smokeless tobacco: Never Used   Substance and Sexual Activity    Alcohol use: No    Drug use: Never    Sexual activity: Not Currently     Partners: Female   Lifestyle    Physical activity:     Days per week: Not on file     Minutes per session: Not on file    Stress: Not on file   Relationships     Social connections:     Talks on phone: Not on file     Gets together: Not on file     Attends Sikhism service: Not on file     Active member of club or organization: Not on file     Attends meetings of clubs or organizations: Not on file     Relationship status: Not on file   Other Topics Concern    Not on file   Social History Narrative    Not on file       Surgery :  Lymph node Bx; Portacath. Bone Marrow Bx Cholecystectomy. Bilateral Cataract surgery  Excision of skin cancer from nostril.     Symptoms :    Review of Systems   Constitutional: Positive for malaise/fatigue. Negative for chills, diaphoresis, fever and weight loss.        Energy Improving.   HENT: Positive for congestion. Negative for hearing loss, nosebleeds, sore throat and tinnitus.    Eyes: Negative for blurred vision, double vision and photophobia.   Respiratory: Negative for cough, hemoptysis and shortness of breath.    Cardiovascular: Negative for chest pain, palpitations, orthopnea, claudication, leg swelling and PND.   Gastrointestinal: Negative for abdominal pain, blood in stool, constipation, diarrhea (Moderate), heartburn, nausea and vomiting.   Genitourinary: Positive for frequency (Related to BPH). Negative for dysuria, flank pain, hematuria and urgency.   Musculoskeletal: Negative for back pain, falls, joint pain (rthritis), myalgias and neck pain.   Skin: Negative for itching and rash.   Neurological: Negative for dizziness, tingling, tremors, sensory change (Related to Chemotherapy), speech change, focal weakness, seizures, loss of consciousness, weakness and headaches.   Endo/Heme/Allergies: Negative for environmental allergies and polydipsia. Does not bruise/bleed easily.   Psychiatric/Behavioral: Positive for depression. Negative for memory loss. The patient is nervous/anxious. The patient does not have insomnia.        Physical Exam :   Physical Exam   Constitutional: He is oriented to person, place, and time and  well-developed, well-nourished, and in no distress. Vital signs are normal. No distress.   HENT:   Head: Normocephalic and atraumatic.   Right Ear: External ear normal.   Left Ear: External ear normal.   Nose: Nose normal.   Mouth/Throat: Oropharynx is clear and moist. No oropharyngeal exudate.   Eyes: Conjunctivae, EOM and lids are normal. Lids are everted and swept, no foreign bodies found. Right eye exhibits no discharge. Left eye exhibits no discharge. No scleral icterus. Pupils are unequal.       Neck: Trachea normal, normal range of motion, full passive range of motion without pain and phonation normal. Neck supple. Normal carotid pulses, no hepatojugular reflux and no JVD present. No tracheal tenderness present. Carotid bruit is not present. No tracheal deviation present. No thyroid mass and no thyromegaly present.   Cardiovascular: Normal rate, regular rhythm, normal heart sounds, intact distal pulses and normal pulses. PMI is not displaced. Exam reveals no friction rub.   No murmur heard.  Pulmonary/Chest: Effort normal and breath sounds normal. No stridor. No apnea. No respiratory distress. He has no wheezes. He has no rales. He exhibits no tenderness.   Abdominal: Soft. Bowel sounds are normal. He exhibits no distension, no ascites and no mass. There is no hepatosplenomegaly, splenomegaly or hepatomegaly. There is no tenderness. There is no rebound, no guarding and no CVA tenderness. No hernia.   Genitourinary:   Genitourinary Comments: Not Examined   Musculoskeletal: Normal range of motion. He exhibits no edema, tenderness or deformity.   Lymphadenopathy:        Head (right side): No submental, no submandibular, no tonsillar, no preauricular, no posterior auricular and no occipital adenopathy present.        Head (left side): No submental, no submandibular, no tonsillar, no preauricular, no posterior auricular and no occipital adenopathy present.     He has no cervical adenopathy.     He has no axillary  adenopathy.        Right: No inguinal, no supraclavicular and no epitrochlear adenopathy present.        Left: No inguinal, no supraclavicular and no epitrochlear adenopathy present.   Neurological: He is alert and oriented to person, place, and time. He has normal motor skills, normal sensation, normal strength, normal reflexes and intact cranial nerves. No cranial nerve deficit. He exhibits normal muscle tone. Gait normal. Coordination normal. GCS score is 15.   Skin: Skin is warm and dry. He is not diaphoretic. No cyanosis. Nails show no clubbing.   Psychiatric: Mood, memory, affect and judgment normal.       Labs & Imaging :  10/31/19 :  NFBS 101;  Cr.107.  Ca 9.2. Bili 0.5 liver enzymes normal. eGFR >60. WBC 7,000.  Hgb 13.2; Hct 40.3. MCV 83.4. Platelets 242,000 ANC 4,530.   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  Remote Hx of Follicular Lymphoma. In Remission. 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  Patient has PICC line. [facility] for PICC maintenance.  Cycle 2 11/1/19. CBC,CMP,Uric acid on 10/30.19.  RTC 3 weeks with CBC,CMP,Uric acid. A    Advance Care Planning     Power of   I initiated the process of advance care planning today and explained the importance of this process to the patient.  I introduced the concept of advance directives to the patient, as well. Then the patient received detailed information about the importance of designating a Health Care Power of  (HCPOA). He was also instructed to communicate with this person about their wishes for future healthcare, should he become sick and lose decision-making capacity. The patient  has not: previously appointed a HCPOA. .         Living Will  During this visit, I engaged the patient in the advance care planning process.  The patient and I reviewed the role for advance directives and their purpose in directing  future healthcare if the patient's unable to speak for him/herself.  At this point in time, the patient does have full decision-making capacity.  We discussed different extreme health states that he could experience, and reviewed what kind of medical care he would want in those situations.  The patient communicated that if he were comatose and had little chance of a meaningful recovery, does not  want machines/life-sustaining treatments used.    The patient  HAS NOTcompleted a living will to reflect these preferences.       Brochures given.

## 2019-11-13 ENCOUNTER — EXTERNAL HOME HEALTH (OUTPATIENT)
Dept: HOME HEALTH SERVICES | Facility: HOSPITAL | Age: 79
End: 2019-11-13
Payer: MEDICARE

## 2019-11-18 ENCOUNTER — TELEPHONE (OUTPATIENT)
Dept: OPHTHALMOLOGY | Facility: CLINIC | Age: 79
End: 2019-11-18

## 2019-11-18 NOTE — TELEPHONE ENCOUNTER
Called pt got voice recorder left message appt scheduled for this Wednesday 11/20/19 on [place] @ 1:15.

## 2019-11-18 NOTE — TELEPHONE ENCOUNTER
----- Message from Janelle Pace sent at 11/18/2019  7:29 AM CST -----  Contact: Self  Pt made contact via [facility] Portal as follows:    Appointment Request From: Robe Cevallos    With Provider: Charles Kapoor MD [[facility] - Ophthalmology]    Preferred Date Range: 11/18/2019 - 11/29/2019    Preferred Times: Any time    Reason for visit: Existing Patient    Comments:  jose cm    Thank you.

## 2019-11-20 ENCOUNTER — PROCEDURE VISIT (OUTPATIENT)
Dept: OPHTHALMOLOGY | Facility: CLINIC | Age: 79
End: 2019-11-20
Attending: OPHTHALMOLOGY
Payer: MEDICARE

## 2019-11-20 VITALS — HEART RATE: 80 BPM | SYSTOLIC BLOOD PRESSURE: 161 MMHG | DIASTOLIC BLOOD PRESSURE: 89 MMHG

## 2019-11-20 DIAGNOSIS — H35.3211 EXUDATIVE AGE-RELATED MACULAR DEGENERATION OF RIGHT EYE WITH ACTIVE CHOROIDAL NEOVASCULARIZATION: Primary | ICD-10-CM

## 2019-11-20 DIAGNOSIS — H35.3123 NONEXUDATIVE AGE-RELATED MACULAR DEGENERATION, LEFT EYE, ADVANCED ATROPHIC WITHOUT SUBFOVEAL INVOLVEMENT: ICD-10-CM

## 2019-11-20 PROCEDURE — 92134 CPTRZ OPH DX IMG PST SGM RTA: CPT | Mod: S$GLB,,, | Performed by: OPHTHALMOLOGY

## 2019-11-20 PROCEDURE — 92014 COMPRE OPH EXAM EST PT 1/>: CPT | Mod: S$GLB,,, | Performed by: OPHTHALMOLOGY

## 2019-11-20 PROCEDURE — 92226 PR SPECIAL EYE EXAM, SUBSEQUENT: CPT | Mod: LT,S$GLB,, | Performed by: OPHTHALMOLOGY

## 2019-11-20 PROCEDURE — 92134 POSTERIOR SEGMENT OCT RETINA (OCULAR COHERENCE TOMOGRAPHY)-BOTH EYES: ICD-10-PCS | Mod: S$GLB,,, | Performed by: OPHTHALMOLOGY

## 2019-11-20 PROCEDURE — 92014 PR EYE EXAM, EST PATIENT,COMPREHESV: ICD-10-PCS | Mod: S$GLB,,, | Performed by: OPHTHALMOLOGY

## 2019-11-20 PROCEDURE — 92226 PR SPECIAL EYE EXAM, SUBSEQUENT: ICD-10-PCS | Mod: RT,S$GLB,, | Performed by: OPHTHALMOLOGY

## 2019-11-20 NOTE — PROGRESS NOTES
Subjective:       Patient ID: Robe Cevallos is a 79 y.o. male      Chief Complaint   Patient presents with    Macular Degeneration     History of Present Illness  HPI     DLS 9/4/19   Pt here for reeval.  Missed planned injections and eval because of acute   onset lymphoma and chemo Rx.      Hx: Exudative ARMD OD with choroidal neovascularization .  S/p  Avastin OD (9/4/19)    Pt is now back on Chemo treatment and feeling better.    Has some worsening distortion OD on A grid.  Va OS stable.  No f/f/pain   OU.         Gtts At's OU PRN    Last edited by Charles Kapoor MD on 11/20/2019  1:59 PM. (History)        Imaging:    See report    Assessment/Plan:     1. Exudative age-related macular degeneration of right eye with active choroidal neovascularization  Today is 11 wks since last inj.  No activity today.  Was trying for TREX  Discussed q 12 wk inj vs obs.    Pt concerned about having Rx and interfering with cancer Rx.  Sent note to Dr Carcamo  Will observe today    Discussed Dry and Wet AMD in detail  Recommend AREDS 2 Vitamins  Home Amsler Grid Testing  RTC immediately PRN any changes in vision    - Posterior Segment OCT Retina-Both eyes    2. Nonexudative age-related macular degeneration, left eye, advanced atrophic without subfoveal involvement  AREDS 2, Amsler    Follow up in about 1 month (around 12/20/2019), or if symptoms worsen or fail to improve, for Dilated examination, OCT Mac.

## 2019-11-20 NOTE — Clinical Note
Hi.  I had been treating Mr Cevallos with intravitreal Avastin for wet macular degeneration in the right eye.  He is concerned about continuing Rx and wants you to know about the treatment and let him know if it's ok to continue.  I think he needs reassurance.That being said, at this point observation is a reasonable option.  I just wanted you to be aware in the event we do need to restart treatGladys Kapoor

## 2019-11-21 NOTE — PROGRESS NOTES
Chief Complaint :  Follicular lymphoma.    Hx of Present illness :  Patient is a 79 y.o. year old male who presents to the clinic today for  Oncology followup. Has been seen at [facility] in the past. Dx'd to have Low grade , Stage 1, follicular lymphoma Right groin in 2004. Good response to R-CVP Chemotherapy and maintenance rituxan.  Recently had lesion excised from Left side of nostril. Dx'd to have  Superficially invasive squamous cell [id number]. Invasive. In Aug/swep 2019 evaluated by physician. Had  Bx of Lymph node from left neck. 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 CT scans revealed a large mediastinal mass. Patient had sifnificant sx. Transferred to [facility]. Was given emergency mediastinal radiation. Started on mini CHOP on 10/2/19.  Admitted  Two weeks  Shortness of breath. CTA chest revealed Emboli in right upper, middle and lower lobes. The  left mediastinal mass had decreased in size    Allergies :    Review of patient's allergies indicates:  No Known Allergies    Occupation :  Law enforcement.    Transfusion :  None.    Menstrual & obstetric Hx :  N/A      Present Meds :   Medication List with Changes/Refills   Current Medications    APIXABAN (ELIQUIS ORAL)    Take by mouth.    PREDNISONE (DELTASONE) 50 MG TAB    Take 2 tablets (100 mg total) by mouth once daily. For 5 days with each cycle of chemo    PYRIDOSTIGMINE (MESTINON) 60 MG TAB    Take 60 mg by mouth 3 (three) times daily.        Past Medical Hx :  Hx of Folliculoar Lymphoma. Hypertension; age related Macular degeneration;  Hypertropia right eye. No hx of DM, PUD, Hepatitis, Liver disease, thyroid dysfunction., TB, Sarcoid, Lupus, rheumatoid arthritis, seizure disorder, CVA. No Hx of DVT or Pulmonary embolism/\. No past Hx of radiation therapy.     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    Family Hx :  Family History   Problem Relation Age of Onset    No Known Problems Mother     Heart disease Father     No Known Problems Sister     No Known Problems Brother     No Known Problems Maternal Aunt     No Known Problems Maternal Uncle     No Known Problems Paternal Aunt     No Known Problems Paternal Uncle     No Known Problems Maternal Grandmother     No Known Problems Maternal Grandfather     No Known Problems Paternal Grandmother     No Known Problems Paternal Grandfather     Amblyopia Neg Hx     Blindness Neg Hx     Cataracts Neg Hx     Glaucoma Neg Hx     Macular degeneration Neg Hx     Retinal detachment Neg Hx     Strabismus Neg Hx     Cancer Neg Hx     Diabetes Neg Hx     Hypertension Neg Hx     Stroke Neg Hx     Thyroid disease Neg Hx        Social Hx :  Social History     Socioeconomic History    Marital status:      Spouse name: Not on file    Number of children: Not on file    Years of education: Not on file    Highest education level: Not on file   Occupational History    Not on file   Social Needs    Financial resource strain: Not on file    Food insecurity:     Worry: Not on file     Inability: Not on file    Transportation needs:     Medical: Not on file     Non-medical: Not on file   Tobacco Use    Smoking status: Former Smoker    Smokeless tobacco: Never Used   Substance and Sexual Activity    Alcohol use: No    Drug use: Never    Sexual activity: Not Currently     Partners: Female   Lifestyle    Physical activity:     Days per week: Not on file     Minutes per  session: Not on file    Stress: Not on file   Relationships    Social connections:     Talks on phone: Not on file     Gets together: Not on file     Attends Yarsanism service: Not on file     Active member of club or organization: Not on file     Attends meetings of clubs or organizations: Not on file     Relationship status: Not on file   Other Topics Concern    Not on file   Social History Narrative    Not on file       Surgery :  Lymph node Bx; Portacath. Bone Marrow Bx Cholecystectomy. Bilateral Cataract surgery  Excision of skin cancer from nostril.     Symptoms :    Review of Systems   Constitutional: Positive for malaise/fatigue. Negative for chills, diaphoresis, fever and weight loss.        Energy Improving. Gaining weight.  No pruritis, night sweats or evening rise in temp. Appetite improving   HENT: Positive for congestion. Negative for hearing loss, nosebleeds, sore throat and tinnitus.    Eyes: Negative for blurred vision, double vision and photophobia.        Under Care of Retina specialist for Macular degeneration   Respiratory: Negative for cough, hemoptysis and shortness of breath.    Cardiovascular: Negative for chest pain, palpitations, orthopnea, claudication, leg swelling and PND.   Gastrointestinal: Negative for abdominal pain, blood in stool, constipation, diarrhea (Moderate), heartburn, nausea and vomiting.   Genitourinary: Positive for frequency (Related to BPH). Negative for dysuria, flank pain, hematuria and urgency.   Musculoskeletal: Negative for back pain, falls, joint pain (rthritis), myalgias and neck pain.   Skin: Negative for itching and rash.   Neurological: Negative for dizziness, tingling, tremors, sensory change (Related to Chemotherapy), speech change, focal weakness, seizures, loss of consciousness, weakness and headaches.   Endo/Heme/Allergies: Negative for environmental allergies and polydipsia. Does not bruise/bleed easily.   Psychiatric/Behavioral: Positive for  depression. Negative for memory loss. The patient is nervous/anxious. The patient does not have insomnia.        Physical Exam :   Physical Exam   Constitutional: He is oriented to person, place, and time and well-developed, well-nourished, and in no distress. Vital signs are normal. No distress.   HENT:   Head: Normocephalic and atraumatic.   Right Ear: External ear normal.   Left Ear: External ear normal.   Nose: Nose normal.   Mouth/Throat: Oropharynx is clear and moist. No oropharyngeal exudate.   Eyes: Conjunctivae, EOM and lids are normal. Lids are everted and swept, no foreign bodies found. Right eye exhibits no discharge. Left eye exhibits no discharge. No scleral icterus. Pupils are unequal.       Neck: Trachea normal, normal range of motion, full passive range of motion without pain and phonation normal. Neck supple. Normal carotid pulses, no hepatojugular reflux and no JVD present. No tracheal tenderness present. Carotid bruit is not present. No tracheal deviation present. No thyroid mass and no thyromegaly present.   Cardiovascular: Normal rate, regular rhythm, normal heart sounds, intact distal pulses and normal pulses. PMI is not displaced. Exam reveals no friction rub.   No murmur heard.  Pulmonary/Chest: Effort normal and breath sounds normal. No stridor. No apnea. No respiratory distress. He has no wheezes. He has no rales. He exhibits no tenderness.   Abdominal: Soft. Bowel sounds are normal. He exhibits no distension, no ascites and no mass. There is no hepatosplenomegaly, splenomegaly or hepatomegaly. There is no tenderness. There is no rebound, no guarding and no CVA tenderness. No hernia.   Genitourinary:   Genitourinary Comments: Not Examined   Musculoskeletal: Normal range of motion. He exhibits no edema, tenderness or deformity.   Lymphadenopathy:        Head (right side): No submental, no submandibular, no tonsillar, no preauricular, no posterior auricular and no occipital adenopathy  present.        Head (left side): No submental, no submandibular, no tonsillar, no preauricular, no posterior auricular and no occipital adenopathy present.     He has no cervical adenopathy.     He has no axillary adenopathy.        Right: No inguinal, no supraclavicular and no epitrochlear adenopathy present.        Left: No inguinal, no supraclavicular and no epitrochlear adenopathy present.   Neurological: He is alert and oriented to person, place, and time. He has normal motor skills, normal sensation, normal strength, normal reflexes and intact cranial nerves. No cranial nerve deficit. He exhibits normal muscle tone. Coordination normal. GCS score is 15.   Walks with cane   Skin: Skin is warm and dry. He is not diaphoretic. No cyanosis. Nails show no clubbing.   Psychiatric: Mood, memory, affect and judgment normal.       Labs & Imaging :  11/21/19 :  NFBS 91; cr.0.94; Ca 9.8. Bioli 0.5 liver enzymes normal; eGFR 76; WBC 4,800; ANC 2,798;  Hgb 13.9; Hct 42.9. MCV 85.6;  Platelets 204,000  Uric acid 5.6    Dx :  Development of diffuse large B cell lymphoma.  Remote Hx of Follicular Lymphoma. In Remission.       Assessment & Plan: Reviewed with patient and spouse .  followup with retina specialist for  Macular degeneration. OK with Avastin therapy.  Patient has PICC line. [facility] for PICC [facility].  Cleared for Cycle 3 today. Has Rx for deltasone . CBC,CMP,Uric acid on 10/30.19.  RTC 3 weeks with CBC,CMP,Uric acid.   Advance Care Planning     Power of   I initiated the process of advance care planning today and explained the importance of this process to the patient.  I introduced the concept of advance directives to the patient, as well. Then the patient received detailed information about the importance of designating a Health Care Power of  (HCPOA). He was also instructed to communicate with this person about their wishes for future healthcare, should he become sick and lose decision-making  capacity. The patient  has not: previously appointed a HCPOA. .         Living Will  During this visit, I engaged the patient in the advance care planning process.  The patient and I reviewed the role for advance directives and their purpose in directing future healthcare if the patient's unable to speak for him/herself.  At this point in time, the patient does have full decision-making capacity.  We discussed different extreme health states that he could experience, and reviewed what kind of medical care he would want in those situations.  The patient communicated that if he were comatose and had little chance of a meaningful recovery, does not  want machines/life-sustaining treatments used.    The patient  HAS NOTcompleted a living will to reflect these preferences.       Brochures given.

## 2019-11-22 ENCOUNTER — INFUSION (OUTPATIENT)
Dept: INFUSION THERAPY | Facility: HOSPITAL | Age: 79
End: 2019-11-22
Attending: INTERNAL MEDICINE
Payer: MEDICARE

## 2019-11-22 ENCOUNTER — OFFICE VISIT (OUTPATIENT)
Dept: HEMATOLOGY/ONCOLOGY | Facility: CLINIC | Age: 79
End: 2019-11-22
Payer: MEDICARE

## 2019-11-22 VITALS
HEART RATE: 72 BPM | HEIGHT: 71 IN | BODY MASS INDEX: 25.22 KG/M2 | SYSTOLIC BLOOD PRESSURE: 153 MMHG | OXYGEN SATURATION: 98 % | DIASTOLIC BLOOD PRESSURE: 86 MMHG | WEIGHT: 180.13 LBS | TEMPERATURE: 98 F

## 2019-11-22 VITALS
DIASTOLIC BLOOD PRESSURE: 82 MMHG | TEMPERATURE: 97 F | HEART RATE: 69 BPM | SYSTOLIC BLOOD PRESSURE: 148 MMHG | RESPIRATION RATE: 18 BRPM | OXYGEN SATURATION: 98 %

## 2019-11-22 DIAGNOSIS — Z85.72 HISTORY OF FOLLICULAR LYMPHOMA: ICD-10-CM

## 2019-11-22 DIAGNOSIS — Z09 CHEMOTHERAPY FOLLOW-UP EXAMINATION: ICD-10-CM

## 2019-11-22 DIAGNOSIS — C83.32 DIFFUSE LARGE B-CELL LYMPHOMA OF INTRATHORACIC LYMPH NODES: Primary | ICD-10-CM

## 2019-11-22 PROCEDURE — 96367 TX/PROPH/DG ADDL SEQ IV INF: CPT

## 2019-11-22 PROCEDURE — 96401 CHEMO ANTI-NEOPL SQ/IM: CPT

## 2019-11-22 PROCEDURE — 1159F PR MEDICATION LIST DOCUMENTED IN MEDICAL RECORD: ICD-10-PCS | Mod: S$GLB,,, | Performed by: INTERNAL MEDICINE

## 2019-11-22 PROCEDURE — S0028 INJECTION, FAMOTIDINE, 20 MG: HCPCS | Performed by: INTERNAL MEDICINE

## 2019-11-22 PROCEDURE — 3079F DIAST BP 80-89 MM HG: CPT | Mod: CPTII,S$GLB,, | Performed by: INTERNAL MEDICINE

## 2019-11-22 PROCEDURE — 1101F PT FALLS ASSESS-DOCD LE1/YR: CPT | Mod: CPTII,S$GLB,, | Performed by: INTERNAL MEDICINE

## 2019-11-22 PROCEDURE — 1101F PR PT FALLS ASSESS DOC 0-1 FALLS W/OUT INJ PAST YR: ICD-10-PCS | Mod: CPTII,S$GLB,, | Performed by: INTERNAL MEDICINE

## 2019-11-22 PROCEDURE — A4216 STERILE WATER/SALINE, 10 ML: HCPCS | Performed by: INTERNAL MEDICINE

## 2019-11-22 PROCEDURE — 96375 TX/PRO/DX INJ NEW DRUG ADDON: CPT

## 2019-11-22 PROCEDURE — 63600175 PHARM REV CODE 636 W HCPCS: Mod: TB | Performed by: INTERNAL MEDICINE

## 2019-11-22 PROCEDURE — 1159F MED LIST DOCD IN RCRD: CPT | Mod: S$GLB,,, | Performed by: INTERNAL MEDICINE

## 2019-11-22 PROCEDURE — 3077F PR MOST RECENT SYSTOLIC BLOOD PRESSURE >= 140 MM HG: ICD-10-PCS | Mod: CPTII,S$GLB,, | Performed by: INTERNAL MEDICINE

## 2019-11-22 PROCEDURE — 25000003 PHARM REV CODE 250: Performed by: INTERNAL MEDICINE

## 2019-11-22 PROCEDURE — 3079F PR MOST RECENT DIASTOLIC BLOOD PRESSURE 80-89 MM HG: ICD-10-PCS | Mod: CPTII,S$GLB,, | Performed by: INTERNAL MEDICINE

## 2019-11-22 PROCEDURE — 99214 PR OFFICE/OUTPT VISIT, EST, LEVL IV, 30-39 MIN: ICD-10-PCS | Mod: S$GLB,,, | Performed by: INTERNAL MEDICINE

## 2019-11-22 PROCEDURE — 99999 PR PBB SHADOW E&M-EST. PATIENT-LVL III: ICD-10-PCS | Mod: PBBFAC,,, | Performed by: INTERNAL MEDICINE

## 2019-11-22 PROCEDURE — 99999 PR PBB SHADOW E&M-EST. PATIENT-LVL III: CPT | Mod: PBBFAC,,, | Performed by: INTERNAL MEDICINE

## 2019-11-22 PROCEDURE — 1126F PR PAIN SEVERITY QUANTIFIED, NO PAIN PRESENT: ICD-10-PCS | Mod: S$GLB,,, | Performed by: INTERNAL MEDICINE

## 2019-11-22 PROCEDURE — 3077F SYST BP >= 140 MM HG: CPT | Mod: CPTII,S$GLB,, | Performed by: INTERNAL MEDICINE

## 2019-11-22 PROCEDURE — 1126F AMNT PAIN NOTED NONE PRSNT: CPT | Mod: S$GLB,,, | Performed by: INTERNAL MEDICINE

## 2019-11-22 PROCEDURE — 96411 CHEMO IV PUSH ADDL DRUG: CPT

## 2019-11-22 PROCEDURE — 96413 CHEMO IV INFUSION 1 HR: CPT

## 2019-11-22 PROCEDURE — 99214 OFFICE O/P EST MOD 30 MIN: CPT | Mod: S$GLB,,, | Performed by: INTERNAL MEDICINE

## 2019-11-22 RX ORDER — SODIUM CHLORIDE 0.9 % (FLUSH) 0.9 %
10 SYRINGE (ML) INJECTION
Status: DISCONTINUED | OUTPATIENT
Start: 2019-11-22 | End: 2019-11-22 | Stop reason: HOSPADM

## 2019-11-22 RX ORDER — HEPARIN 100 UNIT/ML
500 SYRINGE INTRAVENOUS
Status: DISCONTINUED | OUTPATIENT
Start: 2019-11-22 | End: 2019-11-22 | Stop reason: HOSPADM

## 2019-11-22 RX ORDER — FAMOTIDINE 10 MG/ML
20 INJECTION INTRAVENOUS
Status: CANCELLED | OUTPATIENT
Start: 2019-11-22

## 2019-11-22 RX ORDER — FAMOTIDINE 10 MG/ML
20 INJECTION INTRAVENOUS
Status: COMPLETED | OUTPATIENT
Start: 2019-11-22 | End: 2019-11-22

## 2019-11-22 RX ORDER — DOXORUBICIN HYDROCHLORIDE 2 MG/ML
25 INJECTION, SOLUTION INTRAVENOUS
Status: COMPLETED | OUTPATIENT
Start: 2019-11-22 | End: 2019-11-22

## 2019-11-22 RX ORDER — SODIUM CHLORIDE 0.9 % (FLUSH) 0.9 %
10 SYRINGE (ML) INJECTION
Status: CANCELLED | OUTPATIENT
Start: 2019-11-22

## 2019-11-22 RX ORDER — ACETAMINOPHEN 325 MG/1
650 TABLET ORAL
Status: COMPLETED | OUTPATIENT
Start: 2019-11-22 | End: 2019-11-22

## 2019-11-22 RX ORDER — ACETAMINOPHEN 325 MG/1
650 TABLET ORAL
Status: CANCELLED | OUTPATIENT
Start: 2019-11-22

## 2019-11-22 RX ORDER — MEPERIDINE HYDROCHLORIDE 50 MG/ML
25 INJECTION INTRAMUSCULAR; INTRAVENOUS; SUBCUTANEOUS EVERY 30 MIN PRN
Status: CANCELLED | OUTPATIENT
Start: 2019-11-22

## 2019-11-22 RX ORDER — DOXORUBICIN HYDROCHLORIDE 2 MG/ML
25 INJECTION, SOLUTION INTRAVENOUS
Status: CANCELLED | OUTPATIENT
Start: 2019-11-22

## 2019-11-22 RX ORDER — HEPARIN 100 UNIT/ML
500 SYRINGE INTRAVENOUS
Status: CANCELLED | OUTPATIENT
Start: 2019-11-22

## 2019-11-22 RX ADMIN — FAMOTIDINE 20 MG: 10 INJECTION INTRAVENOUS at 11:11

## 2019-11-22 RX ADMIN — DOXORUBICIN HYDROCHLORIDE 50 MG: 2 INJECTION, SOLUTION INTRAVENOUS at 12:11

## 2019-11-22 RX ADMIN — RITUXIMAB AND HYALURONIDASE 1400 MG: 120; 2000 INJECTION, SOLUTION SUBCUTANEOUS at 12:11

## 2019-11-22 RX ADMIN — CYCLOPHOSPHAMIDE 785 MG: 1 INJECTION, POWDER, FOR SOLUTION INTRAVENOUS; ORAL at 01:11

## 2019-11-22 RX ADMIN — DEXAMETHASONE SODIUM PHOSPHATE: 10 INJECTION, SOLUTION INTRAMUSCULAR; INTRAVENOUS at 11:11

## 2019-11-22 RX ADMIN — Medication 10 ML: at 02:11

## 2019-11-22 RX ADMIN — DIPHENHYDRAMINE HYDROCHLORIDE 50 MG: 50 INJECTION INTRAMUSCULAR; INTRAVENOUS at 11:11

## 2019-11-22 RX ADMIN — VINCRISTINE SULFATE 1 MG: 1 INJECTION, SOLUTION INTRAVENOUS at 12:11

## 2019-11-22 RX ADMIN — HEPARIN 500 UNITS: 100 SYRINGE at 02:11

## 2019-11-22 RX ADMIN — ACETAMINOPHEN 650 MG: 325 TABLET ORAL at 11:11

## 2019-11-22 NOTE — PLAN OF CARE
Pt arrived to unit from Dr. Spann's office. Cleared for chemo. Pt states he has been feeling very good, has good appetite, no diarrhea or constipation.  VSS. Tolerated premeds and Rituxan Hycela. Pt monitored 15 min post and no reactions noted. Blood return noted before and after Adriamycin and Vincristine. Tolerated Cytoxan. Pt has next appts. Pt ambulated off unit, no distress noted.

## 2019-12-08 PROCEDURE — G0179 MD RECERTIFICATION HHA PT: HCPCS | Mod: ,,, | Performed by: INTERNAL MEDICINE

## 2019-12-08 PROCEDURE — G0179 PR HOME HEALTH MD RECERTIFICATION: ICD-10-PCS | Mod: ,,, | Performed by: INTERNAL MEDICINE

## 2019-12-12 NOTE — PROGRESS NOTES
Chief Complaint :  Follicular lymphoma.    Hx of Present illness :  Patient is a 79 y.o. year old male who presents to the clinic today for  Oncology followup. Has been seen at [facility] in the past. Dx'd to have Low grade , Stage 1, follicular lymphoma Right groin in 2004. Good response to R-CVP Chemotherapy and maintenance rituxan.  Recently had lesion excised from Left side of nostril. Dx'd to have  Superficially invasive squamous cell [id number]. Invasive. In Aug/swep 2019 evaluated by physician. Had  Bx of Lymph node from left neck. 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 CT scans revealed a large mediastinal mass. Patient had sifnificant sx. Transferred to [facility]. Was given emergency mediastinal radiation. Started on mini CHOP on 10/2/19.  Admitted  Two weeks  Shortness of breath. CTA chest revealed Emboli in right upper, middle and lower lobes. The  left mediastinal mass had decreased in size    Allergies :    Review of patient's allergies indicates:  No Known Allergies    Occupation :  Law enforcement.    Transfusion :  None.    Menstrual & obstetric Hx :  N/A      Present Meds :   Medication List with Changes/Refills   Current Medications    APIXABAN (ELIQUIS ORAL)    Take by mouth.    PREDNISONE (DELTASONE) 50 MG TAB    Take 2 tablets (100 mg total) by mouth once daily. For 5 days with each cycle of chemo    PYRIDOSTIGMINE (MESTINON) 60 MG TAB    Take 60 mg by mouth 3 (three) times daily.        Past Medical Hx :  Hx of Folliculoar Lymphoma. Hypertension; age related Macular degeneration;  Hypertropia right eye. No hx of DM, PUD, Hepatitis, Liver disease, thyroid dysfunction., TB, Sarcoid, Lupus, rheumatoid arthritis, seizure disorder, CVA. No Hx of DVT or Pulmonary embolism/\. No past Hx of radiation therapy.     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    Family Hx :  Family History   Problem Relation Age of Onset    No Known Problems Mother     Heart disease Father     No Known Problems Sister     No Known Problems Brother     No Known Problems Maternal Aunt     No Known Problems Maternal Uncle     No Known Problems Paternal Aunt     No Known Problems Paternal Uncle     No Known Problems Maternal Grandmother     No Known Problems Maternal Grandfather     No Known Problems Paternal Grandmother     No Known Problems Paternal Grandfather     Amblyopia Neg Hx     Blindness Neg Hx     Cataracts Neg Hx     Glaucoma Neg Hx     Macular degeneration Neg Hx     Retinal detachment Neg Hx     Strabismus Neg Hx     Cancer Neg Hx     Diabetes Neg Hx     Hypertension Neg Hx     Stroke Neg Hx     Thyroid disease Neg Hx        Social Hx :  Social History     Socioeconomic History    Marital status:      Spouse name: Not on file    Number of children: Not on file    Years of education: Not on file    Highest education level: Not on file   Occupational History    Not on file   Social Needs    Financial resource strain: Not on file    Food insecurity:     Worry: Not on file     Inability: Not on file    Transportation needs:     Medical: Not on file     Non-medical: Not on file   Tobacco Use    Smoking status: Former Smoker    Smokeless tobacco: Never Used   Substance and Sexual Activity    Alcohol use: No    Drug use: Never    Sexual activity: Not Currently     Partners: Female   Lifestyle    Physical activity:     Days per week: Not on file     Minutes per  session: Not on file    Stress: Not on file   Relationships    Social connections:     Talks on phone: Not on file     Gets together: Not on file     Attends Congregational service: Not on file     Active member of club or organization: Not on file     Attends meetings of clubs or organizations: Not on file     Relationship status: Not on file   Other Topics Concern    Not on file   Social History Narrative    Not on file       Surgery :  Lymph node Bx; Portacath. Bone Marrow Bx Cholecystectomy. Bilateral Cataract surgery  Excision of skin cancer from nostril.     Symptoms :    Review of Systems   Constitutional: Positive for malaise/fatigue. Negative for chills, diaphoresis, fever and weight loss.        Energy Improving. Gaining weight.  No pruritis, night sweats or evening rise in temp. Appetite improving   HENT: Positive for congestion. Negative for hearing loss, nosebleeds, sore throat and tinnitus.    Eyes: Negative for blurred vision, double vision and photophobia.        Under Care of Retina specialist for Macular degeneration   Respiratory: Negative for cough, hemoptysis and shortness of breath.    Cardiovascular: Negative for chest pain, palpitations, orthopnea, claudication, leg swelling and PND.   Gastrointestinal: Negative for abdominal pain, blood in stool, constipation, diarrhea (Moderate), heartburn, nausea and vomiting.   Genitourinary: Positive for frequency (Related to BPH). Negative for dysuria, flank pain, hematuria and urgency.   Musculoskeletal: Negative for back pain, falls, joint pain (rthritis), myalgias and neck pain.   Skin: Negative for itching and rash.   Neurological: Negative for dizziness, tingling, tremors, sensory change (Related to Chemotherapy), speech change, focal weakness, seizures, loss of consciousness, weakness and headaches.   Endo/Heme/Allergies: Negative for environmental allergies and polydipsia. Does not bruise/bleed easily.   Psychiatric/Behavioral: Positive for  depression. Negative for memory loss. The patient is nervous/anxious. The patient does not have insomnia.        Physical Exam :   Physical Exam   Constitutional: He is oriented to person, place, and time and well-developed, well-nourished, and in no distress. Vital signs are normal. No distress.   HENT:   Head: Normocephalic and atraumatic.   Right Ear: External ear normal.   Left Ear: External ear normal.   Nose: Nose normal.   Mouth/Throat: Oropharynx is clear and moist. No oropharyngeal exudate.   Eyes: Conjunctivae, EOM and lids are normal. Lids are everted and swept, no foreign bodies found. Right eye exhibits no discharge. Left eye exhibits no discharge. No scleral icterus. Pupils are unequal.       Neck: Trachea normal, normal range of motion, full passive range of motion without pain and phonation normal. Neck supple. Normal carotid pulses, no hepatojugular reflux and no JVD present. No tracheal tenderness present. Carotid bruit is not present. No tracheal deviation present. No thyroid mass and no thyromegaly present.   Cardiovascular: Normal rate, regular rhythm, normal heart sounds, intact distal pulses and normal pulses. PMI is not displaced. Exam reveals no friction rub.   No murmur heard.  Pulmonary/Chest: Effort normal and breath sounds normal. No stridor. No apnea. No respiratory distress. He has no wheezes. He has no rales. He exhibits no tenderness.   Abdominal: Soft. Bowel sounds are normal. He exhibits no distension, no ascites and no mass. There is no hepatosplenomegaly, splenomegaly or hepatomegaly. There is no tenderness. There is no rebound, no guarding and no CVA tenderness. No hernia.   Genitourinary:   Genitourinary Comments: Not Examined   Musculoskeletal: Normal range of motion. He exhibits no edema, tenderness or deformity.   Lymphadenopathy:        Head (right side): No submental, no submandibular, no tonsillar, no preauricular, no posterior auricular and no occipital adenopathy  present.        Head (left side): No submental, no submandibular, no tonsillar, no preauricular, no posterior auricular and no occipital adenopathy present.     He has no cervical adenopathy.     He has no axillary adenopathy.        Right: No inguinal, no supraclavicular and no epitrochlear adenopathy present.        Left: No inguinal, no supraclavicular and no epitrochlear adenopathy present.   Neurological: He is alert and oriented to person, place, and time. He has normal motor skills, normal sensation, normal strength, normal reflexes and intact cranial nerves. No cranial nerve deficit. He exhibits normal muscle tone. Coordination normal. GCS score is 15.   Walks with cane   Skin: Skin is warm and dry. He is not diaphoretic. No cyanosis. Nails show no clubbing.   Psychiatric: Mood, memory, affect and judgment normal.       Labs & Imaging :  12?11/19 :  NFBS 94; Cr.1.12; eGFR 63. Ca 9.5; Bili 0.5 Liver Enzymes normal. CBC not Done.    Dx :  Development of diffuse large B cell lymphoma.  Remote Hx of Follicular Lymphoma. In Remission.       Assessment & Plan: Reviewed with patient and spouse .    Patient has PICC line. HHS for PICC maintenance.   Has Rx for deltasone .CBC at infusion before chemoRx.   Advance Care Planning     Power of   I initiated the process of advance care planning today and explained the importance of this process to the patient.  I introduced the concept of advance directives to the patient, as well. Then the patient received detailed information about the importance of designating a Health Care Power of  (HCPOA). He was also instructed to communicate with this person about their wishes for future healthcare, should he become sick and lose decision-making capacity. The patient  has not: previously appointed a HCPOA. .         Living Will  During this visit, I engaged the patient in the advance care planning process.  The patient and I reviewed the role for advance directives  and their purpose in directing future healthcare if the patient's unable to speak for him/herself.  At this point in time, the patient does have full decision-making capacity.  We discussed different extreme health states that he could experience, and reviewed what kind of medical care he would want in those situations.  The patient communicated that if he were comatose and had little chance of a meaningful recovery, does not  want machines/life-sustaining treatments used.    The patient  HAS NOTcompleted a living will to reflect these preferences.       Brochures given.

## 2019-12-13 ENCOUNTER — OFFICE VISIT (OUTPATIENT)
Dept: HEMATOLOGY/ONCOLOGY | Facility: CLINIC | Age: 79
End: 2019-12-13
Payer: MEDICARE

## 2019-12-13 ENCOUNTER — INFUSION (OUTPATIENT)
Dept: INFUSION THERAPY | Facility: HOSPITAL | Age: 79
End: 2019-12-13
Attending: INTERNAL MEDICINE
Payer: MEDICARE

## 2019-12-13 VITALS
DIASTOLIC BLOOD PRESSURE: 80 MMHG | WEIGHT: 190.06 LBS | SYSTOLIC BLOOD PRESSURE: 161 MMHG | TEMPERATURE: 97 F | BODY MASS INDEX: 26.61 KG/M2 | HEART RATE: 75 BPM | OXYGEN SATURATION: 99 % | HEIGHT: 71 IN

## 2019-12-13 VITALS
OXYGEN SATURATION: 99 % | SYSTOLIC BLOOD PRESSURE: 147 MMHG | DIASTOLIC BLOOD PRESSURE: 76 MMHG | HEART RATE: 68 BPM | RESPIRATION RATE: 17 BRPM | TEMPERATURE: 98 F

## 2019-12-13 DIAGNOSIS — C83.32 DIFFUSE LARGE B-CELL LYMPHOMA OF INTRATHORACIC LYMPH NODES: Primary | ICD-10-CM

## 2019-12-13 DIAGNOSIS — Z09 CHEMOTHERAPY FOLLOW-UP EXAMINATION: ICD-10-CM

## 2019-12-13 LAB
ERYTHROCYTE [DISTWIDTH] IN BLOOD BY AUTOMATED COUNT: 16.2 % (ref 11.5–14.5)
HCT VFR BLD AUTO: 40.2 % (ref 40–54)
HGB BLD-MCNC: 13 G/DL (ref 14–18)
IMM GRANULOCYTES # BLD AUTO: 0.14 K/UL (ref 0–0.04)
MCH RBC QN AUTO: 27.9 PG (ref 27–31)
MCHC RBC AUTO-ENTMCNC: 32.3 G/DL (ref 32–36)
MCV RBC AUTO: 86 FL (ref 82–98)
NEUTROPHILS # BLD AUTO: 4.4 K/UL (ref 1.8–7.7)
PLATELET # BLD AUTO: 215 K/UL (ref 150–350)
PMV BLD AUTO: 10.5 FL (ref 9.2–12.9)
RBC # BLD AUTO: 4.66 M/UL (ref 4.6–6.2)
WBC # BLD AUTO: 7.32 K/UL (ref 3.9–12.7)

## 2019-12-13 PROCEDURE — 96375 TX/PRO/DX INJ NEW DRUG ADDON: CPT

## 2019-12-13 PROCEDURE — 1159F PR MEDICATION LIST DOCUMENTED IN MEDICAL RECORD: ICD-10-PCS | Mod: S$GLB,,, | Performed by: INTERNAL MEDICINE

## 2019-12-13 PROCEDURE — 85027 COMPLETE CBC AUTOMATED: CPT

## 2019-12-13 PROCEDURE — 1159F MED LIST DOCD IN RCRD: CPT | Mod: S$GLB,,, | Performed by: INTERNAL MEDICINE

## 2019-12-13 PROCEDURE — 1126F AMNT PAIN NOTED NONE PRSNT: CPT | Mod: S$GLB,,, | Performed by: INTERNAL MEDICINE

## 2019-12-13 PROCEDURE — 3079F PR MOST RECENT DIASTOLIC BLOOD PRESSURE 80-89 MM HG: ICD-10-PCS | Mod: CPTII,S$GLB,, | Performed by: INTERNAL MEDICINE

## 2019-12-13 PROCEDURE — 99214 OFFICE O/P EST MOD 30 MIN: CPT | Mod: S$GLB,,, | Performed by: INTERNAL MEDICINE

## 2019-12-13 PROCEDURE — 96401 CHEMO ANTI-NEOPL SQ/IM: CPT

## 2019-12-13 PROCEDURE — 99999 PR PBB SHADOW E&M-EST. PATIENT-LVL III: ICD-10-PCS | Mod: PBBFAC,,, | Performed by: INTERNAL MEDICINE

## 2019-12-13 PROCEDURE — 3077F PR MOST RECENT SYSTOLIC BLOOD PRESSURE >= 140 MM HG: ICD-10-PCS | Mod: CPTII,S$GLB,, | Performed by: INTERNAL MEDICINE

## 2019-12-13 PROCEDURE — 3077F SYST BP >= 140 MM HG: CPT | Mod: CPTII,S$GLB,, | Performed by: INTERNAL MEDICINE

## 2019-12-13 PROCEDURE — 1101F PT FALLS ASSESS-DOCD LE1/YR: CPT | Mod: CPTII,S$GLB,, | Performed by: INTERNAL MEDICINE

## 2019-12-13 PROCEDURE — 99999 PR PBB SHADOW E&M-EST. PATIENT-LVL III: CPT | Mod: PBBFAC,,, | Performed by: INTERNAL MEDICINE

## 2019-12-13 PROCEDURE — 36415 COLL VENOUS BLD VENIPUNCTURE: CPT

## 2019-12-13 PROCEDURE — A4216 STERILE WATER/SALINE, 10 ML: HCPCS | Performed by: INTERNAL MEDICINE

## 2019-12-13 PROCEDURE — 3079F DIAST BP 80-89 MM HG: CPT | Mod: CPTII,S$GLB,, | Performed by: INTERNAL MEDICINE

## 2019-12-13 PROCEDURE — 1126F PR PAIN SEVERITY QUANTIFIED, NO PAIN PRESENT: ICD-10-PCS | Mod: S$GLB,,, | Performed by: INTERNAL MEDICINE

## 2019-12-13 PROCEDURE — 99214 PR OFFICE/OUTPT VISIT, EST, LEVL IV, 30-39 MIN: ICD-10-PCS | Mod: S$GLB,,, | Performed by: INTERNAL MEDICINE

## 2019-12-13 PROCEDURE — 63600175 PHARM REV CODE 636 W HCPCS: Mod: JG | Performed by: INTERNAL MEDICINE

## 2019-12-13 PROCEDURE — 96413 CHEMO IV INFUSION 1 HR: CPT

## 2019-12-13 PROCEDURE — 25000003 PHARM REV CODE 250: Performed by: INTERNAL MEDICINE

## 2019-12-13 PROCEDURE — 36591 DRAW BLOOD OFF VENOUS DEVICE: CPT

## 2019-12-13 PROCEDURE — 96367 TX/PROPH/DG ADDL SEQ IV INF: CPT

## 2019-12-13 PROCEDURE — 1101F PR PT FALLS ASSESS DOC 0-1 FALLS W/OUT INJ PAST YR: ICD-10-PCS | Mod: CPTII,S$GLB,, | Performed by: INTERNAL MEDICINE

## 2019-12-13 PROCEDURE — S0028 INJECTION, FAMOTIDINE, 20 MG: HCPCS | Performed by: INTERNAL MEDICINE

## 2019-12-13 PROCEDURE — 96411 CHEMO IV PUSH ADDL DRUG: CPT

## 2019-12-13 RX ORDER — DOXORUBICIN HYDROCHLORIDE 2 MG/ML
25 INJECTION, SOLUTION INTRAVENOUS
Status: CANCELLED | OUTPATIENT
Start: 2019-12-13

## 2019-12-13 RX ORDER — HEPARIN 100 UNIT/ML
500 SYRINGE INTRAVENOUS
Status: CANCELLED | OUTPATIENT
Start: 2019-12-13

## 2019-12-13 RX ORDER — ACETAMINOPHEN 325 MG/1
650 TABLET ORAL
Status: COMPLETED | OUTPATIENT
Start: 2019-12-13 | End: 2019-12-13

## 2019-12-13 RX ORDER — MEPERIDINE HYDROCHLORIDE 50 MG/ML
25 INJECTION INTRAMUSCULAR; INTRAVENOUS; SUBCUTANEOUS EVERY 30 MIN PRN
Status: DISCONTINUED | OUTPATIENT
Start: 2019-12-13 | End: 2019-12-13 | Stop reason: HOSPADM

## 2019-12-13 RX ORDER — ACETAMINOPHEN 325 MG/1
650 TABLET ORAL
Status: CANCELLED | OUTPATIENT
Start: 2019-12-13

## 2019-12-13 RX ORDER — DOXORUBICIN HYDROCHLORIDE 2 MG/ML
25 INJECTION, SOLUTION INTRAVENOUS
Status: COMPLETED | OUTPATIENT
Start: 2019-12-13 | End: 2019-12-13

## 2019-12-13 RX ORDER — SODIUM CHLORIDE 0.9 % (FLUSH) 0.9 %
10 SYRINGE (ML) INJECTION
Status: DISCONTINUED | OUTPATIENT
Start: 2019-12-13 | End: 2019-12-13 | Stop reason: HOSPADM

## 2019-12-13 RX ORDER — SODIUM CHLORIDE 0.9 % (FLUSH) 0.9 %
10 SYRINGE (ML) INJECTION
Status: CANCELLED | OUTPATIENT
Start: 2019-12-13

## 2019-12-13 RX ORDER — FAMOTIDINE 10 MG/ML
20 INJECTION INTRAVENOUS
Status: CANCELLED | OUTPATIENT
Start: 2019-12-13

## 2019-12-13 RX ORDER — HEPARIN 100 UNIT/ML
500 SYRINGE INTRAVENOUS
Status: DISCONTINUED | OUTPATIENT
Start: 2019-12-13 | End: 2019-12-13 | Stop reason: HOSPADM

## 2019-12-13 RX ORDER — MEPERIDINE HYDROCHLORIDE 50 MG/ML
25 INJECTION INTRAMUSCULAR; INTRAVENOUS; SUBCUTANEOUS EVERY 30 MIN PRN
Status: CANCELLED | OUTPATIENT
Start: 2019-12-13

## 2019-12-13 RX ORDER — FAMOTIDINE 10 MG/ML
20 INJECTION INTRAVENOUS
Status: COMPLETED | OUTPATIENT
Start: 2019-12-13 | End: 2019-12-13

## 2019-12-13 RX ADMIN — FAMOTIDINE 20 MG: 10 INJECTION INTRAVENOUS at 12:12

## 2019-12-13 RX ADMIN — VINCRISTINE SULFATE 1 MG: 1 INJECTION, SOLUTION INTRAVENOUS at 01:12

## 2019-12-13 RX ADMIN — CYCLOPHOSPHAMIDE 830 MG: 1 INJECTION, POWDER, FOR SOLUTION INTRAVENOUS; ORAL at 01:12

## 2019-12-13 RX ADMIN — RITUXIMAB AND HYALURONIDASE 1400 MG: 120; 2000 INJECTION, SOLUTION SUBCUTANEOUS at 01:12

## 2019-12-13 RX ADMIN — HEPARIN 500 UNITS: 100 SYRINGE at 03:12

## 2019-12-13 RX ADMIN — DEXAMETHASONE SODIUM PHOSPHATE: 10 INJECTION, SOLUTION INTRAMUSCULAR; INTRAVENOUS at 01:12

## 2019-12-13 RX ADMIN — DIPHENHYDRAMINE HYDROCHLORIDE 50 MG: 50 INJECTION INTRAMUSCULAR; INTRAVENOUS at 12:12

## 2019-12-13 RX ADMIN — DOXORUBICIN HYDROCHLORIDE 52 MG: 2 INJECTION, SOLUTION INTRAVENOUS at 01:12

## 2019-12-13 RX ADMIN — ACETAMINOPHEN 650 MG: 325 TABLET ORAL at 12:12

## 2019-12-13 RX ADMIN — Medication 10 ML: at 03:12

## 2019-12-13 NOTE — PLAN OF CARE
Patient completed C4 RCHOP. Tolerated treatment well. VSS during visit. He denies any new or worsening symptoms at this time. He received discharge instructions and follow up appointments. Verbalized understanding and ambulated unassisted off unit by self. Patient in NAD at time of discharge.

## 2019-12-21 ENCOUNTER — EXTERNAL HOME HEALTH (OUTPATIENT)
Dept: HOME HEALTH SERVICES | Facility: HOSPITAL | Age: 79
End: 2019-12-21
Payer: MEDICARE

## 2020-01-02 ENCOUNTER — LAB VISIT (OUTPATIENT)
Dept: LAB | Facility: HOSPITAL | Age: 80
End: 2020-01-02
Attending: INTERNAL MEDICINE
Payer: MEDICARE

## 2020-01-02 DIAGNOSIS — C83.32 DIFFUSE LARGE B-CELL LYMPHOMA OF INTRATHORACIC LYMPH NODES: ICD-10-CM

## 2020-01-02 DIAGNOSIS — Z09 CHEMOTHERAPY FOLLOW-UP EXAMINATION: ICD-10-CM

## 2020-01-02 LAB
ALBUMIN SERPL BCP-MCNC: 4 G/DL (ref 3.5–5.2)
ALP SERPL-CCNC: 62 U/L (ref 55–135)
ALT SERPL W/O P-5'-P-CCNC: 18 U/L (ref 10–44)
ANION GAP SERPL CALC-SCNC: 8 MMOL/L (ref 8–16)
AST SERPL-CCNC: 18 U/L (ref 10–40)
BASOPHILS # BLD AUTO: 0.04 K/UL (ref 0–0.2)
BASOPHILS NFR BLD: 0.9 % (ref 0–1.9)
BILIRUB SERPL-MCNC: 0.5 MG/DL (ref 0.1–1)
BUN SERPL-MCNC: 13 MG/DL (ref 8–23)
CALCIUM SERPL-MCNC: 10 MG/DL (ref 8.7–10.5)
CHLORIDE SERPL-SCNC: 107 MMOL/L (ref 95–110)
CO2 SERPL-SCNC: 26 MMOL/L (ref 23–29)
CREAT SERPL-MCNC: 1.3 MG/DL (ref 0.5–1.4)
DIFFERENTIAL METHOD: ABNORMAL
EOSINOPHIL # BLD AUTO: 0.1 K/UL (ref 0–0.5)
EOSINOPHIL NFR BLD: 2.1 % (ref 0–8)
ERYTHROCYTE [DISTWIDTH] IN BLOOD BY AUTOMATED COUNT: 15.7 % (ref 11.5–14.5)
EST. GFR  (AFRICAN AMERICAN): 59 ML/MIN/1.73 M^2
EST. GFR  (NON AFRICAN AMERICAN): 51 ML/MIN/1.73 M^2
GLUCOSE SERPL-MCNC: 136 MG/DL (ref 70–110)
HCT VFR BLD AUTO: 42 % (ref 40–54)
HGB BLD-MCNC: 13.3 G/DL (ref 14–18)
IMM GRANULOCYTES # BLD AUTO: 0.05 K/UL (ref 0–0.04)
IMM GRANULOCYTES NFR BLD AUTO: 1.2 % (ref 0–0.5)
LYMPHOCYTES # BLD AUTO: 1 K/UL (ref 1–4.8)
LYMPHOCYTES NFR BLD: 23.8 % (ref 18–48)
MCH RBC QN AUTO: 28.1 PG (ref 27–31)
MCHC RBC AUTO-ENTMCNC: 31.7 G/DL (ref 32–36)
MCV RBC AUTO: 89 FL (ref 82–98)
MONOCYTES # BLD AUTO: 0.6 K/UL (ref 0.3–1)
MONOCYTES NFR BLD: 13.7 % (ref 4–15)
NEUTROPHILS # BLD AUTO: 2.5 K/UL (ref 1.8–7.7)
NEUTROPHILS NFR BLD: 58.3 % (ref 38–73)
NRBC BLD-RTO: 0 /100 WBC
PLATELET # BLD AUTO: 185 K/UL (ref 150–350)
PMV BLD AUTO: 11 FL (ref 9.2–12.9)
POTASSIUM SERPL-SCNC: 4.8 MMOL/L (ref 3.5–5.1)
PROT SERPL-MCNC: 6.8 G/DL (ref 6–8.4)
RBC # BLD AUTO: 4.73 M/UL (ref 4.6–6.2)
SODIUM SERPL-SCNC: 141 MMOL/L (ref 136–145)
URATE SERPL-MCNC: 6.2 MG/DL (ref 3.4–7)
WBC # BLD AUTO: 4.32 K/UL (ref 3.9–12.7)

## 2020-01-02 PROCEDURE — 80053 COMPREHEN METABOLIC PANEL: CPT

## 2020-01-02 PROCEDURE — 84550 ASSAY OF BLOOD/URIC ACID: CPT

## 2020-01-02 PROCEDURE — 85025 COMPLETE CBC W/AUTO DIFF WBC: CPT

## 2020-01-02 PROCEDURE — 36415 COLL VENOUS BLD VENIPUNCTURE: CPT

## 2020-01-02 NOTE — PROGRESS NOTES
Chief Complaint :  Follicular lymphoma.    Hx of Present illness :  Patient is a 79 y.o. year old male who presents to the clinic today for  Oncology followup. Has been seen at [facility] in the past. Dx'd to have Low grade , Stage 1, follicular lymphoma Right groin in 2004. Good response to R-CVP Chemotherapy and maintenance rituxan.  Recently had lesion excised from Left side of nostril. Dx'd to have  Superficially invasive squamous cell [id number]. Invasive. In Aug/swep 2019 evaluated by physician. Had  Bx of Lymph node from left neck. 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 CT scans revealed a large mediastinal mass. Patient had sifnificant sx. Transferred to [facility]. Was given emergency mediastinal radiation. Started on mini CHOP on 10/2/19.  Admitted  Two weeks  Shortness of breath. CTA chest revealed Emboli in right upper, middle and lower lobes. The  left mediastinal mass had decreased in size    Allergies :    Review of patient's allergies indicates:  No Known Allergies    Occupation :  Law enforcement.    Transfusion :  None.    Menstrual & obstetric Hx :  N/A      Present Meds :   Medication List with Changes/Refills   Current Medications    APIXABAN (ELIQUIS ORAL)    Take by mouth.    PREDNISONE (DELTASONE) 50 MG TAB    Take 2 tablets (100 mg total) by mouth once daily. For 5 days with each cycle of chemo    PYRIDOSTIGMINE (MESTINON) 60 MG TAB    Take 60 mg by mouth 3 (three) times daily.        Past Medical Hx :  Hx of Folliculoar Lymphoma. Hypertension; age related Macular degeneration;  Hypertropia right eye. No hx of DM, PUD, Hepatitis, Liver disease, thyroid dysfunction., TB, Sarcoid, Lupus, rheumatoid arthritis, seizure disorder, CVA. No Hx of DVT or Pulmonary embolism/\. No past Hx of radiation therapy.     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    Family Hx :  Family History   Problem Relation Age of Onset    No Known Problems Mother     Heart disease Father     No Known Problems Sister     No Known Problems Brother     No Known Problems Maternal Aunt     No Known Problems Maternal Uncle     No Known Problems Paternal Aunt     No Known Problems Paternal Uncle     No Known Problems Maternal Grandmother     No Known Problems Maternal Grandfather     No Known Problems Paternal Grandmother     No Known Problems Paternal Grandfather     Amblyopia Neg Hx     Blindness Neg Hx     Cataracts Neg Hx     Glaucoma Neg Hx     Macular degeneration Neg Hx     Retinal detachment Neg Hx     Strabismus Neg Hx     Cancer Neg Hx     Diabetes Neg Hx     Hypertension Neg Hx     Stroke Neg Hx     Thyroid disease Neg Hx        Social Hx :  Social History     Socioeconomic History    Marital status:      Spouse name: Not on file    Number of children: Not on file    Years of education: Not on file    Highest education level: Not on file   Occupational History    Not on file   Social Needs    Financial resource strain: Not on file    Food insecurity:     Worry: Not on file     Inability: Not on file    Transportation needs:     Medical: Not on file     Non-medical: Not on file   Tobacco Use    Smoking status: Former Smoker    Smokeless tobacco: Never Used   Substance and Sexual Activity    Alcohol use: No    Drug use: Never    Sexual activity: Not Currently     Partners: Female   Lifestyle    Physical activity:     Days per week: Not on file     Minutes per  session: Not on file    Stress: Not on file   Relationships    Social connections:     Talks on phone: Not on file     Gets together: Not on file     Attends [employer]ism service: Not on file     Active member of club or organization: Not on file     Attends meetings of clubs or organizations: Not on file     Relationship status: Not on file   Other Topics Concern    Not on file   Social History Narrative    Not on file       Surgery :  Lymph node Bx; Portacath. Bone Marrow Bx Cholecystectomy. Bilateral Cataract surgery  Excision of skin cancer from nostril.     Symptoms :    Review of Systems   Constitutional: Positive for malaise/fatigue. Negative for chills, diaphoresis, fever and weight loss.        Energy Improving. Gaining weight.  No pruritis, night sweats or evening rise in temp. Appetite improving   HENT: Positive for congestion. Negative for hearing loss, nosebleeds, sore throat and tinnitus.    Eyes: Negative for blurred vision, double vision and photophobia.        Under Care of Retina specialist for Macular degeneration   Respiratory: Negative for cough, hemoptysis and shortness of breath.    Cardiovascular: Negative for chest pain, palpitations, orthopnea, claudication, leg swelling and PND.   Gastrointestinal: Negative for abdominal pain, blood in stool, constipation, diarrhea (Moderate), heartburn, nausea and vomiting.   Genitourinary: Positive for frequency (Related to BPH). Negative for dysuria, flank pain, hematuria and urgency.   Musculoskeletal: Negative for back pain, falls, joint pain (rthritis), myalgias and neck pain.   Skin: Negative for itching and rash.   Neurological: Negative for dizziness, tingling, tremors, sensory change (Related to Chemotherapy), speech change, focal weakness, seizures, loss of consciousness, weakness and headaches.   Endo/Heme/Allergies: Negative for environmental allergies and polydipsia. Does not bruise/bleed easily.   Psychiatric/Behavioral: Positive for  depression. Negative for memory loss. The patient is nervous/anxious. The patient does not have insomnia.        Physical Exam :   Physical Exam   Constitutional: He is oriented to person, place, and time and well-developed, well-nourished, and in no distress. Vital signs are normal. No distress.   HENT:   Head: Normocephalic and atraumatic.   Right Ear: External ear normal.   Left Ear: External ear normal.   Nose: Nose normal.   Mouth/Throat: Oropharynx is clear and moist. No oropharyngeal exudate.   Eyes: Conjunctivae, EOM and lids are normal. Lids are everted and swept, no foreign bodies found. Right eye exhibits no discharge. Left eye exhibits no discharge. No scleral icterus. Pupils are unequal.       Neck: Trachea normal, normal range of motion, full passive range of motion without pain and phonation normal. Neck supple. Normal carotid pulses, no hepatojugular reflux and no JVD present. No tracheal tenderness present. Carotid bruit is not present. No tracheal deviation present. No thyroid mass and no thyromegaly present.   Cardiovascular: Normal rate, regular rhythm, normal heart sounds, intact distal pulses and normal pulses. PMI is not displaced. Exam reveals no friction rub.   No murmur heard.  Pulmonary/Chest: Effort normal and breath sounds normal. No stridor. No apnea. No respiratory distress. He has no wheezes. He has no rales. He exhibits no tenderness.   Abdominal: Soft. Bowel sounds are normal. He exhibits no distension, no ascites and no mass. There is no hepatosplenomegaly, splenomegaly or hepatomegaly. There is no tenderness. There is no rebound, no guarding and no CVA tenderness. No hernia.   Genitourinary:   Genitourinary Comments: Not Examined   Musculoskeletal: Normal range of motion. He exhibits no edema, tenderness or deformity.   Lymphadenopathy:        Head (right side): No submental, no submandibular, no tonsillar, no preauricular, no posterior auricular and no occipital adenopathy  present.        Head (left side): No submental, no submandibular, no tonsillar, no preauricular, no posterior auricular and no occipital adenopathy present.     He has no cervical adenopathy.     He has no axillary adenopathy.        Right: No inguinal, no supraclavicular and no epitrochlear adenopathy present.        Left: No inguinal, no supraclavicular and no epitrochlear adenopathy present.   Neurological: He is alert and oriented to person, place, and time. He has normal motor skills, normal sensation, normal strength, normal reflexes and intact cranial nerves. No cranial nerve deficit. He exhibits normal muscle tone. Coordination normal. GCS score is 15.   Walks with cane   Skin: Skin is warm and dry. He is not diaphoretic. No cyanosis. Nails show no clubbing.   Psychiatric: Mood, memory, affect and judgment normal.     Labs & Imaging :  01/2/20 :  Uric acid 6.2 NFBS 136; Cr.1.3;  Ca 10.0; Bili 0.5. eGFR 51 Hgb 13.3; hct 42; MCV 89; Platelets 185,000 ANC 2,500.    Dx :   Chemotherapy follow up.  Diffuse large B cell lymphoma.  Remote Hx of Follicular Lymphoma. In Remission.  RTC 3 weeks  CBC,CMP.      Assessment & Plan: Reviewed with patient and spouse .    Patient has PICC line. HHS for PICC maintenance.   Has Rx for deltasone .CBC at infusion before chemoRx.   Advance Care Planning     Power of   I initiated the process of advance care planning today and explained the importance of this process to the patient.  I introduced the concept of advance directives to the patient, as well. Then the patient received detailed information about the importance of designating a Health Care Power of  (HCPOA). He was also instructed to communicate with this person about their wishes for future healthcare, should he become sick and lose decision-making capacity. The patient  has not: previously appointed a HCPOA. .         Living Will  During this visit, I engaged the patient in the advance care planning  process.  The patient and I reviewed the role for advance directives and their purpose in directing future healthcare if the patient's unable to speak for him/herself.  At this point in time, the patient does have full decision-making capacity.  We discussed different extreme health states that he could experience, and reviewed what kind of medical care he would want in those situations.  The patient communicated that if he were comatose and had little chance of a meaningful recovery, does not  want machines/life-sustaining treatments used.    The patient  HAS NOTcompleted a living will to reflect these preferences.       Brochures given.

## 2020-01-03 ENCOUNTER — INFUSION (OUTPATIENT)
Dept: INFUSION THERAPY | Facility: HOSPITAL | Age: 80
End: 2020-01-03
Attending: INTERNAL MEDICINE
Payer: MEDICARE

## 2020-01-03 ENCOUNTER — OFFICE VISIT (OUTPATIENT)
Dept: HEMATOLOGY/ONCOLOGY | Facility: CLINIC | Age: 80
End: 2020-01-03
Payer: MEDICARE

## 2020-01-03 VITALS
SYSTOLIC BLOOD PRESSURE: 175 MMHG | OXYGEN SATURATION: 100 % | TEMPERATURE: 98 F | DIASTOLIC BLOOD PRESSURE: 88 MMHG | RESPIRATION RATE: 18 BRPM | HEART RATE: 72 BPM

## 2020-01-03 VITALS
SYSTOLIC BLOOD PRESSURE: 158 MMHG | WEIGHT: 188.5 LBS | OXYGEN SATURATION: 97 % | TEMPERATURE: 99 F | DIASTOLIC BLOOD PRESSURE: 80 MMHG | BODY MASS INDEX: 26.39 KG/M2 | HEART RATE: 71 BPM | HEIGHT: 71 IN

## 2020-01-03 DIAGNOSIS — C83.32 DIFFUSE LARGE B-CELL LYMPHOMA OF INTRATHORACIC LYMPH NODES: Primary | ICD-10-CM

## 2020-01-03 DIAGNOSIS — Z09 CHEMOTHERAPY FOLLOW-UP EXAMINATION: ICD-10-CM

## 2020-01-03 PROCEDURE — 96411 CHEMO IV PUSH ADDL DRUG: CPT

## 2020-01-03 PROCEDURE — 3077F SYST BP >= 140 MM HG: CPT | Mod: CPTII,S$GLB,, | Performed by: INTERNAL MEDICINE

## 2020-01-03 PROCEDURE — 96367 TX/PROPH/DG ADDL SEQ IV INF: CPT

## 2020-01-03 PROCEDURE — 1126F AMNT PAIN NOTED NONE PRSNT: CPT | Mod: S$GLB,,, | Performed by: INTERNAL MEDICINE

## 2020-01-03 PROCEDURE — 1159F PR MEDICATION LIST DOCUMENTED IN MEDICAL RECORD: ICD-10-PCS | Mod: S$GLB,,, | Performed by: INTERNAL MEDICINE

## 2020-01-03 PROCEDURE — 3079F DIAST BP 80-89 MM HG: CPT | Mod: CPTII,S$GLB,, | Performed by: INTERNAL MEDICINE

## 2020-01-03 PROCEDURE — S0028 INJECTION, FAMOTIDINE, 20 MG: HCPCS | Performed by: INTERNAL MEDICINE

## 2020-01-03 PROCEDURE — 99214 OFFICE O/P EST MOD 30 MIN: CPT | Mod: S$GLB,,, | Performed by: INTERNAL MEDICINE

## 2020-01-03 PROCEDURE — 1126F PR PAIN SEVERITY QUANTIFIED, NO PAIN PRESENT: ICD-10-PCS | Mod: S$GLB,,, | Performed by: INTERNAL MEDICINE

## 2020-01-03 PROCEDURE — A4216 STERILE WATER/SALINE, 10 ML: HCPCS | Performed by: INTERNAL MEDICINE

## 2020-01-03 PROCEDURE — 99214 PR OFFICE/OUTPT VISIT, EST, LEVL IV, 30-39 MIN: ICD-10-PCS | Mod: S$GLB,,, | Performed by: INTERNAL MEDICINE

## 2020-01-03 PROCEDURE — 96401 CHEMO ANTI-NEOPL SQ/IM: CPT

## 2020-01-03 PROCEDURE — 3079F PR MOST RECENT DIASTOLIC BLOOD PRESSURE 80-89 MM HG: ICD-10-PCS | Mod: CPTII,S$GLB,, | Performed by: INTERNAL MEDICINE

## 2020-01-03 PROCEDURE — 3077F PR MOST RECENT SYSTOLIC BLOOD PRESSURE >= 140 MM HG: ICD-10-PCS | Mod: CPTII,S$GLB,, | Performed by: INTERNAL MEDICINE

## 2020-01-03 PROCEDURE — 96375 TX/PRO/DX INJ NEW DRUG ADDON: CPT

## 2020-01-03 PROCEDURE — 96413 CHEMO IV INFUSION 1 HR: CPT

## 2020-01-03 PROCEDURE — 99999 PR PBB SHADOW E&M-EST. PATIENT-LVL III: ICD-10-PCS | Mod: PBBFAC,,, | Performed by: INTERNAL MEDICINE

## 2020-01-03 PROCEDURE — 1101F PR PT FALLS ASSESS DOC 0-1 FALLS W/OUT INJ PAST YR: ICD-10-PCS | Mod: CPTII,S$GLB,, | Performed by: INTERNAL MEDICINE

## 2020-01-03 PROCEDURE — 63600175 PHARM REV CODE 636 W HCPCS: Mod: TB | Performed by: INTERNAL MEDICINE

## 2020-01-03 PROCEDURE — 99999 PR PBB SHADOW E&M-EST. PATIENT-LVL III: CPT | Mod: PBBFAC,,, | Performed by: INTERNAL MEDICINE

## 2020-01-03 PROCEDURE — 1159F MED LIST DOCD IN RCRD: CPT | Mod: S$GLB,,, | Performed by: INTERNAL MEDICINE

## 2020-01-03 PROCEDURE — 25000003 PHARM REV CODE 250: Performed by: INTERNAL MEDICINE

## 2020-01-03 PROCEDURE — 1101F PT FALLS ASSESS-DOCD LE1/YR: CPT | Mod: CPTII,S$GLB,, | Performed by: INTERNAL MEDICINE

## 2020-01-03 RX ORDER — HEPARIN 100 UNIT/ML
500 SYRINGE INTRAVENOUS
Status: DISCONTINUED | OUTPATIENT
Start: 2020-01-03 | End: 2020-01-03 | Stop reason: HOSPADM

## 2020-01-03 RX ORDER — DOXORUBICIN HYDROCHLORIDE 2 MG/ML
25 INJECTION, SOLUTION INTRAVENOUS
Status: COMPLETED | OUTPATIENT
Start: 2020-01-03 | End: 2020-01-03

## 2020-01-03 RX ORDER — SODIUM CHLORIDE 0.9 % (FLUSH) 0.9 %
10 SYRINGE (ML) INJECTION
Status: CANCELLED | OUTPATIENT
Start: 2020-01-03

## 2020-01-03 RX ORDER — DOXORUBICIN HYDROCHLORIDE 2 MG/ML
25 INJECTION, SOLUTION INTRAVENOUS
Status: CANCELLED | OUTPATIENT
Start: 2020-01-03

## 2020-01-03 RX ORDER — FAMOTIDINE 10 MG/ML
20 INJECTION INTRAVENOUS
Status: COMPLETED | OUTPATIENT
Start: 2020-01-03 | End: 2020-01-03

## 2020-01-03 RX ORDER — HEPARIN 100 UNIT/ML
500 SYRINGE INTRAVENOUS
Status: CANCELLED | OUTPATIENT
Start: 2020-01-03

## 2020-01-03 RX ORDER — MEPERIDINE HYDROCHLORIDE 50 MG/ML
25 INJECTION INTRAMUSCULAR; INTRAVENOUS; SUBCUTANEOUS EVERY 30 MIN PRN
Status: CANCELLED | OUTPATIENT
Start: 2020-01-03

## 2020-01-03 RX ORDER — ACETAMINOPHEN 325 MG/1
650 TABLET ORAL
Status: COMPLETED | OUTPATIENT
Start: 2020-01-03 | End: 2020-01-03

## 2020-01-03 RX ORDER — SODIUM CHLORIDE 0.9 % (FLUSH) 0.9 %
10 SYRINGE (ML) INJECTION
Status: DISCONTINUED | OUTPATIENT
Start: 2020-01-03 | End: 2020-01-03 | Stop reason: HOSPADM

## 2020-01-03 RX ORDER — FAMOTIDINE 10 MG/ML
20 INJECTION INTRAVENOUS
Status: CANCELLED | OUTPATIENT
Start: 2020-01-03

## 2020-01-03 RX ORDER — MEPERIDINE HYDROCHLORIDE 50 MG/ML
25 INJECTION INTRAMUSCULAR; INTRAVENOUS; SUBCUTANEOUS EVERY 30 MIN PRN
Status: DISCONTINUED | OUTPATIENT
Start: 2020-01-03 | End: 2020-01-03 | Stop reason: HOSPADM

## 2020-01-03 RX ORDER — ACETAMINOPHEN 325 MG/1
650 TABLET ORAL
Status: CANCELLED | OUTPATIENT
Start: 2020-01-03

## 2020-01-03 RX ADMIN — DEXAMETHASONE SODIUM PHOSPHATE: 10 INJECTION, SOLUTION INTRAMUSCULAR; INTRAVENOUS at 11:01

## 2020-01-03 RX ADMIN — FAMOTIDINE 20 MG: 10 INJECTION INTRAVENOUS at 11:01

## 2020-01-03 RX ADMIN — HEPARIN 500 UNITS: 100 SYRINGE at 02:01

## 2020-01-03 RX ADMIN — Medication 10 ML: at 02:01

## 2020-01-03 RX ADMIN — CYCLOPHOSPHAMIDE 830 MG: 1 INJECTION, POWDER, FOR SOLUTION INTRAVENOUS; ORAL at 12:01

## 2020-01-03 RX ADMIN — RITUXIMAB AND HYALURONIDASE 1400 MG: 120; 2000 INJECTION, SOLUTION SUBCUTANEOUS at 11:01

## 2020-01-03 RX ADMIN — ACETAMINOPHEN 650 MG: 325 TABLET ORAL at 11:01

## 2020-01-03 RX ADMIN — DIPHENHYDRAMINE HYDROCHLORIDE 50 MG: 50 INJECTION INTRAMUSCULAR; INTRAVENOUS at 11:01

## 2020-01-03 RX ADMIN — VINCRISTINE SULFATE 1 MG: 1 INJECTION, SOLUTION INTRAVENOUS at 12:01

## 2020-01-03 RX ADMIN — DOXORUBICIN HYDROCHLORIDE 52 MG: 2 INJECTION, SOLUTION INTRAVENOUS at 12:01

## 2020-01-03 NOTE — PLAN OF CARE
Pt arrived to unit from Dr. Spann's office. Cleared for chemo. BP elevated, pt talking about recent death of his daughter. Pt calm. PICC dressing dry and intact. Tolerated Rituxan hycela to left abd. Tolerated premeds. Blood return noted before and after Adriamycin and Vincristine. Tolerated Cytoxan. Pt has next appts. Pt ambulated off unit. No distress noted.

## 2020-01-21 ENCOUNTER — LAB VISIT (OUTPATIENT)
Dept: LAB | Facility: HOSPITAL | Age: 80
End: 2020-01-21
Attending: INTERNAL MEDICINE
Payer: MEDICARE

## 2020-01-21 DIAGNOSIS — C83.32 DIFFUSE LARGE B-CELL LYMPHOMA OF INTRATHORACIC LYMPH NODES: ICD-10-CM

## 2020-01-21 DIAGNOSIS — Z09 CHEMOTHERAPY FOLLOW-UP EXAMINATION: ICD-10-CM

## 2020-01-21 LAB
ALBUMIN SERPL BCP-MCNC: 4 G/DL (ref 3.5–5.2)
ALP SERPL-CCNC: 67 U/L (ref 55–135)
ALT SERPL W/O P-5'-P-CCNC: 22 U/L (ref 10–44)
ANION GAP SERPL CALC-SCNC: 9 MMOL/L (ref 8–16)
AST SERPL-CCNC: 19 U/L (ref 10–40)
BASOPHILS # BLD AUTO: 0.03 K/UL (ref 0–0.2)
BASOPHILS NFR BLD: 0.8 % (ref 0–1.9)
BILIRUB SERPL-MCNC: 0.5 MG/DL (ref 0.1–1)
BUN SERPL-MCNC: 13 MG/DL (ref 8–23)
CALCIUM SERPL-MCNC: 9.6 MG/DL (ref 8.7–10.5)
CHLORIDE SERPL-SCNC: 105 MMOL/L (ref 95–110)
CO2 SERPL-SCNC: 26 MMOL/L (ref 23–29)
CREAT SERPL-MCNC: 1.2 MG/DL (ref 0.5–1.4)
DIFFERENTIAL METHOD: ABNORMAL
EOSINOPHIL # BLD AUTO: 0.1 K/UL (ref 0–0.5)
EOSINOPHIL NFR BLD: 1.4 % (ref 0–8)
ERYTHROCYTE [DISTWIDTH] IN BLOOD BY AUTOMATED COUNT: 15.4 % (ref 11.5–14.5)
EST. GFR  (AFRICAN AMERICAN): >60 ML/MIN/1.73 M^2
EST. GFR  (NON AFRICAN AMERICAN): 56 ML/MIN/1.73 M^2
GLUCOSE SERPL-MCNC: 103 MG/DL (ref 70–110)
HCT VFR BLD AUTO: 39.7 % (ref 40–54)
HGB BLD-MCNC: 12.7 G/DL (ref 14–18)
IMM GRANULOCYTES # BLD AUTO: 0.07 K/UL (ref 0–0.04)
IMM GRANULOCYTES NFR BLD AUTO: 1.9 % (ref 0–0.5)
LYMPHOCYTES # BLD AUTO: 1.1 K/UL (ref 1–4.8)
LYMPHOCYTES NFR BLD: 28.8 % (ref 18–48)
MCH RBC QN AUTO: 28.2 PG (ref 27–31)
MCHC RBC AUTO-ENTMCNC: 32 G/DL (ref 32–36)
MCV RBC AUTO: 88 FL (ref 82–98)
MONOCYTES # BLD AUTO: 0.7 K/UL (ref 0.3–1)
MONOCYTES NFR BLD: 19.8 % (ref 4–15)
NEUTROPHILS # BLD AUTO: 1.7 K/UL (ref 1.8–7.7)
NEUTROPHILS NFR BLD: 47.3 % (ref 38–73)
NRBC BLD-RTO: 0 /100 WBC
PLATELET # BLD AUTO: 176 K/UL (ref 150–350)
PMV BLD AUTO: 10.8 FL (ref 9.2–12.9)
POTASSIUM SERPL-SCNC: 4.5 MMOL/L (ref 3.5–5.1)
PROT SERPL-MCNC: 6.6 G/DL (ref 6–8.4)
RBC # BLD AUTO: 4.51 M/UL (ref 4.6–6.2)
SODIUM SERPL-SCNC: 140 MMOL/L (ref 136–145)
WBC # BLD AUTO: 3.68 K/UL (ref 3.9–12.7)

## 2020-01-21 PROCEDURE — 85025 COMPLETE CBC W/AUTO DIFF WBC: CPT

## 2020-01-21 PROCEDURE — 80053 COMPREHEN METABOLIC PANEL: CPT

## 2020-01-21 PROCEDURE — 36415 COLL VENOUS BLD VENIPUNCTURE: CPT

## 2020-01-21 NOTE — PROGRESS NOTES
Chief Complaint :  Follicular lymphoma.    Hx of Present illness :  Patient is a 79 y.o. year old male who presents to the clinic today for  Oncology followup. Has been seen at [facility] in the past. Dx'd to have Low grade , Stage 1, follicular lymphoma Right groin in 2004. Good response to R-CVP Chemotherapy and maintenance rituxan.  Recently had lesion excised from Left side of nostril. Dx'd to have  Superficially invasive squamous cell [id number]. Invasive. In Aug/swep 2019 evaluated by physician. Had  Bx of Lymph node from left neck. 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 CT scans revealed a large mediastinal mass. Patient had sifnificant sx. Transferred to [facility]. Was given emergency mediastinal radiation. Started on mini CHOP on 10/2/19.  Admitted  Six  weeks  Shortness of breath. CTA chest revealed Emboli in right upper, middle and lower lobes. The  left mediastinal mass had decreased in size. Has completed cycle 5.     Allergies :    Review of patient's allergies indicates:  No Known Allergies    Occupation :  Law enforcement.    Transfusion :  None.    Menstrual & obstetric Hx :  N/A      Present Meds :   Medication List with Changes/Refills   Current Medications    APIXABAN (ELIQUIS ORAL)    Take by mouth.    PREDNISONE (DELTASONE) 50 MG TAB    Take 2 tablets (100 mg total) by mouth once daily. For 5 days with each cycle of chemo    PYRIDOSTIGMINE (MESTINON) 60 MG TAB    Take 60 mg by mouth 3 (three) times daily.        Past Medical Hx :  Hx of Folliculoar Lymphoma. Hypertension; age related Macular degeneration;  Hypertropia right eye. No hx of DM, PUD, Hepatitis, Liver disease, thyroid dysfunction., TB, Sarcoid, Lupus, rheumatoid arthritis, seizure disorder, CVA. No Hx of DVT or Pulmonary embolism/\. No past Hx of radiation therapy.     Past Medical Hx :  Past Medical History:   Diagnosis Date    AMD (age-related macular degeneration), bilateral     Cancer 2004,2019    Lymphoma    Decreased  appetite 09/2019    Hypertension     Hypertropia of right eye 12/7/2017    Mass in chest 09/2019    Myasthenia gravis     Requires assistance with activities of daily living (ADL)     Unintended weight loss 09/2019    Unsteady gait     Wheelchair dependence        Travel Hx :   N/A    Immunization :  There is no immunization history for the selected administration types on file for this patient.    Family Hx :  Family History   Problem Relation Age of Onset    No Known Problems Mother     Heart disease Father     No Known Problems Sister     No Known Problems Brother     No Known Problems Maternal Aunt     No Known Problems Maternal Uncle     No Known Problems Paternal Aunt     No Known Problems Paternal Uncle     No Known Problems Maternal Grandmother     No Known Problems Maternal Grandfather     No Known Problems Paternal Grandmother     No Known Problems Paternal Grandfather     Amblyopia Neg Hx     Blindness Neg Hx     Cataracts Neg Hx     Glaucoma Neg Hx     Macular degeneration Neg Hx     Retinal detachment Neg Hx     Strabismus Neg Hx     Cancer Neg Hx     Diabetes Neg Hx     Hypertension Neg Hx     Stroke Neg Hx     Thyroid disease Neg Hx        Social Hx :  Social History     Socioeconomic History    Marital status:      Spouse name: Not on file    Number of children: Not on file    Years of education: Not on file    Highest education level: Not on file   Occupational History    Not on file   Social Needs    Financial resource strain: Not on file    Food insecurity:     Worry: Not on file     Inability: Not on file    Transportation needs:     Medical: Not on file     Non-medical: Not on file   Tobacco Use    Smoking status: Former Smoker    Smokeless tobacco: Never Used   Substance and Sexual Activity    Alcohol use: No    Drug use: Never    Sexual activity: Not Currently     Partners: Female   Lifestyle    Physical activity:     Days per week: Not on  file     Minutes per session: Not on file    Stress: Not on file   Relationships    Social connections:     Talks on phone: Not on file     Gets together: Not on file     Attends Druze service: Not on file     Active member of club or organization: Not on file     Attends meetings of clubs or organizations: Not on file     Relationship status: Not on file   Other Topics Concern    Not on file   Social History Narrative    Not on file       Surgery :  Lymph node Bx; Portacath. Bone Marrow Bx Cholecystectomy. Bilateral Cataract surgery  Excision of skin cancer from nostril.     Symptoms :    Review of Systems   Constitutional: Positive for malaise/fatigue. Negative for chills, diaphoresis, fever and weight loss.        Energy Improving. Gaining weight.  No pruritis, night sweats or evening rise in temp. Appetite improving   HENT: Negative for congestion, hearing loss, nosebleeds, sore throat and tinnitus.    Eyes: Negative for blurred vision, double vision and photophobia.        Under Care of Retina specialist for Macular degeneration   Respiratory: Negative for cough, hemoptysis and shortness of breath.    Cardiovascular: Negative for chest pain, palpitations, orthopnea, claudication, leg swelling and PND.   Gastrointestinal: Negative for abdominal pain, blood in stool, constipation, diarrhea (Moderate), heartburn, nausea and vomiting.   Genitourinary: Positive for frequency (Related to BPH). Negative for dysuria, flank pain, hematuria and urgency.   Musculoskeletal: Negative for back pain, falls, joint pain (rthritis), myalgias and neck pain.   Skin: Negative for itching and rash.   Neurological: Negative for dizziness, tingling, tremors, sensory change (Related to Chemotherapy), speech change, focal weakness, seizures, loss of consciousness, weakness and headaches.   Endo/Heme/Allergies: Negative for environmental allergies and polydipsia. Does not bruise/bleed easily.   Psychiatric/Behavioral: Positive  for depression. Negative for memory loss. The patient is nervous/anxious. The patient does not have insomnia.         About 4 weeks ago, his daughter was killed in a car accident.       Physical Exam :   Wife present in the room.  Physical Exam   Constitutional: He is oriented to person, place, and time and well-developed, well-nourished, and in no distress. Vital signs are normal. No distress.   HENT:   Head: Normocephalic and atraumatic.   Right Ear: External ear normal.   Left Ear: External ear normal.   Nose: Nose normal.   Mouth/Throat: Oropharynx is clear and moist. No oropharyngeal exudate.   Eyes: Conjunctivae, EOM and lids are normal. Lids are everted and swept, no foreign bodies found. Right eye exhibits no discharge. Left eye exhibits no discharge. No scleral icterus. Pupils are unequal.       Neck: Trachea normal, normal range of motion, full passive range of motion without pain and phonation normal. Neck supple. Normal carotid pulses, no hepatojugular reflux and no JVD present. No tracheal tenderness present. Carotid bruit is not present. No tracheal deviation present. No thyroid mass and no thyromegaly present.   Cardiovascular: Normal rate, regular rhythm, normal heart sounds, intact distal pulses and normal pulses. PMI is not displaced. Exam reveals no friction rub.   No murmur heard.  Pulmonary/Chest: Effort normal and breath sounds normal. No stridor. No apnea. No respiratory distress. He has no wheezes. He has no rales. He exhibits no tenderness.   Abdominal: Soft. Bowel sounds are normal. He exhibits no distension, no ascites and no mass. There is no hepatosplenomegaly, splenomegaly or hepatomegaly. There is no tenderness. There is no rebound, no guarding and no CVA tenderness. No hernia.   Genitourinary:   Genitourinary Comments: Not Examined   Musculoskeletal: Normal range of motion. He exhibits no edema, tenderness or deformity.   Lymphadenopathy:        Head (right side): No submental, no  submandibular, no tonsillar, no preauricular, no posterior auricular and no occipital adenopathy present.        Head (left side): No submental, no submandibular, no tonsillar, no preauricular, no posterior auricular and no occipital adenopathy present.     He has no cervical adenopathy.     He has no axillary adenopathy.        Right: No inguinal, no supraclavicular and no epitrochlear adenopathy present.        Left: No inguinal, no supraclavicular and no epitrochlear adenopathy present.   Neurological: He is alert and oriented to person, place, and time. He has normal motor skills, normal sensation, normal strength, normal reflexes and intact cranial nerves. No cranial nerve deficit. He exhibits normal muscle tone. Coordination normal. GCS score is 15.   Walks with cane   Skin: Skin is warm and dry. He is not diaphoretic. No cyanosis. Nails show no clubbing.   Psychiatric: Mood, memory, affect and judgment normal.     Labs & Imaging :  01/21/2020 : NFBS 103; cr.1.2; eGFR 56; Ca 9.6; bili 0.5 Liver enzymes normal.  Hgb 12.7; hct 39.7; MCV 88; platelets 176,000 ANC 1,700.     Dx :   Chemotherapy follow up.  Diffuse large B cell lymphoma.  Remote Hx of Follicular Lymphoma. In Remission.        Assessment & Plan: Reviewed with patient and spouse .  Cleared for cycle 6. RTC 4 weeks with CBC,CMP,LDH     Advance Care Planning     Power of   I initiated the process of advance care planning today and explained the importance of this process to the patient.  I introduced the concept of advance directives to the patient, as well. Then the patient received detailed information about the importance of designating a Health Care Power of  (HCPOA). He was also instructed to communicate with this person about their wishes for future healthcare, should he become sick and lose decision-making capacity. The patient  has not: previously appointed a HCPOA. .         Living Will  During this visit, I engaged the patient  in the advance care planning process.  The patient and I reviewed the role for advance directives and their purpose in directing future healthcare if the patient's unable to speak for him/herself.  At this point in time, the patient does have full decision-making capacity.  We discussed different extreme health states that he could experience, and reviewed what kind of medical care he would want in those situations.  The patient communicated that if he were comatose and had little chance of a meaningful recovery, does not  want machines/life-sustaining treatments used.    The patient  HAS NOTcompleted a living will to reflect these preferences.       Brochures given.

## 2020-01-23 ENCOUNTER — OFFICE VISIT (OUTPATIENT)
Dept: HEMATOLOGY/ONCOLOGY | Facility: CLINIC | Age: 80
End: 2020-01-23
Payer: MEDICARE

## 2020-01-23 VITALS
SYSTOLIC BLOOD PRESSURE: 154 MMHG | HEART RATE: 80 BPM | OXYGEN SATURATION: 98 % | WEIGHT: 194.44 LBS | BODY MASS INDEX: 27.22 KG/M2 | TEMPERATURE: 98 F | DIASTOLIC BLOOD PRESSURE: 75 MMHG | HEIGHT: 71 IN

## 2020-01-23 DIAGNOSIS — I26.99 PULMONARY EMBOLISM WITHOUT ACUTE COR PULMONALE, UNSPECIFIED CHRONICITY, UNSPECIFIED PULMONARY EMBOLISM TYPE: ICD-10-CM

## 2020-01-23 DIAGNOSIS — Z09 CHEMOTHERAPY FOLLOW-UP EXAMINATION: ICD-10-CM

## 2020-01-23 DIAGNOSIS — C83.32 DIFFUSE LARGE B-CELL LYMPHOMA OF INTRATHORACIC LYMPH NODES: Primary | ICD-10-CM

## 2020-01-23 DIAGNOSIS — Z85.72 HISTORY OF FOLLICULAR LYMPHOMA: ICD-10-CM

## 2020-01-23 PROCEDURE — 99214 OFFICE O/P EST MOD 30 MIN: CPT | Mod: S$GLB,,, | Performed by: INTERNAL MEDICINE

## 2020-01-23 PROCEDURE — 99999 PR PBB SHADOW E&M-EST. PATIENT-LVL III: CPT | Mod: PBBFAC,,, | Performed by: INTERNAL MEDICINE

## 2020-01-23 PROCEDURE — 99999 PR PBB SHADOW E&M-EST. PATIENT-LVL III: ICD-10-PCS | Mod: PBBFAC,,, | Performed by: INTERNAL MEDICINE

## 2020-01-23 PROCEDURE — 1126F AMNT PAIN NOTED NONE PRSNT: CPT | Mod: S$GLB,,, | Performed by: INTERNAL MEDICINE

## 2020-01-23 PROCEDURE — 1159F MED LIST DOCD IN RCRD: CPT | Mod: S$GLB,,, | Performed by: INTERNAL MEDICINE

## 2020-01-23 PROCEDURE — 1126F PR PAIN SEVERITY QUANTIFIED, NO PAIN PRESENT: ICD-10-PCS | Mod: S$GLB,,, | Performed by: INTERNAL MEDICINE

## 2020-01-23 PROCEDURE — 3078F PR MOST RECENT DIASTOLIC BLOOD PRESSURE < 80 MM HG: ICD-10-PCS | Mod: CPTII,S$GLB,, | Performed by: INTERNAL MEDICINE

## 2020-01-23 PROCEDURE — 3078F DIAST BP <80 MM HG: CPT | Mod: CPTII,S$GLB,, | Performed by: INTERNAL MEDICINE

## 2020-01-23 PROCEDURE — 99214 PR OFFICE/OUTPT VISIT, EST, LEVL IV, 30-39 MIN: ICD-10-PCS | Mod: S$GLB,,, | Performed by: INTERNAL MEDICINE

## 2020-01-23 PROCEDURE — 3077F PR MOST RECENT SYSTOLIC BLOOD PRESSURE >= 140 MM HG: ICD-10-PCS | Mod: CPTII,S$GLB,, | Performed by: INTERNAL MEDICINE

## 2020-01-23 PROCEDURE — 1159F PR MEDICATION LIST DOCUMENTED IN MEDICAL RECORD: ICD-10-PCS | Mod: S$GLB,,, | Performed by: INTERNAL MEDICINE

## 2020-01-23 PROCEDURE — 1101F PR PT FALLS ASSESS DOC 0-1 FALLS W/OUT INJ PAST YR: ICD-10-PCS | Mod: CPTII,S$GLB,, | Performed by: INTERNAL MEDICINE

## 2020-01-23 PROCEDURE — 1101F PT FALLS ASSESS-DOCD LE1/YR: CPT | Mod: CPTII,S$GLB,, | Performed by: INTERNAL MEDICINE

## 2020-01-23 PROCEDURE — 3077F SYST BP >= 140 MM HG: CPT | Mod: CPTII,S$GLB,, | Performed by: INTERNAL MEDICINE

## 2020-01-23 RX ORDER — FAMOTIDINE 10 MG/ML
20 INJECTION INTRAVENOUS
Status: CANCELLED | OUTPATIENT
Start: 2020-01-24

## 2020-01-23 RX ORDER — SODIUM CHLORIDE 0.9 % (FLUSH) 0.9 %
10 SYRINGE (ML) INJECTION
Status: CANCELLED | OUTPATIENT
Start: 2020-01-24

## 2020-01-23 RX ORDER — DOXORUBICIN HYDROCHLORIDE 2 MG/ML
25 INJECTION, SOLUTION INTRAVENOUS
Status: CANCELLED | OUTPATIENT
Start: 2020-01-24

## 2020-01-23 RX ORDER — ACETAMINOPHEN 325 MG/1
650 TABLET ORAL
Status: CANCELLED | OUTPATIENT
Start: 2020-01-24

## 2020-01-23 RX ORDER — HEPARIN 100 UNIT/ML
500 SYRINGE INTRAVENOUS
Status: CANCELLED | OUTPATIENT
Start: 2020-01-24

## 2020-01-23 RX ORDER — MEPERIDINE HYDROCHLORIDE 50 MG/ML
25 INJECTION INTRAMUSCULAR; INTRAVENOUS; SUBCUTANEOUS EVERY 30 MIN PRN
Status: CANCELLED | OUTPATIENT
Start: 2020-01-24

## 2020-01-24 ENCOUNTER — INFUSION (OUTPATIENT)
Dept: INFUSION THERAPY | Facility: HOSPITAL | Age: 80
End: 2020-01-24
Attending: INTERNAL MEDICINE
Payer: MEDICARE

## 2020-01-24 VITALS
RESPIRATION RATE: 18 BRPM | SYSTOLIC BLOOD PRESSURE: 149 MMHG | DIASTOLIC BLOOD PRESSURE: 76 MMHG | TEMPERATURE: 98 F | OXYGEN SATURATION: 100 % | HEART RATE: 74 BPM

## 2020-01-24 DIAGNOSIS — C83.32 DIFFUSE LARGE B-CELL LYMPHOMA OF INTRATHORACIC LYMPH NODES: Primary | ICD-10-CM

## 2020-01-24 PROCEDURE — 25000003 PHARM REV CODE 250: Performed by: INTERNAL MEDICINE

## 2020-01-24 PROCEDURE — 63600175 PHARM REV CODE 636 W HCPCS: Performed by: INTERNAL MEDICINE

## 2020-01-24 PROCEDURE — 96401 CHEMO ANTI-NEOPL SQ/IM: CPT

## 2020-01-24 PROCEDURE — S0028 INJECTION, FAMOTIDINE, 20 MG: HCPCS | Performed by: INTERNAL MEDICINE

## 2020-01-24 PROCEDURE — 96375 TX/PRO/DX INJ NEW DRUG ADDON: CPT

## 2020-01-24 PROCEDURE — 96411 CHEMO IV PUSH ADDL DRUG: CPT

## 2020-01-24 PROCEDURE — A4216 STERILE WATER/SALINE, 10 ML: HCPCS | Performed by: INTERNAL MEDICINE

## 2020-01-24 PROCEDURE — 96413 CHEMO IV INFUSION 1 HR: CPT

## 2020-01-24 PROCEDURE — 96367 TX/PROPH/DG ADDL SEQ IV INF: CPT

## 2020-01-24 RX ORDER — HEPARIN 100 UNIT/ML
500 SYRINGE INTRAVENOUS
Status: COMPLETED | OUTPATIENT
Start: 2020-01-24 | End: 2020-01-24

## 2020-01-24 RX ORDER — SODIUM CHLORIDE 0.9 % (FLUSH) 0.9 %
10 SYRINGE (ML) INJECTION
Status: DISCONTINUED | OUTPATIENT
Start: 2020-01-24 | End: 2020-01-24 | Stop reason: HOSPADM

## 2020-01-24 RX ORDER — ACETAMINOPHEN 325 MG/1
650 TABLET ORAL
Status: COMPLETED | OUTPATIENT
Start: 2020-01-24 | End: 2020-01-24

## 2020-01-24 RX ORDER — FAMOTIDINE 10 MG/ML
20 INJECTION INTRAVENOUS
Status: COMPLETED | OUTPATIENT
Start: 2020-01-24 | End: 2020-01-24

## 2020-01-24 RX ORDER — DOXORUBICIN HYDROCHLORIDE 2 MG/ML
25 INJECTION, SOLUTION INTRAVENOUS
Status: COMPLETED | OUTPATIENT
Start: 2020-01-24 | End: 2020-01-24

## 2020-01-24 RX ORDER — HEPARIN 100 UNIT/ML
500 SYRINGE INTRAVENOUS
Status: DISCONTINUED | OUTPATIENT
Start: 2020-01-24 | End: 2020-01-24 | Stop reason: HOSPADM

## 2020-01-24 RX ADMIN — RITUXIMAB AND HYALURONIDASE 1400 MG: 120; 2000 INJECTION, SOLUTION SUBCUTANEOUS at 11:01

## 2020-01-24 RX ADMIN — CYCLOPHOSPHAMIDE 830 MG: 1 INJECTION, POWDER, FOR SOLUTION INTRAVENOUS; ORAL at 12:01

## 2020-01-24 RX ADMIN — FAMOTIDINE 20 MG: 10 INJECTION INTRAVENOUS at 11:01

## 2020-01-24 RX ADMIN — HEPARIN 500 UNITS: 100 SYRINGE at 01:01

## 2020-01-24 RX ADMIN — VINCRISTINE SULFATE 1 MG: 1 INJECTION, SOLUTION INTRAVENOUS at 12:01

## 2020-01-24 RX ADMIN — Medication 10 ML: at 01:01

## 2020-01-24 RX ADMIN — DOXORUBICIN HYDROCHLORIDE 52 MG: 2 INJECTION, SOLUTION INTRAVENOUS at 12:01

## 2020-01-24 RX ADMIN — PALONOSETRON HYDROCHLORIDE: 0.25 INJECTION, SOLUTION INTRAVENOUS at 11:01

## 2020-01-24 RX ADMIN — DIPHENHYDRAMINE HYDROCHLORIDE 50 MG: 50 INJECTION INTRAMUSCULAR; INTRAVENOUS at 11:01

## 2020-01-24 RX ADMIN — ACETAMINOPHEN 650 MG: 325 TABLET ORAL at 11:01

## 2020-01-24 NOTE — PLAN OF CARE
Patient arrived to unit for C6 mini RCHOP. Patient denies any new or worsening symptoms at this time. Plan of care reviewed, patient agreeable to plan. Patient tolerated treatment well. No sign of reaction noted. VSS. Patient received discharge instructions and follow up appointments. Patient instructed to return 2/18/20 for lab and 2/20/20 for Dr. Zana wilder. Patient instructed that clinic will reach out to schedule scans. Verbalized understanding and ambulated unassisted off unit, in NAD at time of discharge.

## 2020-01-27 PROBLEM — Z09 CHEMOTHERAPY FOLLOW-UP EXAMINATION: Status: RESOLVED | Noted: 2019-10-22 | Resolved: 2020-01-27

## 2020-01-29 DIAGNOSIS — Z09 CHEMOTHERAPY FOLLOW-UP EXAMINATION: ICD-10-CM

## 2020-01-29 DIAGNOSIS — C83.32 DIFFUSE LARGE B-CELL LYMPHOMA OF INTRATHORACIC LYMPH NODES: Primary | ICD-10-CM

## 2020-01-29 DIAGNOSIS — Z85.72 HISTORY OF FOLLICULAR LYMPHOMA: ICD-10-CM

## 2020-02-18 ENCOUNTER — LAB VISIT (OUTPATIENT)
Dept: LAB | Facility: HOSPITAL | Age: 80
End: 2020-02-18
Attending: INTERNAL MEDICINE
Payer: MEDICARE

## 2020-02-18 DIAGNOSIS — C83.32 DIFFUSE LARGE B-CELL LYMPHOMA OF INTRATHORACIC LYMPH NODES: ICD-10-CM

## 2020-02-18 DIAGNOSIS — Z09 CHEMOTHERAPY FOLLOW-UP EXAMINATION: ICD-10-CM

## 2020-02-18 LAB
ALBUMIN SERPL BCP-MCNC: 3.9 G/DL (ref 3.5–5.2)
ALP SERPL-CCNC: 75 U/L (ref 55–135)
ALT SERPL W/O P-5'-P-CCNC: 21 U/L (ref 10–44)
ANION GAP SERPL CALC-SCNC: 7 MMOL/L (ref 8–16)
AST SERPL-CCNC: 21 U/L (ref 10–40)
BASOPHILS # BLD AUTO: 0.07 K/UL (ref 0–0.2)
BASOPHILS NFR BLD: 1 % (ref 0–1.9)
BILIRUB SERPL-MCNC: 0.4 MG/DL (ref 0.1–1)
BUN SERPL-MCNC: 16 MG/DL (ref 8–23)
CALCIUM SERPL-MCNC: 9.7 MG/DL (ref 8.7–10.5)
CHLORIDE SERPL-SCNC: 106 MMOL/L (ref 95–110)
CO2 SERPL-SCNC: 26 MMOL/L (ref 23–29)
CREAT SERPL-MCNC: 1.2 MG/DL (ref 0.5–1.4)
DIFFERENTIAL METHOD: ABNORMAL
EOSINOPHIL # BLD AUTO: 0.2 K/UL (ref 0–0.5)
EOSINOPHIL NFR BLD: 2.8 % (ref 0–8)
ERYTHROCYTE [DISTWIDTH] IN BLOOD BY AUTOMATED COUNT: 15.2 % (ref 11.5–14.5)
EST. GFR  (AFRICAN AMERICAN): >60 ML/MIN/1.73 M^2
EST. GFR  (NON AFRICAN AMERICAN): 56 ML/MIN/1.73 M^2
GLUCOSE SERPL-MCNC: 98 MG/DL (ref 70–110)
HCT VFR BLD AUTO: 40.2 % (ref 40–54)
HGB BLD-MCNC: 12.7 G/DL (ref 14–18)
IMM GRANULOCYTES # BLD AUTO: 0.11 K/UL (ref 0–0.04)
IMM GRANULOCYTES NFR BLD AUTO: 1.5 % (ref 0–0.5)
LDH SERPL L TO P-CCNC: 217 U/L (ref 110–260)
LYMPHOCYTES # BLD AUTO: 1.6 K/UL (ref 1–4.8)
LYMPHOCYTES NFR BLD: 22.5 % (ref 18–48)
MCH RBC QN AUTO: 27.7 PG (ref 27–31)
MCHC RBC AUTO-ENTMCNC: 31.6 G/DL (ref 32–36)
MCV RBC AUTO: 88 FL (ref 82–98)
MONOCYTES # BLD AUTO: 0.8 K/UL (ref 0.3–1)
MONOCYTES NFR BLD: 11.6 % (ref 4–15)
NEUTROPHILS # BLD AUTO: 4.3 K/UL (ref 1.8–7.7)
NEUTROPHILS NFR BLD: 60.6 % (ref 38–73)
NRBC BLD-RTO: 0 /100 WBC
PLATELET # BLD AUTO: 191 K/UL (ref 150–350)
PMV BLD AUTO: 10.3 FL (ref 9.2–12.9)
POTASSIUM SERPL-SCNC: 4.9 MMOL/L (ref 3.5–5.1)
PROT SERPL-MCNC: 6.5 G/DL (ref 6–8.4)
RBC # BLD AUTO: 4.58 M/UL (ref 4.6–6.2)
SODIUM SERPL-SCNC: 139 MMOL/L (ref 136–145)
WBC # BLD AUTO: 7.14 K/UL (ref 3.9–12.7)

## 2020-02-18 PROCEDURE — 85025 COMPLETE CBC W/AUTO DIFF WBC: CPT

## 2020-02-18 PROCEDURE — 36415 COLL VENOUS BLD VENIPUNCTURE: CPT

## 2020-02-18 PROCEDURE — 83615 LACTATE (LD) (LDH) ENZYME: CPT

## 2020-02-18 PROCEDURE — 80053 COMPREHEN METABOLIC PANEL: CPT

## 2020-02-19 NOTE — PROGRESS NOTES
Chief Complaint :  Follicular lymphoma.    Hx of Present illness :  Patient is a 80 y.o. year old male who presents to the clinic today for  Oncology followup. Has been seen at [facility] in the past. Dx'd to have Low grade , Stage 1, follicular lymphoma Right groin in 2004. Good response to R-CVP Chemotherapy and maintenance rituxan.  Recently had lesion excised from Left side of nostril. Dx'd to have  Superficially invasive squamous cell [id number]. Invasive. In Aug/swep 2019 evaluated by physician. Had  Bx of Lymph node from left neck. 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 CT scans revealed a large mediastinal mass. Patient had sifnificant sx. Transferred to [facility]. Was given emergency mediastinal radiation. Started on mini CHOP on 10/2/19.  Admitted  Six  weeks  Shortness of breath. CTA chest revealed Emboli in right upper, middle and lower lobes. The  left mediastinal mass had decreased in size. Has completed cycle 5.     Allergies :    Review of patient's allergies indicates:  No Known Allergies    Occupation :  Law enforcement.    Transfusion :  None.    Menstrual & obstetric Hx :  N/A      Present Meds :   Medication List with Changes/Refills   Current Medications    APIXABAN (ELIQUIS) 5 MG TAB    Take 1 tablet (5 mg total) by mouth 2 (two) times daily.    PREDNISONE (DELTASONE) 50 MG TAB    Take 2 tablets (100 mg total) by mouth once daily. For 5 days with each cycle of chemo    PYRIDOSTIGMINE (MESTINON) 60 MG TAB    Take 60 mg by mouth 3 (three) times daily.        Past Medical Hx :  Hx of Folliculoar Lymphoma. Hypertension; age related Macular degeneration;  Hypertropia right eye. No hx of DM, PUD, Hepatitis, Liver disease, thyroid dysfunction., TB, Sarcoid, Lupus, rheumatoid arthritis, seizure disorder, CVA. No Hx of DVT or Pulmonary embolism/\. No past Hx of radiation therapy.     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    Family Hx :  Family History   Problem Relation Age of Onset    No Known Problems Mother     Heart disease Father     No Known Problems Sister     No Known Problems Brother     No Known Problems Maternal Aunt     No Known Problems Maternal Uncle     No Known Problems Paternal Aunt     No Known Problems Paternal Uncle     No Known Problems Maternal Grandmother     No Known Problems Maternal Grandfather     No Known Problems Paternal Grandmother     No Known Problems Paternal Grandfather     Amblyopia Neg Hx     Blindness Neg Hx     Cataracts Neg Hx     Glaucoma Neg Hx     Macular degeneration Neg Hx     Retinal detachment Neg Hx     Strabismus Neg Hx     Cancer Neg Hx     Diabetes Neg Hx     Hypertension Neg Hx     Stroke Neg Hx     Thyroid disease Neg Hx        Social Hx :  Social History     Socioeconomic History    Marital status:      Spouse name: Not on file    Number of children: Not on file    Years of education: Not on file    Highest education level: Not on file   Occupational History    Not on file   Social Needs    Financial resource strain: Not on file    Food insecurity:     Worry: Not on file     Inability: Not on file    Transportation needs:     Medical: Not on file     Non-medical: Not on file   Tobacco Use    Smoking status: Former Smoker    Smokeless tobacco: Never Used   Substance and Sexual Activity    Alcohol use: No    Drug use: Never    Sexual activity: Not Currently     Partners: Female   Lifestyle     Physical activity:     Days per week: Not on file     Minutes per session: Not on file    Stress: Not on file   Relationships    Social connections:     Talks on phone: Not on file     Gets together: Not on file     Attends Anabaptist service: Not on file     Active member of club or organization: Not on file     Attends meetings of clubs or organizations: Not on file     Relationship status: Not on file   Other Topics Concern    Not on file   Social History Narrative    Not on file       Surgery :  Lymph node Bx; Portacath. Bone Marrow Bx Cholecystectomy. Bilateral Cataract surgery  Excision of skin cancer from nostril.     Symptoms :    Review of Systems   Constitutional: Positive for malaise/fatigue. Negative for chills, diaphoresis, fever and weight loss.        Energy Improving. Gaining weight.  No pruritis, night sweats or evening rise in temp. Appetite improving   HENT: Negative for congestion, hearing loss, nosebleeds, sore throat and tinnitus.    Eyes: Negative for blurred vision, double vision and photophobia.        Under Care of Retina specialist for Macular degeneration   Respiratory: Negative for cough, hemoptysis and shortness of breath.    Cardiovascular: Negative for chest pain, palpitations, orthopnea, claudication, leg swelling and PND.   Gastrointestinal: Negative for abdominal pain, blood in stool, constipation, diarrhea (Moderate), heartburn, nausea and vomiting.   Genitourinary: Positive for frequency (Related to BPH). Negative for dysuria, flank pain, hematuria and urgency.   Musculoskeletal: Negative for back pain, falls, joint pain (rthritis), myalgias and neck pain.   Skin: Negative for itching and rash.   Neurological: Negative for dizziness, tingling, tremors, sensory change (Related to Chemotherapy), speech change, focal weakness, seizures, loss of consciousness, weakness and headaches.   Endo/Heme/Allergies: Negative for environmental allergies and polydipsia. Does not bruise/bleed  easily.   Psychiatric/Behavioral: Positive for depression. Negative for memory loss. The patient is nervous/anxious. The patient does not have insomnia.         About 4 weeks ago, his daughter was killed in a car accident.       Physical Exam :   Wife present in the room.  Physical Exam   Constitutional: He is oriented to person, place, and time and well-developed, well-nourished, and in no distress. Vital signs are normal. No distress.   HENT:   Head: Normocephalic and atraumatic.   Right Ear: External ear normal.   Left Ear: External ear normal.   Nose: Nose normal.   Mouth/Throat: Oropharynx is clear and moist. No oropharyngeal exudate.   Eyes: Conjunctivae, EOM and lids are normal. Lids are everted and swept, no foreign bodies found. Right eye exhibits no discharge. Left eye exhibits no discharge. No scleral icterus. Pupils are unequal.       Neck: Trachea normal, normal range of motion, full passive range of motion without pain and phonation normal. Neck supple. Normal carotid pulses, no hepatojugular reflux and no JVD present. No tracheal tenderness present. Carotid bruit is not present. No tracheal deviation present. No thyroid mass and no thyromegaly present.   Cardiovascular: Normal rate, regular rhythm, normal heart sounds, intact distal pulses and normal pulses. PMI is not displaced. Exam reveals no friction rub.   No murmur heard.  Pulmonary/Chest: Effort normal and breath sounds normal. No stridor. No apnea. No respiratory distress. He has no wheezes. He has no rales. He exhibits no tenderness.   Abdominal: Soft. Bowel sounds are normal. He exhibits no distension, no ascites and no mass. There is no hepatosplenomegaly, splenomegaly or hepatomegaly. There is no tenderness. There is no rebound, no guarding and no CVA tenderness. No hernia.   Genitourinary:   Genitourinary Comments: Not Examined   Musculoskeletal: Normal range of motion. He exhibits no edema, tenderness or deformity.   Lymphadenopathy:         Head (right side): No submental, no submandibular, no tonsillar, no preauricular, no posterior auricular and no occipital adenopathy present.        Head (left side): No submental, no submandibular, no tonsillar, no preauricular, no posterior auricular and no occipital adenopathy present.     He has no cervical adenopathy.     He has no axillary adenopathy.        Right: No inguinal, no supraclavicular and no epitrochlear adenopathy present.        Left: No inguinal, no supraclavicular and no epitrochlear adenopathy present.   Neurological: He is alert and oriented to person, place, and time. He has normal motor skills, normal sensation, normal strength, normal reflexes and intact cranial nerves. No cranial nerve deficit. He exhibits normal muscle tone. Coordination normal. GCS score is 15.   Walks with cane   Skin: Skin is warm and dry. He is not diaphoretic. No cyanosis. Nails show no clubbing.   Psychiatric: Mood, memory, affect and judgment normal.     Labs & Imaging :  02/18/2020 : ; NFBS 98; Cr. 1.2; eGFR 56; ca 9.7; bili 0.4. Liver enzymes normal.  WBC 4,550. Hgb 12.7; hct 40.2; MCV 86. Platelets 191,000 ANC 4,300.     Dx :   Chemotherapy follow up.  Diffuse large B cell lymphoma.  Remote Hx of Follicular Lymphoma. In Remission.      Assessment & Plan: Reviewed with patient  . Schedule PET/CT and reevaluate.  Advance Care Planning     Power of   I initiated the process of advance care planning today and explained the importance of this process to the patient.  I introduced the concept of advance directives to the patient, as well. Then the patient received detailed information about the importance of designating a Health Care Power of  (HCPOA). He was also instructed to communicate with this person about their wishes for future healthcare, should he become sick and lose decision-making capacity. The patient  has not: previously appointed a HCPOA. .         Living Will  During this  visit, I engaged the patient in the advance care planning process.  The patient and I reviewed the role for advance directives and their purpose in directing future healthcare if the patient's unable to speak for him/herself.  At this point in time, the patient does have full decision-making capacity.  We discussed different extreme health states that he could experience, and reviewed what kind of medical care he would want in those situations.  The patient communicated that if he were comatose and had little chance of a meaningful recovery, does not  want machines/life-sustaining treatments used.    The patient  HAS NOTcompleted a living will to reflect these preferences.       Brochures given.

## 2020-02-20 ENCOUNTER — INFUSION (OUTPATIENT)
Dept: INFUSION THERAPY | Facility: HOSPITAL | Age: 80
End: 2020-02-20
Attending: INTERNAL MEDICINE
Payer: MEDICARE

## 2020-02-20 ENCOUNTER — OFFICE VISIT (OUTPATIENT)
Dept: HEMATOLOGY/ONCOLOGY | Facility: CLINIC | Age: 80
End: 2020-02-20
Payer: MEDICARE

## 2020-02-20 VITALS
DIASTOLIC BLOOD PRESSURE: 82 MMHG | TEMPERATURE: 98 F | SYSTOLIC BLOOD PRESSURE: 146 MMHG | RESPIRATION RATE: 18 BRPM | OXYGEN SATURATION: 100 % | HEART RATE: 75 BPM

## 2020-02-20 VITALS
SYSTOLIC BLOOD PRESSURE: 146 MMHG | DIASTOLIC BLOOD PRESSURE: 76 MMHG | HEIGHT: 71 IN | WEIGHT: 198.88 LBS | OXYGEN SATURATION: 97 % | BODY MASS INDEX: 27.84 KG/M2 | HEART RATE: 86 BPM | TEMPERATURE: 98 F

## 2020-02-20 DIAGNOSIS — C83.32 DIFFUSE LARGE B-CELL LYMPHOMA OF INTRATHORACIC LYMPH NODES: Primary | ICD-10-CM

## 2020-02-20 DIAGNOSIS — Z85.72 HISTORY OF FOLLICULAR LYMPHOMA: ICD-10-CM

## 2020-02-20 DIAGNOSIS — C85.90 LYMPHOMA: Primary | ICD-10-CM

## 2020-02-20 DIAGNOSIS — Z09 CHEMOTHERAPY FOLLOW-UP EXAMINATION: ICD-10-CM

## 2020-02-20 PROCEDURE — 99213 OFFICE O/P EST LOW 20 MIN: CPT | Mod: S$GLB,,, | Performed by: INTERNAL MEDICINE

## 2020-02-20 PROCEDURE — 1159F PR MEDICATION LIST DOCUMENTED IN MEDICAL RECORD: ICD-10-PCS | Mod: S$GLB,,, | Performed by: INTERNAL MEDICINE

## 2020-02-20 PROCEDURE — 63600175 PHARM REV CODE 636 W HCPCS: Performed by: INTERNAL MEDICINE

## 2020-02-20 PROCEDURE — 99999 PR PBB SHADOW E&M-EST. PATIENT-LVL III: CPT | Mod: PBBFAC,,, | Performed by: INTERNAL MEDICINE

## 2020-02-20 PROCEDURE — 99999 PR PBB SHADOW E&M-EST. PATIENT-LVL III: ICD-10-PCS | Mod: PBBFAC,,, | Performed by: INTERNAL MEDICINE

## 2020-02-20 PROCEDURE — 1159F MED LIST DOCD IN RCRD: CPT | Mod: S$GLB,,, | Performed by: INTERNAL MEDICINE

## 2020-02-20 PROCEDURE — 25000003 PHARM REV CODE 250: Performed by: INTERNAL MEDICINE

## 2020-02-20 PROCEDURE — 99213 PR OFFICE/OUTPT VISIT, EST, LEVL III, 20-29 MIN: ICD-10-PCS | Mod: S$GLB,,, | Performed by: INTERNAL MEDICINE

## 2020-02-20 PROCEDURE — 1126F AMNT PAIN NOTED NONE PRSNT: CPT | Mod: S$GLB,,, | Performed by: INTERNAL MEDICINE

## 2020-02-20 PROCEDURE — 3078F DIAST BP <80 MM HG: CPT | Mod: CPTII,S$GLB,, | Performed by: INTERNAL MEDICINE

## 2020-02-20 PROCEDURE — 1126F PR PAIN SEVERITY QUANTIFIED, NO PAIN PRESENT: ICD-10-PCS | Mod: S$GLB,,, | Performed by: INTERNAL MEDICINE

## 2020-02-20 PROCEDURE — 3077F PR MOST RECENT SYSTOLIC BLOOD PRESSURE >= 140 MM HG: ICD-10-PCS | Mod: CPTII,S$GLB,, | Performed by: INTERNAL MEDICINE

## 2020-02-20 PROCEDURE — 1101F PT FALLS ASSESS-DOCD LE1/YR: CPT | Mod: CPTII,S$GLB,, | Performed by: INTERNAL MEDICINE

## 2020-02-20 PROCEDURE — 3077F SYST BP >= 140 MM HG: CPT | Mod: CPTII,S$GLB,, | Performed by: INTERNAL MEDICINE

## 2020-02-20 PROCEDURE — 36591 DRAW BLOOD OFF VENOUS DEVICE: CPT

## 2020-02-20 PROCEDURE — 3078F PR MOST RECENT DIASTOLIC BLOOD PRESSURE < 80 MM HG: ICD-10-PCS | Mod: CPTII,S$GLB,, | Performed by: INTERNAL MEDICINE

## 2020-02-20 PROCEDURE — A4216 STERILE WATER/SALINE, 10 ML: HCPCS | Performed by: INTERNAL MEDICINE

## 2020-02-20 PROCEDURE — 1101F PR PT FALLS ASSESS DOC 0-1 FALLS W/OUT INJ PAST YR: ICD-10-PCS | Mod: CPTII,S$GLB,, | Performed by: INTERNAL MEDICINE

## 2020-02-20 RX ORDER — LISINOPRIL 10 MG/1
10 TABLET ORAL DAILY
COMMUNITY
End: 2021-09-27

## 2020-02-20 RX ORDER — TAMSULOSIN HYDROCHLORIDE 0.4 MG/1
CAPSULE ORAL
COMMUNITY
Start: 2020-01-28 | End: 2021-09-27

## 2020-02-20 RX ORDER — SODIUM CHLORIDE 0.9 % (FLUSH) 0.9 %
10 SYRINGE (ML) INJECTION
Status: DISCONTINUED | OUTPATIENT
Start: 2020-02-20 | End: 2020-02-20 | Stop reason: HOSPADM

## 2020-02-20 RX ORDER — HEPARIN 100 UNIT/ML
500 SYRINGE INTRAVENOUS
Status: COMPLETED | OUTPATIENT
Start: 2020-02-20 | End: 2020-02-20

## 2020-02-20 RX ADMIN — HEPARIN 500 UNITS: 100 SYRINGE at 12:02

## 2020-02-20 RX ADMIN — Medication 10 ML: at 12:02

## 2020-02-20 NOTE — PLAN OF CARE
Pt arrived to unit from Dr. Spann's office. Pt states his home health company stopped doing PICC flushes and dressing changes 2 weeks ago. Each lumen flushed with blood return noted. Caps changed. Dressing removed. No redness, drainage or edema noted. Site cleansed with sterile technique. Biopatch and new dressing applied. Pt has next appt. Pt ambulated off unit. No distress noted.

## 2020-02-27 ENCOUNTER — INFUSION (OUTPATIENT)
Dept: INFUSION THERAPY | Facility: HOSPITAL | Age: 80
End: 2020-02-27
Attending: INTERNAL MEDICINE
Payer: MEDICARE

## 2020-02-27 VITALS
HEART RATE: 81 BPM | TEMPERATURE: 97 F | DIASTOLIC BLOOD PRESSURE: 82 MMHG | SYSTOLIC BLOOD PRESSURE: 167 MMHG | RESPIRATION RATE: 18 BRPM | OXYGEN SATURATION: 100 %

## 2020-02-27 DIAGNOSIS — C85.90 LYMPHOMA: Primary | ICD-10-CM

## 2020-02-27 PROCEDURE — 25000003 PHARM REV CODE 250: Performed by: INTERNAL MEDICINE

## 2020-02-27 PROCEDURE — 96523 IRRIG DRUG DELIVERY DEVICE: CPT

## 2020-02-27 PROCEDURE — A4216 STERILE WATER/SALINE, 10 ML: HCPCS | Performed by: INTERNAL MEDICINE

## 2020-02-27 PROCEDURE — 63600175 PHARM REV CODE 636 W HCPCS: Performed by: INTERNAL MEDICINE

## 2020-02-27 RX ORDER — HEPARIN 100 UNIT/ML
500 SYRINGE INTRAVENOUS
Status: DISCONTINUED | OUTPATIENT
Start: 2020-02-27 | End: 2020-02-27 | Stop reason: HOSPADM

## 2020-02-27 RX ORDER — SODIUM CHLORIDE 0.9 % (FLUSH) 0.9 %
10 SYRINGE (ML) INJECTION
Status: DISCONTINUED | OUTPATIENT
Start: 2020-02-27 | End: 2020-02-27 | Stop reason: HOSPADM

## 2020-02-27 RX ORDER — HEPARIN 100 UNIT/ML
500 SYRINGE INTRAVENOUS
Status: CANCELLED | OUTPATIENT
Start: 2020-03-05

## 2020-02-27 RX ORDER — SODIUM CHLORIDE 0.9 % (FLUSH) 0.9 %
10 SYRINGE (ML) INJECTION
Status: CANCELLED | OUTPATIENT
Start: 2020-03-05

## 2020-02-27 RX ORDER — SODIUM CHLORIDE 0.9 % (FLUSH) 0.9 %
10 SYRINGE (ML) INJECTION
Status: CANCELLED
Start: 2020-03-05

## 2020-02-27 RX ORDER — HEPARIN 100 UNIT/ML
500 SYRINGE INTRAVENOUS
Status: CANCELLED
Start: 2020-03-05

## 2020-02-27 RX ORDER — HEPARIN 100 UNIT/ML
500 SYRINGE INTRAVENOUS
Status: COMPLETED | OUTPATIENT
Start: 2020-02-27 | End: 2020-02-27

## 2020-02-27 RX ADMIN — Medication 10 ML: at 09:02

## 2020-02-27 RX ADMIN — HEPARIN 500 UNITS: 100 SYRINGE at 09:02

## 2020-02-27 NOTE — PLAN OF CARE
Patient arrived to infusion center for PICC dressing change. Tolerated well. Patient denies any new or worsening symptoms. VSS. Discharge instructions provided. Patient instructed to return 3/5/20. Patient ambulated unassisted off unit, in NAD at time of discharge.

## 2020-03-05 ENCOUNTER — INFUSION (OUTPATIENT)
Dept: INFUSION THERAPY | Facility: HOSPITAL | Age: 80
End: 2020-03-05
Attending: INTERNAL MEDICINE
Payer: MEDICARE

## 2020-03-05 VITALS
TEMPERATURE: 98 F | OXYGEN SATURATION: 98 % | DIASTOLIC BLOOD PRESSURE: 69 MMHG | RESPIRATION RATE: 17 BRPM | SYSTOLIC BLOOD PRESSURE: 135 MMHG | HEART RATE: 71 BPM

## 2020-03-05 DIAGNOSIS — C85.90 LYMPHOMA: Primary | ICD-10-CM

## 2020-03-05 PROCEDURE — 96523 IRRIG DRUG DELIVERY DEVICE: CPT

## 2020-03-05 PROCEDURE — A4216 STERILE WATER/SALINE, 10 ML: HCPCS | Performed by: INTERNAL MEDICINE

## 2020-03-05 PROCEDURE — 63600175 PHARM REV CODE 636 W HCPCS: Performed by: INTERNAL MEDICINE

## 2020-03-05 PROCEDURE — 25000003 PHARM REV CODE 250: Performed by: INTERNAL MEDICINE

## 2020-03-05 RX ORDER — SODIUM CHLORIDE 0.9 % (FLUSH) 0.9 %
10 SYRINGE (ML) INJECTION
Status: CANCELLED | OUTPATIENT
Start: 2020-03-12

## 2020-03-05 RX ORDER — SODIUM CHLORIDE 0.9 % (FLUSH) 0.9 %
10 SYRINGE (ML) INJECTION
Status: DISCONTINUED | OUTPATIENT
Start: 2020-03-05 | End: 2020-03-05 | Stop reason: HOSPADM

## 2020-03-05 RX ORDER — HEPARIN 100 UNIT/ML
500 SYRINGE INTRAVENOUS
Status: DISCONTINUED | OUTPATIENT
Start: 2020-03-05 | End: 2020-03-05 | Stop reason: HOSPADM

## 2020-03-05 RX ORDER — HEPARIN 100 UNIT/ML
500 SYRINGE INTRAVENOUS
Status: COMPLETED | OUTPATIENT
Start: 2020-03-05 | End: 2020-03-05

## 2020-03-05 RX ORDER — SODIUM CHLORIDE 0.9 % (FLUSH) 0.9 %
10 SYRINGE (ML) INJECTION
Status: CANCELLED
Start: 2020-03-12

## 2020-03-05 RX ORDER — HEPARIN 100 UNIT/ML
500 SYRINGE INTRAVENOUS
Status: CANCELLED | OUTPATIENT
Start: 2020-03-12

## 2020-03-05 RX ORDER — HEPARIN 100 UNIT/ML
500 SYRINGE INTRAVENOUS
Status: CANCELLED
Start: 2020-03-12

## 2020-03-05 RX ADMIN — HEPARIN 500 UNITS: 100 SYRINGE at 10:03

## 2020-03-05 RX ADMIN — Medication 10 ML: at 10:03

## 2020-03-05 NOTE — PLAN OF CARE
Pt presented for weekly PICC line dressing change and flush. No complaints/concerns voiced this visit. VSS. Existing dressing clean and dry; edges were peeling up, but dressing was otherwise intact. Green curos caps were still present on arrival. Site without sign/symptoms of infection. Sterile dressing change completed. Positive blood return noted from each lumen of PICC line. Each lumen of PICC line flushed and heparinized. Intermittent infusion hubs changed on both lumens. Green curos caps applied to each intermittent infusion hub. Pt tolerated procedure well. Future appt information reviewed with pt. Pt declined AVS; uses [facility]. Distress screening tool completed. Pt ambulated unassisted off unit. Pt in NAD at time of discharge.

## 2020-03-09 ENCOUNTER — OFFICE VISIT (OUTPATIENT)
Dept: OPHTHALMOLOGY | Facility: CLINIC | Age: 80
End: 2020-03-09
Attending: OPHTHALMOLOGY
Payer: MEDICARE

## 2020-03-09 VITALS — HEART RATE: 72 BPM | SYSTOLIC BLOOD PRESSURE: 151 MMHG | DIASTOLIC BLOOD PRESSURE: 80 MMHG

## 2020-03-09 DIAGNOSIS — H35.3212 EXUDATIVE AGE-RELATED MACULAR DEGENERATION OF RIGHT EYE WITH INACTIVE CHOROIDAL NEOVASCULARIZATION: ICD-10-CM

## 2020-03-09 DIAGNOSIS — H35.3123 NONEXUDATIVE AGE-RELATED MACULAR DEGENERATION, LEFT EYE, ADVANCED ATROPHIC WITHOUT SUBFOVEAL INVOLVEMENT: ICD-10-CM

## 2020-03-09 PROCEDURE — 99999 PR PBB SHADOW E&M-EST. PATIENT-LVL III: ICD-10-PCS | Mod: PBBFAC,,, | Performed by: OPHTHALMOLOGY

## 2020-03-09 PROCEDURE — 92014 COMPRE OPH EXAM EST PT 1/>: CPT | Mod: S$GLB,,, | Performed by: OPHTHALMOLOGY

## 2020-03-09 PROCEDURE — 99999 PR PBB SHADOW E&M-EST. PATIENT-LVL III: CPT | Mod: PBBFAC,,, | Performed by: OPHTHALMOLOGY

## 2020-03-09 PROCEDURE — 92202 PR OPHTHALMOSCOPY, EXT, W/DRAW OPTIC NERVE/MACULA, I&R, UNI/BI: ICD-10-PCS | Mod: S$GLB,,, | Performed by: OPHTHALMOLOGY

## 2020-03-09 PROCEDURE — 92134 POSTERIOR SEGMENT OCT RETINA (OCULAR COHERENCE TOMOGRAPHY)-BOTH EYES: ICD-10-PCS | Mod: S$GLB,,, | Performed by: OPHTHALMOLOGY

## 2020-03-09 PROCEDURE — 92202 OPSCPY EXTND ON/MAC DRAW: CPT | Mod: S$GLB,,, | Performed by: OPHTHALMOLOGY

## 2020-03-09 PROCEDURE — 92014 PR EYE EXAM, EST PATIENT,COMPREHESV: ICD-10-PCS | Mod: S$GLB,,, | Performed by: OPHTHALMOLOGY

## 2020-03-09 PROCEDURE — 92134 CPTRZ OPH DX IMG PST SGM RTA: CPT | Mod: S$GLB,,, | Performed by: OPHTHALMOLOGY

## 2020-03-09 NOTE — PROGRESS NOTES
Subjective:       Patient ID: Robe Cevallos is a 80 y.o. male      Chief Complaint   Patient presents with    Macular Degeneration     History of Present Illness  HPI     Overdue ck    Pt missed appointment due to Cancer treatment and family issues     Noticed that [facility] is stable OU  -eye pain but is having soreness to OU around the muscle, rarely   -f/f     Hx: Exudative ARMD OD with choroidal neovascularization .  S/p  Avastin OD (9/4/19)    Gtts Systane OU PRN    Gtts At's OU PRN    Taking eye vitamins    Last edited by Charles Kapoor MD on 3/9/2020 11:32 AM. (History)        Imaging:    See report    Assessment/Plan:     1. 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  Pt agrees.  Discussed rebleed potential.  Pt understands    - Posterior Segment OCT Retina-Both eyes    2. Nonexudative age-related macular degeneration, left eye, advanced atrophic without subfoveal involvement    Discussed Dry and Wet AMD in detail  Recommend AREDS 2 Vitamins  Home Amsler Grid Testing  RTC immediately PRN any changes in vision    - Posterior Segment OCT Retina-Both eyes    Follow up in about 3 months (around 6/9/2020), or if symptoms worsen or fail to improve, for Dilated examination, OCT Mac.

## 2020-03-12 ENCOUNTER — INFUSION (OUTPATIENT)
Dept: INFUSION THERAPY | Facility: HOSPITAL | Age: 80
End: 2020-03-12
Attending: INTERNAL MEDICINE
Payer: MEDICARE

## 2020-03-12 VITALS
OXYGEN SATURATION: 100 % | HEART RATE: 72 BPM | RESPIRATION RATE: 18 BRPM | SYSTOLIC BLOOD PRESSURE: 141 MMHG | DIASTOLIC BLOOD PRESSURE: 76 MMHG | TEMPERATURE: 98 F

## 2020-03-12 DIAGNOSIS — C85.90 LYMPHOMA: Primary | ICD-10-CM

## 2020-03-12 PROCEDURE — 96523 IRRIG DRUG DELIVERY DEVICE: CPT

## 2020-03-12 PROCEDURE — 63600175 PHARM REV CODE 636 W HCPCS: Performed by: INTERNAL MEDICINE

## 2020-03-12 PROCEDURE — 25000003 PHARM REV CODE 250: Performed by: INTERNAL MEDICINE

## 2020-03-12 PROCEDURE — A4216 STERILE WATER/SALINE, 10 ML: HCPCS | Performed by: INTERNAL MEDICINE

## 2020-03-12 RX ORDER — SODIUM CHLORIDE 0.9 % (FLUSH) 0.9 %
10 SYRINGE (ML) INJECTION
Status: CANCELLED
Start: 2020-03-12

## 2020-03-12 RX ORDER — SODIUM CHLORIDE 0.9 % (FLUSH) 0.9 %
10 SYRINGE (ML) INJECTION
Status: DISCONTINUED | OUTPATIENT
Start: 2020-03-12 | End: 2020-03-12 | Stop reason: HOSPADM

## 2020-03-12 RX ORDER — SODIUM CHLORIDE 0.9 % (FLUSH) 0.9 %
10 SYRINGE (ML) INJECTION
Status: CANCELLED | OUTPATIENT
Start: 2020-03-12

## 2020-03-12 RX ORDER — HEPARIN 100 UNIT/ML
500 SYRINGE INTRAVENOUS
Status: COMPLETED | OUTPATIENT
Start: 2020-03-12 | End: 2020-03-12

## 2020-03-12 RX ORDER — HEPARIN 100 UNIT/ML
500 SYRINGE INTRAVENOUS
Status: CANCELLED
Start: 2020-03-12

## 2020-03-12 RX ORDER — HEPARIN 100 UNIT/ML
500 SYRINGE INTRAVENOUS
Status: CANCELLED | OUTPATIENT
Start: 2020-03-12

## 2020-03-12 RX ORDER — HEPARIN 100 UNIT/ML
500 SYRINGE INTRAVENOUS
Status: DISCONTINUED | OUTPATIENT
Start: 2020-03-12 | End: 2020-03-12 | Stop reason: HOSPADM

## 2020-03-12 RX ADMIN — HEPARIN 500 UNITS: 100 SYRINGE at 09:03

## 2020-03-12 RX ADMIN — Medication 10 ML: at 09:03

## 2020-03-12 RX ADMIN — Medication 10 ML: at 10:03

## 2020-03-12 NOTE — PLAN OF CARE
Pt arrived to unit. No complaints voiced. Tolerated PICC dressing. New dressing applied. Blood return from each lumen. AVS given to pt. Pt ambulated off unit. No distress noted.

## 2020-03-18 ENCOUNTER — INFUSION (OUTPATIENT)
Dept: INFUSION THERAPY | Facility: HOSPITAL | Age: 80
End: 2020-03-18
Attending: INTERNAL MEDICINE
Payer: MEDICARE

## 2020-03-18 VITALS
RESPIRATION RATE: 18 BRPM | OXYGEN SATURATION: 97 % | TEMPERATURE: 98 F | SYSTOLIC BLOOD PRESSURE: 145 MMHG | HEART RATE: 80 BPM | DIASTOLIC BLOOD PRESSURE: 71 MMHG

## 2020-03-18 DIAGNOSIS — C85.90 LYMPHOMA: Primary | ICD-10-CM

## 2020-03-18 PROCEDURE — 96523 IRRIG DRUG DELIVERY DEVICE: CPT

## 2020-03-18 PROCEDURE — 25000003 PHARM REV CODE 250: Performed by: INTERNAL MEDICINE

## 2020-03-18 PROCEDURE — A4216 STERILE WATER/SALINE, 10 ML: HCPCS | Performed by: INTERNAL MEDICINE

## 2020-03-18 PROCEDURE — 63600175 PHARM REV CODE 636 W HCPCS: Performed by: INTERNAL MEDICINE

## 2020-03-18 RX ORDER — HEPARIN 100 UNIT/ML
500 SYRINGE INTRAVENOUS
Status: CANCELLED | OUTPATIENT
Start: 2020-03-25

## 2020-03-18 RX ORDER — HEPARIN 100 UNIT/ML
500 SYRINGE INTRAVENOUS
Status: COMPLETED | OUTPATIENT
Start: 2020-03-18 | End: 2020-03-18

## 2020-03-18 RX ORDER — SODIUM CHLORIDE 0.9 % (FLUSH) 0.9 %
10 SYRINGE (ML) INJECTION
Status: CANCELLED | OUTPATIENT
Start: 2020-03-25

## 2020-03-18 RX ORDER — HEPARIN 100 UNIT/ML
500 SYRINGE INTRAVENOUS
Status: DISCONTINUED | OUTPATIENT
Start: 2020-03-18 | End: 2020-03-18 | Stop reason: HOSPADM

## 2020-03-18 RX ORDER — SODIUM CHLORIDE 0.9 % (FLUSH) 0.9 %
10 SYRINGE (ML) INJECTION
Status: CANCELLED
Start: 2020-03-25

## 2020-03-18 RX ORDER — HEPARIN 100 UNIT/ML
500 SYRINGE INTRAVENOUS
Status: CANCELLED
Start: 2020-03-25

## 2020-03-18 RX ORDER — SODIUM CHLORIDE 0.9 % (FLUSH) 0.9 %
10 SYRINGE (ML) INJECTION
Status: DISCONTINUED | OUTPATIENT
Start: 2020-03-18 | End: 2020-03-18 | Stop reason: HOSPADM

## 2020-03-18 RX ADMIN — Medication 10 ML: at 09:03

## 2020-03-18 RX ADMIN — HEPARIN 500 UNITS: 100 SYRINGE at 09:03

## 2020-03-18 NOTE — PLAN OF CARE
Patient arrived to infusion center for weekly PICC dressing change and flush. Tolerated well. VSS. Discharge instructions provided. Patient instructed to return 3/19/20 for PET scan and 3/20/20 for Dr. Spann follow up. Patient ambulated unassisted off unit, in NAD at time of discharge.

## 2020-03-19 ENCOUNTER — HOSPITAL ENCOUNTER (OUTPATIENT)
Dept: RADIOLOGY | Facility: HOSPITAL | Age: 80
Discharge: HOME | End: 2020-03-19
Attending: INTERNAL MEDICINE
Payer: MEDICARE

## 2020-03-19 DIAGNOSIS — C83.32 DIFFUSE LARGE B-CELL LYMPHOMA OF INTRATHORACIC LYMPH NODES: ICD-10-CM

## 2020-03-19 DIAGNOSIS — Z09 CHEMOTHERAPY FOLLOW-UP EXAMINATION: ICD-10-CM

## 2020-03-19 DIAGNOSIS — Z85.72 HISTORY OF FOLLICULAR LYMPHOMA: ICD-10-CM

## 2020-03-19 PROCEDURE — 78815 NM PET CT ROUTINE: ICD-10-PCS | Mod: 26,PS,, | Performed by: RADIOLOGY

## 2020-03-19 PROCEDURE — A9552 F18 FDG: HCPCS

## 2020-03-19 PROCEDURE — 78815 PET IMAGE W/CT SKULL-THIGH: CPT | Mod: 26,PS,, | Performed by: RADIOLOGY

## 2020-03-19 PROCEDURE — 78815 PET IMAGE W/CT SKULL-THIGH: CPT | Mod: TC

## 2020-03-19 NOTE — PROGRESS NOTES
Chief Complaint :  Follicular lymphoma.    Hx of Present illness :  Patient is a 80 y.o. year old male who presents to the clinic today for  Oncology followup. Has been seen at [facility] in the past. Dx'd to have Low grade , Stage 1, follicular lymphoma Right groin in 2004. Good response to R-CVP Chemotherapy and maintenance rituxan.  Recently had lesion excised from Left side of nostril. Dx'd to have  Superficially invasive squamous cell [id number]. Invasive. In Aug/swep 2019 evaluated by physician. Had  Bx of Lymph node from left neck. 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 CT scans revealed a large mediastinal mass. Patient had sifnificant sx. Transferred to [facility]. Was given emergency mediastinal radiation. Started on mini CHOP on 10/2/19.  Admitted  Six  weeks  Shortness of breath. CTA chest revealed Emboli in right upper, middle and lower lobes. The  left mediastinal mass had decreased in size. Has completed cycle 6.     Allergies :    Review of patient's allergies indicates:  No Known Allergies    Occupation :  Law enforcement.    Transfusion :  None.    Menstrual & obstetric Hx :  N/A      Present Meds :   Medication List with Changes/Refills   Current Medications    LISINOPRIL 10 MG TABLET    Take 10 mg by mouth once daily.    PREDNISONE (DELTASONE) 50 MG TAB    Take 2 tablets (100 mg total) by mouth once daily. For 5 days with each cycle of chemo    PYRIDOSTIGMINE (MESTINON) 60 MG TAB    Take 60 mg by mouth 3 (three) times daily.     TAMSULOSIN (FLOMAX) 0.4 MG CAP    TK 1 C PO Q NIGHT       Past Medical Hx :  Hx of Folliculoar Lymphoma. Hypertension; age related Macular degeneration;  Hypertropia right eye. No hx of DM, PUD, Hepatitis, Liver disease, thyroid dysfunction., TB, Sarcoid, Lupus, rheumatoid arthritis, seizure disorder, CVA. No Hx of DVT or Pulmonary embolism/\. No past Hx of radiation therapy.     Past Medical Hx :  Past Medical History:   Diagnosis Date    AMD (age-related macular  degeneration), bilateral     Cancer 2004,2019    Lymphoma    Decreased appetite 09/2019    Hypertension     Hypertropia of right eye 12/7/2017    Mass in chest 09/2019    Myasthenia gravis     Requires assistance with activities of daily living (ADL)     Unintended weight loss 09/2019    Unsteady gait     Wheelchair dependence        Travel Hx :   N/A    Immunization :  There is no immunization history for the selected administration types on file for this patient.    Family Hx :  Family History   Problem Relation Age of Onset    No Known Problems Mother     Heart disease Father     No Known Problems Sister     No Known Problems Brother     No Known Problems Maternal Aunt     No Known Problems Maternal Uncle     No Known Problems Paternal Aunt     No Known Problems Paternal Uncle     No Known Problems Maternal Grandmother     No Known Problems Maternal Grandfather     No Known Problems Paternal Grandmother     No Known Problems Paternal Grandfather     Amblyopia Neg Hx     Blindness Neg Hx     Cataracts Neg Hx     Glaucoma Neg Hx     Macular degeneration Neg Hx     Retinal detachment Neg Hx     Strabismus Neg Hx     Cancer Neg Hx     Diabetes Neg Hx     Hypertension Neg Hx     Stroke Neg Hx     Thyroid disease Neg Hx        Social Hx :  Social History     Socioeconomic History    Marital status:      Spouse name: Not on file    Number of children: Not on file    Years of education: Not on file    Highest education level: Not on file   Occupational History    Not on file   Social Needs    Financial resource strain: Not on file    Food insecurity:     Worry: Not on file     Inability: Not on file    Transportation needs:     Medical: Not on file     Non-medical: Not on file   Tobacco Use    Smoking status: Former Smoker    Smokeless tobacco: Never Used   Substance and Sexual Activity    Alcohol use: No    Drug use: Never    Sexual activity: Not Currently      Partners: Female   Lifestyle    Physical activity:     Days per week: Not on file     Minutes per session: Not on file    Stress: Not on file   Relationships    Social connections:     Talks on phone: Not on file     Gets together: Not on file     Attends Voodoo service: Not on file     Active member of club or organization: Not on file     Attends meetings of clubs or organizations: Not on file     Relationship status: Not on file   Other Topics Concern    Not on file   Social History Narrative    Not on file       Surgery :  Lymph node Bx; Portacath. Bone Marrow Bx Cholecystectomy. Bilateral Cataract surgery  Excision of skin cancer from nostril.     Symptoms :    Review of Systems   Constitutional: Positive for malaise/fatigue. Negative for chills, diaphoresis, fever and weight loss.        Energy Improving. Gaining weight.  No pruritis, night sweats or evening rise in temp. Appetite improving   HENT: Negative for congestion, hearing loss, nosebleeds, sore throat and tinnitus.    Eyes: Negative for blurred vision, double vision and photophobia.        Under Care of Retina specialist for Macular degeneration   Respiratory: Negative for cough, hemoptysis and shortness of breath.    Cardiovascular: Negative for chest pain, palpitations, orthopnea, claudication, leg swelling and PND.   Gastrointestinal: Negative for abdominal pain, blood in stool, constipation, diarrhea (Moderate), heartburn, nausea and vomiting.   Genitourinary: Positive for frequency (Related to BPH). Negative for dysuria, flank pain, hematuria and urgency.   Musculoskeletal: Negative for back pain, falls, joint pain (rthritis), myalgias and neck pain.   Skin: Negative for itching and rash.   Neurological: Negative for dizziness, tingling, tremors, sensory change (Related to Chemotherapy), speech change, focal weakness, seizures, loss of consciousness, weakness and headaches.   Endo/Heme/Allergies: Negative for environmental allergies and  polydipsia. Does not bruise/bleed easily.   Psychiatric/Behavioral: Positive for depression. Negative for memory loss. The patient is nervous/anxious. The patient does not have insomnia.        Physical Exam :   Wife present in the room.  Physical Exam   Constitutional: He is oriented to person, place, and time and well-developed, well-nourished, and in no distress. Vital signs are normal. No distress.   HENT:   Head: Normocephalic and atraumatic.   Right Ear: External ear normal.   Left Ear: External ear normal.   Nose: Nose normal.   Mouth/Throat: Oropharynx is clear and moist. No oropharyngeal exudate.   Eyes: Conjunctivae, EOM and lids are normal. Lids are everted and swept, no foreign bodies found. Right eye exhibits no discharge. Left eye exhibits no discharge. No scleral icterus. Pupils are unequal.       Neck: Trachea normal, normal range of motion, full passive range of motion without pain and phonation normal. Neck supple. Normal carotid pulses, no hepatojugular reflux and no JVD present. No tracheal tenderness present. Carotid bruit is not present. No tracheal deviation present. No thyroid mass and no thyromegaly present.   Cardiovascular: Normal rate, regular rhythm, normal heart sounds, intact distal pulses and normal pulses. PMI is not displaced. Exam reveals no friction rub.   No murmur heard.  Pulmonary/Chest: Effort normal and breath sounds normal. No stridor. No apnea. No respiratory distress. He has no wheezes. He has no rales. He exhibits no tenderness.   Abdominal: Soft. Bowel sounds are normal. He exhibits no distension, no ascites and no mass. There is no hepatosplenomegaly, splenomegaly or hepatomegaly. There is no tenderness. There is no rebound, no guarding and no CVA tenderness. No hernia.   Genitourinary:   Genitourinary Comments: Not Examined   Musculoskeletal: Normal range of motion. He exhibits no edema, tenderness or deformity.   Lymphadenopathy:        Head (right side): No  submental, no submandibular, no tonsillar, no preauricular, no posterior auricular and no occipital adenopathy present.        Head (left side): No submental, no submandibular, no tonsillar, no preauricular, no posterior auricular and no occipital adenopathy present.     He has no cervical adenopathy.     He has no axillary adenopathy.        Right: No inguinal, no supraclavicular and no epitrochlear adenopathy present.        Left: No inguinal, no supraclavicular and no epitrochlear adenopathy present.   Neurological: He is alert and oriented to person, place, and time. He has normal motor skills, normal sensation, normal strength, normal reflexes and intact cranial nerves. No cranial nerve deficit. He exhibits normal muscle tone. Coordination normal. GCS score is 15.   Skin: Skin is warm and dry. He is not diaphoretic. No cyanosis. Nails show no clubbing.   Psychiatric: Mood, memory, affect and judgment normal.     Labs & Imaging :  02/18/2020 : ; NFBS 98; Cr. 1.2; eGFR 56; ca 9.7; bili 0.4. Liver enzymes normal.  WBC 4,550. Hgb 12.7; hct 40.2; MCV 86. Platelets 191,000 ANC 4,300.   PET/CT 3/19/20 : Small left Pleural effusion. Mild uptake Prostate. No uptake in liver, spleen or Lymph nodes  Dx :   Chemotherapy follow up.  Diffuse large B cell lymphoma.  Remote Hx of Follicular Lymphoma. In Remission.      Assessment & Plan: Reviewed with patient  .  Nice response to treatment. RTC 2 months wit CBC,CMP,LDH  Advance Care Planning     Power of   I initiated the process of advance care planning today and explained the importance of this process to the patient.  I introduced the concept of advance directives to the patient, as well. Then the patient received detailed information about the importance of designating a Health Care Power of  (HCPOA). He was also instructed to communicate with this person about their wishes for future healthcare, should he become sick and lose decision-making  capacity. The patient  has not: previously appointed a HCPOA. .         Living Will  During this visit, I engaged the patient in the advance care planning process.  The patient and I reviewed the role for advance directives and their purpose in directing future healthcare if the patient's unable to speak for him/herself.  At this point in time, the patient does have full decision-making capacity.  We discussed different extreme health states that he could experience, and reviewed what kind of medical care he would want in those situations.  The patient communicated that if he were comatose and had little chance of a meaningful recovery, does not  want machines/life-sustaining treatments used.    The patient  HAS NOTcompleted a living will to reflect these preferences.       Brochures given.

## 2020-03-20 ENCOUNTER — OFFICE VISIT (OUTPATIENT)
Dept: HEMATOLOGY/ONCOLOGY | Facility: CLINIC | Age: 80
End: 2020-03-20
Payer: MEDICARE

## 2020-03-20 VITALS
DIASTOLIC BLOOD PRESSURE: 78 MMHG | SYSTOLIC BLOOD PRESSURE: 152 MMHG | HEIGHT: 71 IN | OXYGEN SATURATION: 97 % | WEIGHT: 190.5 LBS | BODY MASS INDEX: 26.67 KG/M2 | TEMPERATURE: 98 F | HEART RATE: 78 BPM

## 2020-03-20 DIAGNOSIS — C83.32 DIFFUSE LARGE B-CELL LYMPHOMA OF INTRATHORACIC LYMPH NODES: Primary | ICD-10-CM

## 2020-03-20 DIAGNOSIS — Z85.72 HISTORY OF FOLLICULAR LYMPHOMA: ICD-10-CM

## 2020-03-20 PROCEDURE — 1159F MED LIST DOCD IN RCRD: CPT | Mod: S$GLB,,, | Performed by: INTERNAL MEDICINE

## 2020-03-20 PROCEDURE — 1101F PT FALLS ASSESS-DOCD LE1/YR: CPT | Mod: CPTII,S$GLB,, | Performed by: INTERNAL MEDICINE

## 2020-03-20 PROCEDURE — 3077F PR MOST RECENT SYSTOLIC BLOOD PRESSURE >= 140 MM HG: ICD-10-PCS | Mod: CPTII,S$GLB,, | Performed by: INTERNAL MEDICINE

## 2020-03-20 PROCEDURE — 1126F AMNT PAIN NOTED NONE PRSNT: CPT | Mod: S$GLB,,, | Performed by: INTERNAL MEDICINE

## 2020-03-20 PROCEDURE — 1159F PR MEDICATION LIST DOCUMENTED IN MEDICAL RECORD: ICD-10-PCS | Mod: S$GLB,,, | Performed by: INTERNAL MEDICINE

## 2020-03-20 PROCEDURE — 1126F PR PAIN SEVERITY QUANTIFIED, NO PAIN PRESENT: ICD-10-PCS | Mod: S$GLB,,, | Performed by: INTERNAL MEDICINE

## 2020-03-20 PROCEDURE — 3078F DIAST BP <80 MM HG: CPT | Mod: CPTII,S$GLB,, | Performed by: INTERNAL MEDICINE

## 2020-03-20 PROCEDURE — 3077F SYST BP >= 140 MM HG: CPT | Mod: CPTII,S$GLB,, | Performed by: INTERNAL MEDICINE

## 2020-03-20 PROCEDURE — 99999 PR PBB SHADOW E&M-EST. PATIENT-LVL III: ICD-10-PCS | Mod: PBBFAC,,, | Performed by: INTERNAL MEDICINE

## 2020-03-20 PROCEDURE — 99213 PR OFFICE/OUTPT VISIT, EST, LEVL III, 20-29 MIN: ICD-10-PCS | Mod: S$GLB,,, | Performed by: INTERNAL MEDICINE

## 2020-03-20 PROCEDURE — 99999 PR PBB SHADOW E&M-EST. PATIENT-LVL III: CPT | Mod: PBBFAC,,, | Performed by: INTERNAL MEDICINE

## 2020-03-20 PROCEDURE — 99213 OFFICE O/P EST LOW 20 MIN: CPT | Mod: S$GLB,,, | Performed by: INTERNAL MEDICINE

## 2020-03-20 PROCEDURE — 1101F PR PT FALLS ASSESS DOC 0-1 FALLS W/OUT INJ PAST YR: ICD-10-PCS | Mod: CPTII,S$GLB,, | Performed by: INTERNAL MEDICINE

## 2020-03-20 PROCEDURE — 3078F PR MOST RECENT DIASTOLIC BLOOD PRESSURE < 80 MM HG: ICD-10-PCS | Mod: CPTII,S$GLB,, | Performed by: INTERNAL MEDICINE

## 2020-03-26 ENCOUNTER — INFUSION (OUTPATIENT)
Dept: INFUSION THERAPY | Facility: HOSPITAL | Age: 80
End: 2020-03-26
Attending: INTERNAL MEDICINE
Payer: MEDICARE

## 2020-03-26 VITALS
DIASTOLIC BLOOD PRESSURE: 82 MMHG | OXYGEN SATURATION: 98 % | TEMPERATURE: 98 F | RESPIRATION RATE: 18 BRPM | SYSTOLIC BLOOD PRESSURE: 156 MMHG | HEART RATE: 80 BPM

## 2020-03-26 DIAGNOSIS — C85.90 LYMPHOMA: Primary | ICD-10-CM

## 2020-03-26 PROCEDURE — 96523 IRRIG DRUG DELIVERY DEVICE: CPT

## 2020-03-26 PROCEDURE — 25000003 PHARM REV CODE 250: Performed by: INTERNAL MEDICINE

## 2020-03-26 PROCEDURE — A4216 STERILE WATER/SALINE, 10 ML: HCPCS | Performed by: INTERNAL MEDICINE

## 2020-03-26 PROCEDURE — 63600175 PHARM REV CODE 636 W HCPCS: Performed by: INTERNAL MEDICINE

## 2020-03-26 RX ORDER — SODIUM CHLORIDE 0.9 % (FLUSH) 0.9 %
10 SYRINGE (ML) INJECTION
Status: DISCONTINUED | OUTPATIENT
Start: 2020-03-26 | End: 2020-03-26 | Stop reason: HOSPADM

## 2020-03-26 RX ORDER — HEPARIN 100 UNIT/ML
500 SYRINGE INTRAVENOUS
Status: DISCONTINUED | OUTPATIENT
Start: 2020-03-26 | End: 2020-03-26 | Stop reason: HOSPADM

## 2020-03-26 RX ORDER — SODIUM CHLORIDE 0.9 % (FLUSH) 0.9 %
10 SYRINGE (ML) INJECTION
Status: CANCELLED
Start: 2020-04-02

## 2020-03-26 RX ORDER — HEPARIN 100 UNIT/ML
500 SYRINGE INTRAVENOUS
Status: CANCELLED
Start: 2020-04-02

## 2020-03-26 RX ORDER — SODIUM CHLORIDE 0.9 % (FLUSH) 0.9 %
10 SYRINGE (ML) INJECTION
Status: CANCELLED | OUTPATIENT
Start: 2020-04-02

## 2020-03-26 RX ORDER — HEPARIN 100 UNIT/ML
500 SYRINGE INTRAVENOUS
Status: CANCELLED | OUTPATIENT
Start: 2020-04-02

## 2020-03-26 RX ORDER — HEPARIN 100 UNIT/ML
500 SYRINGE INTRAVENOUS
Status: COMPLETED | OUTPATIENT
Start: 2020-03-26 | End: 2020-03-26

## 2020-03-26 RX ADMIN — HEPARIN 500 UNITS: 100 SYRINGE at 11:03

## 2020-03-26 RX ADMIN — Medication 10 ML: at 11:03

## 2020-03-26 NOTE — PLAN OF CARE
Pt arrived to unit. Pt understands catheter needs to be removed in IR and will be scheduled. Tolerated PICC dressing change. Site with no edema, redness or drainage. Pt has next appt. Pt ambulated off unit. No distress noted.

## 2020-04-02 ENCOUNTER — INFUSION (OUTPATIENT)
Dept: INFUSION THERAPY | Facility: HOSPITAL | Age: 80
End: 2020-04-02
Attending: INTERNAL MEDICINE
Payer: MEDICARE

## 2020-04-02 VITALS
OXYGEN SATURATION: 99 % | TEMPERATURE: 98 F | RESPIRATION RATE: 18 BRPM | SYSTOLIC BLOOD PRESSURE: 174 MMHG | DIASTOLIC BLOOD PRESSURE: 82 MMHG | HEART RATE: 80 BPM

## 2020-04-02 DIAGNOSIS — C85.90 LYMPHOMA: Primary | ICD-10-CM

## 2020-04-02 PROCEDURE — A4216 STERILE WATER/SALINE, 10 ML: HCPCS | Performed by: INTERNAL MEDICINE

## 2020-04-02 PROCEDURE — 96523 IRRIG DRUG DELIVERY DEVICE: CPT

## 2020-04-02 PROCEDURE — 25000003 PHARM REV CODE 250: Performed by: INTERNAL MEDICINE

## 2020-04-02 PROCEDURE — 63600175 PHARM REV CODE 636 W HCPCS: Performed by: INTERNAL MEDICINE

## 2020-04-02 RX ORDER — SODIUM CHLORIDE 0.9 % (FLUSH) 0.9 %
10 SYRINGE (ML) INJECTION
Status: DISCONTINUED | OUTPATIENT
Start: 2020-04-02 | End: 2020-04-02 | Stop reason: HOSPADM

## 2020-04-02 RX ORDER — HEPARIN 100 UNIT/ML
500 SYRINGE INTRAVENOUS
Status: CANCELLED | OUTPATIENT
Start: 2020-04-02

## 2020-04-02 RX ORDER — SODIUM CHLORIDE 0.9 % (FLUSH) 0.9 %
10 SYRINGE (ML) INJECTION
Status: CANCELLED | OUTPATIENT
Start: 2020-04-02

## 2020-04-02 RX ORDER — HEPARIN 100 UNIT/ML
500 SYRINGE INTRAVENOUS
Status: DISCONTINUED | OUTPATIENT
Start: 2020-04-02 | End: 2020-04-02 | Stop reason: HOSPADM

## 2020-04-02 RX ORDER — HEPARIN 100 UNIT/ML
500 SYRINGE INTRAVENOUS
Status: COMPLETED | OUTPATIENT
Start: 2020-04-02 | End: 2020-04-02

## 2020-04-02 RX ORDER — SODIUM CHLORIDE 0.9 % (FLUSH) 0.9 %
10 SYRINGE (ML) INJECTION
Status: CANCELLED
Start: 2020-04-02

## 2020-04-02 RX ORDER — HEPARIN 100 UNIT/ML
500 SYRINGE INTRAVENOUS
Status: CANCELLED
Start: 2020-04-02

## 2020-04-02 RX ADMIN — HEPARIN 500 UNITS: 100 SYRINGE at 08:04

## 2020-04-02 RX ADMIN — Medication 10 ML: at 08:04

## 2020-04-02 NOTE — PLAN OF CARE
Patient arrived to infusion center for weekly PICC dressing change and flush. Tolerated well. VSS. Discharge instructions provided. Patient instructed to return 4/9/20. Patient ambulated unassisted off unit, in NAD at time of discharge.

## 2020-04-03 ENCOUNTER — TELEPHONE (OUTPATIENT)
Dept: HEMATOLOGY/ONCOLOGY | Facility: CLINIC | Age: 80
End: 2020-04-03

## 2020-04-03 NOTE — TELEPHONE ENCOUNTER
The patient called stating that he would like for you to call him as he does not understanding why you can not call the higher up people to get his pick line removed. Also that he was told by a  radiologist that the nurses could remove his PICC. I explain to the patient that the nurses can not remove it because it is not in his arm but in his chest. He still does not understand as well as he does not understand that we do not make the decisions on the removal of the PICC in IR.He was told that it will have to wait as they are only dealing with emergencies at this time.

## 2020-04-09 ENCOUNTER — INFUSION (OUTPATIENT)
Dept: INFUSION THERAPY | Facility: HOSPITAL | Age: 80
End: 2020-04-09
Attending: INTERNAL MEDICINE
Payer: MEDICARE

## 2020-04-09 VITALS
DIASTOLIC BLOOD PRESSURE: 69 MMHG | SYSTOLIC BLOOD PRESSURE: 124 MMHG | HEART RATE: 80 BPM | TEMPERATURE: 98 F | OXYGEN SATURATION: 97 % | RESPIRATION RATE: 18 BRPM

## 2020-04-09 DIAGNOSIS — C85.90 LYMPHOMA: Primary | ICD-10-CM

## 2020-04-09 PROCEDURE — 96523 IRRIG DRUG DELIVERY DEVICE: CPT

## 2020-04-09 PROCEDURE — 25000003 PHARM REV CODE 250: Performed by: INTERNAL MEDICINE

## 2020-04-09 PROCEDURE — A4216 STERILE WATER/SALINE, 10 ML: HCPCS | Performed by: INTERNAL MEDICINE

## 2020-04-09 PROCEDURE — 63600175 PHARM REV CODE 636 W HCPCS: Performed by: INTERNAL MEDICINE

## 2020-04-09 RX ORDER — HEPARIN 100 UNIT/ML
500 SYRINGE INTRAVENOUS
Status: DISCONTINUED | OUTPATIENT
Start: 2020-04-09 | End: 2020-04-09 | Stop reason: HOSPADM

## 2020-04-09 RX ORDER — HEPARIN 100 UNIT/ML
500 SYRINGE INTRAVENOUS
Status: CANCELLED
Start: 2020-04-16

## 2020-04-09 RX ORDER — SODIUM CHLORIDE 0.9 % (FLUSH) 0.9 %
10 SYRINGE (ML) INJECTION
Status: CANCELLED
Start: 2020-04-16

## 2020-04-09 RX ORDER — SODIUM CHLORIDE 0.9 % (FLUSH) 0.9 %
10 SYRINGE (ML) INJECTION
Status: DISCONTINUED | OUTPATIENT
Start: 2020-04-09 | End: 2020-04-09 | Stop reason: HOSPADM

## 2020-04-09 RX ORDER — SODIUM CHLORIDE 0.9 % (FLUSH) 0.9 %
10 SYRINGE (ML) INJECTION
Status: CANCELLED | OUTPATIENT
Start: 2020-04-16

## 2020-04-09 RX ORDER — HEPARIN 100 UNIT/ML
500 SYRINGE INTRAVENOUS
Status: COMPLETED | OUTPATIENT
Start: 2020-04-09 | End: 2020-04-09

## 2020-04-09 RX ORDER — HEPARIN 100 UNIT/ML
500 SYRINGE INTRAVENOUS
Status: CANCELLED | OUTPATIENT
Start: 2020-04-16

## 2020-04-09 RX ADMIN — HEPARIN 500 UNITS: 100 SYRINGE at 09:04

## 2020-04-09 RX ADMIN — Medication 10 ML: at 09:04

## 2020-04-09 NOTE — PLAN OF CARE
Patient arrived to infusion center for weekly PICC dressing change and line flush. Tolerated well. VSS. Discharge instructions provided. Patient instructed to return 4/16/20. Patient ambulated unassisted off unit, in NAD at time of discharge.

## 2020-04-16 ENCOUNTER — INFUSION (OUTPATIENT)
Dept: INFUSION THERAPY | Facility: HOSPITAL | Age: 80
End: 2020-04-16
Attending: INTERNAL MEDICINE
Payer: MEDICARE

## 2020-04-16 VITALS
DIASTOLIC BLOOD PRESSURE: 74 MMHG | TEMPERATURE: 98 F | HEART RATE: 78 BPM | RESPIRATION RATE: 18 BRPM | OXYGEN SATURATION: 100 % | SYSTOLIC BLOOD PRESSURE: 157 MMHG

## 2020-04-16 DIAGNOSIS — C83.32 DIFFUSE LARGE B-CELL LYMPHOMA OF INTRATHORACIC LYMPH NODES: Primary | ICD-10-CM

## 2020-04-16 DIAGNOSIS — C85.90 LYMPHOMA: Primary | ICD-10-CM

## 2020-04-16 PROCEDURE — 96523 IRRIG DRUG DELIVERY DEVICE: CPT

## 2020-04-16 PROCEDURE — 25000003 PHARM REV CODE 250: Performed by: INTERNAL MEDICINE

## 2020-04-16 PROCEDURE — 63600175 PHARM REV CODE 636 W HCPCS: Performed by: INTERNAL MEDICINE

## 2020-04-16 PROCEDURE — A4216 STERILE WATER/SALINE, 10 ML: HCPCS | Performed by: INTERNAL MEDICINE

## 2020-04-16 RX ORDER — SODIUM CHLORIDE 0.9 % (FLUSH) 0.9 %
10 SYRINGE (ML) INJECTION
Status: CANCELLED
Start: 2020-04-16

## 2020-04-16 RX ORDER — HEPARIN 100 UNIT/ML
500 SYRINGE INTRAVENOUS
Status: CANCELLED
Start: 2020-04-16

## 2020-04-16 RX ORDER — HEPARIN 100 UNIT/ML
500 SYRINGE INTRAVENOUS
Status: CANCELLED | OUTPATIENT
Start: 2020-04-16

## 2020-04-16 RX ORDER — HEPARIN 100 UNIT/ML
500 SYRINGE INTRAVENOUS
Status: DISCONTINUED | OUTPATIENT
Start: 2020-04-16 | End: 2020-04-16 | Stop reason: HOSPADM

## 2020-04-16 RX ORDER — SODIUM CHLORIDE 0.9 % (FLUSH) 0.9 %
10 SYRINGE (ML) INJECTION
Status: DISCONTINUED | OUTPATIENT
Start: 2020-04-16 | End: 2020-04-16 | Stop reason: HOSPADM

## 2020-04-16 RX ORDER — SODIUM CHLORIDE 0.9 % (FLUSH) 0.9 %
10 SYRINGE (ML) INJECTION
Status: CANCELLED | OUTPATIENT
Start: 2020-04-16

## 2020-04-16 RX ORDER — HEPARIN 100 UNIT/ML
500 SYRINGE INTRAVENOUS
Status: COMPLETED | OUTPATIENT
Start: 2020-04-16 | End: 2020-04-16

## 2020-04-16 RX ADMIN — HEPARIN 500 UNITS: 100 SYRINGE at 09:04

## 2020-04-16 RX ADMIN — Medication 10 ML: at 09:04

## 2020-04-16 NOTE — PRE ADMISSION SCREENING
Pre admit phone call completed.    Instructions given to patient about NPO status as follows:     The evening before surgery do not eat anything after 9 p.m. ( this includes hard candy, chewing gum and mints).  You may only have GATORADE, POWERADE AND WATER from 9 p.m. until you leave your home. DO NOT  DRINK ANY LIQUIDS ON THE WAY TO THE HOSPITAL.      Patient was also instructed on the below information:  No visitors Entrance  Ride home reinforced.   if you will be discharged the same day as your procedure, please arrange for a responsible adult to drive you home or  to accompany you if traveling by taxi.  YOU WILL NOT BE PERMITTED TO DRIVE OR TO LEAVE THE HOSPITAL ALONE AFTER SURGERY.  It is strongly recommended that you arrange for someone to remain with you for the first 24 hrs following your surgery.    Patient verbalized understanding of above instructions.

## 2020-04-16 NOTE — PLAN OF CARE
Patient arrived to unit for weekly PICC line flush and sterile dressing change. VSS. Pt continues to c/o itchiness + pain at insertion site and surrounding area that keeps patient up at night. Previous dressing lifted on edges. Skin around dressing is visibly irritated. Multiple lesions in various healing stages present. Lesions appear red, edematous, with scaly patches that are weepy. Occasional vesicles are present. Yellow-tinged drainage on old dressing. Patient does report PMHx of eczema. Redness + tenderness noted at PICC insertion site. NO drainage from insertion site at this time. Suture intact. Patient remains Afebrile at this time. He continues to request PICC line removal with concern of CLABSI and possible thrombus formation. The patient has completed chemotherapy and was scheduled for PICC line removal in IR last month. Procedure postponed due to COVID-19. Informed patient we will contact IR about current PICC status and removal request. Agreeable with plan of care. Patient tolerated line flush and sterile dressing change well. He received discharge instructions and follow up appointments. Will return in 1 week for next dressing change. Patient verbalized understanding and ambulated unassisted off unit by self, in NAD.    Patient is currently taking apixaban 5mg, last dose this AM (04/16/20).    Double-lumen PICC line to Right IJ  Inserted 09/30/2019   Catheter Secured at 23cm   Suture remains intact

## 2020-04-17 ENCOUNTER — TELEPHONE (OUTPATIENT)
Dept: HEMATOLOGY/ONCOLOGY | Facility: CLINIC | Age: 80
End: 2020-04-17

## 2020-04-17 ENCOUNTER — HOSPITAL ENCOUNTER (OUTPATIENT)
Facility: OTHER | Age: 80
Discharge: HOME | End: 2020-04-17
Attending: RADIOLOGY | Admitting: RADIOLOGY
Payer: MEDICARE

## 2020-04-17 VITALS
WEIGHT: 188 LBS | DIASTOLIC BLOOD PRESSURE: 88 MMHG | HEART RATE: 85 BPM | SYSTOLIC BLOOD PRESSURE: 152 MMHG | TEMPERATURE: 98 F | RESPIRATION RATE: 16 BRPM | HEIGHT: 71 IN | OXYGEN SATURATION: 98 % | BODY MASS INDEX: 26.32 KG/M2

## 2020-04-17 DIAGNOSIS — T82.9XXA PROBLEM WITH INTRAVENOUS CATHETER: ICD-10-CM

## 2020-04-17 DIAGNOSIS — C85.90 LYMPHOMA: ICD-10-CM

## 2020-04-17 LAB — SARS-COV-2 RDRP RESP QL NAA+PROBE: NEGATIVE

## 2020-04-17 PROCEDURE — U0002 COVID-19 LAB TEST NON-CDC: HCPCS

## 2020-04-17 NOTE — DISCHARGE SUMMARY
Radiology Discharge Summary      Hospital Course: No complications    Admit Date: 4/17/2020  Discharge Date: 04/17/2020     Instructions Given to Patient: Yes  Diet: Resume prior diet  Activity: activity as tolerated    Description of Condition on Discharge: Stable  Vital Signs (Most Recent): Temp: 98.1 °F (36.7 °C) (04/17/20 0834)  Pulse: 88 (04/17/20 0834)  Resp: 16 (04/17/20 0834)  BP: (!) 144/86 (04/17/20 0834)  SpO2: 97 % (04/17/20 0834)    Discharge Disposition: Home    Discharge Diagnosis: lymphoma, in remission, tunneled PICC removed     Follow-up: per oncology    @SIG@

## 2020-04-17 NOTE — PROCEDURES
Radiology Post-Procedure Note    Pre Op Diagnosis: central line problem  Post Op Diagnosis: Same    Procedure: R chest tunneled PICC removal    Procedure performed by: Bandar Baker MD    Written Informed Consent Obtained: Yes  Specimen Removed: YES tunneled PICC line  Estimated Blood Loss: Minimal    Findings:   Successful removal of R chest tunneled PICC.    Patient tolerated procedure well.    @SIG@

## 2020-04-17 NOTE — PLAN OF CARE
Robe Cevallos has met all discharge criteria from Phase II. Vital Signs are stable, ambulating  without difficulty. Discharge instructions given, patient verbalized understanding. Discharged from facility via wheelchair in stable condition.

## 2020-04-17 NOTE — OR NURSING
CVC catheter removed without incident at beside by Dr. Lynn using sterile technique.  Pt tolerated procedure well.

## 2020-04-17 NOTE — H&P
Consult/H&P Note  Interventional Radiology    Consult Requested By: oncology    Reason for Consult: central line issue    SUBJECTIVE:     Chief Complaint: central line irritation    History of Present Illness: 81 yo M with a R chest tunneled PICC line which is no longer in use.  The line is irritated and causes him pain.  There was some concern of infection, however it appears to be superficial irritation rather than infection.    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 Jolley    CATARACT EXTRACTION W/  INTRAOCULAR LENS IMPLANT Left 04/20/2017    Dr. Jolley    CHOLECYSTECTOMY      EYE SURGERY      Rt cataract    KNEE ARTHROSCOPY      Lt knee    TONSILLECTOMY      Tunneled PICC line Right 09/30/2019    Via IJ, 23cm  length     Family History   Problem Relation Age of Onset    No Known Problems Mother     Heart disease Father     No Known Problems Sister     No Known Problems Brother     No Known Problems Maternal Aunt     No Known Problems Maternal Uncle     No Known Problems Paternal Aunt     No Known Problems Paternal Uncle     No Known Problems Maternal Grandmother     No Known Problems Maternal Grandfather     No Known Problems Paternal Grandmother     No Known Problems Paternal Grandfather     Amblyopia Neg Hx     Blindness Neg Hx     Cataracts Neg Hx     Glaucoma Neg Hx     Macular degeneration Neg Hx     Retinal detachment Neg Hx     Strabismus Neg Hx     Cancer Neg Hx     Diabetes Neg Hx     Hypertension Neg Hx     Stroke Neg Hx      Thyroid disease Neg Hx      Social History     Tobacco Use    Smoking status: Former Smoker    Smokeless tobacco: Never Used   Substance Use Topics    Alcohol use: No    Drug use: Never       Review of Systems:  Constitutional/General:No fever, chills, change in appetite or weight loss.  Hematological/Immuno: no known coagulopathies  Respiratory: no shortness of breath  Cardiovascular: no chest pain  Gastrointestinal: no abdominal pain  Genito-Urinary: no dysuria  Musculoskeletal: negative  Skin: Negative for rash, itching, pigmentation changes, nail or hair changes.  Neurological: no TIA or stroke symptoms  Psychiatric: normal mood/affect, good insight/judgement      OBJECTIVE:     Vital Signs Range (Last 24H):  Temp:  [98.1 °F (36.7 °C)]   Pulse:  [88]   Resp:  [16]   BP: (144)/(86)   SpO2:  [97 %]     Physical Exam:  General- Patient alert and oriented x3 in NAD  ENT- PERRLA,  Neck- No masses  CV- Regular rate and rhythm  Resp-  No increased WOB  GI- Non tender/non-distended  Extrem- No cyanosis, clubbing, edema.   Derm- No rashes, masses, or lesions noted  Neuro-  No focal deficits noted.     Physical Exam  Body mass index is 26.22 kg/m².    Scheduled Meds:   Continuous Infusions:   PRN Meds:    Allergies: Review of patient's allergies indicates:  No Known Allergies    Labs:  No results for input(s): INR in the last 168 hours.    Invalid input(s):  PT,  PTT  No results for input(s): WBC, HGB, HCT, MCV, PLT in the last 168 hours. No results for input(s): GLU, NA, K, CL, CO2, BUN, CREATININE, CALCIUM, MG, ALT, AST, ALBUMIN, BILITOT, BILIDIR in the last 168 hours.    Vitals (Most Recent):  Temp: 98.1 °F (36.7 °C) (04/17/20 0834)  Pulse: 88 (04/17/20 0834)  Resp: 16 (04/17/20 0834)  BP: (!) 144/86 (04/17/20 0834)  SpO2: 97 % (04/17/20 0834)    ASA: 3  Mallampati: 2    Consent obtained    ASSESSMENT/PLAN:     R chest tunneled central line removal.  Local anesthesia.    Active Hospital Problems    Diagnosis  POA     Problem with intravenous catheter [T82.9XXA]  Yes      Resolved Hospital Problems   No resolved problems to display.           Bandar Baker MD

## 2020-04-17 NOTE — TELEPHONE ENCOUNTER
The patient called stating that he had his PICC line removed today and would like to know if he need to continue Eliquis or if he can stop taking    Left Message for patient.  Stay on Eliquis for four more weeks and re evaluate

## 2020-05-18 ENCOUNTER — LAB VISIT (OUTPATIENT)
Dept: LAB | Facility: HOSPITAL | Age: 80
End: 2020-05-18
Attending: INTERNAL MEDICINE
Payer: MEDICARE

## 2020-05-18 DIAGNOSIS — Z85.72 HISTORY OF FOLLICULAR LYMPHOMA: ICD-10-CM

## 2020-05-18 DIAGNOSIS — C83.32 DIFFUSE LARGE B-CELL LYMPHOMA OF INTRATHORACIC LYMPH NODES: ICD-10-CM

## 2020-05-18 LAB
ALBUMIN SERPL BCP-MCNC: 4.1 G/DL (ref 3.5–5.2)
ALP SERPL-CCNC: 83 U/L (ref 55–135)
ALT SERPL W/O P-5'-P-CCNC: 13 U/L (ref 10–44)
ANION GAP SERPL CALC-SCNC: 11 MMOL/L (ref 8–16)
AST SERPL-CCNC: 18 U/L (ref 10–40)
BASOPHILS # BLD AUTO: 0.03 K/UL (ref 0–0.2)
BASOPHILS NFR BLD: 0.5 % (ref 0–1.9)
BILIRUB SERPL-MCNC: 0.5 MG/DL (ref 0.1–1)
BUN SERPL-MCNC: 19 MG/DL (ref 8–23)
CALCIUM SERPL-MCNC: 9.5 MG/DL (ref 8.7–10.5)
CHLORIDE SERPL-SCNC: 107 MMOL/L (ref 95–110)
CO2 SERPL-SCNC: 21 MMOL/L (ref 23–29)
CREAT SERPL-MCNC: 1.1 MG/DL (ref 0.5–1.4)
DIFFERENTIAL METHOD: ABNORMAL
EOSINOPHIL # BLD AUTO: 0.2 K/UL (ref 0–0.5)
EOSINOPHIL NFR BLD: 3.9 % (ref 0–8)
ERYTHROCYTE [DISTWIDTH] IN BLOOD BY AUTOMATED COUNT: 14.3 % (ref 11.5–14.5)
EST. GFR  (AFRICAN AMERICAN): >60 ML/MIN/1.73 M^2
EST. GFR  (NON AFRICAN AMERICAN): >60 ML/MIN/1.73 M^2
GLUCOSE SERPL-MCNC: 93 MG/DL (ref 70–110)
HCT VFR BLD AUTO: 43.6 % (ref 40–54)
HGB BLD-MCNC: 13.8 G/DL (ref 14–18)
IMM GRANULOCYTES # BLD AUTO: 0.02 K/UL (ref 0–0.04)
IMM GRANULOCYTES NFR BLD AUTO: 0.3 % (ref 0–0.5)
LDH SERPL L TO P-CCNC: 180 U/L (ref 110–260)
LYMPHOCYTES # BLD AUTO: 1.6 K/UL (ref 1–4.8)
LYMPHOCYTES NFR BLD: 26.1 % (ref 18–48)
MCH RBC QN AUTO: 26.7 PG (ref 27–31)
MCHC RBC AUTO-ENTMCNC: 31.7 G/DL (ref 32–36)
MCV RBC AUTO: 85 FL (ref 82–98)
MONOCYTES # BLD AUTO: 0.6 K/UL (ref 0.3–1)
MONOCYTES NFR BLD: 9.7 % (ref 4–15)
NEUTROPHILS # BLD AUTO: 3.6 K/UL (ref 1.8–7.7)
NEUTROPHILS NFR BLD: 59.5 % (ref 38–73)
NRBC BLD-RTO: 0 /100 WBC
PLATELET # BLD AUTO: 184 K/UL (ref 150–350)
PMV BLD AUTO: 11.2 FL (ref 9.2–12.9)
POTASSIUM SERPL-SCNC: 4.6 MMOL/L (ref 3.5–5.1)
PROT SERPL-MCNC: 6.7 G/DL (ref 6–8.4)
RBC # BLD AUTO: 5.16 M/UL (ref 4.6–6.2)
SODIUM SERPL-SCNC: 139 MMOL/L (ref 136–145)
WBC # BLD AUTO: 6.09 K/UL (ref 3.9–12.7)

## 2020-05-18 PROCEDURE — 80053 COMPREHEN METABOLIC PANEL: CPT

## 2020-05-18 PROCEDURE — 85025 COMPLETE CBC W/AUTO DIFF WBC: CPT

## 2020-05-18 PROCEDURE — 83615 LACTATE (LD) (LDH) ENZYME: CPT

## 2020-05-18 PROCEDURE — 36415 COLL VENOUS BLD VENIPUNCTURE: CPT

## 2020-05-19 NOTE — PROGRESS NOTES
Established Patient - Audio Only Telehealth Visit     The patient location is:  Home  The chief complaint leading to consultation is:  Lymphoma followup.  Visit type: Virtual visit with audio only (telephone)  Total time spent with patient: 15 mins       The reason for the audio only service rather than synchronous audio and video virtual visit was related to technical difficulties or patient preference/necessity.     Each patient to whom I provide medical services by telemedicine is:  (1) informed of the relationship between the physician and patient and the respective role of any other health care provider with respect to management of the patient; and (2) notified that they may decline to receive medical services by telemedicine and may withdraw from such care at any time. Patient verbally consented to receive this service via voice-only telephone call.       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 Treated with six cucles of R-CVP and two years of maintenance rituxa,. In.aug/sep 2019,  Treated with R.CHOP. abot six weeks after Dx documented to have Pulmonary embolsm. Was on [id number].  Good response to R-CHOP.  Routine Oncology followup.     Review of Systems.  Appetite normal. No bowel or urinary Sx. No fever, pruritis, night sweats or urinary Sx. No headache, dizziness, nausea, vomiting. No chest, abdominal, back or bone pain. No skin rash. No tingling, numbness or claudication. No chest, abdominal, back or bone pain. No epistaxis, bleeding gums, hematemesis, melena, hematuria or hemoptysis.  Bad teeth. Going to see Dentist next month. 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 Stays active. Plays golf regularly.     Labs :  05/18/2020 : ; NFBS 93; Cr.1.1  Ca 9.5. Bili 0.5; Liver enzymes normal. eGFR > 60.   Hgb 13.5; Hct 43.6; MCV 85. Platelets 184,000 ANC 3,600.      Assessment and plan:  No new Sx, Labs adequate, XeraltoEliquis  discontinued, two days ago. after six moths of therapy and normal CT chest.  Continue Monitoring. RTC 3 months with CBC,CMP,LDH.                         This service was not originating from a related E/M service provided within the previous 7 days nor will  to an E/M service or procedure within the next 24 hours or my soonest available appointment.  Prevailing standard of care was able to be met in this audio-only visit.

## 2020-05-20 ENCOUNTER — OFFICE VISIT (OUTPATIENT)
Dept: HEMATOLOGY/ONCOLOGY | Facility: CLINIC | Age: 80
End: 2020-05-20
Payer: MEDICARE

## 2020-05-20 DIAGNOSIS — C83.32 DIFFUSE LARGE B-CELL LYMPHOMA OF INTRATHORACIC LYMPH NODES: Primary | ICD-10-CM

## 2020-05-20 DIAGNOSIS — I26.99 PULMONARY EMBOLISM WITHOUT ACUTE COR PULMONALE, UNSPECIFIED CHRONICITY, UNSPECIFIED PULMONARY EMBOLISM TYPE: ICD-10-CM

## 2020-05-20 PROCEDURE — 1159F PR MEDICATION LIST DOCUMENTED IN MEDICAL RECORD: ICD-10-PCS | Mod: 95,,, | Performed by: INTERNAL MEDICINE

## 2020-05-20 PROCEDURE — 99442 PR PHYSICIAN TELEPHONE EVALUATION 11-20 MIN: ICD-10-PCS | Mod: 95,,, | Performed by: INTERNAL MEDICINE

## 2020-05-20 PROCEDURE — 1101F PR PT FALLS ASSESS DOC 0-1 FALLS W/OUT INJ PAST YR: ICD-10-PCS | Mod: CPTII,95,, | Performed by: INTERNAL MEDICINE

## 2020-05-20 PROCEDURE — 1159F MED LIST DOCD IN RCRD: CPT | Mod: 95,,, | Performed by: INTERNAL MEDICINE

## 2020-05-20 PROCEDURE — 99442 PR PHYSICIAN TELEPHONE EVALUATION 11-20 MIN: CPT | Mod: 95,,, | Performed by: INTERNAL MEDICINE

## 2020-05-20 PROCEDURE — 1101F PT FALLS ASSESS-DOCD LE1/YR: CPT | Mod: CPTII,95,, | Performed by: INTERNAL MEDICINE

## 2020-05-25 PROBLEM — Z09 CHEMOTHERAPY FOLLOW-UP EXAMINATION: Status: RESOLVED | Noted: 2020-02-20 | Resolved: 2020-05-25

## 2020-08-06 ENCOUNTER — OFFICE VISIT (OUTPATIENT)
Dept: OPTOMETRY | Facility: CLINIC | Age: 80
End: 2020-08-06
Payer: MEDICARE

## 2020-08-06 DIAGNOSIS — Z96.1 PSEUDOPHAKIA: ICD-10-CM

## 2020-08-06 DIAGNOSIS — H35.3212 EXUDATIVE AGE-RELATED MACULAR DEGENERATION OF RIGHT EYE WITH INACTIVE CHOROIDAL NEOVASCULARIZATION: Primary | ICD-10-CM

## 2020-08-06 DIAGNOSIS — H50.21 HYPERTROPIA OF RIGHT EYE: ICD-10-CM

## 2020-08-06 DIAGNOSIS — H52.7 REFRACTIVE ERROR: ICD-10-CM

## 2020-08-06 DIAGNOSIS — H35.3122 INTERMEDIATE STAGE NONEXUDATIVE AGE-RELATED MACULAR DEGENERATION OF LEFT EYE: ICD-10-CM

## 2020-08-06 PROCEDURE — 99999 PR PBB SHADOW E&M-EST. PATIENT-LVL III: CPT | Mod: PBBFAC,,, | Performed by: OPTOMETRIST

## 2020-08-06 PROCEDURE — 92014 COMPRE OPH EXAM EST PT 1/>: CPT | Mod: S$GLB,,, | Performed by: OPTOMETRIST

## 2020-08-06 PROCEDURE — 92015 DETERMINE REFRACTIVE STATE: CPT | Mod: S$GLB,,, | Performed by: OPTOMETRIST

## 2020-08-06 PROCEDURE — 92014 PR EYE EXAM, EST PATIENT,COMPREHESV: ICD-10-PCS | Mod: S$GLB,,, | Performed by: OPTOMETRIST

## 2020-08-06 PROCEDURE — 99999 PR PBB SHADOW E&M-EST. PATIENT-LVL III: ICD-10-PCS | Mod: PBBFAC,,, | Performed by: OPTOMETRIST

## 2020-08-06 PROCEDURE — 92015 PR REFRACTION: ICD-10-PCS | Mod: S$GLB,,, | Performed by: OPTOMETRIST

## 2020-08-06 NOTE — PROGRESS NOTES
Subjective:       Patient ID: Robe Cevallos is a 80 y.o. male      Chief Complaint   Patient presents with    Concerns About Ocular Health    Macular Degeneration     History of Present Illness  Dls: 3/9/20 Dr. Kapoor    81 y/o male presents today for AMD check.   Pt states no changes in vision. Pt wears trifocal glasses.    No tearing  No itching  No burning  No pain  No ha's  + floaters  No flashes    Eye meds  otc gtts ou prn     Assessment/Plan:     1. 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 Kapoor. OCT out of service today, unable to get updated scan. Will have pt follow up with retina for eval for OCT    3. Pseudophakia  Well-centered. Clear.     4. Hypertropia of right eye  5. Refractive error  Pt doing well with prism in glasses. Educated patient on refractive error and discussed lens options. Dispensed updated spectacle Rx. Educated about adaptation period to new specs.    Eyeglass Final Rx     Eyeglass Final Rx       Sphere Cylinder Axis Add Vert Prism    Right -1.00 +1.75 175 +2.50 1.0 down    Left -0.50 +2.00 165 +2.50     Expiration Date: 8/7/2021                  Follow up for Retina with Dr. aKpoor.

## 2020-08-14 NOTE — PROGRESS NOTES
Chief Complaint :  Follicular lymphoma.    Hx of Present illness :  Patient is a 80 y.o. year old male who presents to the clinic today for  Oncology followup. Has been seen at [facility] in the past. Dx'd to have Low grade , Stage 1, follicular lymphoma Right groin in 2004. Good response to R-CVP Chemotherapy and maintenance rituxan.  Recently had lesion excised from Left side of nostril. Dx'd to have  Superficially invasive squamous cell [id number]. Invasive. In Aug/swep 2019 evaluated by physician. Had  Bx of Lymph node from left neck. 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 CT scans revealed a large mediastinal mass. Patient had sifnificant sx. Transferred to [facility]. Was given emergency mediastinal radiation. Started on mini CHOP on 10/2/19.  Admitted  Six  weeks  Shortness of breath. CTA chest revealed Emboli in right upper, middle and lower lobes. The  left mediastinal mass had decreased in size. Has completed cycle 6.   Followup PET/CT ;No evidence of lymphoma  Allergies :    Review of patient's allergies indicates:  No Known Allergies    Occupation :  Law enforcement.    Transfusion :  None.    Menstrual & obstetric Hx :  N/A      Present Meds :   Medication List with Changes/Refills   Current Medications    APIXABAN (ELIQUIS) 5 MG TAB    Take 5 mg by mouth.    LISINOPRIL 10 MG TABLET    Take 10 mg by mouth once daily.    PYRIDOSTIGMINE (MESTINON) 60 MG TAB    Take 60 mg by mouth 3 (three) times daily.     TAMSULOSIN (FLOMAX) 0.4 MG CAP    TK 1 C PO Q NIGHT       Past Medical Hx :  Hx of Folliculoar Lymphoma. Hypertension; age related Macular degeneration;  Hypertropia right eye. No hx of DM, PUD, Hepatitis, Liver disease, thyroid dysfunction., TB, Sarcoid, Lupus, rheumatoid arthritis, seizure disorder, CVA. No Hx of DVT or Pulmonary embolism/\. No past Hx of radiation therapy.     Past Medical Hx :  Past Medical History:   Diagnosis Date    AMD (age-related macular degeneration), bilateral      Cancer 2004,2019    Lymphoma    Decreased appetite 09/2019    Hypertension     Hypertropia of right eye 12/7/2017    Mass in chest 09/2019    Myasthenia gravis     Unintended weight loss 09/2019    Unsteady gait        Travel Hx :   N/A    Immunization :  There is no immunization history for the selected administration types on file for this patient.    Family Hx :  Family History   Problem Relation Age of Onset    No Known Problems Mother     Heart disease Father     No Known Problems Sister     No Known Problems Brother     No Known Problems Maternal Aunt     No Known Problems Maternal Uncle     No Known Problems Paternal Aunt     No Known Problems Paternal Uncle     No Known Problems Maternal Grandmother     No Known Problems Maternal Grandfather     No Known Problems Paternal Grandmother     No Known Problems Paternal Grandfather     Amblyopia Neg Hx     Blindness Neg Hx     Cataracts Neg Hx     Glaucoma Neg Hx     Macular degeneration Neg Hx     Retinal detachment Neg Hx     Strabismus Neg Hx     Cancer Neg Hx     Diabetes Neg Hx     Hypertension Neg Hx     Stroke Neg Hx     Thyroid disease Neg Hx        Social Hx :  Social History     Socioeconomic History    Marital status:      Spouse name: Not on file    Number of children: Not on file    Years of education: Not on file    Highest education level: Not on file   Occupational History    Not on file   Social Needs    Financial resource strain: Not on file    Food insecurity     Worry: Not on file     Inability: Not on file    Transportation needs     Medical: Not on file     Non-medical: Not on file   Tobacco Use    Smoking status: Former Smoker    Smokeless tobacco: Never Used   Substance and Sexual Activity    Alcohol use: No    Drug use: Never    Sexual activity: Not Currently     Partners: Female   Lifestyle    Physical activity     Days per week: Not on file     Minutes per session: Not on file     Stress: Not on file   Relationships    Social connections     Talks on phone: Not on file     Gets together: Not on file     Attends [employer] service: Not on file     Active member of club or organization: Not on file     Attends meetings of clubs or organizations: Not on file     Relationship status: Not on file   Other Topics Concern    Not on file   Social History Narrative    Not on file       Surgery :  Lymph node Bx; Portacath. Bone Marrow Bx Cholecystectomy. Bilateral Cataract surgery  Excision of skin cancer from nostril.     Symptoms :    Review of Systems   Constitutional: Positive for malaise/fatigue. Negative for chills, diaphoresis, fever and weight loss.        Energy Improving. Gaining weight.  No pruritis, night sweats or evening rise in temp. Appetite improving   HENT: Negative for congestion, hearing loss, nosebleeds, sore throat and tinnitus.    Eyes: Negative for blurred vision, double vision and photophobia.        Under Care of Retina specialist for Macular degeneration   Respiratory: Negative for cough, hemoptysis and shortness of breath.    Cardiovascular: Negative for chest pain, palpitations, orthopnea, claudication, leg swelling and PND.   Gastrointestinal: Negative for abdominal pain, blood in stool, constipation, diarrhea (Moderate), heartburn, nausea and vomiting.   Genitourinary: Positive for frequency (Related to BPH). Negative for dysuria, flank pain, hematuria and urgency.   Musculoskeletal: Negative for back pain, falls, joint pain (rthritis), myalgias and neck pain.   Skin: Negative for itching and rash.   Neurological: Negative for dizziness, tingling, tremors, sensory change (Related to Chemotherapy), speech change, focal weakness, seizures, loss of consciousness, weakness and headaches.   Endo/Heme/Allergies: Negative for environmental allergies and polydipsia. Does not bruise/bleed easily.   Psychiatric/Behavioral: Positive for depression. Negative for memory loss. The  patient is nervous/anxious. The patient does not have insomnia.        Physical Exam :   Wife present in the room.  Physical Exam   Constitutional: He is oriented to person, place, and time and well-developed, well-nourished, and in no distress. Vital signs are normal. No distress.   HENT:   Head: Normocephalic and atraumatic.   Right Ear: External ear normal.   Left Ear: External ear normal.   Nose: Nose normal.   Mouth/Throat: Oropharynx is clear and moist. No oropharyngeal exudate.   Eyes: Conjunctivae, EOM and lids are normal. Lids are everted and swept, no foreign bodies found. Right eye exhibits no discharge. Left eye exhibits no discharge. No scleral icterus. Pupils are unequal.       Neck: Trachea normal, normal range of motion, full passive range of motion without pain and phonation normal. Neck supple. Normal carotid pulses, no hepatojugular reflux and no JVD present. No tracheal tenderness present. Carotid bruit is not present. No tracheal deviation present. No thyroid mass and no thyromegaly present.   Cardiovascular: Normal rate, regular rhythm, normal heart sounds, intact distal pulses and normal pulses. PMI is not displaced. Exam reveals no friction rub.   No murmur heard.  Pulmonary/Chest: Effort normal and breath sounds normal. No stridor. No apnea. No respiratory distress. He has no wheezes. He has no rales. He exhibits no tenderness.   Abdominal: Soft. Bowel sounds are normal. He exhibits no distension, no ascites and no mass. There is no hepatosplenomegaly, splenomegaly or hepatomegaly. There is no abdominal tenderness. There is no rebound, no guarding and no CVA tenderness. No hernia.   Genitourinary:    Genitourinary Comments: Not Examined     Musculoskeletal: Normal range of motion.         General: No tenderness, deformity or edema.   Lymphadenopathy:        Head (right side): No submental, no submandibular, no tonsillar, no preauricular, no posterior auricular and no occipital adenopathy  present.        Head (left side): No submental, no submandibular, no tonsillar, no preauricular, no posterior auricular and no occipital adenopathy present.     He has no cervical adenopathy.     He has no axillary adenopathy.        Right: No supraclavicular and no epitrochlear adenopathy present.        Left: No supraclavicular and no epitrochlear adenopathy present.   Neurological: He is alert and oriented to person, place, and time. He has normal motor skills, normal sensation, normal strength, normal reflexes and intact cranial nerves. No cranial nerve deficit. He exhibits normal muscle tone. Coordination normal. GCS score is 15.   Skin: Skin is warm and dry. He is not diaphoretic. No cyanosis. Nails show no clubbing.   Psychiatric: Mood, memory, affect and judgment normal.     Labs & Imaging : 08/17/20 :  Hgb 13.0; Hct 42.5; MCV 85; Platelets 169,000 ANC 4,900.  NFBS 95l; Cr.1.2; eGFR 56; Ca 8.9; Bili 0.4; Liver enzymes normal;.          02/18/2020 : ; NFBS 98; Cr. 1.2; eGFR 56; ca 9.7; bili 0.4. Liver enzymes normal.  WBC 4,550. Hgb 12.7; hct 40.2; MCV 86. Platelets 191,000 ANC 4,300.   PET/CT 3/19/20 : Small left Pleural effusion. Mild uptake Prostate. No uptake in liver, spleen or Lymph nodes      Dx :   Chemotherapy follow up.  Diffuse large B cell lymphoma.  Remote Hx of Follicular Lymphoma. In Remission.      Assessment & Plan: Reviewed with patient  .RTC 3 months with CBC,CMP,LDH. And PET/CT . Urology followup with   Advance Care Planning     Power of   I initiated the process of advance care planning today and explained the importance of this process to the patient.  I introduced the concept of advance directives to the patient, as well. Then the patient received detailed information about the importance of designating a Health Care Power of  (HCPOA). He was also instructed to communicate with this person about their wishes for future healthcare, should he become sick and  lose decision-making capacity. The patient  has not: previously appointed a HCPOA. .         Living Will  During this visit, I engaged the patient in the advance care planning process.  The patient and I reviewed the role for advance directives and their purpose in directing future healthcare if the patient's unable to speak for him/herself.  At this point in time, the patient does have full decision-making capacity.  We discussed different extreme health states that he could experience, and reviewed what kind of medical care he would want in those situations.  The patient communicated that if he were comatose and had little chance of a meaningful recovery, does not  want machines/life-sustaining treatments used.    The patient  HAS NOTcompleted a living will to reflect these preferences.       Brochures given.

## 2020-08-17 ENCOUNTER — LAB VISIT (OUTPATIENT)
Dept: LAB | Facility: HOSPITAL | Age: 80
End: 2020-08-17
Attending: INTERNAL MEDICINE
Payer: MEDICARE

## 2020-08-17 DIAGNOSIS — C83.32 DIFFUSE LARGE B-CELL LYMPHOMA OF INTRATHORACIC LYMPH NODES: ICD-10-CM

## 2020-08-17 DIAGNOSIS — I26.99 PULMONARY EMBOLISM WITHOUT ACUTE COR PULMONALE, UNSPECIFIED CHRONICITY, UNSPECIFIED PULMONARY EMBOLISM TYPE: ICD-10-CM

## 2020-08-17 LAB
ALBUMIN SERPL BCP-MCNC: 4 G/DL (ref 3.5–5.2)
ALP SERPL-CCNC: 80 U/L (ref 55–135)
ALT SERPL W/O P-5'-P-CCNC: 13 U/L (ref 10–44)
ANION GAP SERPL CALC-SCNC: 8 MMOL/L (ref 8–16)
AST SERPL-CCNC: 16 U/L (ref 10–40)
BASOPHILS # BLD AUTO: 0.05 K/UL (ref 0–0.2)
BASOPHILS NFR BLD: 0.7 % (ref 0–1.9)
BILIRUB SERPL-MCNC: 0.4 MG/DL (ref 0.1–1)
BUN SERPL-MCNC: 17 MG/DL (ref 8–23)
CALCIUM SERPL-MCNC: 8.9 MG/DL (ref 8.7–10.5)
CHLORIDE SERPL-SCNC: 105 MMOL/L (ref 95–110)
CO2 SERPL-SCNC: 26 MMOL/L (ref 23–29)
CREAT SERPL-MCNC: 1.2 MG/DL (ref 0.5–1.4)
DIFFERENTIAL METHOD: ABNORMAL
EOSINOPHIL # BLD AUTO: 0.3 K/UL (ref 0–0.5)
EOSINOPHIL NFR BLD: 4 % (ref 0–8)
ERYTHROCYTE [DISTWIDTH] IN BLOOD BY AUTOMATED COUNT: 14.9 % (ref 11.5–14.5)
EST. GFR  (AFRICAN AMERICAN): >60 ML/MIN/1.73 M^2
EST. GFR  (NON AFRICAN AMERICAN): 56 ML/MIN/1.73 M^2
GLUCOSE SERPL-MCNC: 95 MG/DL (ref 70–110)
HCT VFR BLD AUTO: 42.5 % (ref 40–54)
HGB BLD-MCNC: 13.6 G/DL (ref 14–18)
IMM GRANULOCYTES # BLD AUTO: 0.02 K/UL (ref 0–0.04)
IMM GRANULOCYTES NFR BLD AUTO: 0.3 % (ref 0–0.5)
LYMPHOCYTES # BLD AUTO: 1.4 K/UL (ref 1–4.8)
LYMPHOCYTES NFR BLD: 18.8 % (ref 18–48)
MCH RBC QN AUTO: 27.1 PG (ref 27–31)
MCHC RBC AUTO-ENTMCNC: 32 G/DL (ref 32–36)
MCV RBC AUTO: 85 FL (ref 82–98)
MONOCYTES # BLD AUTO: 0.6 K/UL (ref 0.3–1)
MONOCYTES NFR BLD: 8.9 % (ref 4–15)
NEUTROPHILS # BLD AUTO: 4.9 K/UL (ref 1.8–7.7)
NEUTROPHILS NFR BLD: 67.3 % (ref 38–73)
NRBC BLD-RTO: 0 /100 WBC
PLATELET # BLD AUTO: 169 K/UL (ref 150–350)
PMV BLD AUTO: 10.6 FL (ref 9.2–12.9)
POTASSIUM SERPL-SCNC: 4.5 MMOL/L (ref 3.5–5.1)
PROT SERPL-MCNC: 6.9 G/DL (ref 6–8.4)
RBC # BLD AUTO: 5.02 M/UL (ref 4.6–6.2)
SODIUM SERPL-SCNC: 139 MMOL/L (ref 136–145)
WBC # BLD AUTO: 7.22 K/UL (ref 3.9–12.7)

## 2020-08-17 PROCEDURE — 36415 COLL VENOUS BLD VENIPUNCTURE: CPT

## 2020-08-17 PROCEDURE — 80053 COMPREHEN METABOLIC PANEL: CPT

## 2020-08-17 PROCEDURE — 85025 COMPLETE CBC W/AUTO DIFF WBC: CPT

## 2020-08-19 ENCOUNTER — OFFICE VISIT (OUTPATIENT)
Dept: HEMATOLOGY/ONCOLOGY | Facility: CLINIC | Age: 80
End: 2020-08-19
Payer: MEDICARE

## 2020-08-19 VITALS
BODY MASS INDEX: 27.59 KG/M2 | WEIGHT: 197.06 LBS | DIASTOLIC BLOOD PRESSURE: 80 MMHG | SYSTOLIC BLOOD PRESSURE: 124 MMHG | TEMPERATURE: 97 F | HEART RATE: 82 BPM | HEIGHT: 71 IN | OXYGEN SATURATION: 97 %

## 2020-08-19 DIAGNOSIS — Z85.72 HISTORY OF FOLLICULAR LYMPHOMA: ICD-10-CM

## 2020-08-19 DIAGNOSIS — I26.99 PULMONARY EMBOLISM WITHOUT ACUTE COR PULMONALE, UNSPECIFIED CHRONICITY, UNSPECIFIED PULMONARY EMBOLISM TYPE: ICD-10-CM

## 2020-08-19 DIAGNOSIS — C83.32 DIFFUSE LARGE B-CELL LYMPHOMA OF INTRATHORACIC LYMPH NODES: Primary | ICD-10-CM

## 2020-08-19 PROCEDURE — 99213 PR OFFICE/OUTPT VISIT, EST, LEVL III, 20-29 MIN: ICD-10-PCS | Mod: S$GLB,,, | Performed by: INTERNAL MEDICINE

## 2020-08-19 PROCEDURE — 99999 PR PBB SHADOW E&M-EST. PATIENT-LVL III: ICD-10-PCS | Mod: PBBFAC,,, | Performed by: INTERNAL MEDICINE

## 2020-08-19 PROCEDURE — 3079F DIAST BP 80-89 MM HG: CPT | Mod: CPTII,S$GLB,, | Performed by: INTERNAL MEDICINE

## 2020-08-19 PROCEDURE — 1126F PR PAIN SEVERITY QUANTIFIED, NO PAIN PRESENT: ICD-10-PCS | Mod: S$GLB,,, | Performed by: INTERNAL MEDICINE

## 2020-08-19 PROCEDURE — 1126F AMNT PAIN NOTED NONE PRSNT: CPT | Mod: S$GLB,,, | Performed by: INTERNAL MEDICINE

## 2020-08-19 PROCEDURE — 1101F PR PT FALLS ASSESS DOC 0-1 FALLS W/OUT INJ PAST YR: ICD-10-PCS | Mod: CPTII,S$GLB,, | Performed by: INTERNAL MEDICINE

## 2020-08-19 PROCEDURE — 1159F MED LIST DOCD IN RCRD: CPT | Mod: S$GLB,,, | Performed by: INTERNAL MEDICINE

## 2020-08-19 PROCEDURE — 3079F PR MOST RECENT DIASTOLIC BLOOD PRESSURE 80-89 MM HG: ICD-10-PCS | Mod: CPTII,S$GLB,, | Performed by: INTERNAL MEDICINE

## 2020-08-19 PROCEDURE — 1159F PR MEDICATION LIST DOCUMENTED IN MEDICAL RECORD: ICD-10-PCS | Mod: S$GLB,,, | Performed by: INTERNAL MEDICINE

## 2020-08-19 PROCEDURE — 3074F SYST BP LT 130 MM HG: CPT | Mod: CPTII,S$GLB,, | Performed by: INTERNAL MEDICINE

## 2020-08-19 PROCEDURE — 1101F PT FALLS ASSESS-DOCD LE1/YR: CPT | Mod: CPTII,S$GLB,, | Performed by: INTERNAL MEDICINE

## 2020-08-19 PROCEDURE — 99213 OFFICE O/P EST LOW 20 MIN: CPT | Mod: S$GLB,,, | Performed by: INTERNAL MEDICINE

## 2020-08-19 PROCEDURE — 99999 PR PBB SHADOW E&M-EST. PATIENT-LVL III: CPT | Mod: PBBFAC,,, | Performed by: INTERNAL MEDICINE

## 2020-08-19 PROCEDURE — 3074F PR MOST RECENT SYSTOLIC BLOOD PRESSURE < 130 MM HG: ICD-10-PCS | Mod: CPTII,S$GLB,, | Performed by: INTERNAL MEDICINE

## 2020-09-02 ENCOUNTER — OFFICE VISIT (OUTPATIENT)
Dept: OPHTHALMOLOGY | Facility: CLINIC | Age: 80
End: 2020-09-02
Attending: OPHTHALMOLOGY
Payer: MEDICARE

## 2020-09-02 DIAGNOSIS — H35.3212 EXUDATIVE AGE-RELATED MACULAR DEGENERATION OF RIGHT EYE WITH INACTIVE CHOROIDAL NEOVASCULARIZATION: Primary | ICD-10-CM

## 2020-09-02 DIAGNOSIS — H35.3123 NONEXUDATIVE AGE-RELATED MACULAR DEGENERATION, LEFT EYE, ADVANCED ATROPHIC WITHOUT SUBFOVEAL INVOLVEMENT: ICD-10-CM

## 2020-09-02 DIAGNOSIS — H31.001 CHORIORETINAL SCAR, RIGHT: ICD-10-CM

## 2020-09-02 PROCEDURE — 92134 POSTERIOR SEGMENT OCT RETINA (OCULAR COHERENCE TOMOGRAPHY)-BOTH EYES: ICD-10-PCS | Mod: S$GLB,,, | Performed by: OPHTHALMOLOGY

## 2020-09-02 PROCEDURE — 92134 CPTRZ OPH DX IMG PST SGM RTA: CPT | Mod: S$GLB,,, | Performed by: OPHTHALMOLOGY

## 2020-09-02 PROCEDURE — 92202 OPSCPY EXTND ON/MAC DRAW: CPT | Mod: S$GLB,,, | Performed by: OPHTHALMOLOGY

## 2020-09-02 PROCEDURE — 92202 PR OPHTHALMOSCOPY, EXT, W/DRAW OPTIC NERVE/MACULA, I&R, UNI/BI: ICD-10-PCS | Mod: S$GLB,,, | Performed by: OPHTHALMOLOGY

## 2020-09-02 PROCEDURE — 99999 PR PBB SHADOW E&M-EST. PATIENT-LVL III: ICD-10-PCS | Mod: PBBFAC,,, | Performed by: OPHTHALMOLOGY

## 2020-09-02 PROCEDURE — 92012 INTRM OPH EXAM EST PATIENT: CPT | Mod: S$GLB,,, | Performed by: OPHTHALMOLOGY

## 2020-09-02 PROCEDURE — 99999 PR PBB SHADOW E&M-EST. PATIENT-LVL III: CPT | Mod: PBBFAC,,, | Performed by: OPHTHALMOLOGY

## 2020-09-02 PROCEDURE — 92012 PR EYE EXAM, EST PATIENT,INTERMED: ICD-10-PCS | Mod: S$GLB,,, | Performed by: OPHTHALMOLOGY

## 2020-09-02 NOTE — PROGRESS NOTES
Subjective:       Patient ID: Robe Cevallos is a 80 y.o. male      Chief Complaint   Patient presents with    Macular Degeneration     History of Present Illness  HPI     Dls: 3/9/20 Dr. Kapoor  Here  today for AMD check.     Pt states that Dr. Garza wanted retina checked- machine was broken day she   saw him and it is time now for retina f/u anyway.      Va overall stable OU.  No f/f/pain OU    Eye med(s) - OTC Artificial Tears - OU prn  AREDS 2 Preservision    Last edited by Charles Kapoor MD on 9/2/2020 11:19 AM. (History)        Imaging:    See report    Assessment/Plan:     1. 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  Last inj 9/2019  Observe  - Posterior Segment OCT Retina-Both eyes  - Posterior Segment OCT Retina-Both eyes; Future    2. Nonexudative age-related macular degeneration, left eye, advanced atrophic without subfoveal involvement  Discussed Dry and Wet AMD in detail  Recommend continue AREDS 2 Vitamins  Continue home [employer] Grid Testing  RTC immediately PRN any changes in vision      3. Chorioretinal scar, right  Stable.  Observe    RD precautions    Follow up in about 6 months (around 3/2/2021), or if symptoms worsen or fail to improve, for Comprehensive Examination, OCT Mac.

## 2020-10-22 ENCOUNTER — PATIENT MESSAGE (OUTPATIENT)
Dept: OTOLARYNGOLOGY | Facility: CLINIC | Age: 80
End: 2020-10-22

## 2020-10-22 ENCOUNTER — TELEPHONE (OUTPATIENT)
Dept: OTOLARYNGOLOGY | Facility: CLINIC | Age: 80
End: 2020-10-22

## 2020-10-22 DIAGNOSIS — J34.9 SINUS DISEASE: Primary | ICD-10-CM

## 2020-10-22 NOTE — TELEPHONE ENCOUNTER
Portal messaged Patient that he will need to see Dr. Tong to have him scoped to examine the Sinuses.  Covid Test order In.

## 2020-11-19 ENCOUNTER — HOSPITAL ENCOUNTER (OUTPATIENT)
Dept: RADIOLOGY | Facility: HOSPITAL | Age: 80
Discharge: HOME | End: 2020-11-19
Attending: INTERNAL MEDICINE
Payer: MEDICARE

## 2020-11-19 DIAGNOSIS — Z85.72 HISTORY OF FOLLICULAR LYMPHOMA: ICD-10-CM

## 2020-11-19 DIAGNOSIS — C83.32 DIFFUSE LARGE B-CELL LYMPHOMA OF INTRATHORACIC LYMPH NODES: ICD-10-CM

## 2020-11-19 PROCEDURE — A9552 F18 FDG: HCPCS

## 2020-11-19 PROCEDURE — 78815 NM PET CT ROUTINE: ICD-10-PCS | Mod: 26,PI,, | Performed by: RADIOLOGY

## 2020-11-19 PROCEDURE — 78815 PET IMAGE W/CT SKULL-THIGH: CPT | Mod: 26,PI,, | Performed by: RADIOLOGY

## 2020-11-19 PROCEDURE — 78815 PET IMAGE W/CT SKULL-THIGH: CPT | Mod: TC

## 2020-11-24 ENCOUNTER — OFFICE VISIT (OUTPATIENT)
Dept: HEMATOLOGY/ONCOLOGY | Facility: CLINIC | Age: 80
End: 2020-11-24
Payer: MEDICARE

## 2020-11-24 VITALS
HEIGHT: 71 IN | OXYGEN SATURATION: 98 % | DIASTOLIC BLOOD PRESSURE: 79 MMHG | WEIGHT: 157.88 LBS | BODY MASS INDEX: 22.1 KG/M2 | TEMPERATURE: 98 F | HEART RATE: 74 BPM | SYSTOLIC BLOOD PRESSURE: 151 MMHG

## 2020-11-24 DIAGNOSIS — C83.32 DIFFUSE LARGE B-CELL LYMPHOMA OF INTRATHORACIC LYMPH NODES: Primary | ICD-10-CM

## 2020-11-24 DIAGNOSIS — Z85.72 HISTORY OF FOLLICULAR LYMPHOMA: ICD-10-CM

## 2020-11-24 DIAGNOSIS — I26.99 PULMONARY EMBOLISM WITHOUT ACUTE COR PULMONALE, UNSPECIFIED CHRONICITY, UNSPECIFIED PULMONARY EMBOLISM TYPE: ICD-10-CM

## 2020-11-24 PROCEDURE — 1159F MED LIST DOCD IN RCRD: CPT | Mod: S$GLB,,, | Performed by: INTERNAL MEDICINE

## 2020-11-24 PROCEDURE — 99999 PR PBB SHADOW E&M-EST. PATIENT-LVL III: ICD-10-PCS | Mod: PBBFAC,,, | Performed by: INTERNAL MEDICINE

## 2020-11-24 PROCEDURE — 1159F PR MEDICATION LIST DOCUMENTED IN MEDICAL RECORD: ICD-10-PCS | Mod: S$GLB,,, | Performed by: INTERNAL MEDICINE

## 2020-11-24 PROCEDURE — 1126F PR PAIN SEVERITY QUANTIFIED, NO PAIN PRESENT: ICD-10-PCS | Mod: S$GLB,,, | Performed by: INTERNAL MEDICINE

## 2020-11-24 PROCEDURE — 1101F PT FALLS ASSESS-DOCD LE1/YR: CPT | Mod: CPTII,S$GLB,, | Performed by: INTERNAL MEDICINE

## 2020-11-24 PROCEDURE — 99213 OFFICE O/P EST LOW 20 MIN: CPT | Mod: S$GLB,,, | Performed by: INTERNAL MEDICINE

## 2020-11-24 PROCEDURE — 3077F SYST BP >= 140 MM HG: CPT | Mod: CPTII,S$GLB,, | Performed by: INTERNAL MEDICINE

## 2020-11-24 PROCEDURE — 3078F DIAST BP <80 MM HG: CPT | Mod: CPTII,S$GLB,, | Performed by: INTERNAL MEDICINE

## 2020-11-24 PROCEDURE — 1101F PR PT FALLS ASSESS DOC 0-1 FALLS W/OUT INJ PAST YR: ICD-10-PCS | Mod: CPTII,S$GLB,, | Performed by: INTERNAL MEDICINE

## 2020-11-24 PROCEDURE — 3288F PR FALLS RISK ASSESSMENT DOCUMENTED: ICD-10-PCS | Mod: CPTII,S$GLB,, | Performed by: INTERNAL MEDICINE

## 2020-11-24 PROCEDURE — 3077F PR MOST RECENT SYSTOLIC BLOOD PRESSURE >= 140 MM HG: ICD-10-PCS | Mod: CPTII,S$GLB,, | Performed by: INTERNAL MEDICINE

## 2020-11-24 PROCEDURE — 1126F AMNT PAIN NOTED NONE PRSNT: CPT | Mod: S$GLB,,, | Performed by: INTERNAL MEDICINE

## 2020-11-24 PROCEDURE — 3288F FALL RISK ASSESSMENT DOCD: CPT | Mod: CPTII,S$GLB,, | Performed by: INTERNAL MEDICINE

## 2020-11-24 PROCEDURE — 99999 PR PBB SHADOW E&M-EST. PATIENT-LVL III: CPT | Mod: PBBFAC,,, | Performed by: INTERNAL MEDICINE

## 2020-11-24 PROCEDURE — 3078F PR MOST RECENT DIASTOLIC BLOOD PRESSURE < 80 MM HG: ICD-10-PCS | Mod: CPTII,S$GLB,, | Performed by: INTERNAL MEDICINE

## 2020-11-24 PROCEDURE — 99213 PR OFFICE/OUTPT VISIT, EST, LEVL III, 20-29 MIN: ICD-10-PCS | Mod: S$GLB,,, | Performed by: INTERNAL MEDICINE

## 2020-11-24 RX ORDER — INFLUENZA A VIRUS A/MICHIGAN/45/2015 X-275 (H1N1) ANTIGEN (FORMALDEHYDE INACTIVATED), INFLUENZA A VIRUS A/SINGAPORE/INFIMH-16-0019/2016 IVR-186 (H3N2) ANTIGEN (FORMALDEHYDE INACTIVATED), INFLUENZA B VIRUS B/PHUKET/3073/2013 ANTIGEN (FORMALDEHYDE INACTIVATED), AND INFLUENZA B VIRUS B/MARYLAND/15/2016 BX-69A ANTIGEN (FORMALDEHYDE INACTIVATED) 60; 60; 60; 60 UG/.7ML; UG/.7ML; UG/.7ML; UG/.7ML
INJECTION, SUSPENSION INTRAMUSCULAR
COMMUNITY
Start: 2020-11-07 | End: 2022-03-30

## 2020-11-24 NOTE — PROGRESS NOTES
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    Hx of Present illness :  Patient is a 80 y.o. year old male who presents to the clinic today for  Oncology followup. Has been seen at [facility] in the past. Dx'd to have Low grade , Stage 1, follicular lymphoma Right groin in 2004. Good response to R-CVP Chemotherapy and maintenance rituxan.  Recently had lesion excised from Left side of nostril. Dx'd to have  Superficially invasive squamous cell [id number]. Invasive. In Aug/swep 2019 evaluated by physician. Had  Bx of Lymph node from left neck. 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 CT scans revealed a large mediastinal mass. Patient had sifnificant sx. Transferred to [facility]. Was given emergency mediastinal radiation. Started on mini CHOP on 10/2/19.  Admitted  Six  weeks  Shortness of breath. CTA chest revealed Emboli in right upper, middle and lower lobes. The  left mediastinal mass had decreased in size. Has completed cycle 6.  ollowup PET/CT in Feb 2020. ;No evidence of lymphoma  Allergies :    Review of patient's allergies indicates:  No Known Allergies    Occupation :  Law enforcement.    Transfusion :  None.    Menstrual & obstetric Hx :  N/A      Present Meds :   Medication List with Changes/Refills   Current Medications    APIXABAN (ELIQUIS) 5 MG TAB    Take 5 mg by mouth.    LISINOPRIL 10 MG TABLET    Take 10 mg by mouth once daily.    PYRIDOSTIGMINE (MESTINON) 60 MG TAB    Take 60 mg by mouth 3 (three) times daily.     TAMSULOSIN (FLOMAX) 0.4 MG CAP    TK 1 C PO Q NIGHT       Past Medical Hx :  Hx of Folliculoar Lymphoma. Hypertension; age related Macular degeneration;  Hypertropia right eye. No hx of DM, PUD, Hepatitis, Liver disease, thyroid dysfunction., TB, Sarcoid, Lupus, rheumatoid arthritis, seizure disorder, CVA. No Hx of DVT or Pulmonary embolism/\. No past Hx of radiation therapy.     Past Medical Hx :  Past Medical History:   Diagnosis Date    AMD  (age-related macular degeneration), bilateral     Cancer 2004,2019    Lymphoma    Decreased appetite 09/2019    Hypertension     Hypertropia of right eye 12/7/2017    Mass in chest 09/2019    Myasthenia gravis     Unintended weight loss 09/2019    Unsteady gait        Travel Hx :   N/A    Immunization :  There is no immunization history for the selected administration types on file for this patient.    Family Hx :  Family History   Problem Relation Age of Onset    No Known Problems Mother     Heart disease Father     No Known Problems Sister     No Known Problems Brother     No Known Problems Maternal Aunt     No Known Problems Maternal Uncle     No Known Problems Paternal Aunt     No Known Problems Paternal Uncle     No Known Problems Maternal Grandmother     No Known Problems Maternal Grandfather     No Known Problems Paternal Grandmother     No Known Problems Paternal Grandfather     Amblyopia Neg Hx     Blindness Neg Hx     Cataracts Neg Hx     Glaucoma Neg Hx     Macular degeneration Neg Hx     Retinal detachment Neg Hx     Strabismus Neg Hx     Cancer Neg Hx     Diabetes Neg Hx     Hypertension Neg Hx     Stroke Neg Hx     Thyroid disease Neg Hx        Social Hx :  Social History     Socioeconomic History    Marital status:      Spouse name: Not on file    Number of children: Not on file    Years of education: Not on file    Highest education level: Not on file   Occupational History    Not on file   Social Needs    Financial resource strain: Not on file    Food insecurity     Worry: Not on file     Inability: Not on file    Transportation needs     Medical: Not on file     Non-medical: Not on file   Tobacco Use    Smoking status: Former Smoker    Smokeless tobacco: Never Used   Substance and Sexual Activity    Alcohol use: No    Drug use: Never    Sexual activity: Not Currently     Partners: Female   Lifestyle    Physical activity     Days per week: Not on  file     Minutes per session: Not on file    Stress: Not on file   Relationships    Social connections     Talks on phone: Not on file     Gets together: Not on file     Attends Spiritism service: Not on file     Active member of club or organization: Not on file     Attends meetings of clubs or organizations: Not on file     Relationship status: Not on file   Other Topics Concern    Not on file   Social History Narrative    Not on file       Surgery :  Lymph node Bx; Portacath. Bone Marrow Bx Cholecystectomy. Bilateral Cataract surgery  Excision of skin cancer from nostril.     Symptoms :    Review of Systems   Constitutional: Positive for malaise/fatigue. Negative for chills, diaphoresis, fever and weight loss.        Energy Improving. Gaining weight.  No pruritis, night sweats or evening rise in temp. Appetite improving   HENT: Negative for congestion, hearing loss, nosebleeds, sore throat and tinnitus.    Eyes: Negative for blurred vision, double vision and photophobia.        Under Care of Retina specialist for Macular degeneration   Respiratory: Negative for cough, hemoptysis and shortness of breath.    Cardiovascular: Negative for chest pain, palpitations, orthopnea, claudication, leg swelling and PND.   Gastrointestinal: Positive for diarrhea (Moderate). Negative for abdominal pain, blood in stool, constipation, heartburn, nausea and vomiting.        Saw  for diarrhoea.   Genitourinary: Negative for dysuria, flank pain, frequency (Related to BPH), hematuria and urgency.   Musculoskeletal: Negative for back pain, falls, joint pain (rthritis), myalgias and neck pain.        Attack of gout few days ago.    Skin: Negative for itching and rash.   Neurological: Positive for dizziness, tingling and sensory change (Related to Chemotherapy). Negative for tremors, speech change, focal weakness, seizures, loss of consciousness, weakness and headaches.   Endo/Heme/Allergies: Negative for environmental  allergies and polydipsia. Does not bruise/bleed easily.   Psychiatric/Behavioral: Positive for depression. Negative for memory loss. The patient is nervous/anxious. The patient does not have insomnia.        Physical Exam :     Physical Exam   Constitutional: He is oriented to person, place, and time and well-developed, well-nourished, and in no distress. Vital signs are normal. No distress.   HENT:   Head: Normocephalic and atraumatic.   Right Ear: External ear normal.   Left Ear: External ear normal.   Nose: Nose normal.   Mouth/Throat: Oropharynx is clear and moist. No oropharyngeal exudate.   Eyes: Conjunctivae, EOM and lids are normal. Lids are everted and swept, no foreign bodies found. Right eye exhibits no discharge. Left eye exhibits no discharge. No scleral icterus. Pupils are unequal.       Neck: Trachea normal, normal range of motion, full passive range of motion without pain and phonation normal. Neck supple. Normal carotid pulses, no hepatojugular reflux and no JVD present. No tracheal tenderness present. Carotid bruit is not present. No tracheal deviation present. No thyroid mass and no thyromegaly present.   Cardiovascular: Normal rate, regular rhythm, normal heart sounds, intact distal pulses and normal pulses. PMI is not displaced. Exam reveals no friction rub.   No murmur heard.  Pulmonary/Chest: Effort normal and breath sounds normal. No stridor. No apnea. No respiratory distress. He has no wheezes. He has no rales. He exhibits no tenderness.   Abdominal: Soft. Bowel sounds are normal. He exhibits no distension, no ascites and no mass. There is no hepatosplenomegaly, splenomegaly or hepatomegaly. There is no abdominal tenderness. There is no rebound, no guarding and no CVA tenderness. No hernia.   Genitourinary:    Genitourinary Comments: Not Examined     Musculoskeletal: Normal range of motion.         General: No tenderness, deformity or edema.   Lymphadenopathy:        Head (right side): No  submental, no submandibular, no tonsillar, no preauricular, no posterior auricular and no occipital adenopathy present.        Head (left side): No submental, no submandibular, no tonsillar, no preauricular, no posterior auricular and no occipital adenopathy present.     He has no cervical adenopathy.     He has no axillary adenopathy.        Right: No supraclavicular and no epitrochlear adenopathy present.        Left: No supraclavicular and no epitrochlear adenopathy present.   Neurological: He is alert and oriented to person, place, and time. He has normal motor skills, normal sensation, normal strength, normal reflexes and intact cranial nerves. No cranial nerve deficit. He exhibits normal muscle tone. Coordination normal. GCS score is 15.   Skin: Skin is warm and dry. He is not diaphoretic. No cyanosis. Nails show no clubbing.   Psychiatric: Mood, memory, affect and judgment normal.     Labs & Imaging : 11/19/20 : PET/Ct No hypermetabolic Focus to suggest Lympohoma Activity. WBC 7,180; Hgb 14.3; Hct 43.2; MCV 85; Platelets 187,000 ANC 5,200. ; NFBS 94; cr.1.2; Ca 9.0; Bili 0.4. Liver enzymes normal.  eGFR 56          08/17/20 :  Hgb 13.0; Hct 42.5; MCV 85; Platelets 169,000 ANC 4,900.  NFBS 95l; Cr.1.2; eGFR 56; Ca 8.9; Bili 0.4; Liver enzymes normal;.          02/18/2020 : ; NFBS 98; Cr. 1.2; eGFR 56; ca 9.7; bili 0.4. Liver enzymes normal.  WBC 4,550. Hgb 12.7; hct 40.2; MCV 86. Platelets 191,000 ANC 4,300.   PET/CT 3/19/20 : Small left Pleural effusion. Mild uptake Prostate. No uptake in liver, spleen or Lymph nodes      Dx :     Diffuse large B cell lymphoma in remission.   Remote Hx of Follicular Lymphoma. In Remission.      Assessment & Plan: Reviewed with patient  .RTC 3 months with CBC,CMP,LDH. . Urology followup with   Advance Care Planning     Power of   I initiated the process of advance care planning today and explained the importance of this process to the patient.  I  introduced the concept of advance directives to the patient, as well. Then the patient received detailed information about the importance of designating a Health Care Power of  (HCPOA). He was also instructed to communicate with this person about their wishes for future healthcare, should he become sick and lose decision-making capacity. The patient  has not: previously appointed a HCPOA. .         Living Will  During this visit, I engaged the patient in the advance care planning process.  The patient and I reviewed the role for advance directives and their purpose in directing future healthcare if the patient's unable to speak for him/herself.  At this point in time, the patient does have full decision-making capacity.  We discussed different extreme health states that he could experience, and reviewed what kind of medical care he would want in those situations.  The patient communicated that if he were comatose and had little chance of a meaningful recovery, does not  want machines/life-sustaining treatments used.    The patient  HAS NOTcompleted a living will to reflect these preferences.       Brochures given.

## 2021-01-11 ENCOUNTER — NURSE TRIAGE (OUTPATIENT)
Dept: ADMINISTRATIVE | Facility: CLINIC | Age: 81
End: 2021-01-11

## 2021-01-19 ENCOUNTER — OFFICE VISIT (OUTPATIENT)
Dept: OPTOMETRY | Facility: CLINIC | Age: 81
End: 2021-01-19
Payer: MEDICARE

## 2021-01-19 DIAGNOSIS — G70.00 OCULAR MYASTHENIA GRAVIS: ICD-10-CM

## 2021-01-19 DIAGNOSIS — H52.7 REFRACTIVE ERROR: Primary | ICD-10-CM

## 2021-01-19 DIAGNOSIS — H50.21 HYPERTROPIA OF RIGHT EYE: ICD-10-CM

## 2021-01-19 PROCEDURE — 3288F PR FALLS RISK ASSESSMENT DOCUMENTED: ICD-10-PCS | Mod: CPTII,S$GLB,, | Performed by: OPTOMETRIST

## 2021-01-19 PROCEDURE — 99999 PR PBB SHADOW E&M-EST. PATIENT-LVL II: CPT | Mod: PBBFAC,,, | Performed by: OPTOMETRIST

## 2021-01-19 PROCEDURE — 99999 PR PBB SHADOW E&M-EST. PATIENT-LVL II: ICD-10-PCS | Mod: PBBFAC,,, | Performed by: OPTOMETRIST

## 2021-01-19 PROCEDURE — 1126F AMNT PAIN NOTED NONE PRSNT: CPT | Mod: S$GLB,,, | Performed by: OPTOMETRIST

## 2021-01-19 PROCEDURE — 3288F FALL RISK ASSESSMENT DOCD: CPT | Mod: CPTII,S$GLB,, | Performed by: OPTOMETRIST

## 2021-01-19 PROCEDURE — 1101F PR PT FALLS ASSESS DOC 0-1 FALLS W/OUT INJ PAST YR: ICD-10-PCS | Mod: CPTII,S$GLB,, | Performed by: OPTOMETRIST

## 2021-01-19 PROCEDURE — 1101F PT FALLS ASSESS-DOCD LE1/YR: CPT | Mod: CPTII,S$GLB,, | Performed by: OPTOMETRIST

## 2021-01-19 PROCEDURE — 92015 DETERMINE REFRACTIVE STATE: CPT | Mod: S$GLB,,, | Performed by: OPTOMETRIST

## 2021-01-19 PROCEDURE — 1126F PR PAIN SEVERITY QUANTIFIED, NO PAIN PRESENT: ICD-10-PCS | Mod: S$GLB,,, | Performed by: OPTOMETRIST

## 2021-01-19 PROCEDURE — 92015 PR REFRACTION: ICD-10-PCS | Mod: S$GLB,,, | Performed by: OPTOMETRIST

## 2021-01-22 ENCOUNTER — PATIENT MESSAGE (OUTPATIENT)
Dept: ADMINISTRATIVE | Facility: OTHER | Age: 81
End: 2021-01-22

## 2021-02-19 ENCOUNTER — LAB VISIT (OUTPATIENT)
Dept: LAB | Facility: HOSPITAL | Age: 81
End: 2021-02-19
Attending: INTERNAL MEDICINE
Payer: MEDICARE

## 2021-02-19 DIAGNOSIS — Z85.72 HISTORY OF FOLLICULAR LYMPHOMA: ICD-10-CM

## 2021-02-19 DIAGNOSIS — C83.32 DIFFUSE LARGE B-CELL LYMPHOMA OF INTRATHORACIC LYMPH NODES: ICD-10-CM

## 2021-02-19 LAB
ALBUMIN SERPL BCP-MCNC: 4.2 G/DL (ref 3.5–5.2)
ALP SERPL-CCNC: 86 U/L (ref 55–135)
ALT SERPL W/O P-5'-P-CCNC: 24 U/L (ref 10–44)
ANION GAP SERPL CALC-SCNC: 8 MMOL/L (ref 8–16)
AST SERPL-CCNC: 17 U/L (ref 10–40)
BASOPHILS # BLD AUTO: 0.06 K/UL (ref 0–0.2)
BASOPHILS NFR BLD: 0.8 % (ref 0–1.9)
BILIRUB SERPL-MCNC: 0.5 MG/DL (ref 0.1–1)
BUN SERPL-MCNC: 17 MG/DL (ref 8–23)
CALCIUM SERPL-MCNC: 9.1 MG/DL (ref 8.7–10.5)
CHLORIDE SERPL-SCNC: 105 MMOL/L (ref 95–110)
CO2 SERPL-SCNC: 28 MMOL/L (ref 23–29)
CREAT SERPL-MCNC: 1.4 MG/DL (ref 0.5–1.4)
DIFFERENTIAL METHOD: ABNORMAL
EOSINOPHIL # BLD AUTO: 0.1 K/UL (ref 0–0.5)
EOSINOPHIL NFR BLD: 1.5 % (ref 0–8)
ERYTHROCYTE [DISTWIDTH] IN BLOOD BY AUTOMATED COUNT: 14.9 % (ref 11.5–14.5)
EST. GFR  (AFRICAN AMERICAN): 53 ML/MIN/1.73 M^2
EST. GFR  (NON AFRICAN AMERICAN): 46 ML/MIN/1.73 M^2
GLUCOSE SERPL-MCNC: 108 MG/DL (ref 70–110)
HCT VFR BLD AUTO: 46.5 % (ref 40–54)
HGB BLD-MCNC: 14.8 G/DL (ref 14–18)
IMM GRANULOCYTES # BLD AUTO: 0.06 K/UL (ref 0–0.04)
IMM GRANULOCYTES NFR BLD AUTO: 0.8 % (ref 0–0.5)
LDH SERPL L TO P-CCNC: 203 U/L (ref 110–260)
LYMPHOCYTES # BLD AUTO: 1.5 K/UL (ref 1–4.8)
LYMPHOCYTES NFR BLD: 20.5 % (ref 18–48)
MCH RBC QN AUTO: 27.8 PG (ref 27–31)
MCHC RBC AUTO-ENTMCNC: 31.8 G/DL (ref 32–36)
MCV RBC AUTO: 87 FL (ref 82–98)
MONOCYTES # BLD AUTO: 0.5 K/UL (ref 0.3–1)
MONOCYTES NFR BLD: 6.8 % (ref 4–15)
NEUTROPHILS # BLD AUTO: 5.2 K/UL (ref 1.8–7.7)
NEUTROPHILS NFR BLD: 69.6 % (ref 38–73)
NRBC BLD-RTO: 0 /100 WBC
PLATELET # BLD AUTO: 144 K/UL (ref 150–350)
PMV BLD AUTO: 11 FL (ref 9.2–12.9)
POTASSIUM SERPL-SCNC: 4.7 MMOL/L (ref 3.5–5.1)
PROT SERPL-MCNC: 7 G/DL (ref 6–8.4)
RBC # BLD AUTO: 5.33 M/UL (ref 4.6–6.2)
SODIUM SERPL-SCNC: 141 MMOL/L (ref 136–145)
WBC # BLD AUTO: 7.51 K/UL (ref 3.9–12.7)

## 2021-02-19 PROCEDURE — 80053 COMPREHEN METABOLIC PANEL: CPT

## 2021-02-19 PROCEDURE — 83615 LACTATE (LD) (LDH) ENZYME: CPT

## 2021-02-19 PROCEDURE — 85025 COMPLETE CBC W/AUTO DIFF WBC: CPT

## 2021-02-19 PROCEDURE — 36415 COLL VENOUS BLD VENIPUNCTURE: CPT

## 2021-02-24 ENCOUNTER — OFFICE VISIT (OUTPATIENT)
Dept: HEMATOLOGY/ONCOLOGY | Facility: CLINIC | Age: 81
End: 2021-02-24
Payer: MEDICARE

## 2021-02-24 VITALS
TEMPERATURE: 97 F | WEIGHT: 198 LBS | HEART RATE: 77 BPM | BODY MASS INDEX: 27.72 KG/M2 | DIASTOLIC BLOOD PRESSURE: 80 MMHG | OXYGEN SATURATION: 98 % | SYSTOLIC BLOOD PRESSURE: 158 MMHG | HEIGHT: 71 IN

## 2021-02-24 DIAGNOSIS — C83.32 DIFFUSE LARGE B-CELL LYMPHOMA OF INTRATHORACIC LYMPH NODES: Primary | ICD-10-CM

## 2021-02-24 DIAGNOSIS — I26.99 PULMONARY EMBOLISM WITHOUT ACUTE COR PULMONALE, UNSPECIFIED CHRONICITY, UNSPECIFIED PULMONARY EMBOLISM TYPE: ICD-10-CM

## 2021-02-24 DIAGNOSIS — Z85.72 HISTORY OF FOLLICULAR LYMPHOMA: ICD-10-CM

## 2021-02-24 PROCEDURE — 3077F SYST BP >= 140 MM HG: CPT | Mod: CPTII,S$GLB,, | Performed by: INTERNAL MEDICINE

## 2021-02-24 PROCEDURE — 99213 PR OFFICE/OUTPT VISIT, EST, LEVL III, 20-29 MIN: ICD-10-PCS | Mod: S$GLB,,, | Performed by: INTERNAL MEDICINE

## 2021-02-24 PROCEDURE — 3288F FALL RISK ASSESSMENT DOCD: CPT | Mod: CPTII,S$GLB,, | Performed by: INTERNAL MEDICINE

## 2021-02-24 PROCEDURE — 1126F AMNT PAIN NOTED NONE PRSNT: CPT | Mod: S$GLB,,, | Performed by: INTERNAL MEDICINE

## 2021-02-24 PROCEDURE — 3077F PR MOST RECENT SYSTOLIC BLOOD PRESSURE >= 140 MM HG: ICD-10-PCS | Mod: CPTII,S$GLB,, | Performed by: INTERNAL MEDICINE

## 2021-02-24 PROCEDURE — 1159F MED LIST DOCD IN RCRD: CPT | Mod: S$GLB,,, | Performed by: INTERNAL MEDICINE

## 2021-02-24 PROCEDURE — 1159F PR MEDICATION LIST DOCUMENTED IN MEDICAL RECORD: ICD-10-PCS | Mod: S$GLB,,, | Performed by: INTERNAL MEDICINE

## 2021-02-24 PROCEDURE — 3288F PR FALLS RISK ASSESSMENT DOCUMENTED: ICD-10-PCS | Mod: CPTII,S$GLB,, | Performed by: INTERNAL MEDICINE

## 2021-02-24 PROCEDURE — 99213 OFFICE O/P EST LOW 20 MIN: CPT | Mod: S$GLB,,, | Performed by: INTERNAL MEDICINE

## 2021-02-24 PROCEDURE — 99999 PR PBB SHADOW E&M-EST. PATIENT-LVL III: ICD-10-PCS | Mod: PBBFAC,,, | Performed by: INTERNAL MEDICINE

## 2021-02-24 PROCEDURE — 1126F PR PAIN SEVERITY QUANTIFIED, NO PAIN PRESENT: ICD-10-PCS | Mod: S$GLB,,, | Performed by: INTERNAL MEDICINE

## 2021-02-24 PROCEDURE — 1101F PT FALLS ASSESS-DOCD LE1/YR: CPT | Mod: CPTII,S$GLB,, | Performed by: INTERNAL MEDICINE

## 2021-02-24 PROCEDURE — 99999 PR PBB SHADOW E&M-EST. PATIENT-LVL III: CPT | Mod: PBBFAC,,, | Performed by: INTERNAL MEDICINE

## 2021-02-24 PROCEDURE — 3079F DIAST BP 80-89 MM HG: CPT | Mod: CPTII,S$GLB,, | Performed by: INTERNAL MEDICINE

## 2021-02-24 PROCEDURE — 3079F PR MOST RECENT DIASTOLIC BLOOD PRESSURE 80-89 MM HG: ICD-10-PCS | Mod: CPTII,S$GLB,, | Performed by: INTERNAL MEDICINE

## 2021-02-24 PROCEDURE — 1101F PR PT FALLS ASSESS DOC 0-1 FALLS W/OUT INJ PAST YR: ICD-10-PCS | Mod: CPTII,S$GLB,, | Performed by: INTERNAL MEDICINE

## 2021-02-24 RX ORDER — DOXAZOSIN 4 MG/1
TABLET ORAL
COMMUNITY
Start: 2021-02-19 | End: 2022-02-01

## 2021-03-03 ENCOUNTER — OFFICE VISIT (OUTPATIENT)
Dept: OTOLARYNGOLOGY | Facility: CLINIC | Age: 81
End: 2021-03-03
Payer: MEDICARE

## 2021-03-03 VITALS
BODY MASS INDEX: 28.31 KG/M2 | WEIGHT: 202.25 LBS | HEIGHT: 71 IN | DIASTOLIC BLOOD PRESSURE: 94 MMHG | SYSTOLIC BLOOD PRESSURE: 164 MMHG

## 2021-03-03 DIAGNOSIS — Z85.72 HISTORY OF LYMPHOMA: ICD-10-CM

## 2021-03-03 DIAGNOSIS — J30.2 SEASONAL ALLERGIC RHINITIS, UNSPECIFIED TRIGGER: ICD-10-CM

## 2021-03-03 DIAGNOSIS — R09.81 NASAL CONGESTION: Primary | ICD-10-CM

## 2021-03-03 DIAGNOSIS — J34.3 HYPERTROPHY OF INFERIOR NASAL TURBINATE: ICD-10-CM

## 2021-03-03 PROCEDURE — 3077F PR MOST RECENT SYSTOLIC BLOOD PRESSURE >= 140 MM HG: ICD-10-PCS | Mod: CPTII,S$GLB,, | Performed by: OTOLARYNGOLOGY

## 2021-03-03 PROCEDURE — 1126F AMNT PAIN NOTED NONE PRSNT: CPT | Mod: S$GLB,,, | Performed by: OTOLARYNGOLOGY

## 2021-03-03 PROCEDURE — 1101F PT FALLS ASSESS-DOCD LE1/YR: CPT | Mod: CPTII,S$GLB,, | Performed by: OTOLARYNGOLOGY

## 2021-03-03 PROCEDURE — 3080F PR MOST RECENT DIASTOLIC BLOOD PRESSURE >= 90 MM HG: ICD-10-PCS | Mod: CPTII,S$GLB,, | Performed by: OTOLARYNGOLOGY

## 2021-03-03 PROCEDURE — 3288F FALL RISK ASSESSMENT DOCD: CPT | Mod: CPTII,S$GLB,, | Performed by: OTOLARYNGOLOGY

## 2021-03-03 PROCEDURE — 1126F PR PAIN SEVERITY QUANTIFIED, NO PAIN PRESENT: ICD-10-PCS | Mod: S$GLB,,, | Performed by: OTOLARYNGOLOGY

## 2021-03-03 PROCEDURE — 3080F DIAST BP >= 90 MM HG: CPT | Mod: CPTII,S$GLB,, | Performed by: OTOLARYNGOLOGY

## 2021-03-03 PROCEDURE — 1101F PR PT FALLS ASSESS DOC 0-1 FALLS W/OUT INJ PAST YR: ICD-10-PCS | Mod: CPTII,S$GLB,, | Performed by: OTOLARYNGOLOGY

## 2021-03-03 PROCEDURE — 99213 OFFICE O/P EST LOW 20 MIN: CPT | Mod: S$GLB,,, | Performed by: OTOLARYNGOLOGY

## 2021-03-03 PROCEDURE — 1159F MED LIST DOCD IN RCRD: CPT | Mod: S$GLB,,, | Performed by: OTOLARYNGOLOGY

## 2021-03-03 PROCEDURE — 99213 PR OFFICE/OUTPT VISIT, EST, LEVL III, 20-29 MIN: ICD-10-PCS | Mod: S$GLB,,, | Performed by: OTOLARYNGOLOGY

## 2021-03-03 PROCEDURE — 3288F PR FALLS RISK ASSESSMENT DOCUMENTED: ICD-10-PCS | Mod: CPTII,S$GLB,, | Performed by: OTOLARYNGOLOGY

## 2021-03-03 PROCEDURE — 3077F SYST BP >= 140 MM HG: CPT | Mod: CPTII,S$GLB,, | Performed by: OTOLARYNGOLOGY

## 2021-03-03 PROCEDURE — 1159F PR MEDICATION LIST DOCUMENTED IN MEDICAL RECORD: ICD-10-PCS | Mod: S$GLB,,, | Performed by: OTOLARYNGOLOGY

## 2021-03-08 ENCOUNTER — OFFICE VISIT (OUTPATIENT)
Dept: OPHTHALMOLOGY | Facility: CLINIC | Age: 81
End: 2021-03-08
Attending: OPHTHALMOLOGY
Payer: MEDICARE

## 2021-03-08 DIAGNOSIS — H35.3212 EXUDATIVE AGE-RELATED MACULAR DEGENERATION OF RIGHT EYE WITH INACTIVE CHOROIDAL NEOVASCULARIZATION: ICD-10-CM

## 2021-03-08 DIAGNOSIS — H01.00A BLEPHARITIS OF UPPER AND LOWER EYELIDS OF BOTH EYES, UNSPECIFIED TYPE: ICD-10-CM

## 2021-03-08 DIAGNOSIS — H31.001 CHORIORETINAL SCAR, RIGHT: ICD-10-CM

## 2021-03-08 DIAGNOSIS — H01.00B BLEPHARITIS OF UPPER AND LOWER EYELIDS OF BOTH EYES, UNSPECIFIED TYPE: ICD-10-CM

## 2021-03-08 DIAGNOSIS — H04.123 DRY EYES: ICD-10-CM

## 2021-03-08 DIAGNOSIS — H35.3123 NONEXUDATIVE AGE-RELATED MACULAR DEGENERATION, LEFT EYE, ADVANCED ATROPHIC WITHOUT SUBFOVEAL INVOLVEMENT: Primary | ICD-10-CM

## 2021-03-08 PROCEDURE — 92134 POSTERIOR SEGMENT OCT RETINA (OCULAR COHERENCE TOMOGRAPHY)-BOTH EYES: ICD-10-PCS | Mod: S$GLB,,, | Performed by: OPHTHALMOLOGY

## 2021-03-08 PROCEDURE — 92134 CPTRZ OPH DX IMG PST SGM RTA: CPT | Mod: S$GLB,,, | Performed by: OPHTHALMOLOGY

## 2021-03-08 PROCEDURE — 99999 PR PBB SHADOW E&M-EST. PATIENT-LVL II: ICD-10-PCS | Mod: PBBFAC,,, | Performed by: OPHTHALMOLOGY

## 2021-03-08 PROCEDURE — 1126F AMNT PAIN NOTED NONE PRSNT: CPT | Mod: S$GLB,,, | Performed by: OPHTHALMOLOGY

## 2021-03-08 PROCEDURE — 99999 PR PBB SHADOW E&M-EST. PATIENT-LVL II: CPT | Mod: PBBFAC,,, | Performed by: OPHTHALMOLOGY

## 2021-03-08 PROCEDURE — 92014 COMPRE OPH EXAM EST PT 1/>: CPT | Mod: S$GLB,,, | Performed by: OPHTHALMOLOGY

## 2021-03-08 PROCEDURE — 1126F PR PAIN SEVERITY QUANTIFIED, NO PAIN PRESENT: ICD-10-PCS | Mod: S$GLB,,, | Performed by: OPHTHALMOLOGY

## 2021-03-08 PROCEDURE — 92014 PR EYE EXAM, EST PATIENT,COMPREHESV: ICD-10-PCS | Mod: S$GLB,,, | Performed by: OPHTHALMOLOGY

## 2021-04-21 ENCOUNTER — OFFICE VISIT (OUTPATIENT)
Dept: OTOLARYNGOLOGY | Facility: CLINIC | Age: 81
End: 2021-04-21
Payer: MEDICARE

## 2021-04-21 VITALS
DIASTOLIC BLOOD PRESSURE: 80 MMHG | SYSTOLIC BLOOD PRESSURE: 140 MMHG | TEMPERATURE: 98 F | HEIGHT: 71 IN | WEIGHT: 204.06 LBS | BODY MASS INDEX: 28.57 KG/M2

## 2021-04-21 DIAGNOSIS — D10.39: ICD-10-CM

## 2021-04-21 DIAGNOSIS — J34.89 NASAL CRUSTING: Primary | ICD-10-CM

## 2021-04-21 DIAGNOSIS — R13.10 DYSPHAGIA, UNSPECIFIED TYPE: ICD-10-CM

## 2021-04-21 PROCEDURE — 99213 OFFICE O/P EST LOW 20 MIN: CPT | Mod: 25,S$GLB,, | Performed by: OTOLARYNGOLOGY

## 2021-04-21 PROCEDURE — 3288F FALL RISK ASSESSMENT DOCD: CPT | Mod: CPTII,S$GLB,, | Performed by: OTOLARYNGOLOGY

## 2021-04-21 PROCEDURE — 1159F PR MEDICATION LIST DOCUMENTED IN MEDICAL RECORD: ICD-10-PCS | Mod: S$GLB,,, | Performed by: OTOLARYNGOLOGY

## 2021-04-21 PROCEDURE — 99213 PR OFFICE/OUTPT VISIT, EST, LEVL III, 20-29 MIN: ICD-10-PCS | Mod: 25,S$GLB,, | Performed by: OTOLARYNGOLOGY

## 2021-04-21 PROCEDURE — 1101F PR PT FALLS ASSESS DOC 0-1 FALLS W/OUT INJ PAST YR: ICD-10-PCS | Mod: CPTII,S$GLB,, | Performed by: OTOLARYNGOLOGY

## 2021-04-21 PROCEDURE — 31575 DIAGNOSTIC LARYNGOSCOPY: CPT | Mod: S$GLB,,, | Performed by: OTOLARYNGOLOGY

## 2021-04-21 PROCEDURE — 31575 PR LARYNGOSCOPY, FLEXIBLE; DIAGNOSTIC: ICD-10-PCS | Mod: S$GLB,,, | Performed by: OTOLARYNGOLOGY

## 2021-04-21 PROCEDURE — 1101F PT FALLS ASSESS-DOCD LE1/YR: CPT | Mod: CPTII,S$GLB,, | Performed by: OTOLARYNGOLOGY

## 2021-04-21 PROCEDURE — 1125F AMNT PAIN NOTED PAIN PRSNT: CPT | Mod: S$GLB,,, | Performed by: OTOLARYNGOLOGY

## 2021-04-21 PROCEDURE — 1159F MED LIST DOCD IN RCRD: CPT | Mod: S$GLB,,, | Performed by: OTOLARYNGOLOGY

## 2021-04-21 PROCEDURE — 1125F PR PAIN SEVERITY QUANTIFIED, PAIN PRESENT: ICD-10-PCS | Mod: S$GLB,,, | Performed by: OTOLARYNGOLOGY

## 2021-04-21 PROCEDURE — 3288F PR FALLS RISK ASSESSMENT DOCUMENTED: ICD-10-PCS | Mod: CPTII,S$GLB,, | Performed by: OTOLARYNGOLOGY

## 2021-04-21 RX ORDER — MUPIROCIN 20 MG/G
OINTMENT TOPICAL 2 TIMES DAILY
Qty: 1 TUBE | Refills: 0 | Status: SHIPPED | OUTPATIENT
Start: 2021-04-21 | End: 2021-05-05

## 2021-05-21 ENCOUNTER — LAB VISIT (OUTPATIENT)
Dept: LAB | Facility: HOSPITAL | Age: 81
End: 2021-05-21
Attending: INTERNAL MEDICINE
Payer: MEDICARE

## 2021-05-21 DIAGNOSIS — C83.32 DIFFUSE LARGE B-CELL LYMPHOMA OF INTRATHORACIC LYMPH NODES: ICD-10-CM

## 2021-05-21 DIAGNOSIS — I26.99 PULMONARY EMBOLISM WITHOUT ACUTE COR PULMONALE, UNSPECIFIED CHRONICITY, UNSPECIFIED PULMONARY EMBOLISM TYPE: ICD-10-CM

## 2021-05-21 DIAGNOSIS — Z85.72 HISTORY OF FOLLICULAR LYMPHOMA: ICD-10-CM

## 2021-05-21 LAB
ALBUMIN SERPL BCP-MCNC: 4 G/DL (ref 3.5–5.2)
ALP SERPL-CCNC: 83 U/L (ref 55–135)
ALT SERPL W/O P-5'-P-CCNC: 16 U/L (ref 10–44)
ANION GAP SERPL CALC-SCNC: 10 MMOL/L (ref 8–16)
AST SERPL-CCNC: 16 U/L (ref 10–40)
BASOPHILS # BLD AUTO: 0.07 K/UL (ref 0–0.2)
BASOPHILS NFR BLD: 1.1 % (ref 0–1.9)
BILIRUB SERPL-MCNC: 0.5 MG/DL (ref 0.1–1)
BUN SERPL-MCNC: 17 MG/DL (ref 8–23)
CALCIUM SERPL-MCNC: 9.6 MG/DL (ref 8.7–10.5)
CHLORIDE SERPL-SCNC: 107 MMOL/L (ref 95–110)
CO2 SERPL-SCNC: 22 MMOL/L (ref 23–29)
CREAT SERPL-MCNC: 1.4 MG/DL (ref 0.5–1.4)
DIFFERENTIAL METHOD: NORMAL
EOSINOPHIL # BLD AUTO: 0.3 K/UL (ref 0–0.5)
EOSINOPHIL NFR BLD: 5.1 % (ref 0–8)
ERYTHROCYTE [DISTWIDTH] IN BLOOD BY AUTOMATED COUNT: 14.1 % (ref 11.5–14.5)
EST. GFR  (AFRICAN AMERICAN): 53 ML/MIN/1.73 M^2
EST. GFR  (NON AFRICAN AMERICAN): 46 ML/MIN/1.73 M^2
GLUCOSE SERPL-MCNC: 129 MG/DL (ref 70–110)
HCT VFR BLD AUTO: 44.7 % (ref 40–54)
HGB BLD-MCNC: 14.5 G/DL (ref 14–18)
IMM GRANULOCYTES # BLD AUTO: 0.02 K/UL (ref 0–0.04)
IMM GRANULOCYTES NFR BLD AUTO: 0.3 % (ref 0–0.5)
LYMPHOCYTES # BLD AUTO: 1.6 K/UL (ref 1–4.8)
LYMPHOCYTES NFR BLD: 25.3 % (ref 18–48)
MCH RBC QN AUTO: 28.2 PG (ref 27–31)
MCHC RBC AUTO-ENTMCNC: 32.4 G/DL (ref 32–36)
MCV RBC AUTO: 87 FL (ref 82–98)
MONOCYTES # BLD AUTO: 0.4 K/UL (ref 0.3–1)
MONOCYTES NFR BLD: 6.9 % (ref 4–15)
NEUTROPHILS # BLD AUTO: 3.8 K/UL (ref 1.8–7.7)
NEUTROPHILS NFR BLD: 61.3 % (ref 38–73)
NRBC BLD-RTO: 0 /100 WBC
PLATELET # BLD AUTO: 153 K/UL (ref 150–450)
PMV BLD AUTO: 11.4 FL (ref 9.2–12.9)
POTASSIUM SERPL-SCNC: 4.1 MMOL/L (ref 3.5–5.1)
PROT SERPL-MCNC: 6.7 G/DL (ref 6–8.4)
RBC # BLD AUTO: 5.15 M/UL (ref 4.6–6.2)
SODIUM SERPL-SCNC: 139 MMOL/L (ref 136–145)
WBC # BLD AUTO: 6.24 K/UL (ref 3.9–12.7)

## 2021-05-21 PROCEDURE — 85025 COMPLETE CBC W/AUTO DIFF WBC: CPT | Performed by: INTERNAL MEDICINE

## 2021-05-21 PROCEDURE — 80053 COMPREHEN METABOLIC PANEL: CPT | Performed by: INTERNAL MEDICINE

## 2021-05-21 PROCEDURE — 36415 COLL VENOUS BLD VENIPUNCTURE: CPT | Performed by: INTERNAL MEDICINE

## 2021-05-25 ENCOUNTER — OFFICE VISIT (OUTPATIENT)
Dept: HEMATOLOGY/ONCOLOGY | Facility: CLINIC | Age: 81
End: 2021-05-25
Payer: MEDICARE

## 2021-05-25 VITALS
BODY MASS INDEX: 28.27 KG/M2 | HEIGHT: 71 IN | TEMPERATURE: 98 F | DIASTOLIC BLOOD PRESSURE: 77 MMHG | SYSTOLIC BLOOD PRESSURE: 145 MMHG | HEART RATE: 80 BPM | OXYGEN SATURATION: 98 % | WEIGHT: 201.94 LBS

## 2021-05-25 DIAGNOSIS — Z85.72 HISTORY OF FOLLICULAR LYMPHOMA: ICD-10-CM

## 2021-05-25 DIAGNOSIS — C83.32 DIFFUSE LARGE B-CELL LYMPHOMA OF INTRATHORACIC LYMPH NODES: Primary | ICD-10-CM

## 2021-05-25 PROCEDURE — 1159F MED LIST DOCD IN RCRD: CPT | Mod: S$GLB,,, | Performed by: INTERNAL MEDICINE

## 2021-05-25 PROCEDURE — 3288F FALL RISK ASSESSMENT DOCD: CPT | Mod: CPTII,S$GLB,, | Performed by: INTERNAL MEDICINE

## 2021-05-25 PROCEDURE — 99999 PR PBB SHADOW E&M-EST. PATIENT-LVL III: ICD-10-PCS | Mod: PBBFAC,,, | Performed by: INTERNAL MEDICINE

## 2021-05-25 PROCEDURE — 1101F PT FALLS ASSESS-DOCD LE1/YR: CPT | Mod: CPTII,S$GLB,, | Performed by: INTERNAL MEDICINE

## 2021-05-25 PROCEDURE — 3288F PR FALLS RISK ASSESSMENT DOCUMENTED: ICD-10-PCS | Mod: CPTII,S$GLB,, | Performed by: INTERNAL MEDICINE

## 2021-05-25 PROCEDURE — 99213 PR OFFICE/OUTPT VISIT, EST, LEVL III, 20-29 MIN: ICD-10-PCS | Mod: S$GLB,,, | Performed by: INTERNAL MEDICINE

## 2021-05-25 PROCEDURE — 1159F PR MEDICATION LIST DOCUMENTED IN MEDICAL RECORD: ICD-10-PCS | Mod: S$GLB,,, | Performed by: INTERNAL MEDICINE

## 2021-05-25 PROCEDURE — 99999 PR PBB SHADOW E&M-EST. PATIENT-LVL III: CPT | Mod: PBBFAC,,, | Performed by: INTERNAL MEDICINE

## 2021-05-25 PROCEDURE — 1101F PR PT FALLS ASSESS DOC 0-1 FALLS W/OUT INJ PAST YR: ICD-10-PCS | Mod: CPTII,S$GLB,, | Performed by: INTERNAL MEDICINE

## 2021-05-25 PROCEDURE — 1126F AMNT PAIN NOTED NONE PRSNT: CPT | Mod: S$GLB,,, | Performed by: INTERNAL MEDICINE

## 2021-05-25 PROCEDURE — 99213 OFFICE O/P EST LOW 20 MIN: CPT | Mod: S$GLB,,, | Performed by: INTERNAL MEDICINE

## 2021-05-25 PROCEDURE — 1126F PR PAIN SEVERITY QUANTIFIED, NO PAIN PRESENT: ICD-10-PCS | Mod: S$GLB,,, | Performed by: INTERNAL MEDICINE

## 2021-05-28 ENCOUNTER — OFFICE VISIT (OUTPATIENT)
Dept: OTOLARYNGOLOGY | Facility: CLINIC | Age: 81
End: 2021-05-28
Payer: MEDICARE

## 2021-05-28 VITALS
WEIGHT: 201.81 LBS | BODY MASS INDEX: 28.25 KG/M2 | HEIGHT: 71 IN | DIASTOLIC BLOOD PRESSURE: 80 MMHG | TEMPERATURE: 98 F | SYSTOLIC BLOOD PRESSURE: 140 MMHG

## 2021-05-28 DIAGNOSIS — R22.0 NASAL SWELLING: ICD-10-CM

## 2021-05-28 DIAGNOSIS — J34.89 NASAL CRUSTING: Primary | ICD-10-CM

## 2021-05-28 PROCEDURE — 1125F PR PAIN SEVERITY QUANTIFIED, PAIN PRESENT: ICD-10-PCS | Mod: S$GLB,,, | Performed by: OTOLARYNGOLOGY

## 2021-05-28 PROCEDURE — 3288F PR FALLS RISK ASSESSMENT DOCUMENTED: ICD-10-PCS | Mod: CPTII,S$GLB,, | Performed by: OTOLARYNGOLOGY

## 2021-05-28 PROCEDURE — 1159F PR MEDICATION LIST DOCUMENTED IN MEDICAL RECORD: ICD-10-PCS | Mod: S$GLB,,, | Performed by: OTOLARYNGOLOGY

## 2021-05-28 PROCEDURE — 1125F AMNT PAIN NOTED PAIN PRSNT: CPT | Mod: S$GLB,,, | Performed by: OTOLARYNGOLOGY

## 2021-05-28 PROCEDURE — 1101F PR PT FALLS ASSESS DOC 0-1 FALLS W/OUT INJ PAST YR: ICD-10-PCS | Mod: CPTII,S$GLB,, | Performed by: OTOLARYNGOLOGY

## 2021-05-28 PROCEDURE — 3288F FALL RISK ASSESSMENT DOCD: CPT | Mod: CPTII,S$GLB,, | Performed by: OTOLARYNGOLOGY

## 2021-05-28 PROCEDURE — 1101F PT FALLS ASSESS-DOCD LE1/YR: CPT | Mod: CPTII,S$GLB,, | Performed by: OTOLARYNGOLOGY

## 2021-05-28 PROCEDURE — 99213 OFFICE O/P EST LOW 20 MIN: CPT | Mod: S$GLB,,, | Performed by: OTOLARYNGOLOGY

## 2021-05-28 PROCEDURE — 1159F MED LIST DOCD IN RCRD: CPT | Mod: S$GLB,,, | Performed by: OTOLARYNGOLOGY

## 2021-05-28 PROCEDURE — 99213 PR OFFICE/OUTPT VISIT, EST, LEVL III, 20-29 MIN: ICD-10-PCS | Mod: S$GLB,,, | Performed by: OTOLARYNGOLOGY

## 2021-08-25 ENCOUNTER — LAB VISIT (OUTPATIENT)
Dept: LAB | Facility: HOSPITAL | Age: 81
End: 2021-08-25
Attending: INTERNAL MEDICINE
Payer: MEDICARE

## 2021-08-25 DIAGNOSIS — Z85.72 HISTORY OF FOLLICULAR LYMPHOMA: ICD-10-CM

## 2021-08-25 DIAGNOSIS — C83.32 DIFFUSE LARGE B-CELL LYMPHOMA OF INTRATHORACIC LYMPH NODES: ICD-10-CM

## 2021-08-25 LAB
ALBUMIN SERPL BCP-MCNC: 3.9 G/DL (ref 3.5–5.2)
ALP SERPL-CCNC: 82 U/L (ref 55–135)
ALT SERPL W/O P-5'-P-CCNC: 14 U/L (ref 10–44)
ANION GAP SERPL CALC-SCNC: 10 MMOL/L (ref 8–16)
AST SERPL-CCNC: 15 U/L (ref 10–40)
BASOPHILS # BLD AUTO: 0.04 K/UL (ref 0–0.2)
BASOPHILS NFR BLD: 0.7 % (ref 0–1.9)
BILIRUB SERPL-MCNC: 0.5 MG/DL (ref 0.1–1)
BUN SERPL-MCNC: 16 MG/DL (ref 8–23)
CALCIUM SERPL-MCNC: 9.5 MG/DL (ref 8.7–10.5)
CHLORIDE SERPL-SCNC: 109 MMOL/L (ref 95–110)
CO2 SERPL-SCNC: 21 MMOL/L (ref 23–29)
CREAT SERPL-MCNC: 1.2 MG/DL (ref 0.5–1.4)
DIFFERENTIAL METHOD: ABNORMAL
EOSINOPHIL # BLD AUTO: 0.2 K/UL (ref 0–0.5)
EOSINOPHIL NFR BLD: 3.4 % (ref 0–8)
ERYTHROCYTE [DISTWIDTH] IN BLOOD BY AUTOMATED COUNT: 14.6 % (ref 11.5–14.5)
EST. GFR  (AFRICAN AMERICAN): >60 ML/MIN/1.73 M^2
EST. GFR  (NON AFRICAN AMERICAN): 56 ML/MIN/1.73 M^2
GLUCOSE SERPL-MCNC: 91 MG/DL (ref 70–110)
HCT VFR BLD AUTO: 43.3 % (ref 40–54)
HGB BLD-MCNC: 14.2 G/DL (ref 14–18)
IMM GRANULOCYTES # BLD AUTO: 0.02 K/UL (ref 0–0.04)
IMM GRANULOCYTES NFR BLD AUTO: 0.3 % (ref 0–0.5)
LDH SERPL L TO P-CCNC: 148 U/L (ref 110–260)
LYMPHOCYTES # BLD AUTO: 1.2 K/UL (ref 1–4.8)
LYMPHOCYTES NFR BLD: 19.5 % (ref 18–48)
MCH RBC QN AUTO: 27.2 PG (ref 27–31)
MCHC RBC AUTO-ENTMCNC: 32.8 G/DL (ref 32–36)
MCV RBC AUTO: 83 FL (ref 82–98)
MONOCYTES # BLD AUTO: 0.5 K/UL (ref 0.3–1)
MONOCYTES NFR BLD: 8.6 % (ref 4–15)
NEUTROPHILS # BLD AUTO: 4.1 K/UL (ref 1.8–7.7)
NEUTROPHILS NFR BLD: 67.5 % (ref 38–73)
NRBC BLD-RTO: 0 /100 WBC
PLATELET # BLD AUTO: 151 K/UL (ref 150–450)
PMV BLD AUTO: 10.8 FL (ref 9.2–12.9)
POTASSIUM SERPL-SCNC: 4.5 MMOL/L (ref 3.5–5.1)
PROT SERPL-MCNC: 6.6 G/DL (ref 6–8.4)
RBC # BLD AUTO: 5.22 M/UL (ref 4.6–6.2)
SODIUM SERPL-SCNC: 140 MMOL/L (ref 136–145)
WBC # BLD AUTO: 6.14 K/UL (ref 3.9–12.7)

## 2021-08-25 PROCEDURE — 85025 COMPLETE CBC W/AUTO DIFF WBC: CPT | Performed by: INTERNAL MEDICINE

## 2021-08-25 PROCEDURE — 36415 COLL VENOUS BLD VENIPUNCTURE: CPT | Performed by: INTERNAL MEDICINE

## 2021-08-25 PROCEDURE — 83615 LACTATE (LD) (LDH) ENZYME: CPT | Performed by: INTERNAL MEDICINE

## 2021-08-25 PROCEDURE — 80053 COMPREHEN METABOLIC PANEL: CPT | Performed by: INTERNAL MEDICINE

## 2021-09-03 ENCOUNTER — TELEPHONE (OUTPATIENT)
Dept: SURGERY | Facility: CLINIC | Age: 81
End: 2021-09-03

## 2021-09-13 ENCOUNTER — TELEPHONE (OUTPATIENT)
Dept: OPHTHALMOLOGY | Facility: CLINIC | Age: 81
End: 2021-09-13

## 2021-09-21 ENCOUNTER — OFFICE VISIT (OUTPATIENT)
Dept: HEMATOLOGY/ONCOLOGY | Facility: CLINIC | Age: 81
End: 2021-09-21
Payer: MEDICARE

## 2021-09-21 VITALS
DIASTOLIC BLOOD PRESSURE: 70 MMHG | TEMPERATURE: 98 F | SYSTOLIC BLOOD PRESSURE: 150 MMHG | BODY MASS INDEX: 28.11 KG/M2 | WEIGHT: 200.81 LBS | HEIGHT: 71 IN | HEART RATE: 71 BPM | OXYGEN SATURATION: 97 %

## 2021-09-21 DIAGNOSIS — C83.32 DIFFUSE LARGE B-CELL LYMPHOMA OF INTRATHORACIC LYMPH NODES: Primary | ICD-10-CM

## 2021-09-21 PROCEDURE — 1101F PT FALLS ASSESS-DOCD LE1/YR: CPT | Mod: CPTII,S$GLB,, | Performed by: INTERNAL MEDICINE

## 2021-09-21 PROCEDURE — 3288F FALL RISK ASSESSMENT DOCD: CPT | Mod: CPTII,S$GLB,, | Performed by: INTERNAL MEDICINE

## 2021-09-21 PROCEDURE — 1101F PR PT FALLS ASSESS DOC 0-1 FALLS W/OUT INJ PAST YR: ICD-10-PCS | Mod: CPTII,S$GLB,, | Performed by: INTERNAL MEDICINE

## 2021-09-21 PROCEDURE — 3077F PR MOST RECENT SYSTOLIC BLOOD PRESSURE >= 140 MM HG: ICD-10-PCS | Mod: CPTII,S$GLB,, | Performed by: INTERNAL MEDICINE

## 2021-09-21 PROCEDURE — 3078F PR MOST RECENT DIASTOLIC BLOOD PRESSURE < 80 MM HG: ICD-10-PCS | Mod: CPTII,S$GLB,, | Performed by: INTERNAL MEDICINE

## 2021-09-21 PROCEDURE — 99214 PR OFFICE/OUTPT VISIT, EST, LEVL IV, 30-39 MIN: ICD-10-PCS | Mod: S$GLB,,, | Performed by: INTERNAL MEDICINE

## 2021-09-21 PROCEDURE — 99999 PR PBB SHADOW E&M-EST. PATIENT-LVL IV: ICD-10-PCS | Mod: PBBFAC,,, | Performed by: INTERNAL MEDICINE

## 2021-09-21 PROCEDURE — 1126F PR PAIN SEVERITY QUANTIFIED, NO PAIN PRESENT: ICD-10-PCS | Mod: CPTII,S$GLB,, | Performed by: INTERNAL MEDICINE

## 2021-09-21 PROCEDURE — 99999 PR PBB SHADOW E&M-EST. PATIENT-LVL IV: CPT | Mod: PBBFAC,,, | Performed by: INTERNAL MEDICINE

## 2021-09-21 PROCEDURE — 3078F DIAST BP <80 MM HG: CPT | Mod: CPTII,S$GLB,, | Performed by: INTERNAL MEDICINE

## 2021-09-21 PROCEDURE — 3077F SYST BP >= 140 MM HG: CPT | Mod: CPTII,S$GLB,, | Performed by: INTERNAL MEDICINE

## 2021-09-21 PROCEDURE — 3288F PR FALLS RISK ASSESSMENT DOCUMENTED: ICD-10-PCS | Mod: CPTII,S$GLB,, | Performed by: INTERNAL MEDICINE

## 2021-09-21 PROCEDURE — 1126F AMNT PAIN NOTED NONE PRSNT: CPT | Mod: CPTII,S$GLB,, | Performed by: INTERNAL MEDICINE

## 2021-09-21 PROCEDURE — 1159F PR MEDICATION LIST DOCUMENTED IN MEDICAL RECORD: ICD-10-PCS | Mod: CPTII,S$GLB,, | Performed by: INTERNAL MEDICINE

## 2021-09-21 PROCEDURE — 99214 OFFICE O/P EST MOD 30 MIN: CPT | Mod: S$GLB,,, | Performed by: INTERNAL MEDICINE

## 2021-09-21 PROCEDURE — 1159F MED LIST DOCD IN RCRD: CPT | Mod: CPTII,S$GLB,, | Performed by: INTERNAL MEDICINE

## 2021-09-27 ENCOUNTER — OFFICE VISIT (OUTPATIENT)
Dept: OPHTHALMOLOGY | Facility: CLINIC | Age: 81
End: 2021-09-27
Attending: OPHTHALMOLOGY
Payer: MEDICARE

## 2021-09-27 DIAGNOSIS — H01.00A BLEPHARITIS OF UPPER AND LOWER EYELIDS OF BOTH EYES, UNSPECIFIED TYPE: ICD-10-CM

## 2021-09-27 DIAGNOSIS — H35.3212 EXUDATIVE AGE-RELATED MACULAR DEGENERATION OF RIGHT EYE WITH INACTIVE CHOROIDAL NEOVASCULARIZATION: Primary | ICD-10-CM

## 2021-09-27 DIAGNOSIS — H31.001 CHORIORETINAL SCAR, RIGHT: ICD-10-CM

## 2021-09-27 DIAGNOSIS — H01.00B BLEPHARITIS OF UPPER AND LOWER EYELIDS OF BOTH EYES, UNSPECIFIED TYPE: ICD-10-CM

## 2021-09-27 DIAGNOSIS — H35.3123 NONEXUDATIVE AGE-RELATED MACULAR DEGENERATION, LEFT EYE, ADVANCED ATROPHIC WITHOUT SUBFOVEAL INVOLVEMENT: ICD-10-CM

## 2021-09-27 DIAGNOSIS — H04.123 DRY EYES: ICD-10-CM

## 2021-09-27 PROCEDURE — 1101F PT FALLS ASSESS-DOCD LE1/YR: CPT | Mod: CPTII,S$GLB,, | Performed by: OPHTHALMOLOGY

## 2021-09-27 PROCEDURE — 99999 PR PBB SHADOW E&M-EST. PATIENT-LVL III: CPT | Mod: PBBFAC,,, | Performed by: OPHTHALMOLOGY

## 2021-09-27 PROCEDURE — 92134 CPTRZ OPH DX IMG PST SGM RTA: CPT | Mod: S$GLB,,, | Performed by: OPHTHALMOLOGY

## 2021-09-27 PROCEDURE — 99999 PR PBB SHADOW E&M-EST. PATIENT-LVL III: ICD-10-PCS | Mod: PBBFAC,,, | Performed by: OPHTHALMOLOGY

## 2021-09-27 PROCEDURE — 3288F FALL RISK ASSESSMENT DOCD: CPT | Mod: CPTII,S$GLB,, | Performed by: OPHTHALMOLOGY

## 2021-09-27 PROCEDURE — 1126F AMNT PAIN NOTED NONE PRSNT: CPT | Mod: CPTII,S$GLB,, | Performed by: OPHTHALMOLOGY

## 2021-09-27 PROCEDURE — 1101F PR PT FALLS ASSESS DOC 0-1 FALLS W/OUT INJ PAST YR: ICD-10-PCS | Mod: CPTII,S$GLB,, | Performed by: OPHTHALMOLOGY

## 2021-09-27 PROCEDURE — 1160F RVW MEDS BY RX/DR IN RCRD: CPT | Mod: CPTII,S$GLB,, | Performed by: OPHTHALMOLOGY

## 2021-09-27 PROCEDURE — 92014 COMPRE OPH EXAM EST PT 1/>: CPT | Mod: S$GLB,,, | Performed by: OPHTHALMOLOGY

## 2021-09-27 PROCEDURE — 3288F PR FALLS RISK ASSESSMENT DOCUMENTED: ICD-10-PCS | Mod: CPTII,S$GLB,, | Performed by: OPHTHALMOLOGY

## 2021-09-27 PROCEDURE — 1159F MED LIST DOCD IN RCRD: CPT | Mod: CPTII,S$GLB,, | Performed by: OPHTHALMOLOGY

## 2021-09-27 PROCEDURE — 1126F PR PAIN SEVERITY QUANTIFIED, NO PAIN PRESENT: ICD-10-PCS | Mod: CPTII,S$GLB,, | Performed by: OPHTHALMOLOGY

## 2021-09-27 PROCEDURE — 92134 POSTERIOR SEGMENT OCT RETINA (OCULAR COHERENCE TOMOGRAPHY)-BOTH EYES: ICD-10-PCS | Mod: S$GLB,,, | Performed by: OPHTHALMOLOGY

## 2021-09-27 PROCEDURE — 92014 PR EYE EXAM, EST PATIENT,COMPREHESV: ICD-10-PCS | Mod: S$GLB,,, | Performed by: OPHTHALMOLOGY

## 2021-09-27 PROCEDURE — 1159F PR MEDICATION LIST DOCUMENTED IN MEDICAL RECORD: ICD-10-PCS | Mod: CPTII,S$GLB,, | Performed by: OPHTHALMOLOGY

## 2021-09-27 PROCEDURE — 1160F PR REVIEW ALL MEDS BY PRESCRIBER/CLIN PHARMACIST DOCUMENTED: ICD-10-PCS | Mod: CPTII,S$GLB,, | Performed by: OPHTHALMOLOGY

## 2021-09-27 RX ORDER — MOMETASONE FUROATE 1 MG/G
CREAM TOPICAL
COMMUNITY
Start: 2021-05-28 | End: 2022-02-08

## 2021-10-01 ENCOUNTER — HOSPITAL ENCOUNTER (OUTPATIENT)
Dept: RADIOLOGY | Facility: HOSPITAL | Age: 81
Discharge: HOME | End: 2021-10-01
Attending: INTERNAL MEDICINE
Payer: MEDICARE

## 2021-10-01 DIAGNOSIS — C83.32 DIFFUSE LARGE B-CELL LYMPHOMA OF INTRATHORACIC LYMPH NODES: ICD-10-CM

## 2021-10-01 PROCEDURE — 78815 PET IMAGE W/CT SKULL-THIGH: CPT | Mod: 26,PS,, | Performed by: RADIOLOGY

## 2021-10-01 PROCEDURE — 78815 NM PET CT ROUTINE: ICD-10-PCS | Mod: 26,PS,, | Performed by: RADIOLOGY

## 2021-10-01 PROCEDURE — 78815 PET IMAGE W/CT SKULL-THIGH: CPT | Mod: TC,MG

## 2021-12-15 ENCOUNTER — OFFICE VISIT (OUTPATIENT)
Dept: CARDIOLOGY | Facility: CLINIC | Age: 81
End: 2021-12-15
Payer: MEDICARE

## 2021-12-15 VITALS
OXYGEN SATURATION: 96 % | SYSTOLIC BLOOD PRESSURE: 120 MMHG | DIASTOLIC BLOOD PRESSURE: 72 MMHG | WEIGHT: 200 LBS | RESPIRATION RATE: 15 BRPM | BODY MASS INDEX: 28 KG/M2 | HEIGHT: 71 IN | HEART RATE: 67 BPM

## 2021-12-15 DIAGNOSIS — Z82.49 FAMILY HISTORY OF ABDOMINAL AORTIC ANEURYSM (AAA): ICD-10-CM

## 2021-12-15 DIAGNOSIS — I87.2 VENOUS INSUFFICIENCY (CHRONIC) (PERIPHERAL): ICD-10-CM

## 2021-12-15 DIAGNOSIS — I77.810 AORTIC ROOT DILATATION: ICD-10-CM

## 2021-12-15 DIAGNOSIS — R60.0 EDEMA OF LEFT LOWER EXTREMITY: Primary | ICD-10-CM

## 2021-12-15 DIAGNOSIS — I25.10 CORONARY ARTERY DISEASE INVOLVING NATIVE CORONARY ARTERY OF NATIVE HEART WITHOUT ANGINA PECTORIS: ICD-10-CM

## 2021-12-15 DIAGNOSIS — I70.0 AORTIC ATHEROSCLEROSIS: ICD-10-CM

## 2021-12-15 PROCEDURE — 93000 EKG 12-LEAD: ICD-10-PCS | Mod: S$GLB,,, | Performed by: INTERNAL MEDICINE

## 2021-12-15 PROCEDURE — 93000 ELECTROCARDIOGRAM COMPLETE: CPT | Mod: S$GLB,,, | Performed by: INTERNAL MEDICINE

## 2021-12-15 PROCEDURE — 99204 OFFICE O/P NEW MOD 45 MIN: CPT | Mod: S$GLB,,, | Performed by: INTERNAL MEDICINE

## 2021-12-15 PROCEDURE — 99999 PR PBB SHADOW E&M-EST. PATIENT-LVL IV: CPT | Mod: PBBFAC,,, | Performed by: INTERNAL MEDICINE

## 2021-12-15 PROCEDURE — 99999 PR PBB SHADOW E&M-EST. PATIENT-LVL IV: ICD-10-PCS | Mod: PBBFAC,,, | Performed by: INTERNAL MEDICINE

## 2021-12-15 PROCEDURE — 99204 PR OFFICE/OUTPT VISIT, NEW, LEVL IV, 45-59 MIN: ICD-10-PCS | Mod: S$GLB,,, | Performed by: INTERNAL MEDICINE

## 2022-01-26 ENCOUNTER — HOSPITAL ENCOUNTER (OUTPATIENT)
Dept: CARDIOLOGY | Facility: HOSPITAL | Age: 82
Discharge: HOME | End: 2022-01-26
Attending: INTERNAL MEDICINE
Payer: MEDICARE

## 2022-01-26 DIAGNOSIS — I87.2 VENOUS INSUFFICIENCY (CHRONIC) (PERIPHERAL): ICD-10-CM

## 2022-01-26 DIAGNOSIS — R60.0 EDEMA OF LEFT LOWER EXTREMITY: ICD-10-CM

## 2022-01-26 DIAGNOSIS — I77.810 AORTIC ROOT DILATATION: ICD-10-CM

## 2022-01-26 DIAGNOSIS — Z82.49 FAMILY HISTORY OF ABDOMINAL AORTIC ANEURYSM (AAA): ICD-10-CM

## 2022-01-26 LAB
ABDOMINAL IMA AP: 1.6 CM
ABDOMINAL IMA ED VEL: 0 CM/S
ABDOMINAL IMA PS VEL: 66 CM/S
ABDOMINAL IMA TRANS: 1.9 CM
ABDOMINAL INFRARENAL AORTA AP: 2 CM
ABDOMINAL INFRARENAL AORTA ED VEL: 0 CM/S
ABDOMINAL INFRARENAL AORTA PS VEL: 104 CM/S
ABDOMINAL INFRARENAL AORTA TRANS: 2.2 CM
ABDOMINAL JUXTARENAL AORTA AP: 1.9 CM
ABDOMINAL JUXTARENAL AORTA ED VEL: 0 CM/S
ABDOMINAL JUXTARENAL AORTA PS VEL: 84 CM/S
ABDOMINAL JUXTARENAL AORTA TRANS: 2 CM
ABDOMINAL LT COM ILIAC AP: 1.2 CM
ABDOMINAL LT COM ILIAC TRANS: 1.2 CM
ABDOMINAL LT COM ILIAC VEL: 75 CM/S
ABDOMINAL LT COM ILLIAC ED VEL: 0 CM/S
ABDOMINAL RT COM ILIAC AP: 1.2 CM
ABDOMINAL RT COM ILIAC TRANS: 1.2 CM
ABDOMINAL RT COM ILIAC VEL: 72 CM/S
ABDOMINAL RT COM ILLIAC ED VEL: 0 CM/S
ABDOMINAL SUPRARENAL AORTA AP: 2.1 CM
ABDOMINAL SUPRARENAL AORTA ED VEL: 0 CM/S
ABDOMINAL SUPRARENAL AORTA PS VEL: 80 CM/S
ABDOMINAL SUPRARENAL AORTA TRANS: 2.4 CM
LEFT GREAT SAPHENOUS DISTAL THIGH DIA: 0.3 CM
LEFT GREAT SAPHENOUS JUNCTION DIA: 0.9 CM
LEFT GREAT SAPHENOUS KNEE DIA: 0.2 CM
LEFT GREAT SAPHENOUS MIDDLE THIGH DIA: 0.3 CM
LEFT GREAT SAPHENOUS PROXIMAL CALF DIA: 0.3 CM
LEFT SMALL SAPHENOUS KNEE DIA: 0.4 CM
LEFT SMALL SAPHENOUS SPJ DIA: 0.4 CM
RIGHT GREAT SAPHENOUS DISTAL THIGH DIA: 0.4 CM
RIGHT GREAT SAPHENOUS KNEE DIA: 0.3 CM
RIGHT GREAT SAPHENOUS MIDDLE THIGH DIA: 0.7 CM
RIGHT GREAT SAPHENOUS PROXIMAL CALF DIA: 0.3 CM
RIGHT SMALL SAPHENOUS KNEE DIA: 0.2 CM
RIGHT SMALL SAPHENOUS SPJ DIA: 0.2 CM

## 2022-01-26 PROCEDURE — 93978 VASCULAR STUDY: CPT | Mod: 26,,, | Performed by: INTERNAL MEDICINE

## 2022-01-26 PROCEDURE — 93978 VASCULAR STUDY: CPT

## 2022-01-26 PROCEDURE — 93970 CV US LOWER VENOUS INSUFFICIENCY BILATERAL (CUPID ONLY): ICD-10-PCS | Mod: 26,,, | Performed by: INTERNAL MEDICINE

## 2022-01-26 PROCEDURE — 93970 EXTREMITY STUDY: CPT | Mod: TC

## 2022-01-26 PROCEDURE — 93970 EXTREMITY STUDY: CPT | Mod: 26,,, | Performed by: INTERNAL MEDICINE

## 2022-01-26 PROCEDURE — 93978 CV US ABDOMINAL AORTA EVALUATION (CUPID ONLY): ICD-10-PCS | Mod: 26,,, | Performed by: INTERNAL MEDICINE

## 2022-01-26 NOTE — PROGRESS NOTES
Mr. Cevallos received both ultrasounds in our dept today (AAA and venous insuffiency). After completion of tests, I stepped out the room for patient to change back into his clothes to be sent home from appointment. While he was changing, he called for us to come back in after he scratched his leg due to ecxema and popped a varicose vein. We held pressure with gauze to slow bleeding and wrapped with koban after bleeding stopped. Sent patient home and told him to follow up with his doctor about the itching.

## 2022-01-27 NOTE — MR AVS SNAPSHOT
[facility]  [address]  [place] [address]  Phone: [phone]  Fax: [phone]                  Robe Cevallos   2017 8:00 AM   Office Visit    Description:  Male : 1940   Provider:  Ad Jolley MD   Department:  [facility]           Reason for Visit     Post-op Evaluation           Diagnoses this Visit        Comments    Post-operative state    -  Primary     AMD (age-related macular degeneration), bilateral         Ocular myasthenia gravis         Refractive error                To Do List           Goals (5 Years of Data)     None      Follow-Up and Disposition     Return in about 6 months (around 2017) for F/U S/P CE OU.      [facility]     [facility] Line -  Assistance  Unless otherwise directed by your provider, please contact [facility], our nurse care line that is available for  assistance.     Registered nurses in the [facility] provide: appointment scheduling, clinical advisement, health education, and other advisory services.  Call: [phone] (toll free)               Medications           Message regarding Medications     Verify the changes and/or additions to your medication regime listed below are the same as discussed with your clinician today.  If any of these changes or additions are incorrect, please notify your healthcare provider.             Verify that the below list of medications is an accurate representation of the medications you are currently taking.  If none reported, the list may be blank. If incorrect, please contact your healthcare provider. Carry this list with you in case of emergency.           Current Medications     alfuzosin (UROXATRAL) 10 mg Tb24 Take 10 mg by mouth daily with breakfast.    amlodipine (NORVASC) 10 MG tablet Take 5 mg by mouth once daily.     pyridostigmine (MESTINON) 60 mg Tab Take 60 mg by mouth.           Clinical Reference Information  · Secondary to COVID 19 infection  · No improvement since yesterday   · Transitioned to midflow, added doxycycline due to elevated procalcitonin  · Increased decadron to 0 1 mg/kg BID  · Monitor           Allergies as of 5/19/2017     No Known Allergies      Immunizations Administered on Date of Encounter - 5/19/2017     None      [facility] Sign-Up     Activating your [facility] account is as easy as 1-2-3!     1) Visit [email or web address], select Sign Up Now, enter this activation code and your date of birth, then select Next.  [email or web address]  Expires: 7/3/2017 12:00 PM      2) Create a username and password to use when you visit [facility] in the future and select a security question in case you lose your password and select Next.    3) Enter your e-mail address and click Sign Up!    Additional Information  If you have questions, please e-mail [email or web address] or call [phone] to talk to our [facility] staff. Remember, [facility] is NOT to be used for urgent needs. For medical emergencies, dial 911.         Language Assistance Services     ATTENTION: Language assistance services are available, free of charge. Please call [phone].      ATENCIÓN: Si habla collin, tiene a elliott disposición servicios gratuitos de asistencia lingüística. Llame al [phone].     CHÚ Ý: N?u b?n nói Ti?ng Vi?t, có các d?ch v? h? tr? ngôn ng? mi?n phí dành cho b?n. G?i s? [phone].         [facility] complies with applicable Federal civil rights laws and does not discriminate on the basis of race, color, national origin, age, disability, or sex.

## 2022-01-28 ENCOUNTER — HOSPITAL ENCOUNTER (OUTPATIENT)
Dept: CARDIOLOGY | Facility: HOSPITAL | Age: 82
Discharge: HOME | End: 2022-01-28
Attending: INTERNAL MEDICINE
Payer: MEDICARE

## 2022-01-28 DIAGNOSIS — I25.10 CORONARY ARTERY DISEASE INVOLVING NATIVE CORONARY ARTERY OF NATIVE HEART WITHOUT ANGINA PECTORIS: ICD-10-CM

## 2022-01-28 LAB
ASCENDING AORTA: 4.04 CM
AV INDEX (PROSTH): 1.04
AV MEAN GRADIENT: 3 MMHG
AV PEAK GRADIENT: 5 MMHG
AV VALVE AREA: 3.63 CM2
AV VELOCITY RATIO: 0.95
CV ECHO LV RWT: 0.6 CM
CV STRESS BASE HR: 64 BPM
DIASTOLIC BLOOD PRESSURE: 88 MMHG
DOP CALC AO PEAK VEL: 1.08 M/S
DOP CALC AO VTI: 24.27 CM
DOP CALC LVOT AREA: 3.5 CM2
DOP CALC LVOT DIAMETER: 2.11 CM
DOP CALC LVOT PEAK VEL: 1.03 M/S
DOP CALC LVOT STROKE VOLUME: 88.07 CM3
DOP CALCLVOT PEAK VEL VTI: 25.2 CM
E WAVE DECELERATION TIME: 215.92 MSEC
E/A RATIO: 1.16
E/E' RATIO: 12.8 M/S
ECHO LV POSTERIOR WALL: 1.1 CM (ref 0.6–1.1)
EJECTION FRACTION: 60 %
FRACTIONAL SHORTENING: 38 % (ref 28–44)
INTERVENTRICULAR SEPTUM: 1.08 CM (ref 0.6–1.1)
IVRT: 131.49 MSEC
LA MAJOR: 4.77 CM
LA MINOR: 5.64 CM
LA WIDTH: 4.73 CM
LEFT ATRIUM SIZE: 4.13 CM
LEFT ATRIUM VOLUME: 85.82 CM3
LEFT INTERNAL DIMENSION IN SYSTOLE: 2.28 CM (ref 2.1–4)
LEFT VENTRICLE DIASTOLIC VOLUME: 57.41 ML
LEFT VENTRICLE SYSTOLIC VOLUME: 17.78 ML
LEFT VENTRICULAR INTERNAL DIMENSION IN DIASTOLE: 3.68 CM (ref 3.5–6)
LEFT VENTRICULAR MASS: 126.56 G
LV LATERAL E/E' RATIO: 13.71 M/S
LV SEPTAL E/E' RATIO: 12 M/S
MV PEAK A VEL: 0.83 M/S
MV PEAK E VEL: 0.96 M/S
MV STENOSIS PRESSURE HALF TIME: 62.62 MS
MV VALVE AREA P 1/2 METHOD: 3.51 CM2
OHS CV CPX 1 MINUTE RECOVERY HEART RATE: 109 BPM
OHS CV CPX 85 PERCENT MAX PREDICTED HEART RATE MALE: 116
OHS CV CPX ESTIMATED METS: 7
OHS CV CPX MAX PREDICTED HEART RATE: 137
OHS CV CPX PATIENT IS FEMALE: 0
OHS CV CPX PATIENT IS MALE: 1
OHS CV CPX PEAK DIASTOLIC BLOOD PRESSURE: 94 MMHG
OHS CV CPX PEAK HEAR RATE: 153 BPM
OHS CV CPX PEAK RATE PRESSURE PRODUCT: NORMAL
OHS CV CPX PEAK SYSTOLIC BLOOD PRESSURE: 206 MMHG
OHS CV CPX PERCENT MAX PREDICTED HEART RATE ACHIEVED: 112
OHS CV CPX RATE PRESSURE PRODUCT PRESENTING: NORMAL
PISA TR MAX VEL: 1.87 M/S
PULM VEIN S/D RATIO: 1.71
PV PEAK D VEL: 0.28 M/S
PV PEAK S VEL: 0.48 M/S
PV PEAK VELOCITY: 0.67 CM/S
RA MAJOR: 4.79 CM
RA PRESSURE: 8 MMHG
RA WIDTH: 3.32 CM
RIGHT VENTRICULAR END-DIASTOLIC DIMENSION: 3.17 CM
RV TISSUE DOPPLER FREE WALL SYSTOLIC VELOCITY 1 (APICAL 4 CHAMBER VIEW): 12.32 CM/S
SINUS: 4.49 CM
STJ: 3.48 CM
STRESS ANGINA INDEX: 0
STRESS ECHO POST EXERCISE DUR MIN: 6 MINUTES
STRESS ECHO POST EXERCISE DUR SEC: 5 SECONDS
SYSTOLIC BLOOD PRESSURE: 180 MMHG
TDI LATERAL: 0.07 M/S
TDI SEPTAL: 0.08 M/S
TDI: 0.08 M/S
TR MAX PG: 14 MMHG
TRICUSPID ANNULAR PLANE SYSTOLIC EXCURSION: 2.54 CM
TV REST PULMONARY ARTERY PRESSURE: 22 MMHG

## 2022-01-28 PROCEDURE — 93320 STRESS ECHO (CUPID ONLY): ICD-10-PCS | Mod: 26,,, | Performed by: INTERNAL MEDICINE

## 2022-01-28 PROCEDURE — 93325 DOPPLER ECHO COLOR FLOW MAPG: CPT

## 2022-01-28 PROCEDURE — 93325 STRESS ECHO (CUPID ONLY): ICD-10-PCS | Mod: 26,,, | Performed by: INTERNAL MEDICINE

## 2022-01-28 PROCEDURE — 93325 DOPPLER ECHO COLOR FLOW MAPG: CPT | Mod: 26,,, | Performed by: INTERNAL MEDICINE

## 2022-01-28 PROCEDURE — 93351 STRESS ECHO (CUPID ONLY): ICD-10-PCS | Mod: 26,,, | Performed by: INTERNAL MEDICINE

## 2022-01-28 PROCEDURE — 93320 DOPPLER ECHO COMPLETE: CPT | Mod: 26,,, | Performed by: INTERNAL MEDICINE

## 2022-01-28 PROCEDURE — 93351 STRESS TTE COMPLETE: CPT | Mod: 26,,, | Performed by: INTERNAL MEDICINE

## 2022-02-01 ENCOUNTER — OFFICE VISIT (OUTPATIENT)
Dept: CARDIOLOGY | Facility: CLINIC | Age: 82
End: 2022-02-01
Payer: MEDICARE

## 2022-02-01 VITALS
HEART RATE: 69 BPM | BODY MASS INDEX: 28 KG/M2 | OXYGEN SATURATION: 98 % | WEIGHT: 200 LBS | DIASTOLIC BLOOD PRESSURE: 84 MMHG | RESPIRATION RATE: 15 BRPM | SYSTOLIC BLOOD PRESSURE: 180 MMHG | HEIGHT: 71 IN

## 2022-02-01 DIAGNOSIS — Z82.49 FAMILY HISTORY OF ABDOMINAL AORTIC ANEURYSM (AAA): ICD-10-CM

## 2022-02-01 DIAGNOSIS — I87.2 VENOUS INSUFFICIENCY (CHRONIC) (PERIPHERAL): ICD-10-CM

## 2022-02-01 DIAGNOSIS — I77.810 AORTIC ROOT DILATATION: Primary | ICD-10-CM

## 2022-02-01 DIAGNOSIS — I70.0 AORTIC ATHEROSCLEROSIS: ICD-10-CM

## 2022-02-01 DIAGNOSIS — I25.10 CORONARY ARTERY DISEASE INVOLVING NATIVE CORONARY ARTERY OF NATIVE HEART WITHOUT ANGINA PECTORIS: ICD-10-CM

## 2022-02-01 PROCEDURE — 1159F PR MEDICATION LIST DOCUMENTED IN MEDICAL RECORD: ICD-10-PCS | Mod: CPTII,S$GLB,, | Performed by: INTERNAL MEDICINE

## 2022-02-01 PROCEDURE — 3079F PR MOST RECENT DIASTOLIC BLOOD PRESSURE 80-89 MM HG: ICD-10-PCS | Mod: CPTII,S$GLB,, | Performed by: INTERNAL MEDICINE

## 2022-02-01 PROCEDURE — 99214 PR OFFICE/OUTPT VISIT, EST, LEVL IV, 30-39 MIN: ICD-10-PCS | Mod: S$GLB,,, | Performed by: INTERNAL MEDICINE

## 2022-02-01 PROCEDURE — 99999 PR PBB SHADOW E&M-EST. PATIENT-LVL III: ICD-10-PCS | Mod: PBBFAC,,, | Performed by: INTERNAL MEDICINE

## 2022-02-01 PROCEDURE — 99999 PR PBB SHADOW E&M-EST. PATIENT-LVL III: CPT | Mod: PBBFAC,,, | Performed by: INTERNAL MEDICINE

## 2022-02-01 PROCEDURE — 1160F PR REVIEW ALL MEDS BY PRESCRIBER/CLIN PHARMACIST DOCUMENTED: ICD-10-PCS | Mod: CPTII,S$GLB,, | Performed by: INTERNAL MEDICINE

## 2022-02-01 PROCEDURE — 1126F PR PAIN SEVERITY QUANTIFIED, NO PAIN PRESENT: ICD-10-PCS | Mod: CPTII,S$GLB,, | Performed by: INTERNAL MEDICINE

## 2022-02-01 PROCEDURE — 3079F DIAST BP 80-89 MM HG: CPT | Mod: CPTII,S$GLB,, | Performed by: INTERNAL MEDICINE

## 2022-02-01 PROCEDURE — 3288F PR FALLS RISK ASSESSMENT DOCUMENTED: ICD-10-PCS | Mod: CPTII,S$GLB,, | Performed by: INTERNAL MEDICINE

## 2022-02-01 PROCEDURE — 1101F PT FALLS ASSESS-DOCD LE1/YR: CPT | Mod: CPTII,S$GLB,, | Performed by: INTERNAL MEDICINE

## 2022-02-01 PROCEDURE — 1126F AMNT PAIN NOTED NONE PRSNT: CPT | Mod: CPTII,S$GLB,, | Performed by: INTERNAL MEDICINE

## 2022-02-01 PROCEDURE — 1159F MED LIST DOCD IN RCRD: CPT | Mod: CPTII,S$GLB,, | Performed by: INTERNAL MEDICINE

## 2022-02-01 PROCEDURE — 3077F PR MOST RECENT SYSTOLIC BLOOD PRESSURE >= 140 MM HG: ICD-10-PCS | Mod: CPTII,S$GLB,, | Performed by: INTERNAL MEDICINE

## 2022-02-01 PROCEDURE — 99214 OFFICE O/P EST MOD 30 MIN: CPT | Mod: S$GLB,,, | Performed by: INTERNAL MEDICINE

## 2022-02-01 PROCEDURE — 3288F FALL RISK ASSESSMENT DOCD: CPT | Mod: CPTII,S$GLB,, | Performed by: INTERNAL MEDICINE

## 2022-02-01 PROCEDURE — 1160F RVW MEDS BY RX/DR IN RCRD: CPT | Mod: CPTII,S$GLB,, | Performed by: INTERNAL MEDICINE

## 2022-02-01 PROCEDURE — 3077F SYST BP >= 140 MM HG: CPT | Mod: CPTII,S$GLB,, | Performed by: INTERNAL MEDICINE

## 2022-02-01 PROCEDURE — 1101F PR PT FALLS ASSESS DOC 0-1 FALLS W/OUT INJ PAST YR: ICD-10-PCS | Mod: CPTII,S$GLB,, | Performed by: INTERNAL MEDICINE

## 2022-02-01 RX ORDER — ATORVASTATIN CALCIUM 20 MG/1
20 TABLET, FILM COATED ORAL NIGHTLY
Qty: 90 TABLET | Refills: 3 | Status: SHIPPED | OUTPATIENT
Start: 2022-02-01 | End: 2023-03-07

## 2022-02-01 RX ORDER — METOPROLOL SUCCINATE 25 MG/1
25 TABLET, EXTENDED RELEASE ORAL DAILY
Qty: 90 TABLET | Refills: 3 | Status: SHIPPED | OUTPATIENT
Start: 2022-02-01 | End: 2023-07-10

## 2022-02-01 NOTE — PROGRESS NOTES
CARDIOVASCULAR CONSULTATION    REASON FOR CONSULT:   Robe Cevallos is a 81 y.o. male who presents for LLE edema, CAD (presumed).    PCP: Barry  Onc: Rossi  HISTORY OF PRESENT ILLNESS:   The patient returns for follow-up, accompanied by his wife.  He denies intercurrent angina, dyspnea, palpitations, or syncope.  There has been no PND, orthopnea, melena, hematuria, or claudicant symptoms.    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  I offered him referral to vascular Medicine and he declined.  The patient tells me he is also no longer taking Cardura, and his blood pressure is noted to be uncontrolled today.    I reviewed the results of his cardiac testing.  Stress echo was normal.  There was significant aortic root dilatation at 4.5 cm. His aortic ultrasound did not reveal AAA.  His lower extremity venous ultrasound did not reveal DVT or axial venous insufficiency.    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 Jolley    CATARACT EXTRACTION W/  INTRAOCULAR LENS IMPLANT Left 04/20/2017    Dr. Jolley    CHOLECYSTECTOMY      EYE SURGERY      Rt cataract    KNEE ARTHROSCOPY      Lt knee    MEDIPORT REMOVAL N/A 4/17/2020    Procedure: REMOVAL, CATHETER, CENTRAL VENOUS, TUNNELED, WITH PORT;  Surgeon: Bandar Baker MD;  Location: [facility] CATH LAB;  Service: Radiology;  Laterality: N/A;   picc removal    TONSILLECTOMY      Tunneled PICC line Right 09/30/2019    Via IJ, 23cm  length       ALLERGIES AND MEDICATION:   Review of patient's allergies indicates:  No Known Allergies     Medication List          Accurate as of February 1, 2022  4:57 PM. If you have any questions, ask your nurse or doctor.            START taking these medications    metoprolol succinate 25 MG 24 hr tablet  Commonly known as: TOPROL-XL  Take 1 tablet (25 mg total) by mouth once daily.  Started by: Sampson Ireland MD        CONTINUE taking these medications    FLUZONE HIGHDOSE QUAD 20-21  mcg/0.7 mL Syrg  Generic drug: flu vacc [id number](65yr up)-PF     mometasone 0.1% 0.1 % cream  Commonly known as: ELOCON     pyridostigmine 60 mg Tab  Commonly known as: MESTINON        STOP taking these medications    doxazosin 4 MG tablet  Commonly known as: CARDURA  Stopped by: Sampson Ireland MD           Where to Get Your Medications      These medications were sent to [facility][id number] - [address] AT [address] [address]    Phone: [phone]   · metoprolol succinate 25 MG 24 hr tablet         SOCIAL HISTORY:     Social History     Socioeconomic History    Marital status:    Tobacco Use    Smoking status: Former Smoker    Smokeless tobacco: Never Used   Substance and Sexual Activity    Alcohol use: No    Drug use: Never    Sexual activity: Not Currently     Partners: Female       FAMILY HISTORY:     Family History   Problem Relation Age of Onset    No Known Problems Mother     Heart disease Father     No Known Problems Sister     No Known Problems Brother     No Known Problems Maternal Aunt     No Known Problems Maternal Uncle     No Known Problems Paternal Aunt     No Known Problems Paternal Uncle     No Known Problems Maternal Grandmother     No Known Problems Maternal Grandfather     No Known Problems Paternal Grandmother      "No Known Problems Paternal Grandfather     Amblyopia Neg Hx     Blindness Neg Hx     Cataracts Neg Hx     Glaucoma Neg Hx     Macular degeneration Neg Hx     Retinal detachment Neg Hx     Strabismus Neg Hx     Cancer Neg Hx     Diabetes Neg Hx     Hypertension Neg Hx     Stroke Neg Hx     Thyroid disease Neg Hx        REVIEW OF SYSTEMS:   Review of Systems   Constitutional: Negative for chills, diaphoresis and fever.   HENT: Negative for nosebleeds.    Eyes: Negative for blurred vision, double vision and photophobia.   Respiratory: Negative for hemoptysis, shortness of breath and wheezing.    Cardiovascular: Negative for chest pain, palpitations, orthopnea, claudication, leg swelling and PND.   Gastrointestinal: Negative for abdominal pain, blood in stool, heartburn, melena, nausea and vomiting.   Genitourinary: Negative for flank pain and hematuria.   Musculoskeletal: Negative for falls, myalgias and neck pain.   Skin: Negative for rash.   Neurological: Negative for dizziness, seizures, loss of consciousness, weakness and headaches.   Endo/Heme/Allergies: Negative for polydipsia. Does not bruise/bleed easily.   Psychiatric/Behavioral: Negative for depression and memory loss. The patient is not nervous/anxious.        PHYSICAL EXAM:     Vitals:    02/01/22 1530   BP: (!) 180/84   Pulse: 69   Resp: 15    Body mass index is 27.89 kg/m².  Weight: 90.7 kg (200 lb)   Height: 5' 11" (180.3 cm)     Physical Exam  Vitals reviewed.   Constitutional:       General: He is not in acute distress.     Appearance: Normal appearance. He is well-developed. He is not ill-appearing, toxic-appearing or diaphoretic.   HENT:      Head: Normocephalic and atraumatic.   Eyes:      General: No scleral icterus.     Extraocular Movements: Extraocular movements intact.      Conjunctiva/sclera: Conjunctivae normal.      Pupils: Pupils are equal, round, and reactive to light.   Neck:      Thyroid: No thyromegaly.      Vascular: Normal " carotid pulses. No carotid bruit or JVD.      Trachea: Trachea normal.   Cardiovascular:      Rate and Rhythm: Normal rate and regular rhythm.      Pulses:           Carotid pulses are 2+ on the right side and 2+ on the left side.     Heart sounds: S1 normal and S2 normal. No murmur heard.  No friction rub. No gallop.    Pulmonary:      Effort: Pulmonary effort is normal. No respiratory distress.      Breath sounds: Normal breath sounds. No stridor. No wheezing, rhonchi or rales.   Chest:      Chest wall: No tenderness.   Abdominal:      General: There is no distension.      Palpations: Abdomen is soft.   Musculoskeletal:         General: No swelling or tenderness. Normal range of motion.      Cervical back: Normal range of motion and neck supple. No edema or rigidity.      Right lower leg: No edema.      Left lower leg: No edema.   Feet:      Right foot:      Skin integrity: No ulcer.      Left foot:      Skin integrity: No ulcer.   Skin:     General: Skin is warm and dry.      Coloration: Skin is not jaundiced.      Findings: No erythema or rash.      Comments: Stigmata of CVI bilat LE    Neurological:      General: No focal deficit present.      Mental Status: He is alert and oriented to person, place, and time.      Cranial Nerves: No cranial nerve deficit.   Psychiatric:         Mood and Affect: Mood normal.         Speech: Speech normal.         Behavior: Behavior normal. Behavior is cooperative.         DATA:   EKG: (personally reviewed tracing)  12/15/21 SR 67    Laboratory:  CBC:  Recent Labs   Lab 02/19/21  0812 05/21/21  0813 08/25/21  0928   WBC 7.51 6.24 6.14   Hemoglobin 14.8 14.5 14.2   Hematocrit 46.5 44.7 43.3   Platelets 144 L 153 151       CHEMISTRIES:  Recent Labs   Lab 10/03/19  0400 10/03/19  0400 10/04/19  0330 10/04/19  0330 10/05/19  0412 10/06/19  0229 02/19/21  0812 05/21/21  0813 08/25/21  0928   Glucose 96   < > 103   < > 99   < > 108 129 H 91   Sodium 135 L   < > 134 L   < > 132 L   < >  141 139 140   Potassium 4.3   < > 4.5   < > 4.1   < > 4.7 4.1 4.5   BUN 18   < > 21   < > 17   < > 17 17 16   Creatinine 0.8   < > 0.9   < > 0.8   < > 1.4 1.4 1.2   eGFR if African American >60.0   < > >60   < > >60   < > 53 A 53 A >60   eGFR if non  >60.0   < > >60   < > >60   < > 46 A 46 A 56 A   Calcium 8.4 L   < > 9.0   < > 8.4 L   < > 9.1 9.6 9.5   Magnesium 2.1  --  2.3  --  2.2  --   --   --   --     < > = values in this interval not displayed.       CARDIAC BIOMARKERS:  Recent Labs   Lab 10/06/19  0230 10/06/19  0812 10/07/19  0549   Troponin I 0.033 H 0.011 <0.006       COAGS:  Recent Labs   Lab 09/30/19  0900 10/04/19  0330 10/07/19  0549   INR 1.1 1.1 1.1       LIPIDS/LFTS:  Recent Labs   Lab 02/19/21  0812 05/21/21  0813 08/25/21  0928 01/26/22  0916   Cholesterol  --   --   --  162   Triglycerides  --   --   --  107   HDL  --   --   --  43   LDL Cholesterol  --   --   --  97.6   Non-HDL Cholesterol  --   --   --  119   AST 17 16 15  --    ALT 24 16 14  --          Cardiovascular Testing:  Ex stress echo 1/28/22  · The left ventricle is normal in size with mild concentric hypertrophy and normal systolic function.  · The estimated ejection fraction is 60%.  · Normal right ventricular size with normal right ventricular systolic function.  · The sinuses of Valsalva is moderately dilated, 4.5cm.  · The ECG portion of this study is negative for myocardial ischemia (Hiren 6:05, 7 METS, 111% MPHR, +/94).  · The stress echo portion of this study is negative for myocardial ischemia.    Aortic US 1/26/22  No evidence of AAA.  Aortic atherosclerosis.    LE venous US/reflux 1/26/22  No evidence of lower extremity DVT venous reflux bilaterally.      Carotid US 10/7/19  No sonographic evidence of hemodynamically significant stenosis of the bilateral extracranial internal carotid arteries.        ASSESSMENT:   # episodic LLE edema, no CVI noted on US 1/2022, but had ruptured varix in 1/2022.   Declines vasc med referral.  # CAD, presumed (cor Ca++ on CT 10/1/21).  CARMELLA 1/2022 neg.  # HTN, off cardura, uncontrolled  # FH AAA.  Neg aortic US 1/2022 (but Ao root dil noted on echo 1/2022)  # CKD3a  # ao root dil, 3.7->4.5cm (echo 10/2019->1/2022)  # aortic atherosclerosis (CT 10/2/21)    PLAN:   Cont med rx  Start toprol XL 50mg qd  Start Atorva 20mg qhs  Pt's wife (RN) will monitor BP at home and return if >140/90  RTC 6 months with lipids/LFT and surveillance echo (Aug 2022)    Sampson Ireland MD, FACC

## 2022-02-08 ENCOUNTER — OFFICE VISIT (OUTPATIENT)
Dept: OPTOMETRY | Facility: CLINIC | Age: 82
End: 2022-02-08
Payer: MEDICARE

## 2022-02-08 DIAGNOSIS — H35.3212 EXUDATIVE AGE-RELATED MACULAR DEGENERATION OF RIGHT EYE WITH INACTIVE CHOROIDAL NEOVASCULARIZATION: Primary | ICD-10-CM

## 2022-02-08 DIAGNOSIS — H35.3122 INTERMEDIATE STAGE NONEXUDATIVE AGE-RELATED MACULAR DEGENERATION OF LEFT EYE: ICD-10-CM

## 2022-02-08 DIAGNOSIS — H50.21 HYPERTROPIA OF RIGHT EYE: ICD-10-CM

## 2022-02-08 DIAGNOSIS — Z96.1 PSEUDOPHAKIA: ICD-10-CM

## 2022-02-08 DIAGNOSIS — H52.7 REFRACTIVE ERROR: ICD-10-CM

## 2022-02-08 PROCEDURE — 92134 POSTERIOR SEGMENT OCT RETINA (OCULAR COHERENCE TOMOGRAPHY)-BOTH EYES: ICD-10-PCS | Mod: S$GLB,,, | Performed by: OPTOMETRIST

## 2022-02-08 PROCEDURE — 92012 PR EYE EXAM, EST PATIENT,INTERMED: ICD-10-PCS | Mod: S$GLB,,, | Performed by: OPTOMETRIST

## 2022-02-08 PROCEDURE — 92012 INTRM OPH EXAM EST PATIENT: CPT | Mod: S$GLB,,, | Performed by: OPTOMETRIST

## 2022-02-08 PROCEDURE — 1101F PR PT FALLS ASSESS DOC 0-1 FALLS W/OUT INJ PAST YR: ICD-10-PCS | Mod: CPTII,S$GLB,, | Performed by: OPTOMETRIST

## 2022-02-08 PROCEDURE — 1160F PR REVIEW ALL MEDS BY PRESCRIBER/CLIN PHARMACIST DOCUMENTED: ICD-10-PCS | Mod: CPTII,S$GLB,, | Performed by: OPTOMETRIST

## 2022-02-08 PROCEDURE — 92134 CPTRZ OPH DX IMG PST SGM RTA: CPT | Mod: S$GLB,,, | Performed by: OPTOMETRIST

## 2022-02-08 PROCEDURE — 1159F PR MEDICATION LIST DOCUMENTED IN MEDICAL RECORD: ICD-10-PCS | Mod: CPTII,S$GLB,, | Performed by: OPTOMETRIST

## 2022-02-08 PROCEDURE — 1126F PR PAIN SEVERITY QUANTIFIED, NO PAIN PRESENT: ICD-10-PCS | Mod: CPTII,S$GLB,, | Performed by: OPTOMETRIST

## 2022-02-08 PROCEDURE — 1101F PT FALLS ASSESS-DOCD LE1/YR: CPT | Mod: CPTII,S$GLB,, | Performed by: OPTOMETRIST

## 2022-02-08 PROCEDURE — 3288F FALL RISK ASSESSMENT DOCD: CPT | Mod: CPTII,S$GLB,, | Performed by: OPTOMETRIST

## 2022-02-08 PROCEDURE — 3288F PR FALLS RISK ASSESSMENT DOCUMENTED: ICD-10-PCS | Mod: CPTII,S$GLB,, | Performed by: OPTOMETRIST

## 2022-02-08 PROCEDURE — 1126F AMNT PAIN NOTED NONE PRSNT: CPT | Mod: CPTII,S$GLB,, | Performed by: OPTOMETRIST

## 2022-02-08 PROCEDURE — 1159F MED LIST DOCD IN RCRD: CPT | Mod: CPTII,S$GLB,, | Performed by: OPTOMETRIST

## 2022-02-08 PROCEDURE — 99999 PR PBB SHADOW E&M-EST. PATIENT-LVL III: CPT | Mod: PBBFAC,,, | Performed by: OPTOMETRIST

## 2022-02-08 PROCEDURE — 99999 PR PBB SHADOW E&M-EST. PATIENT-LVL III: ICD-10-PCS | Mod: PBBFAC,,, | Performed by: OPTOMETRIST

## 2022-02-08 PROCEDURE — 1160F RVW MEDS BY RX/DR IN RCRD: CPT | Mod: CPTII,S$GLB,, | Performed by: OPTOMETRIST

## 2022-02-08 RX ORDER — PYRIDOSTIGMINE BROMIDE 60 MG/1
60 TABLET ORAL 3 TIMES DAILY PRN
COMMUNITY
Start: 2022-01-24

## 2022-02-08 NOTE — PROGRESS NOTES
Subjective:       Patient ID: Robe Cevallos is a 81 y.o. male      Chief Complaint   Patient presents with    Concerns About Ocular Health    Macular Degeneration     History of Present Illness  Dls: 1/19/21 Dr. Garza ; 9/27/21 Dr. Kapoor     82 y/o male presents today for ocular health check.  Pt c/o blurry vision at distance and near ou.   Pt wears trifocal glasses.     No tearing  No itching  No burning   No pain  No ha's  + floaters  No flashes    Eye meds  Otc gtts ou prn        Assessment/Plan:     1. 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 OCT today with no SRF. Pt scheduled to see Dr. Kapoor next month.   - Posterior Segment OCT Retina-Both eyes    3. [place]  Well-centered. Clear.     4. Hypertropia of right eye  5. Refractive error  Prism Rx.     Eyeglass Final Rx     Eyeglass Final Rx       Sphere Cylinder Axis Add Vert Prism    Right -0.75 +1.50 170 +2.50 1.0 down    Left -0.25 +1.75 170 +2.50     Expiration Date: 2/9/2023                  Follow up in about 7 weeks (around 3/30/2022) for retina as scheduled.

## 2022-02-22 DIAGNOSIS — D84.9 IMMUNOSUPPRESSED STATUS: ICD-10-CM

## 2022-03-30 ENCOUNTER — OFFICE VISIT (OUTPATIENT)
Dept: OPHTHALMOLOGY | Facility: CLINIC | Age: 82
End: 2022-03-30
Attending: OPHTHALMOLOGY
Payer: MEDICARE

## 2022-03-30 DIAGNOSIS — H31.001 CHORIORETINAL SCAR, RIGHT: ICD-10-CM

## 2022-03-30 DIAGNOSIS — H35.3123 NONEXUDATIVE AGE-RELATED MACULAR DEGENERATION, LEFT EYE, ADVANCED ATROPHIC WITHOUT SUBFOVEAL INVOLVEMENT: ICD-10-CM

## 2022-03-30 DIAGNOSIS — H35.3212 EXUDATIVE AGE-RELATED MACULAR DEGENERATION OF RIGHT EYE WITH INACTIVE CHOROIDAL NEOVASCULARIZATION: Primary | ICD-10-CM

## 2022-03-30 PROCEDURE — 99999 PR PBB SHADOW E&M-EST. PATIENT-LVL III: ICD-10-PCS | Mod: PBBFAC,,, | Performed by: OPHTHALMOLOGY

## 2022-03-30 PROCEDURE — 1159F PR MEDICATION LIST DOCUMENTED IN MEDICAL RECORD: ICD-10-PCS | Mod: CPTII,S$GLB,, | Performed by: OPHTHALMOLOGY

## 2022-03-30 PROCEDURE — 92134 CPTRZ OPH DX IMG PST SGM RTA: CPT | Mod: S$GLB,,, | Performed by: OPHTHALMOLOGY

## 2022-03-30 PROCEDURE — 92134 POSTERIOR SEGMENT OCT RETINA (OCULAR COHERENCE TOMOGRAPHY)-BOTH EYES: ICD-10-PCS | Mod: S$GLB,,, | Performed by: OPHTHALMOLOGY

## 2022-03-30 PROCEDURE — 92014 COMPRE OPH EXAM EST PT 1/>: CPT | Mod: S$GLB,,, | Performed by: OPHTHALMOLOGY

## 2022-03-30 PROCEDURE — 1160F PR REVIEW ALL MEDS BY PRESCRIBER/CLIN PHARMACIST DOCUMENTED: ICD-10-PCS | Mod: CPTII,S$GLB,, | Performed by: OPHTHALMOLOGY

## 2022-03-30 PROCEDURE — 3288F FALL RISK ASSESSMENT DOCD: CPT | Mod: CPTII,S$GLB,, | Performed by: OPHTHALMOLOGY

## 2022-03-30 PROCEDURE — 3288F PR FALLS RISK ASSESSMENT DOCUMENTED: ICD-10-PCS | Mod: CPTII,S$GLB,, | Performed by: OPHTHALMOLOGY

## 2022-03-30 PROCEDURE — 1159F MED LIST DOCD IN RCRD: CPT | Mod: CPTII,S$GLB,, | Performed by: OPHTHALMOLOGY

## 2022-03-30 PROCEDURE — 1126F AMNT PAIN NOTED NONE PRSNT: CPT | Mod: CPTII,S$GLB,, | Performed by: OPHTHALMOLOGY

## 2022-03-30 PROCEDURE — 92014 PR EYE EXAM, EST PATIENT,COMPREHESV: ICD-10-PCS | Mod: S$GLB,,, | Performed by: OPHTHALMOLOGY

## 2022-03-30 PROCEDURE — 1101F PT FALLS ASSESS-DOCD LE1/YR: CPT | Mod: CPTII,S$GLB,, | Performed by: OPHTHALMOLOGY

## 2022-03-30 PROCEDURE — 1101F PR PT FALLS ASSESS DOC 0-1 FALLS W/OUT INJ PAST YR: ICD-10-PCS | Mod: CPTII,S$GLB,, | Performed by: OPHTHALMOLOGY

## 2022-03-30 PROCEDURE — 99999 PR PBB SHADOW E&M-EST. PATIENT-LVL III: CPT | Mod: PBBFAC,,, | Performed by: OPHTHALMOLOGY

## 2022-03-30 PROCEDURE — 1160F RVW MEDS BY RX/DR IN RCRD: CPT | Mod: CPTII,S$GLB,, | Performed by: OPHTHALMOLOGY

## 2022-03-30 PROCEDURE — 1126F PR PAIN SEVERITY QUANTIFIED, NO PAIN PRESENT: ICD-10-PCS | Mod: CPTII,S$GLB,, | Performed by: OPHTHALMOLOGY

## 2022-03-30 RX ORDER — TAMSULOSIN HYDROCHLORIDE 0.4 MG/1
1 CAPSULE ORAL NIGHTLY
COMMUNITY
Start: 2022-02-14

## 2022-03-30 RX ORDER — AMOXICILLIN 500 MG/1
500 CAPSULE ORAL EVERY 6 HOURS
COMMUNITY
Start: 2022-03-23 | End: 2022-04-03

## 2022-03-30 NOTE — PROGRESS NOTES
Subjective:       Patient ID: Robe Cevallos is a 82 y.o. male      Chief Complaint   Patient presents with    Macular Degeneration     History of Present Illness  HPI     DLS: 9/27/21    Pt here for 6 month follow up and states no changes since last exam  Very happy with current Va.  No f/f/pain OU    1. Wet ARMD with inactive CNV OD    2. Dry ARMD OS    3. CRS OD    4, Dry Eyes    5. Blepharitis     MEDS:  AT's PRN OU    Last edited by Charles Kapoor MD on 3/30/2022  5:42 PM. (History)        Imaging:    See report    Assessment/Plan:     1. 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  No reactivation  Observe  - Posterior Segment OCT Retina-Both eyes    2. Nonexudative age-related macular degeneration, left eye, advanced atrophic without subfoveal involvement  Discussed Dry and Wet AMD in detail  Recommend continue AREDS 2 Vitamins  Home Amsler Grid Testing  RTC immediately PRN any changes in vision      3. Chorioretinal scar, right  Stable.  Observe      Follow up in about 6 months (around 9/30/2022), or if symptoms worsen or fail to improve, for Comprehensive Examination, OCT Mac.

## 2022-08-22 ENCOUNTER — HOSPITAL ENCOUNTER (OUTPATIENT)
Dept: CARDIOLOGY | Facility: HOSPITAL | Age: 82
Discharge: HOME | End: 2022-08-22
Attending: INTERNAL MEDICINE
Payer: MEDICARE

## 2022-08-22 DIAGNOSIS — I77.810 AORTIC ROOT DILATATION: ICD-10-CM

## 2022-08-22 DIAGNOSIS — I70.0 AORTIC ATHEROSCLEROSIS: ICD-10-CM

## 2022-08-22 LAB
AORTIC ROOT ANNULUS: 4.1 CM
AORTIC VALVE CUSP SEPERATION: 2.19 CM
ASCENDING AORTA: 4.01 CM
AV INDEX (PROSTH): 0.96
AV MEAN GRADIENT: 3 MMHG
AV PEAK GRADIENT: 5 MMHG
AV VALVE AREA: 4.02 CM2
AV VELOCITY RATIO: 0.81
CV ECHO LV RWT: 0.61 CM
DOP CALC AO PEAK VEL: 1.08 M/S
DOP CALC AO VTI: 23.73 CM
DOP CALC LVOT AREA: 4.2 CM2
DOP CALC LVOT DIAMETER: 2.31 CM
DOP CALC LVOT PEAK VEL: 0.87 M/S
DOP CALC LVOT STROKE VOLUME: 95.46 CM3
DOP CALCLVOT PEAK VEL VTI: 22.79 CM
E WAVE DECELERATION TIME: 207.8 MSEC
E/A RATIO: 0.75
E/E' RATIO: 10.67 M/S
ECHO LV POSTERIOR WALL: 1.13 CM (ref 0.6–1.1)
EJECTION FRACTION: 60 %
FRACTIONAL SHORTENING: 32 % (ref 28–44)
INTERVENTRICULAR SEPTUM: 1.53 CM (ref 0.6–1.1)
LA MAJOR: 4.62 CM
LA MINOR: 4.31 CM
LA WIDTH: 3.91 CM
LEFT ATRIUM SIZE: 3.99 CM
LEFT ATRIUM VOLUME: 59.14 CM3
LEFT INTERNAL DIMENSION IN SYSTOLE: 2.52 CM (ref 2.1–4)
LEFT VENTRICLE DIASTOLIC VOLUME: 59.4 ML
LEFT VENTRICLE SYSTOLIC VOLUME: 22.85 ML
LEFT VENTRICULAR INTERNAL DIMENSION IN DIASTOLE: 3.73 CM (ref 3.5–6)
LEFT VENTRICULAR MASS: 174.51 G
LV LATERAL E/E' RATIO: 10 M/S
LV SEPTAL E/E' RATIO: 11.43 M/S
MV PEAK A VEL: 1.06 M/S
MV PEAK E VEL: 0.8 M/S
MV STENOSIS PRESSURE HALF TIME: 88.52 MS
MV VALVE AREA P 1/2 METHOD: 2.49 CM2
PISA TR MAX VEL: 2.16 M/S
PV PEAK VELOCITY: 0.85 CM/S
RA MAJOR: 4.47 CM
RA PRESSURE: 3 MMHG
RA WIDTH: 2.86 CM
RIGHT VENTRICULAR END-DIASTOLIC DIMENSION: 3.32 CM
SINUS: 4.48 CM
STJ: 3.35 CM
TDI LATERAL: 0.08 M/S
TDI SEPTAL: 0.07 M/S
TDI: 0.08 M/S
TR MAX PG: 19 MMHG
TV REST PULMONARY ARTERY PRESSURE: 22 MMHG

## 2022-08-22 PROCEDURE — 93306 TTE W/DOPPLER COMPLETE: CPT

## 2022-08-22 PROCEDURE — 93306 ECHO (CUPID ONLY): ICD-10-PCS | Mod: 26,,, | Performed by: INTERNAL MEDICINE

## 2022-08-22 PROCEDURE — 93306 TTE W/DOPPLER COMPLETE: CPT | Mod: 26,,, | Performed by: INTERNAL MEDICINE

## 2022-08-31 ENCOUNTER — OFFICE VISIT (OUTPATIENT)
Dept: CARDIOLOGY | Facility: CLINIC | Age: 82
End: 2022-08-31
Payer: MEDICARE

## 2022-08-31 VITALS
RESPIRATION RATE: 18 BRPM | DIASTOLIC BLOOD PRESSURE: 68 MMHG | HEART RATE: 84 BPM | SYSTOLIC BLOOD PRESSURE: 120 MMHG | HEIGHT: 71 IN | OXYGEN SATURATION: 98 % | WEIGHT: 204.13 LBS | BODY MASS INDEX: 28.58 KG/M2

## 2022-08-31 DIAGNOSIS — I10 ESSENTIAL HYPERTENSION: ICD-10-CM

## 2022-08-31 DIAGNOSIS — I87.2 VENOUS INSUFFICIENCY (CHRONIC) (PERIPHERAL): ICD-10-CM

## 2022-08-31 DIAGNOSIS — I77.810 AORTIC ROOT DILATATION: Primary | ICD-10-CM

## 2022-08-31 DIAGNOSIS — I70.0 AORTIC ATHEROSCLEROSIS: ICD-10-CM

## 2022-08-31 DIAGNOSIS — Z82.49 FAMILY HISTORY OF ABDOMINAL AORTIC ANEURYSM (AAA): ICD-10-CM

## 2022-08-31 DIAGNOSIS — I25.10 CORONARY ARTERY DISEASE INVOLVING NATIVE CORONARY ARTERY OF NATIVE HEART WITHOUT ANGINA PECTORIS: ICD-10-CM

## 2022-08-31 PROBLEM — I26.99 PULMONARY EMBOLUS: Status: RESOLVED | Noted: 2019-10-04 | Resolved: 2022-08-31

## 2022-08-31 PROBLEM — I26.99 PULMONARY EMBOLISM WITHOUT ACUTE COR PULMONALE: Status: RESOLVED | Noted: 2020-08-19 | Resolved: 2022-08-31

## 2022-08-31 PROCEDURE — 3074F SYST BP LT 130 MM HG: CPT | Mod: CPTII,S$GLB,, | Performed by: INTERNAL MEDICINE

## 2022-08-31 PROCEDURE — 3078F PR MOST RECENT DIASTOLIC BLOOD PRESSURE < 80 MM HG: ICD-10-PCS | Mod: CPTII,S$GLB,, | Performed by: INTERNAL MEDICINE

## 2022-08-31 PROCEDURE — 1160F RVW MEDS BY RX/DR IN RCRD: CPT | Mod: CPTII,S$GLB,, | Performed by: INTERNAL MEDICINE

## 2022-08-31 PROCEDURE — 99999 PR PBB SHADOW E&M-EST. PATIENT-LVL III: CPT | Mod: PBBFAC,,, | Performed by: INTERNAL MEDICINE

## 2022-08-31 PROCEDURE — 3288F PR FALLS RISK ASSESSMENT DOCUMENTED: ICD-10-PCS | Mod: CPTII,S$GLB,, | Performed by: INTERNAL MEDICINE

## 2022-08-31 PROCEDURE — 93000 ELECTROCARDIOGRAM COMPLETE: CPT | Mod: S$GLB,,, | Performed by: INTERNAL MEDICINE

## 2022-08-31 PROCEDURE — 1159F PR MEDICATION LIST DOCUMENTED IN MEDICAL RECORD: ICD-10-PCS | Mod: CPTII,S$GLB,, | Performed by: INTERNAL MEDICINE

## 2022-08-31 PROCEDURE — 99999 PR PBB SHADOW E&M-EST. PATIENT-LVL III: ICD-10-PCS | Mod: PBBFAC,,, | Performed by: INTERNAL MEDICINE

## 2022-08-31 PROCEDURE — 99214 OFFICE O/P EST MOD 30 MIN: CPT | Mod: S$GLB,,, | Performed by: INTERNAL MEDICINE

## 2022-08-31 PROCEDURE — 3074F PR MOST RECENT SYSTOLIC BLOOD PRESSURE < 130 MM HG: ICD-10-PCS | Mod: CPTII,S$GLB,, | Performed by: INTERNAL MEDICINE

## 2022-08-31 PROCEDURE — 3288F FALL RISK ASSESSMENT DOCD: CPT | Mod: CPTII,S$GLB,, | Performed by: INTERNAL MEDICINE

## 2022-08-31 PROCEDURE — 99214 PR OFFICE/OUTPT VISIT, EST, LEVL IV, 30-39 MIN: ICD-10-PCS | Mod: S$GLB,,, | Performed by: INTERNAL MEDICINE

## 2022-08-31 PROCEDURE — 1160F PR REVIEW ALL MEDS BY PRESCRIBER/CLIN PHARMACIST DOCUMENTED: ICD-10-PCS | Mod: CPTII,S$GLB,, | Performed by: INTERNAL MEDICINE

## 2022-08-31 PROCEDURE — 1101F PR PT FALLS ASSESS DOC 0-1 FALLS W/OUT INJ PAST YR: ICD-10-PCS | Mod: CPTII,S$GLB,, | Performed by: INTERNAL MEDICINE

## 2022-08-31 PROCEDURE — 1101F PT FALLS ASSESS-DOCD LE1/YR: CPT | Mod: CPTII,S$GLB,, | Performed by: INTERNAL MEDICINE

## 2022-08-31 PROCEDURE — 3078F DIAST BP <80 MM HG: CPT | Mod: CPTII,S$GLB,, | Performed by: INTERNAL MEDICINE

## 2022-08-31 PROCEDURE — 1159F MED LIST DOCD IN RCRD: CPT | Mod: CPTII,S$GLB,, | Performed by: INTERNAL MEDICINE

## 2022-08-31 PROCEDURE — 93000 EKG 12-LEAD: ICD-10-PCS | Mod: S$GLB,,, | Performed by: INTERNAL MEDICINE

## 2022-08-31 NOTE — PROGRESS NOTES
CARDIOVASCULAR CONSULTATION    REASON FOR CONSULT:   Robe Cevallos is a 82 y.o. male who presents for LLE edema, CAD (presumed).    PCP: Barry  Onc: Rossi  HISTORY OF PRESENT ILLNESS:   Patient returns for follow-up.  He reports generally stable status without angina, dyspnea, palpitations, or syncope.  There has been no PND, orthopnea, or lower extremity edema.  He denies melena, hematuria, or claudicant symptoms.    I reviewed the results of his echocardiogram noting stable aortic root dilatation.  His EKG notes inferior Q-waves.  He recently had a negative stress echocardiogram reports no intercurrent symptoms.  I do not plan repeat imaging at this time.    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 Jolley    CATARACT EXTRACTION W/  INTRAOCULAR LENS IMPLANT Left 04/20/2017    Dr. Jolley    CHOLECYSTECTOMY      EYE SURGERY      Rt cataract    KNEE ARTHROSCOPY      Lt knee    MEDIPORT REMOVAL N/A 4/17/2020    Procedure: REMOVAL, CATHETER, CENTRAL VENOUS, TUNNELED, WITH PORT;  Surgeon: Bandar Baker MD;  Location: [facility] CATH LAB;  Service: Radiology;  Laterality: N/A;  picc removal    TONSILLECTOMY      Tunneled PICC line Right 09/30/2019    Via IJ, 23cm  length       ALLERGIES AND MEDICATION:   Review of patient's allergies indicates:  No Known Allergies     Medication List            Accurate as of August 31, 2022  2:10 PM. If you have any questions, ask  your nurse or doctor.                CONTINUE taking these medications      atorvastatin 20 MG tablet  Commonly known as: LIPITOR  Take 1 tablet (20 mg total) by mouth every evening.     metoprolol succinate 25 MG 24 hr tablet  Commonly known as: TOPROL-XL  Take 1 tablet (25 mg total) by mouth once daily.     pyridostigmine 60 mg Tab  Commonly known as: MESTINON     tamsulosin 0.4 mg Cap  Commonly known as: FLOMAX              SOCIAL HISTORY:     Social History     Socioeconomic History    Marital status:    Tobacco Use    Smoking status: Former    Smokeless tobacco: Never   Substance and Sexual Activity    Alcohol use: No    Drug use: Never    Sexual activity: Not Currently     Partners: Female       FAMILY HISTORY:     Family History   Problem Relation Age of Onset    No Known Problems Mother     Heart disease Father     No Known Problems Sister     No Known Problems Brother     No Known Problems Maternal Aunt     No Known Problems Maternal Uncle     No Known Problems Paternal Aunt     No Known Problems Paternal Uncle     No Known Problems Maternal Grandmother     No Known Problems Maternal Grandfather     No Known Problems Paternal Grandmother     No Known Problems Paternal Grandfather     Amblyopia Neg Hx     Blindness Neg Hx     Cataracts Neg Hx     Glaucoma Neg Hx     Macular degeneration Neg Hx     Retinal detachment Neg Hx     Strabismus Neg Hx     Cancer Neg Hx     Diabetes Neg Hx     Hypertension Neg Hx     Stroke Neg Hx     Thyroid disease Neg Hx        REVIEW OF SYSTEMS:   Review of Systems   Constitutional:  Negative for chills, diaphoresis and fever.   HENT:  Negative for nosebleeds.    Eyes:  Negative for blurred vision, double vision and photophobia.   Respiratory:  Negative for hemoptysis, shortness of breath and wheezing.    Cardiovascular:  Negative for chest pain, palpitations, orthopnea, claudication, leg swelling and PND.   Gastrointestinal:  Negative for abdominal pain, blood in stool,  "heartburn, melena, nausea and vomiting.   Genitourinary:  Negative for flank pain and hematuria.   Musculoskeletal:  Negative for falls, myalgias and neck pain.   Skin:  Negative for rash.   Neurological:  Negative for dizziness, seizures, loss of consciousness, weakness and headaches.   Endo/Heme/Allergies:  Negative for polydipsia. Does not bruise/bleed easily.   Psychiatric/Behavioral:  Negative for depression and memory loss. The patient is not nervous/anxious.      PHYSICAL EXAM:     Vitals:    08/31/22 1349   BP: 120/68   Pulse: 84   Resp: 18    Body mass index is 28.47 kg/m².  Weight: 92.6 kg (204 lb 2.3 oz)   Height: 5' 11" (180.3 cm)     Physical Exam  Vitals reviewed.   Constitutional:       General: He is not in acute distress.     Appearance: Normal appearance. He is well-developed. He is not ill-appearing, toxic-appearing or diaphoretic.   HENT:      Head: Normocephalic and atraumatic.   Eyes:      General: No scleral icterus.     Extraocular Movements: Extraocular movements intact.      Conjunctiva/sclera: Conjunctivae normal.      Pupils: Pupils are equal, round, and reactive to light.   Neck:      Thyroid: No thyromegaly.      Vascular: Normal carotid pulses. No carotid bruit or JVD.      Trachea: Trachea normal.   Cardiovascular:      Rate and Rhythm: Normal rate and regular rhythm.      Pulses:           Carotid pulses are 2+ on the right side and 2+ on the left side.     Heart sounds: S1 normal and S2 normal. No murmur heard.    No friction rub. No gallop.   Pulmonary:      Effort: Pulmonary effort is normal. No respiratory distress.      Breath sounds: Normal breath sounds. No stridor. No wheezing, rhonchi or rales.   Chest:      Chest wall: No tenderness.   Abdominal:      General: There is no distension.      Palpations: Abdomen is soft.   Musculoskeletal:         General: No swelling or tenderness. Normal range of motion.      Cervical back: Normal range of motion and neck supple. No edema or " rigidity.      Right lower leg: No edema.      Left lower leg: No edema.   Feet:      Right foot:      Skin integrity: No ulcer.      Left foot:      Skin integrity: No ulcer.   Skin:     General: Skin is warm and dry.      Coloration: Skin is not jaundiced.      Comments: Stigmata of CVI bilat LE    Neurological:      General: No focal deficit present.      Mental Status: He is alert and oriented to person, place, and time.      Cranial Nerves: No cranial nerve deficit.   Psychiatric:         Mood and Affect: Mood normal.         Speech: Speech normal.         Behavior: Behavior normal. Behavior is cooperative.       DATA:   EKG: (personally reviewed tracing)  8/31/22 SR 79, IRBBB, IMI-age indet    Laboratory:  CBC:  Recent Labs   Lab 02/19/21  0812 05/21/21  0813 08/25/21  0928   WBC 7.51 6.24 6.14   Hemoglobin 14.8 14.5 14.2   Hematocrit 46.5 44.7 43.3   Platelets 144 L 153 151         CHEMISTRIES:  Recent Labs   Lab 10/03/19  0400 10/04/19  0330 10/05/19  0412 10/06/19  0229 02/19/21  0812 05/21/21  0813 08/25/21  0928   Glucose 96 103 99   < > 108 129 H 91   Sodium 135 L 134 L 132 L   < > 141 139 140   Potassium 4.3 4.5 4.1   < > 4.7 4.1 4.5   BUN 18 21 17   < > 17 17 16   Creatinine 0.8 0.9 0.8   < > 1.4 1.4 1.2   eGFR if African American >60.0 >60 >60   < > 53 A 53 A >60   eGFR if non African American >60.0 >60 >60   < > 46 A 46 A 56 A   Calcium 8.4 L 9.0 8.4 L   < > 9.1 9.6 9.5   Magnesium 2.1 2.3 2.2  --   --   --   --     < > = values in this interval not displayed.         CARDIAC BIOMARKERS:  Recent Labs   Lab 10/06/19  0230 10/06/19  0812 10/07/19  0549   Troponin I 0.033 H 0.011 <0.006         COAGS:  Recent Labs   Lab 09/30/19  0900 10/04/19  0330 10/07/19  0549   INR 1.1 1.1 1.1         LIPIDS/LFTS:  Recent Labs   Lab 05/21/21  0813 08/25/21  0928 01/26/22  0916 08/22/22  1228   Cholesterol  --   --  162 125   Triglycerides  --   --  107 142   HDL  --   --  43 41   LDL Cholesterol  --   --  97.6 55.6  L   Non-HDL Cholesterol  --   --  119 84   AST 16 15  --  20   ALT 16 14  --  21           Cardiovascular Testing:  Echo 8/22/22 (Ao root dil stable vs report 1/28/22)  The estimated ejection fraction is 60%.  The left ventricle is normal in size with eccentric hypertrophy and normal systolic function.  Grade I left ventricular diastolic dysfunction.  Normal right ventricular size with normal right ventricular systolic function.  Mild left atrial enlargement.  The sinuses of Valsalva is mildly dilated. 4.5cm  Normal central venous pressure (3 mmHg).  The estimated PA systolic pressure is 22 mmHg.    Ex stress echo 1/28/22  The left ventricle is normal in size with mild concentric hypertrophy and normal systolic function.  The estimated ejection fraction is 60%.  Normal right ventricular size with normal right ventricular systolic function.  The sinuses of Valsalva is moderately dilated, 4.5cm.  The ECG portion of this study is negative for myocardial ischemia (Hiren 6:05, 7 METS, 111% MPHR, +/94).  The stress echo portion of this study is negative for myocardial ischemia.    Aortic US 1/26/22  No evidence of AAA.  Aortic atherosclerosis.    LE venous US/reflux 1/26/22  No evidence of lower extremity DVT venous reflux bilaterally.    Carotid US 10/7/19  No sonographic evidence of hemodynamically significant stenosis of the bilateral extracranial internal carotid arteries.        ASSESSMENT:   # episodic LLE edema, no CVI noted on US 1/2022, but had ruptured varix in 1/2022.  Declines vasc med referral.  # CAD, presumed (cor Ca++ on CT 10/1/21).  CARMELLA 1/2022 neg.  # HTN, controlled  # FH AAA.  Neg aortic US 1/2022 (but Ao root dil noted on echo 1/2022)  # CKD3a  # ao root dil, 3.7->4.5cm (echo 8/2022)  # aortic atherosclerosis (CT 10/2/21)  # dyslipidemia on atorva 20mg    PLAN:   Cont med rx  RTC 6 months with surveillance echo (Feb 2023)    Sampson Ireland MD, [facility]

## 2022-09-28 ENCOUNTER — OFFICE VISIT (OUTPATIENT)
Dept: OPHTHALMOLOGY | Facility: CLINIC | Age: 82
End: 2022-09-28
Attending: OPHTHALMOLOGY
Payer: MEDICARE

## 2022-09-28 DIAGNOSIS — H35.3123 NONEXUDATIVE AGE-RELATED MACULAR DEGENERATION, LEFT EYE, ADVANCED ATROPHIC WITHOUT SUBFOVEAL INVOLVEMENT: ICD-10-CM

## 2022-09-28 DIAGNOSIS — H35.3212 EXUDATIVE AGE-RELATED MACULAR DEGENERATION OF RIGHT EYE WITH INACTIVE CHOROIDAL NEOVASCULARIZATION: Primary | ICD-10-CM

## 2022-09-28 DIAGNOSIS — H31.001 CHORIORETINAL SCAR, RIGHT: ICD-10-CM

## 2022-09-28 PROCEDURE — 1160F PR REVIEW ALL MEDS BY PRESCRIBER/CLIN PHARMACIST DOCUMENTED: ICD-10-PCS | Mod: CPTII,S$GLB,, | Performed by: OPHTHALMOLOGY

## 2022-09-28 PROCEDURE — 99999 PR PBB SHADOW E&M-EST. PATIENT-LVL III: ICD-10-PCS | Mod: PBBFAC,,, | Performed by: OPHTHALMOLOGY

## 2022-09-28 PROCEDURE — 92134 CPTRZ OPH DX IMG PST SGM RTA: CPT | Mod: S$GLB,,, | Performed by: OPHTHALMOLOGY

## 2022-09-28 PROCEDURE — 1126F AMNT PAIN NOTED NONE PRSNT: CPT | Mod: CPTII,S$GLB,, | Performed by: OPHTHALMOLOGY

## 2022-09-28 PROCEDURE — 1160F RVW MEDS BY RX/DR IN RCRD: CPT | Mod: CPTII,S$GLB,, | Performed by: OPHTHALMOLOGY

## 2022-09-28 PROCEDURE — 1159F PR MEDICATION LIST DOCUMENTED IN MEDICAL RECORD: ICD-10-PCS | Mod: CPTII,S$GLB,, | Performed by: OPHTHALMOLOGY

## 2022-09-28 PROCEDURE — 99999 PR PBB SHADOW E&M-EST. PATIENT-LVL III: CPT | Mod: PBBFAC,,, | Performed by: OPHTHALMOLOGY

## 2022-09-28 PROCEDURE — 92134 POSTERIOR SEGMENT OCT RETINA (OCULAR COHERENCE TOMOGRAPHY)-BOTH EYES: ICD-10-PCS | Mod: S$GLB,,, | Performed by: OPHTHALMOLOGY

## 2022-09-28 PROCEDURE — 3288F PR FALLS RISK ASSESSMENT DOCUMENTED: ICD-10-PCS | Mod: CPTII,S$GLB,, | Performed by: OPHTHALMOLOGY

## 2022-09-28 PROCEDURE — 1101F PT FALLS ASSESS-DOCD LE1/YR: CPT | Mod: CPTII,S$GLB,, | Performed by: OPHTHALMOLOGY

## 2022-09-28 PROCEDURE — 3288F FALL RISK ASSESSMENT DOCD: CPT | Mod: CPTII,S$GLB,, | Performed by: OPHTHALMOLOGY

## 2022-09-28 PROCEDURE — 1126F PR PAIN SEVERITY QUANTIFIED, NO PAIN PRESENT: ICD-10-PCS | Mod: CPTII,S$GLB,, | Performed by: OPHTHALMOLOGY

## 2022-09-28 PROCEDURE — 1159F MED LIST DOCD IN RCRD: CPT | Mod: CPTII,S$GLB,, | Performed by: OPHTHALMOLOGY

## 2022-09-28 PROCEDURE — 1101F PR PT FALLS ASSESS DOC 0-1 FALLS W/OUT INJ PAST YR: ICD-10-PCS | Mod: CPTII,S$GLB,, | Performed by: OPHTHALMOLOGY

## 2022-09-28 PROCEDURE — 92012 PR EYE EXAM, EST PATIENT,INTERMED: ICD-10-PCS | Mod: S$GLB,,, | Performed by: OPHTHALMOLOGY

## 2022-09-28 PROCEDURE — 92012 INTRM OPH EXAM EST PATIENT: CPT | Mod: S$GLB,,, | Performed by: OPHTHALMOLOGY

## 2022-09-28 NOTE — PROGRESS NOTES
Subjective:       Patient ID: Robe Cevallos is a 82 y.o. male      Chief Complaint   Patient presents with    Macular Degeneration     History of Present Illness  HPI    DLS: 3/30/22    Pt here for 6 month follow up and pt feels vision may be getting a little   bit worse. Pt states it is getting harder for him to see clearly.  Images   in one eye look a little bigger than the other.  No f/f/pain    1. Wet ARMD OD with inactive CNV    2. Dry ARMD OS advanced atrophic without subfoveal involvement    3. CRS OD    MEDS:  AREDS2 BID PO  Last edited by Charles Kapoor MD on 9/28/2022 10:45 AM.        Imaging:    See report    Assessment/Plan:     1. 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  No reactivation  Observe  - Posterior Segment OCT Retina-Both eyes    2. Nonexudative age-related macular degeneration, left eye, advanced atrophic without subfoveal involvement  Discussed Dry and Wet AMD in detail  Recommend continue AREDS 2 Vitamins  Home Amsler Grid Testing  RTC immediately PRN any changes in vision    3. Chorioretinal scar, right  Stable.  Observe    Follow up in about 6 months (around 3/28/2023), or if symptoms worsen or fail to improve, for Comprehensive Examination, OCT Mac.

## 2022-10-04 ENCOUNTER — TELEPHONE (OUTPATIENT)
Dept: HEMATOLOGY/ONCOLOGY | Facility: CLINIC | Age: 82
End: 2022-10-04
Payer: MEDICARE

## 2022-10-04 NOTE — TELEPHONE ENCOUNTER
Patient called to get an appointment with DR. Heredia, Patient would be a new patient, he said its time for a yearly check up for lymphoma. He lives on this side so it would be easier for him to come to this clinic

## 2022-10-05 ENCOUNTER — TELEPHONE (OUTPATIENT)
Dept: HEMATOLOGY/ONCOLOGY | Facility: CLINIC | Age: 82
End: 2022-10-05
Payer: MEDICARE

## 2022-10-10 ENCOUNTER — LAB VISIT (OUTPATIENT)
Dept: LAB | Facility: HOSPITAL | Age: 82
End: 2022-10-10
Attending: INTERNAL MEDICINE
Payer: MEDICARE

## 2022-10-10 DIAGNOSIS — C83.32 DIFFUSE LARGE B-CELL LYMPHOMA OF INTRATHORACIC LYMPH NODES: ICD-10-CM

## 2022-10-10 DIAGNOSIS — C83.32 DIFFUSE LARGE B-CELL LYMPHOMA OF INTRATHORACIC LYMPH NODES: Primary | ICD-10-CM

## 2022-10-10 LAB
ALBUMIN SERPL BCP-MCNC: 4.1 G/DL (ref 3.5–5.2)
ALP SERPL-CCNC: 79 U/L (ref 55–135)
ALT SERPL W/O P-5'-P-CCNC: 19 U/L (ref 10–44)
ANION GAP SERPL CALC-SCNC: 4 MMOL/L (ref 8–16)
AST SERPL-CCNC: 19 U/L (ref 10–40)
BASOPHILS # BLD AUTO: 0.05 K/UL (ref 0–0.2)
BASOPHILS NFR BLD: 0.8 % (ref 0–1.9)
BILIRUB SERPL-MCNC: 0.6 MG/DL (ref 0.1–1)
BUN SERPL-MCNC: 15 MG/DL (ref 8–23)
CALCIUM SERPL-MCNC: 9.4 MG/DL (ref 8.7–10.5)
CHLORIDE SERPL-SCNC: 111 MMOL/L (ref 95–110)
CO2 SERPL-SCNC: 25 MMOL/L (ref 23–29)
CREAT SERPL-MCNC: 1.2 MG/DL (ref 0.5–1.4)
DIFFERENTIAL METHOD: ABNORMAL
EOSINOPHIL # BLD AUTO: 0.2 K/UL (ref 0–0.5)
EOSINOPHIL NFR BLD: 3.8 % (ref 0–8)
ERYTHROCYTE [DISTWIDTH] IN BLOOD BY AUTOMATED COUNT: 14.6 % (ref 11.5–14.5)
EST. GFR  (NO RACE VARIABLE): 60 ML/MIN/1.73 M^2
GLUCOSE SERPL-MCNC: 96 MG/DL (ref 70–110)
HCT VFR BLD AUTO: 44.2 % (ref 40–54)
HGB BLD-MCNC: 14.2 G/DL (ref 14–18)
IMM GRANULOCYTES # BLD AUTO: 0.02 K/UL (ref 0–0.04)
IMM GRANULOCYTES NFR BLD AUTO: 0.3 % (ref 0–0.5)
LDH SERPL L TO P-CCNC: 177 U/L (ref 110–260)
LYMPHOCYTES # BLD AUTO: 1.5 K/UL (ref 1–4.8)
LYMPHOCYTES NFR BLD: 23 % (ref 18–48)
MCH RBC QN AUTO: 28 PG (ref 27–31)
MCHC RBC AUTO-ENTMCNC: 32.1 G/DL (ref 32–36)
MCV RBC AUTO: 87 FL (ref 82–98)
MONOCYTES # BLD AUTO: 0.6 K/UL (ref 0.3–1)
MONOCYTES NFR BLD: 8.8 % (ref 4–15)
NEUTROPHILS # BLD AUTO: 4.1 K/UL (ref 1.8–7.7)
NEUTROPHILS NFR BLD: 63.3 % (ref 38–73)
NRBC BLD-RTO: 0 /100 WBC
PLATELET # BLD AUTO: 143 K/UL (ref 150–450)
PMV BLD AUTO: 11.5 FL (ref 9.2–12.9)
POTASSIUM SERPL-SCNC: 4.7 MMOL/L (ref 3.5–5.1)
PROT SERPL-MCNC: 6.9 G/DL (ref 6–8.4)
RBC # BLD AUTO: 5.07 M/UL (ref 4.6–6.2)
SODIUM SERPL-SCNC: 140 MMOL/L (ref 136–145)
WBC # BLD AUTO: 6.44 K/UL (ref 3.9–12.7)

## 2022-10-10 PROCEDURE — 85025 COMPLETE CBC W/AUTO DIFF WBC: CPT | Performed by: INTERNAL MEDICINE

## 2022-10-10 PROCEDURE — 80053 COMPREHEN METABOLIC PANEL: CPT | Performed by: INTERNAL MEDICINE

## 2022-10-10 PROCEDURE — 83615 LACTATE (LD) (LDH) ENZYME: CPT | Performed by: INTERNAL MEDICINE

## 2022-10-10 PROCEDURE — 36415 COLL VENOUS BLD VENIPUNCTURE: CPT | Performed by: INTERNAL MEDICINE

## 2022-10-11 ENCOUNTER — TELEPHONE (OUTPATIENT)
Dept: HEMATOLOGY/ONCOLOGY | Facility: CLINIC | Age: 82
End: 2022-10-11
Payer: MEDICARE

## 2022-10-11 NOTE — TELEPHONE ENCOUNTER
Patient voicemail box is not set up. Could not leave message for patient to call back to confirm appointment.

## 2022-10-12 ENCOUNTER — OFFICE VISIT (OUTPATIENT)
Dept: HEMATOLOGY/ONCOLOGY | Facility: CLINIC | Age: 82
End: 2022-10-12
Payer: MEDICARE

## 2022-10-12 VITALS
HEIGHT: 71 IN | OXYGEN SATURATION: 95 % | SYSTOLIC BLOOD PRESSURE: 154 MMHG | WEIGHT: 205.69 LBS | TEMPERATURE: 98 F | BODY MASS INDEX: 28.8 KG/M2 | DIASTOLIC BLOOD PRESSURE: 79 MMHG | HEART RATE: 74 BPM

## 2022-10-12 DIAGNOSIS — C83.32 DIFFUSE LARGE B-CELL LYMPHOMA OF INTRATHORACIC LYMPH NODES: Primary | ICD-10-CM

## 2022-10-12 PROCEDURE — 99999 PR PBB SHADOW E&M-EST. PATIENT-LVL III: ICD-10-PCS | Mod: PBBFAC,,, | Performed by: INTERNAL MEDICINE

## 2022-10-12 PROCEDURE — 3077F PR MOST RECENT SYSTOLIC BLOOD PRESSURE >= 140 MM HG: ICD-10-PCS | Mod: CPTII,S$GLB,, | Performed by: INTERNAL MEDICINE

## 2022-10-12 PROCEDURE — 3288F PR FALLS RISK ASSESSMENT DOCUMENTED: ICD-10-PCS | Mod: CPTII,S$GLB,, | Performed by: INTERNAL MEDICINE

## 2022-10-12 PROCEDURE — 1101F PR PT FALLS ASSESS DOC 0-1 FALLS W/OUT INJ PAST YR: ICD-10-PCS | Mod: CPTII,S$GLB,, | Performed by: INTERNAL MEDICINE

## 2022-10-12 PROCEDURE — 1126F AMNT PAIN NOTED NONE PRSNT: CPT | Mod: CPTII,S$GLB,, | Performed by: INTERNAL MEDICINE

## 2022-10-12 PROCEDURE — 3077F SYST BP >= 140 MM HG: CPT | Mod: CPTII,S$GLB,, | Performed by: INTERNAL MEDICINE

## 2022-10-12 PROCEDURE — 3078F DIAST BP <80 MM HG: CPT | Mod: CPTII,S$GLB,, | Performed by: INTERNAL MEDICINE

## 2022-10-12 PROCEDURE — 99214 PR OFFICE/OUTPT VISIT, EST, LEVL IV, 30-39 MIN: ICD-10-PCS | Mod: S$GLB,,, | Performed by: INTERNAL MEDICINE

## 2022-10-12 PROCEDURE — 3078F PR MOST RECENT DIASTOLIC BLOOD PRESSURE < 80 MM HG: ICD-10-PCS | Mod: CPTII,S$GLB,, | Performed by: INTERNAL MEDICINE

## 2022-10-12 PROCEDURE — 99999 PR PBB SHADOW E&M-EST. PATIENT-LVL III: CPT | Mod: PBBFAC,,, | Performed by: INTERNAL MEDICINE

## 2022-10-12 PROCEDURE — 1126F PR PAIN SEVERITY QUANTIFIED, NO PAIN PRESENT: ICD-10-PCS | Mod: CPTII,S$GLB,, | Performed by: INTERNAL MEDICINE

## 2022-10-12 PROCEDURE — 1101F PT FALLS ASSESS-DOCD LE1/YR: CPT | Mod: CPTII,S$GLB,, | Performed by: INTERNAL MEDICINE

## 2022-10-12 PROCEDURE — 99214 OFFICE O/P EST MOD 30 MIN: CPT | Mod: S$GLB,,, | Performed by: INTERNAL MEDICINE

## 2022-10-12 PROCEDURE — 3288F FALL RISK ASSESSMENT DOCD: CPT | Mod: CPTII,S$GLB,, | Performed by: INTERNAL MEDICINE

## 2022-10-12 NOTE — PROGRESS NOTES
"Subjective:       Patient ID: Robe Cevallos is a 82 y.o. male.    Chief Complaint: Lymphoma    HPI  Diffuse large B-cell lymphoma of intrathoracic lymph nodes (Follow Up)     HPI Patient is a 82 y.o. year old male who presents to the clinic today for  Oncology followup. Has been seen at [facility] in the past. Dx'd to have Low grade , Stage 1, follicular lymphoma Right groin in 2004. Good response to R-CVP Chemotherapy and maintenance rituxan.       He was then noted yoselin have a lesion excised from Left side of nostril which revealed superficially invasive squamous cell [id number]. Invasive. \     In Aug/Sep 2019 he underwent a biopsy of Lymph node from left neck and pathology revealed "difuse large b cell lymphoma"     CT scans revealed a large mediastinal mass. Patient had sifnificant sx. Transferred to [facility]. Was given emergency mediastinal radiation. Started on mini CHOP on 10/2/19 and was admitted for 6  weeks, at the same time CTA chest revealed Emboli in right upper, middle and lower lobes. The  left mediastinal mass had decreased in size  He completed 6 cycles of mini CHOP in February 2020 and is on surveillance  Last PET scan was in 10/2021 was negative for lymphoma  He is doing well  Appetite and weight stable  No SOB/cough  No CP  No fevers, night sweats  No recent infxns  No hx of recurrent infxns.      Review of Systems       Lab Results   Component Value Date    WBC 6.44 10/10/2022    HGB 14.2 10/10/2022    HCT 44.2 10/10/2022    MCV 87 10/10/2022     (L) 10/10/2022               Review of Systems   Constitutional:  Negative for appetite change, fatigue, fever and unexpected weight change.   HENT:  Negative for mouth sores.    Eyes:  Negative for visual disturbance.   Respiratory:  Negative for cough and shortness of breath.    Cardiovascular:  Negative for chest pain.   Gastrointestinal:  Negative for abdominal pain and diarrhea.   Genitourinary:  Negative for frequency. " "  Musculoskeletal:  Negative for back pain.   Integumentary:  Negative for rash.   Neurological:  Negative for headaches.   Hematological:  Negative for adenopathy.   Psychiatric/Behavioral:  The patient is not nervous/anxious.        Objective:       Vitals:    10/12/22 1311   BP: (!) 154/79   BP Location: Left arm   Patient Position: Sitting   BP Method: Large (Automatic)   Pulse: 74   Temp: 98.3 °F (36.8 °C)   SpO2: 95%   Weight: 93.3 kg (205 lb 11 oz)   Height: 5' 11" (1.803 m)       Physical Exam  Constitutional:       Appearance: He is well-developed.   HENT:      Head: Normocephalic.      Mouth/Throat:      Pharynx: No oropharyngeal exudate.   Eyes:      General: No scleral icterus.        Right eye: No discharge.         Left eye: No discharge.      Conjunctiva/sclera: Conjunctivae normal.   Neck:      Thyroid: No thyromegaly.   Cardiovascular:      Rate and Rhythm: Normal rate and regular rhythm.      Heart sounds: Normal heart sounds. No murmur heard.  Pulmonary:      Effort: Pulmonary effort is normal.      Breath sounds: Normal breath sounds. No wheezing or rales.   Abdominal:      General: Bowel sounds are normal.      Palpations: Abdomen is soft.      Tenderness: There is no abdominal tenderness. There is no guarding or rebound.   Musculoskeletal:         General: No swelling. Normal range of motion.      Cervical back: Normal range of motion and neck supple.   Lymphadenopathy:      Cervical: No cervical adenopathy.      Upper Body:      Right upper body: No supraclavicular adenopathy.      Left upper body: No supraclavicular adenopathy.   Skin:     Findings: No erythema or rash.   Neurological:      Mental Status: He is alert and oriented to person, place, and time.      Cranial Nerves: No cranial nerve deficit.   Psychiatric:         Mood and Affect: Mood normal.         Behavior: Behavior normal.             Lab Results   Component Value Date    WBC 6.44 10/10/2022    HGB 14.2 10/10/2022    HCT 44.2 " 10/10/2022    MCV 87 10/10/2022     (L) 10/10/2022               Assessment:       Problem List Items Addressed This Visit          Oncology    History of follicular lymphoma    Diffuse large B-cell lymphoma of intrathoracic lymph nodes - Primary  Will obtain a PET scan and notify him of findings.   Will call with results. If everything looks good then he will return in 1 year with labs   Cbc,cmp, LDH prior to f/u 1 yr   Recommend update immunizations  Above care plan was discussed with patient and all questions were addressed to his satisfaction     Relevant Orders    NM PET CT Routine Skull to Mid Thigh       Follow-up:      Cbc,cmp, LDH prior to f/u 1 yr  Update immunizatons

## 2022-11-16 ENCOUNTER — HOSPITAL ENCOUNTER (OUTPATIENT)
Dept: RADIOLOGY | Facility: HOSPITAL | Age: 82
Discharge: HOME | End: 2022-11-16
Attending: NURSE PRACTITIONER
Payer: MEDICARE

## 2022-11-16 DIAGNOSIS — R05.1 ACUTE COUGH: Primary | ICD-10-CM

## 2022-11-16 DIAGNOSIS — R05.1 ACUTE COUGH: ICD-10-CM

## 2022-11-16 PROCEDURE — 71046 X-RAY EXAM CHEST 2 VIEWS: CPT | Mod: TC,FY

## 2022-11-16 PROCEDURE — 71046 XR CHEST PA AND LATERAL: ICD-10-PCS | Mod: 26,,, | Performed by: INTERNAL MEDICINE

## 2022-11-16 PROCEDURE — 71046 X-RAY EXAM CHEST 2 VIEWS: CPT | Mod: 26,,, | Performed by: INTERNAL MEDICINE

## 2022-12-28 ENCOUNTER — HOSPITAL ENCOUNTER (OUTPATIENT)
Dept: RADIOLOGY | Facility: HOSPITAL | Age: 82
Discharge: HOME | End: 2022-12-28
Attending: INTERNAL MEDICINE
Payer: MEDICARE

## 2022-12-28 DIAGNOSIS — C83.32 DIFFUSE LARGE B-CELL LYMPHOMA OF INTRATHORACIC LYMPH NODES: ICD-10-CM

## 2022-12-28 LAB — POCT GLUCOSE: 112 MG/DL (ref 70–110)

## 2022-12-28 PROCEDURE — 78815 NM PET CT ROUTINE: ICD-10-PCS | Mod: 26,PS,, | Performed by: RADIOLOGY

## 2022-12-28 PROCEDURE — 78815 PET IMAGE W/CT SKULL-THIGH: CPT | Mod: 26,PS,, | Performed by: RADIOLOGY

## 2022-12-28 PROCEDURE — 78815 PET IMAGE W/CT SKULL-THIGH: CPT | Mod: TC

## 2022-12-28 PROCEDURE — A9552 F18 FDG: HCPCS

## 2023-02-15 ENCOUNTER — OFFICE VISIT (OUTPATIENT)
Dept: OPTOMETRY | Facility: CLINIC | Age: 83
End: 2023-02-15
Payer: MEDICARE

## 2023-02-15 DIAGNOSIS — H52.7 REFRACTIVE ERROR: Primary | ICD-10-CM

## 2023-02-15 DIAGNOSIS — H50.21 HYPERTROPIA OF RIGHT EYE: ICD-10-CM

## 2023-02-15 PROCEDURE — 1101F PR PT FALLS ASSESS DOC 0-1 FALLS W/OUT INJ PAST YR: ICD-10-PCS | Mod: CPTII,S$GLB,, | Performed by: OPTOMETRIST

## 2023-02-15 PROCEDURE — 3288F FALL RISK ASSESSMENT DOCD: CPT | Mod: CPTII,S$GLB,, | Performed by: OPTOMETRIST

## 2023-02-15 PROCEDURE — 1160F RVW MEDS BY RX/DR IN RCRD: CPT | Mod: CPTII,S$GLB,, | Performed by: OPTOMETRIST

## 2023-02-15 PROCEDURE — 1160F PR REVIEW ALL MEDS BY PRESCRIBER/CLIN PHARMACIST DOCUMENTED: ICD-10-PCS | Mod: CPTII,S$GLB,, | Performed by: OPTOMETRIST

## 2023-02-15 PROCEDURE — 1159F PR MEDICATION LIST DOCUMENTED IN MEDICAL RECORD: ICD-10-PCS | Mod: CPTII,S$GLB,, | Performed by: OPTOMETRIST

## 2023-02-15 PROCEDURE — 99999 PR PBB SHADOW E&M-EST. PATIENT-LVL II: CPT | Mod: PBBFAC,,, | Performed by: OPTOMETRIST

## 2023-02-15 PROCEDURE — 1159F MED LIST DOCD IN RCRD: CPT | Mod: CPTII,S$GLB,, | Performed by: OPTOMETRIST

## 2023-02-15 PROCEDURE — 92015 PR REFRACTION: ICD-10-PCS | Mod: S$GLB,,, | Performed by: OPTOMETRIST

## 2023-02-15 PROCEDURE — 92015 DETERMINE REFRACTIVE STATE: CPT | Mod: S$GLB,,, | Performed by: OPTOMETRIST

## 2023-02-15 PROCEDURE — 3288F PR FALLS RISK ASSESSMENT DOCUMENTED: ICD-10-PCS | Mod: CPTII,S$GLB,, | Performed by: OPTOMETRIST

## 2023-02-15 PROCEDURE — 1101F PT FALLS ASSESS-DOCD LE1/YR: CPT | Mod: CPTII,S$GLB,, | Performed by: OPTOMETRIST

## 2023-02-15 PROCEDURE — 99999 PR PBB SHADOW E&M-EST. PATIENT-LVL II: ICD-10-PCS | Mod: PBBFAC,,, | Performed by: OPTOMETRIST

## 2023-02-15 NOTE — PROGRESS NOTES
Subjective:       Patient ID: Robe Cevallos is a 83 y.o. male      Chief Complaint   Patient presents with    Concerns About Ocular Health     History of Present Illness  Dls: 9/28/22 Dr. Kapoor     84 y/o male presents today for refraction.   Pt c/o blurry vision at distance and near ou off/on  Pt wears trifocal glasses with prism od.  Pt wants a new rx for glasses.       Assessment/Plan:     1. Refractive error  2. Hypertropia of right eye  Educated patient on refractive error and discussed lens options. Dispensed updated spectacle Rx. Educated about adaptation period to new specs.    Eyeglass Final Rx       Eyeglass Final Rx         Sphere Cylinder Axis Add Vert Prism    Right -1.00 +1.50 175 +2.50 1.0 up    Left -0.25 +1.75 165 +2.50       Expiration Date: 2/15/2024                      Follow up for retina as scheduled.

## 2023-03-22 ENCOUNTER — HOSPITAL ENCOUNTER (OUTPATIENT)
Dept: CARDIOLOGY | Facility: HOSPITAL | Age: 83
Discharge: HOME | End: 2023-03-22
Attending: INTERNAL MEDICINE
Payer: MEDICARE

## 2023-03-22 VITALS — BODY MASS INDEX: 28.7 KG/M2 | HEIGHT: 71 IN | WEIGHT: 205 LBS

## 2023-03-22 DIAGNOSIS — I10 ESSENTIAL HYPERTENSION: ICD-10-CM

## 2023-03-22 DIAGNOSIS — I77.810 AORTIC ROOT DILATATION: ICD-10-CM

## 2023-03-22 LAB
AORTIC ROOT ANNULUS: 4.09 CM
AORTIC VALVE CUSP SEPERATION: 1.99 CM
ASCENDING AORTA: 4.34 CM
AV INDEX (PROSTH): 0.96
AV MEAN GRADIENT: 4 MMHG
AV PEAK GRADIENT: 6 MMHG
AV REGURGITATION PRESSURE HALF TIME: 834 MS
AV VALVE AREA: 3.59 CM2
AV VELOCITY RATIO: 1.02
BSA FOR ECHO PROCEDURE: 2.16 M2
CV ECHO LV RWT: 0.7 CM
DOP CALC AO PEAK VEL: 1.23 M/S
DOP CALC AO VTI: 31 CM
DOP CALC LVOT AREA: 3.7 CM2
DOP CALC LVOT DIAMETER: 2.18 CM
DOP CALC LVOT PEAK VEL: 1.25 M/S
DOP CALC LVOT STROKE VOLUME: 111.17 CM3
DOP CALC MV VTI: 39.5 CM
DOP CALCLVOT PEAK VEL VTI: 29.8 CM
E WAVE DECELERATION TIME: 195.19 MSEC
E/A RATIO: 1.26
E/E' RATIO: 13.29 M/S
ECHO LV POSTERIOR WALL: 1.26 CM (ref 0.6–1.1)
EJECTION FRACTION: 65 %
FRACTIONAL SHORTENING: 43 % (ref 28–44)
INTERVENTRICULAR SEPTUM: 1.04 CM (ref 0.6–1.1)
IVC DIAMETER: 1.88 CM
IVRT: 159.17 MSEC
LA MAJOR: 5.19 CM
LA MINOR: 5.05 CM
LA WIDTH: 5.6 CM
LEFT ATRIUM SIZE: 4.8 CM
LEFT ATRIUM VOLUME INDEX: 54.9 ML/M2
LEFT ATRIUM VOLUME: 116.96 CM3
LEFT INTERNAL DIMENSION IN SYSTOLE: 2.06 CM (ref 2.1–4)
LEFT VENTRICLE DIASTOLIC VOLUME INDEX: 25.92 ML/M2
LEFT VENTRICLE DIASTOLIC VOLUME: 55.21 ML
LEFT VENTRICLE MASS INDEX: 63 G/M2
LEFT VENTRICLE SYSTOLIC VOLUME INDEX: 6.4 ML/M2
LEFT VENTRICLE SYSTOLIC VOLUME: 13.65 ML
LEFT VENTRICULAR INTERNAL DIMENSION IN DIASTOLE: 3.62 CM (ref 3.5–6)
LEFT VENTRICULAR MASS: 133.75 G
LV LATERAL E/E' RATIO: 16.14 M/S
LV SEPTAL E/E' RATIO: 11.3 M/S
LVOT MG: 3.19 MMHG
LVOT MV: 0.81 CM/S
MV MEAN GRADIENT: 2 MMHG
MV PEAK A VEL: 0.9 M/S
MV PEAK E VEL: 1.13 M/S
MV PEAK GRADIENT: 4 MMHG
MV STENOSIS PRESSURE HALF TIME: 67.45 MS
MV VALVE AREA BY CONTINUITY EQUATION: 2.81 CM2
MV VALVE AREA P 1/2 METHOD: 3.26 CM2
PISA AR MAX VEL: 2.64 M/S
PISA TR MAX VEL: 2.14 M/S
PULM VEIN S/D RATIO: 1.44
PV PEAK D VEL: 0.43 M/S
PV PEAK S VEL: 0.62 M/S
PV PEAK VELOCITY: 0.74 CM/S
RA MAJOR: 4.39 CM
RA PRESSURE: 3 MMHG
RA WIDTH: 4.7 CM
RIGHT VENTRICULAR END-DIASTOLIC DIMENSION: 3.61 CM
SINUS: 4.5 CM
STJ: 3.77 CM
TDI LATERAL: 0.07 M/S
TDI SEPTAL: 0.1 M/S
TDI: 0.09 M/S
TR MAX PG: 18 MMHG
TRICUSPID ANNULAR PLANE SYSTOLIC EXCURSION: 2.7 CM
TV REST PULMONARY ARTERY PRESSURE: 21 MMHG

## 2023-03-22 PROCEDURE — 93306 TTE W/DOPPLER COMPLETE: CPT

## 2023-03-22 PROCEDURE — 93306 ECHO (CUPID ONLY): ICD-10-PCS | Mod: 26,,, | Performed by: INTERNAL MEDICINE

## 2023-03-22 PROCEDURE — 93306 TTE W/DOPPLER COMPLETE: CPT | Mod: 26,,, | Performed by: INTERNAL MEDICINE

## 2023-03-23 ENCOUNTER — PATIENT MESSAGE (OUTPATIENT)
Dept: CARDIOLOGY | Facility: CLINIC | Age: 83
End: 2023-03-23
Payer: MEDICARE

## 2023-06-20 NOTE — SUBJECTIVE & OBJECTIVE
Subjective:     Interval History: Patient hypoxic overnight, placed on BIPAP. DNR after discussions with family. Planned to start decadron and vincristine today. Patient c/o generalized malaise, fatigue, dyspnea, abdominal pain, dry mouth. Wife at bedside. Pulm consulted to evaluate for thoracentesis.    Objective:     Vital Signs (Most Recent):  Temp: 97.7 °F (36.5 °C) (09/27/19 0704)  Pulse: 109 (09/27/19 0804)  Resp: (!) 21 (09/27/19 0759)  BP: 119/74 (09/27/19 0704)  SpO2: 98 % (09/27/19 0759) Vital Signs (24h Range):  Temp:  [96.8 °F (36 °C)-98 °F (36.7 °C)] 97.7 °F (36.5 °C)  Pulse:  [] 109  Resp:  [18-36] 21  SpO2:  [78 %-98 %] 98 %  BP: (119-144)/(74-85) 119/74     Weight: 89.4 kg (197 lb 3.2 oz)  Body mass index is 27.5 kg/m².  Body surface area is 2.12 meters squared.    ECOG SCORE         [unfilled]    Intake/Output - Last 3 Shifts       09/25 0700 - 09/26 0659 09/26 0700 - 09/27 0659 09/27 0700 - 09/28 0659    P.O.  100 0    I.V. (mL/kg) 925 (10.3)      IV Piggyback 2900 550     Total Intake(mL/kg) 3825 (42.8) 650 (7.3) 0 (0)    Urine (mL/kg/hr) 400 435 (0.2) 2400 (24.1)    Emesis/NG output 0 50     Other 0      Stool 0      Blood 0      Total Output     Net +3425 +165 -2400           Urine Occurrence 0 x      Stool Occurrence 0 x      Emesis Occurrence 0 x            Physical Exam   Constitutional: He appears well-developed and well-nourished. He appears distressed.   HENT:   Head: Normocephalic and atraumatic.   Mouth/Throat: No oropharyngeal exudate.   Eyes: Pupils are equal, round, and reactive to light. EOM are normal. No scleral icterus.   Neck: Neck supple.   Cardiovascular: Normal rate and regular rhythm.   Pulmonary/Chest: He is in respiratory distress (mild).   On BIPAP, + labored breathing  Decreased breath sounds left>right  No palpable axillary LN   Abdominal: Soft. He exhibits distension (mild). There is no tenderness.   Genitourinary:   Genitourinary Comments: No palpable  inguinal LN   Musculoskeletal: Normal range of motion. He exhibits no edema.   Lymphadenopathy:     He has no cervical adenopathy.   Neurological: He is alert.   Skin: Skin is warm and dry.   Psychiatric: He has a normal mood and affect. His behavior is normal.       Significant Labs:   CBC:   Recent Labs   Lab 09/25/19  0808 09/26/19 0414   WBC 8.61 8.64   HGB 14.7 13.3*   HCT 43.5 41.5    228   , CMP:   Recent Labs   Lab 09/25/19  0808 09/26/19 0414 09/27/19  0504    135* 140   K 4.7 4.8 4.5   CL 99 103 105   CO2 23 21* 19*   * 101 101   BUN 28* 23 27*   CREATININE 1.3 1.2 1.2   CALCIUM 10.9* 10.0 10.7*   PROT 7.1 6.2 6.3   ALBUMIN 3.6 3.0* 3.1*   BILITOT 1.4* 1.0 1.1*   ALKPHOS 140* 130 136*   AST 30 28 34   ALT 43 40 46*   ANIONGAP 14 11 16   EGFRNONAA 51* 56.4* 56.4*    and Coagulation: No results for input(s): PT, INR, APTT in the last 48 hours.    Diagnostic Results:  I have reviewed all pertinent imaging results/findings within the past 24 hours.   [Initial Eval - Existing Diagnosis] : an initial evaluation of an existing diagnosis [FreeTextEntry1] : osteoarthritis

## 2023-06-30 NOTE — ED TRIAGE NOTES
Had dental work on Friday and was started on Amoxicillin and Lortab.  Patient reports becoming dizzy this morning.    Quality 111:Pneumonia Vaccination Status For Older Adults: Patient received any pneumococcal conjugate or polysaccharide vaccine on or after their 60th birthday and before the end of the measurement period Detail Level: Detailed Quality 110: Preventive Care And Screening: Influenza Immunization: Influenza Immunization Administered during Influenza season

## 2023-08-10 ENCOUNTER — OFFICE VISIT (OUTPATIENT)
Dept: CARDIOLOGY | Facility: CLINIC | Age: 83
End: 2023-08-10
Payer: MEDICARE

## 2023-08-10 VITALS
BODY MASS INDEX: 28.25 KG/M2 | HEIGHT: 71 IN | WEIGHT: 201.81 LBS | SYSTOLIC BLOOD PRESSURE: 128 MMHG | OXYGEN SATURATION: 97 % | HEART RATE: 74 BPM | RESPIRATION RATE: 18 BRPM | DIASTOLIC BLOOD PRESSURE: 62 MMHG

## 2023-08-10 DIAGNOSIS — E78.5 DYSLIPIDEMIA: ICD-10-CM

## 2023-08-10 DIAGNOSIS — I77.810 AORTIC ROOT DILATATION: Primary | ICD-10-CM

## 2023-08-10 DIAGNOSIS — Z09 FOLLOW-UP EXAM: ICD-10-CM

## 2023-08-10 DIAGNOSIS — I70.0 AORTIC ATHEROSCLEROSIS: ICD-10-CM

## 2023-08-10 DIAGNOSIS — I25.10 CORONARY ARTERY DISEASE INVOLVING NATIVE CORONARY ARTERY OF NATIVE HEART WITHOUT ANGINA PECTORIS: ICD-10-CM

## 2023-08-10 DIAGNOSIS — I10 ESSENTIAL HYPERTENSION: ICD-10-CM

## 2023-08-10 PROCEDURE — 93000 EKG 12-LEAD: ICD-10-PCS | Mod: S$GLB,,, | Performed by: INTERNAL MEDICINE

## 2023-08-10 PROCEDURE — 1159F PR MEDICATION LIST DOCUMENTED IN MEDICAL RECORD: ICD-10-PCS | Mod: CPTII,S$GLB,, | Performed by: INTERNAL MEDICINE

## 2023-08-10 PROCEDURE — 3074F SYST BP LT 130 MM HG: CPT | Mod: CPTII,S$GLB,, | Performed by: INTERNAL MEDICINE

## 2023-08-10 PROCEDURE — 99999 PR PBB SHADOW E&M-EST. PATIENT-LVL III: ICD-10-PCS | Mod: PBBFAC,,, | Performed by: INTERNAL MEDICINE

## 2023-08-10 PROCEDURE — 99999 PR PBB SHADOW E&M-EST. PATIENT-LVL III: CPT | Mod: PBBFAC,,, | Performed by: INTERNAL MEDICINE

## 2023-08-10 PROCEDURE — 99214 OFFICE O/P EST MOD 30 MIN: CPT | Mod: 25,S$GLB,, | Performed by: INTERNAL MEDICINE

## 2023-08-10 PROCEDURE — 1101F PT FALLS ASSESS-DOCD LE1/YR: CPT | Mod: CPTII,S$GLB,, | Performed by: INTERNAL MEDICINE

## 2023-08-10 PROCEDURE — 1159F MED LIST DOCD IN RCRD: CPT | Mod: CPTII,S$GLB,, | Performed by: INTERNAL MEDICINE

## 2023-08-10 PROCEDURE — 93000 ELECTROCARDIOGRAM COMPLETE: CPT | Mod: S$GLB,,, | Performed by: INTERNAL MEDICINE

## 2023-08-10 PROCEDURE — 1101F PR PT FALLS ASSESS DOC 0-1 FALLS W/OUT INJ PAST YR: ICD-10-PCS | Mod: CPTII,S$GLB,, | Performed by: INTERNAL MEDICINE

## 2023-08-10 PROCEDURE — 3078F PR MOST RECENT DIASTOLIC BLOOD PRESSURE < 80 MM HG: ICD-10-PCS | Mod: CPTII,S$GLB,, | Performed by: INTERNAL MEDICINE

## 2023-08-10 PROCEDURE — 3074F PR MOST RECENT SYSTOLIC BLOOD PRESSURE < 130 MM HG: ICD-10-PCS | Mod: CPTII,S$GLB,, | Performed by: INTERNAL MEDICINE

## 2023-08-10 PROCEDURE — 1126F AMNT PAIN NOTED NONE PRSNT: CPT | Mod: CPTII,S$GLB,, | Performed by: INTERNAL MEDICINE

## 2023-08-10 PROCEDURE — 3288F FALL RISK ASSESSMENT DOCD: CPT | Mod: CPTII,S$GLB,, | Performed by: INTERNAL MEDICINE

## 2023-08-10 PROCEDURE — 99214 PR OFFICE/OUTPT VISIT, EST, LEVL IV, 30-39 MIN: ICD-10-PCS | Mod: 25,S$GLB,, | Performed by: INTERNAL MEDICINE

## 2023-08-10 PROCEDURE — 1160F RVW MEDS BY RX/DR IN RCRD: CPT | Mod: CPTII,S$GLB,, | Performed by: INTERNAL MEDICINE

## 2023-08-10 PROCEDURE — 3078F DIAST BP <80 MM HG: CPT | Mod: CPTII,S$GLB,, | Performed by: INTERNAL MEDICINE

## 2023-08-10 PROCEDURE — 1126F PR PAIN SEVERITY QUANTIFIED, NO PAIN PRESENT: ICD-10-PCS | Mod: CPTII,S$GLB,, | Performed by: INTERNAL MEDICINE

## 2023-08-10 PROCEDURE — 3288F PR FALLS RISK ASSESSMENT DOCUMENTED: ICD-10-PCS | Mod: CPTII,S$GLB,, | Performed by: INTERNAL MEDICINE

## 2023-08-10 PROCEDURE — 1160F PR REVIEW ALL MEDS BY PRESCRIBER/CLIN PHARMACIST DOCUMENTED: ICD-10-PCS | Mod: CPTII,S$GLB,, | Performed by: INTERNAL MEDICINE

## 2023-08-10 NOTE — PROGRESS NOTES
CARDIOVASCULAR CONSULTATION    REASON FOR CONSULT:   Robe Cevallos is a 83 y.o. male who presents for LLE edema, CAD (presumed).    PCP: Barry  Onc: Rossi  HISTORY OF PRESENT ILLNESS:   The patient returns for follow-up, accompanied by his wife.  In the interim since last office visit, he was diagnosed with metastatic prostate cancer.  He reports generally stable status from a cardiovascular perspective.  He is had no angina, dyspnea, palpitations, or syncope.  There has been no PND, orthopnea, or lower extremity edema.  He denies melena, hematuria, or claudication symptoms.  He does describe bilateral foot pain which sounds neuropathic in nature as it does not occur with ambulation, more when he is sitting and lifting his feet up.    I reviewed the results of his echocardiogram from March 2023 noting stable aortic root dilatation at 4.5 cm.  I also personally reviewed his PET-CT from 2021 noting the ascending aorta at 3.9 cm which seems to correspond with the echocardiograms.  The maximum dilatation appears to be at the sinuses of Valsalva.    CARDIOVASCULAR HISTORY:   Ao root dil 3.7->4.5cm (Echo 3/2023)    PAST MEDICAL HISTORY:     Past Medical History:   Diagnosis Date    AMD (age-related macular degeneration), bilateral     Cancer 2004,2019    Lymphoma    Decreased appetite 09/2019    Hypertension     Hypertropia of right eye 12/7/2017    Mass in chest 09/2019    Myasthenia gravis     Unintended weight loss 09/2019    Unsteady gait        PAST SURGICAL HISTORY:     Past Surgical History:   Procedure Laterality Date    BONE MARROW BIOPSY  09/30/2019    CATARACT EXTRACTION W/  INTRAOCULAR LENS IMPLANT Right 03/30/2017    Dr. Jolley    CATARACT EXTRACTION W/  INTRAOCULAR LENS IMPLANT Left 04/20/2017    Dr. Jolley    CHOLECYSTECTOMY      EYE SURGERY      Rt cataract    KNEE ARTHROSCOPY      Lt knee    MEDIPORT REMOVAL N/A 4/17/2020    Procedure: REMOVAL, CATHETER, CENTRAL VENOUS, TUNNELED, WITH PORT;   Surgeon: Bandar Baker MD;  Location: [facility] CATH LAB;  Service: Radiology;  Laterality: N/A;  picc removal    TONSILLECTOMY      Tunneled PICC line Right 09/30/2019    Via IJ, 23cm  length       ALLERGIES AND MEDICATION:   Review of patient's allergies indicates:  No Known Allergies     Medication List            Accurate as of August 10, 2023  2:14 PM. If you have any questions, ask your nurse or doctor.                CONTINUE taking these medications      atorvastatin 20 MG tablet  Commonly known as: LIPITOR  TAKE 1 TABLET(20 MG) BY MOUTH EVERY EVENING     metoprolol succinate 25 MG 24 hr tablet  Commonly known as: TOPROL-XL  TAKE 1 TABLET(25 MG) BY MOUTH EVERY DAY     pyridostigmine 60 mg Tab  Commonly known as: MESTINON     tamsulosin 0.4 mg Cap  Commonly known as: FLOMAX              SOCIAL HISTORY:     Social History     Socioeconomic History    Marital status:    Tobacco Use    Smoking status: Former     Current packs/day: 0.00    Smokeless tobacco: Never   Substance and Sexual Activity    Alcohol use: No    Drug use: Never    Sexual activity: Not Currently     Partners: Female       FAMILY HISTORY:     Family History   Problem Relation Age of Onset    No Known Problems Mother     Heart disease Father     No Known Problems Sister     No Known Problems Brother     No Known Problems Maternal Aunt     No Known Problems Maternal Uncle     No Known Problems Paternal Aunt     No Known Problems Paternal Uncle     No Known Problems Maternal Grandmother     No Known Problems Maternal Grandfather     No Known Problems Paternal Grandmother     No Known Problems Paternal Grandfather     Amblyopia Neg Hx     Blindness Neg Hx     Cataracts Neg Hx     Glaucoma Neg Hx     Macular degeneration Neg Hx     Retinal detachment Neg Hx     Strabismus Neg Hx     Cancer Neg Hx     Diabetes Neg Hx     Hypertension Neg Hx     Stroke Neg Hx     Thyroid disease Neg Hx        REVIEW OF SYSTEMS:   Review of Systems  "  Constitutional:  Negative for chills, diaphoresis and fever.   HENT:  Negative for nosebleeds.    Eyes:  Negative for blurred vision, double vision and photophobia.   Respiratory:  Negative for hemoptysis, shortness of breath and wheezing.    Cardiovascular:  Negative for chest pain, palpitations, orthopnea, claudication, leg swelling and PND.   Gastrointestinal:  Negative for abdominal pain, blood in stool, heartburn, melena, nausea and vomiting.   Genitourinary:  Negative for flank pain and hematuria.   Musculoskeletal:  Negative for falls, myalgias and neck pain.   Skin:  Negative for rash.   Neurological:  Negative for dizziness, seizures, loss of consciousness, weakness and headaches.   Endo/Heme/Allergies:  Negative for polydipsia. Does not bruise/bleed easily.   Psychiatric/Behavioral:  Negative for depression and memory loss. The patient is not nervous/anxious.        PHYSICAL EXAM:     Vitals:    08/10/23 1405   BP: 128/62   Pulse: 74   Resp: 18    Body mass index is 28.15 kg/m².  Weight: 91.6 kg (201 lb 13.3 oz)   Height: 5' 11" (180.3 cm)     Physical Exam  Vitals reviewed.   Constitutional:       General: He is not in acute distress.     Appearance: Normal appearance. He is well-developed. He is not ill-appearing, toxic-appearing or diaphoretic.   HENT:      Head: Normocephalic and atraumatic.   Eyes:      General: No scleral icterus.     Extraocular Movements: Extraocular movements intact.      Conjunctiva/sclera: Conjunctivae normal.      Pupils: Pupils are equal, round, and reactive to light.   Neck:      Thyroid: No thyromegaly.      Vascular: Normal carotid pulses. No carotid bruit or JVD.      Trachea: Trachea normal.   Cardiovascular:      Rate and Rhythm: Normal rate and regular rhythm.      Pulses:           Carotid pulses are 2+ on the right side and 2+ on the left side.     Heart sounds: S1 normal and S2 normal. No murmur heard.     No friction rub. No gallop.   Pulmonary:      Effort: " Pulmonary effort is normal. No respiratory distress.      Breath sounds: Normal breath sounds. No stridor. No wheezing, rhonchi or rales.   Chest:      Chest wall: No tenderness.   Abdominal:      General: There is no distension.      Palpations: Abdomen is soft.   Musculoskeletal:         General: No swelling or tenderness. Normal range of motion.      Cervical back: Normal range of motion and neck supple. No edema or rigidity.      Right lower leg: No edema.      Left lower leg: No edema.   Feet:      Right foot:      Skin integrity: No ulcer.      Left foot:      Skin integrity: No ulcer.   Skin:     General: Skin is warm and dry.      Coloration: Skin is not jaundiced.      Comments: Stigmata of CVI bilat LE    Neurological:      General: No focal deficit present.      Mental Status: He is alert and oriented to person, place, and time.      Cranial Nerves: No cranial nerve deficit.   Psychiatric:         Mood and Affect: Mood normal.         Speech: Speech normal.         Behavior: Behavior normal. Behavior is cooperative.         DATA:   EKG: (personally reviewed tracing(s))  8/10/23 SR 74, IRBBB, ?IMI-age indet    Laboratory:  CBC:  Recent Labs   Lab 05/21/21  0813 08/25/21  0928 10/10/22  1150   WBC 6.24 6.14 6.44   Hemoglobin 14.5 14.2 14.2   Hematocrit 44.7 43.3 44.2   Platelets 153 151 143 L         CHEMISTRIES:  Recent Labs   Lab 02/19/21  0812 05/21/21  0813 08/25/21  0928 10/10/22  1150   Glucose 108 129 H 91 96   Sodium 141 139 140 140   Potassium 4.7 4.1 4.5 4.7   BUN 17 17 16 15   Creatinine 1.4 1.4 1.2 1.2   eGFR if  53 A 53 A >60  --    eGFR if non  46 A 46 A 56 A  --    Calcium 9.1 9.6 9.5 9.4         CARDIAC BIOMARKERS:        COAGS:        LIPIDS/LFTS:  Recent Labs   Lab 08/25/21  0928 01/26/22  0916 08/22/22  1228 10/10/22  1150   Cholesterol  --  162 125  --    Triglycerides  --  107 142  --    HDL  --  43 41  --    LDL Cholesterol  --  97.6 55.6 L  --     Non-HDL Cholesterol  --  119 84  --    AST 15  --  20 19   ALT 14  --  21 19           Cardiovascular Testing:  Echo 3/22/23 (Ao root dil stable vs report 8/22/22)  The left ventricle is normal in size with concentric hypertrophy and normal systolic function.  The estimated ejection fraction is 65%.  Indeterminate left ventricular diastolic function.  Normal right ventricular size with normal right ventricular systolic function.  Mild left atrial enlargement.  Normal central venous pressure (3 mmHg).  The estimated PA systolic pressure is 21 mmHg.  The aortic root is moderately dilated. 4.1 cm  The sinuses of Valsalva is moderately dilated. 4.5 cm    Ex stress echo 1/28/22  The left ventricle is normal in size with mild concentric hypertrophy and normal systolic function.  The estimated ejection fraction is 60%.  Normal right ventricular size with normal right ventricular systolic function.  The sinuses of Valsalva is moderately dilated, 4.5cm.  The ECG portion of this study is negative for myocardial ischemia (Hiren 6:05, 7 METS, 111% MPHR, +/94).  The stress echo portion of this study is negative for myocardial ischemia.    Aortic US 1/26/22  No evidence of AAA.  Aortic atherosclerosis.    LE venous US/reflux 1/26/22  No evidence of lower extremity DVT venous reflux bilaterally.    Carotid US 10/7/19  No sonographic evidence of hemodynamically significant stenosis of the bilateral extracranial internal carotid arteries.        ASSESSMENT:   # ao root dil, 3.7->4.5cm (echo 3/2023).  Asc Ao 3.9cm on CT 10/1/21.  # CAD, presumed (cor Ca++ on CT 10/1/21).  CARMELLA 1/2022 neg.  Asymptomatic.  # HTN, controlled  # FH AAA.  Neg aortic US 1/2022 (but Ao root dil noted on echo 1/2022)  # CKD3a  # aortic atherosclerosis (CT 10/2/21)  # dyslipidemia on atorva 20mg  # episodic LLE edema, no CVI noted on US 1/2022, but had ruptured varix in 1/2022.      PLAN:   Cont med rx  RTC 3 months with surveillance echo (Oct  2023)    Sampson Ireland MD, FACC

## 2023-08-10 NOTE — LETTER
August 10, 2023        Kang Reddy MD  [address] [facility]  [address]  [place] [address]             [facility] - Cardiology  [address]  [facility] [place] [address]  Phone: [phone]   Patient: Robe Cevallos   MR Number: [id number]   YOB: 1940   Date of Visit: 8/10/2023       Dear Dr. Reddy:    Thank you for referring Robe Cevallos to me for evaluation. Attached you will find relevant portions of my assessment and plan of care.    If you have questions, please do not hesitate to call me. I look forward to following Robe Cevallos along with you.    Sincerely,      Sampson Ireland MD            CC  No Recipients    Enclosure

## 2023-09-15 NOTE — ASSESSMENT & PLAN NOTE
Due to extrinsic compression which has improved with XRT as discussed above  He worked with speech therapy on previous hospital admission and he is stable for a soft diet with thin liquids   Star Wedge Flap Text: The defect edges were debeveled with a #15 scalpel blade.  Given the location of the defect, shape of the defect and the proximity to free margins a star wedge flap was deemed most appropriate.  Using a sterile surgical marker, an appropriate rotation flap was drawn incorporating the defect and placing the expected incisions within the relaxed skin tension lines where possible. The area thus outlined was incised deep to adipose tissue with a #15 scalpel blade.  The skin margins were undermined to an appropriate distance in all directions utilizing iris scissors.

## 2023-10-12 ENCOUNTER — LAB VISIT (OUTPATIENT)
Dept: LAB | Facility: HOSPITAL | Age: 83
End: 2023-10-12
Attending: INTERNAL MEDICINE
Payer: MEDICARE

## 2023-10-12 ENCOUNTER — OFFICE VISIT (OUTPATIENT)
Dept: HEMATOLOGY/ONCOLOGY | Facility: CLINIC | Age: 83
End: 2023-10-12
Payer: MEDICARE

## 2023-10-12 VITALS
SYSTOLIC BLOOD PRESSURE: 151 MMHG | WEIGHT: 204.13 LBS | BODY MASS INDEX: 28.58 KG/M2 | HEART RATE: 77 BPM | OXYGEN SATURATION: 95 % | DIASTOLIC BLOOD PRESSURE: 76 MMHG | HEIGHT: 71 IN

## 2023-10-12 DIAGNOSIS — C61 PROSTATE CANCER: ICD-10-CM

## 2023-10-12 DIAGNOSIS — C83.32 DIFFUSE LARGE B-CELL LYMPHOMA OF INTRATHORACIC LYMPH NODES: Primary | ICD-10-CM

## 2023-10-12 DIAGNOSIS — C83.32 DIFFUSE LARGE B-CELL LYMPHOMA OF INTRATHORACIC LYMPH NODES: ICD-10-CM

## 2023-10-12 LAB
ALBUMIN SERPL BCP-MCNC: 3.9 G/DL (ref 3.5–5.2)
ALP SERPL-CCNC: 88 U/L (ref 55–135)
ALT SERPL W/O P-5'-P-CCNC: 25 U/L (ref 10–44)
ANION GAP SERPL CALC-SCNC: 9 MMOL/L (ref 8–16)
AST SERPL-CCNC: 22 U/L (ref 10–40)
BASOPHILS # BLD AUTO: 0.04 K/UL (ref 0–0.2)
BASOPHILS NFR BLD: 0.7 % (ref 0–1.9)
BILIRUB SERPL-MCNC: 0.5 MG/DL (ref 0.1–1)
BUN SERPL-MCNC: 16 MG/DL (ref 8–23)
CALCIUM SERPL-MCNC: 9.6 MG/DL (ref 8.7–10.5)
CHLORIDE SERPL-SCNC: 107 MMOL/L (ref 95–110)
CO2 SERPL-SCNC: 26 MMOL/L (ref 23–29)
CREAT SERPL-MCNC: 1.2 MG/DL (ref 0.5–1.4)
DIFFERENTIAL METHOD: ABNORMAL
EOSINOPHIL # BLD AUTO: 0.3 K/UL (ref 0–0.5)
EOSINOPHIL NFR BLD: 5.2 % (ref 0–8)
ERYTHROCYTE [DISTWIDTH] IN BLOOD BY AUTOMATED COUNT: 14.4 % (ref 11.5–14.5)
EST. GFR  (NO RACE VARIABLE): 59 ML/MIN/1.73 M^2
GLUCOSE SERPL-MCNC: 98 MG/DL (ref 70–110)
HCT VFR BLD AUTO: 42.2 % (ref 40–54)
HGB BLD-MCNC: 13.4 G/DL (ref 14–18)
IMM GRANULOCYTES # BLD AUTO: 0.02 K/UL (ref 0–0.04)
IMM GRANULOCYTES NFR BLD AUTO: 0.3 % (ref 0–0.5)
LDH SERPL L TO P-CCNC: 147 U/L (ref 110–260)
LYMPHOCYTES # BLD AUTO: 1.4 K/UL (ref 1–4.8)
LYMPHOCYTES NFR BLD: 24.4 % (ref 18–48)
MCH RBC QN AUTO: 27.3 PG (ref 27–31)
MCHC RBC AUTO-ENTMCNC: 31.8 G/DL (ref 32–36)
MCV RBC AUTO: 86 FL (ref 82–98)
MONOCYTES # BLD AUTO: 0.4 K/UL (ref 0.3–1)
MONOCYTES NFR BLD: 7 % (ref 4–15)
NEUTROPHILS # BLD AUTO: 3.6 K/UL (ref 1.8–7.7)
NEUTROPHILS NFR BLD: 62.4 % (ref 38–73)
NRBC BLD-RTO: 0 /100 WBC
PLATELET # BLD AUTO: 143 K/UL (ref 150–450)
PMV BLD AUTO: 11.3 FL (ref 9.2–12.9)
POTASSIUM SERPL-SCNC: 4.4 MMOL/L (ref 3.5–5.1)
PROT SERPL-MCNC: 6.8 G/DL (ref 6–8.4)
RBC # BLD AUTO: 4.9 M/UL (ref 4.6–6.2)
SODIUM SERPL-SCNC: 142 MMOL/L (ref 136–145)
WBC # BLD AUTO: 5.74 K/UL (ref 3.9–12.7)

## 2023-10-12 PROCEDURE — 1126F PR PAIN SEVERITY QUANTIFIED, NO PAIN PRESENT: ICD-10-PCS | Mod: CPTII,S$GLB,, | Performed by: INTERNAL MEDICINE

## 2023-10-12 PROCEDURE — 1101F PR PT FALLS ASSESS DOC 0-1 FALLS W/OUT INJ PAST YR: ICD-10-PCS | Mod: CPTII,S$GLB,, | Performed by: INTERNAL MEDICINE

## 2023-10-12 PROCEDURE — 99999 PR PBB SHADOW E&M-EST. PATIENT-LVL III: CPT | Mod: PBBFAC,,, | Performed by: INTERNAL MEDICINE

## 2023-10-12 PROCEDURE — 3078F DIAST BP <80 MM HG: CPT | Mod: CPTII,S$GLB,, | Performed by: INTERNAL MEDICINE

## 2023-10-12 PROCEDURE — 36415 COLL VENOUS BLD VENIPUNCTURE: CPT | Performed by: INTERNAL MEDICINE

## 2023-10-12 PROCEDURE — 83615 LACTATE (LD) (LDH) ENZYME: CPT | Performed by: INTERNAL MEDICINE

## 2023-10-12 PROCEDURE — 85025 COMPLETE CBC W/AUTO DIFF WBC: CPT | Performed by: INTERNAL MEDICINE

## 2023-10-12 PROCEDURE — 3288F PR FALLS RISK ASSESSMENT DOCUMENTED: ICD-10-PCS | Mod: CPTII,S$GLB,, | Performed by: INTERNAL MEDICINE

## 2023-10-12 PROCEDURE — 99213 OFFICE O/P EST LOW 20 MIN: CPT | Mod: S$GLB,,, | Performed by: INTERNAL MEDICINE

## 2023-10-12 PROCEDURE — 80053 COMPREHEN METABOLIC PANEL: CPT | Performed by: INTERNAL MEDICINE

## 2023-10-12 PROCEDURE — 3077F PR MOST RECENT SYSTOLIC BLOOD PRESSURE >= 140 MM HG: ICD-10-PCS | Mod: CPTII,S$GLB,, | Performed by: INTERNAL MEDICINE

## 2023-10-12 PROCEDURE — 1101F PT FALLS ASSESS-DOCD LE1/YR: CPT | Mod: CPTII,S$GLB,, | Performed by: INTERNAL MEDICINE

## 2023-10-12 PROCEDURE — 1126F AMNT PAIN NOTED NONE PRSNT: CPT | Mod: CPTII,S$GLB,, | Performed by: INTERNAL MEDICINE

## 2023-10-12 PROCEDURE — 1159F MED LIST DOCD IN RCRD: CPT | Mod: CPTII,S$GLB,, | Performed by: INTERNAL MEDICINE

## 2023-10-12 PROCEDURE — 3288F FALL RISK ASSESSMENT DOCD: CPT | Mod: CPTII,S$GLB,, | Performed by: INTERNAL MEDICINE

## 2023-10-12 PROCEDURE — 1159F PR MEDICATION LIST DOCUMENTED IN MEDICAL RECORD: ICD-10-PCS | Mod: CPTII,S$GLB,, | Performed by: INTERNAL MEDICINE

## 2023-10-12 PROCEDURE — 3077F SYST BP >= 140 MM HG: CPT | Mod: CPTII,S$GLB,, | Performed by: INTERNAL MEDICINE

## 2023-10-12 PROCEDURE — 99213 PR OFFICE/OUTPT VISIT, EST, LEVL III, 20-29 MIN: ICD-10-PCS | Mod: S$GLB,,, | Performed by: INTERNAL MEDICINE

## 2023-10-12 PROCEDURE — 3078F PR MOST RECENT DIASTOLIC BLOOD PRESSURE < 80 MM HG: ICD-10-PCS | Mod: CPTII,S$GLB,, | Performed by: INTERNAL MEDICINE

## 2023-10-12 PROCEDURE — 99999 PR PBB SHADOW E&M-EST. PATIENT-LVL III: ICD-10-PCS | Mod: PBBFAC,,, | Performed by: INTERNAL MEDICINE

## 2023-10-12 NOTE — PROGRESS NOTES
"Subjective:       Patient ID: Robe Cevallos is a 83 y.o. male.    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 year old male who presents to the clinic today for  Oncology followup. Has been seen at [facility] in the past. Dx'd to have Low grade , Stage 1, follicular lymphoma Right groin in 2004. Good response to R-CVP Chemotherapy and maintenance rituxan.       He was then noted yoselin have a lesion excised from Left side of nostril which revealed superficially invasive squamous cell [id number]. Invasive. \     In Aug/Sep 2019 he underwent a biopsy of Lymph node from left neck and pathology revealed "difuse large b cell lymphoma"     CT scans revealed a large mediastinal mass. Patient had sifnificant sx. Transferred to [facility]. Was given emergency mediastinal radiation. Started on mini CHOP on 10/2/19 and was admitted for 6  weeks, at the same time CTA chest revealed Emboli in right upper, middle and lower lobes. The  left mediastinal mass had decreased in size  He completed 6 cycles of mini CHOP in February 2020 and is on surveillance  PET scan was in 10/2021 was negative for lymphoma    Interval Hx: He is followed by Urology at [facility] for prostate ca  He is undergoing Firmagon  Appetite and weight stable  No SOB/cough/CP  No fevers, night sweats  No recent infxns  No hx of recurrent infxns.            Lab Results   Component Value Date    WBC 5.74 10/12/2023    HGB 13.4 (L) 10/12/2023    HCT 42.2 10/12/2023    MCV 86 10/12/2023     (L) 10/12/2023               Review of Systems   Constitutional:  Negative for appetite change, fatigue, fever and unexpected weight change.   HENT:  Negative for mouth sores.    Eyes:  Negative for visual disturbance.   Respiratory:  Negative for cough and shortness of breath.    Cardiovascular:  Negative for chest pain.   Gastrointestinal:  Negative for abdominal pain and diarrhea. " "  Genitourinary:  Negative for frequency.   Musculoskeletal:  Negative for back pain.   Integumentary:  Negative for rash.   Neurological:  Negative for headaches.   Hematological:  Negative for adenopathy.   Psychiatric/Behavioral:  The patient is not nervous/anxious.        Objective:       Vitals:    10/12/23 1048   BP: (!) 151/76   Pulse: 77   SpO2: 95%   Weight: 92.6 kg (204 lb 2.3 oz)   Height: 5' 11" (1.803 m)       Physical Exam  Constitutional:       Appearance: He is well-developed.   HENT:      Head: Normocephalic.      Mouth/Throat:      Pharynx: No oropharyngeal exudate.   Eyes:      General: No scleral icterus.        Right eye: No discharge.         Left eye: No discharge.      Conjunctiva/sclera: Conjunctivae normal.   Neck:      Thyroid: No thyromegaly.   Cardiovascular:      Rate and Rhythm: Normal rate and regular rhythm.      Heart sounds: Normal heart sounds. No murmur heard.  Pulmonary:      Effort: Pulmonary effort is normal.      Breath sounds: Normal breath sounds. No wheezing or rales.   Abdominal:      General: Bowel sounds are normal.      Palpations: Abdomen is soft.      Tenderness: There is no abdominal tenderness. There is no guarding or rebound.   Musculoskeletal:         General: No swelling. Normal range of motion.      Cervical back: Normal range of motion and neck supple.   Lymphadenopathy:      Cervical: No cervical adenopathy.      Upper Body:      Right upper body: No supraclavicular adenopathy.      Left upper body: No supraclavicular adenopathy.   Skin:     Findings: No erythema or rash.   Neurological:      Mental Status: He is alert and oriented to person, place, and time.      Cranial Nerves: No cranial nerve deficit.   Psychiatric:         Mood and Affect: Mood normal.         Behavior: Behavior normal.             Lab Results   Component Value Date    WBC 5.74 10/12/2023    HGB 13.4 (L) 10/12/2023    HCT 42.2 10/12/2023    MCV 86 10/12/2023     (L) 10/12/2023 "       MRI Prostate w wo Contrast: 03/01/2023    IMPRESSION: PI-RADS 5. There is a large lesion extending across the midline involving the apex, mid gland, and base with invasion of the left more so than right seminal vesicles and conceivable involvement of the neurovascular bundles, especially on the left given that there is likely extraprostatic extension within these regions. There is also osseous metastatic disease involving the right inferior pubic ramus. There are small lymph nodes in the perirectal region that although are not overtly enlarged by size criteria could conceivably represent small metastatic lymph nodes. Further assessment with PET/CT utilizing Pylarify could be considered if clinically indicated.         PET CT Skull Base to Mid Thigh PSMA: 04/28/2023   Impression    1.   Unchanged dominant mass in the posterior prostate consistent with PI-RADS 5 lesion on recent prostate MRI and biopsied prostate adenocarcinoma.   2.   Multiple pelvic and bilateral iliac hypermetabolic lymph nodes consistent with local metastatic esme disease.   3.   Osseous sclerotic metastases in the right inferior pubic ramus and T12 vertebral body.        PSA Level:  PSA 08/15/2023 1.19  PSA 01/27/2023 44.73  PSA 10/19/2021 18.21    Assessment:       Problem List Items Addressed This Visit          Oncology    History of follicular lymphoma    Diffuse large B-cell lymphoma of intrathoracic lymph nodes - Primary  He completed 6 cycles of mini CHOP in February 2020 and is on surveillance  PET scan was in 10/2021 was negative for lymphoma   No b symptoms   Cbc,cmp, LDH prior to f/u 1 yr   Recommend update immunizations  Above care plan was discussed with patient and all questions were addressed to his satisfaction     Relevant Orders        Prostate CA  Followed by Urology, on ADT, on Firmagon       Follow-up:      Cbc,cmp, LDH prior to f/u 1 yr  Update immunizatons

## 2023-11-10 ENCOUNTER — HOSPITAL ENCOUNTER (OUTPATIENT)
Dept: CARDIOLOGY | Facility: HOSPITAL | Age: 83
Discharge: HOME | End: 2023-11-10
Attending: INTERNAL MEDICINE
Payer: MEDICARE

## 2023-11-10 DIAGNOSIS — I77.810 AORTIC ROOT DILATATION: ICD-10-CM

## 2023-11-10 LAB
ASCENDING AORTA: 3.86 CM
AV INDEX (PROSTH): 0.91
AV MEAN GRADIENT: 3 MMHG
AV PEAK GRADIENT: 5 MMHG
AV REGURGITATION PRESSURE HALF TIME: 602.05 MS
AV VALVE AREA BY VELOCITY RATIO: 2.62 CM²
AV VALVE AREA: 2.9 CM²
AV VELOCITY RATIO: 0.83
CV ECHO LV RWT: 0.49 CM
DOP CALC AO PEAK VEL: 1.15 M/S
DOP CALC AO VTI: 23 CM
DOP CALC LVOT AREA: 3.2 CM2
DOP CALC LVOT DIAMETER: 2.01 CM
DOP CALC LVOT PEAK VEL: 0.95 M/S
DOP CALC LVOT STROKE VOLUME: 66.6 CM3
DOP CALC MV VTI: 36.8 CM
DOP CALCLVOT PEAK VEL VTI: 21 CM
E WAVE DECELERATION TIME: 291.8 MSEC
E/A RATIO: 1
E/E' RATIO: 10.59 M/S
ECHO LV POSTERIOR WALL: 1.05 CM (ref 0.6–1.1)
FRACTIONAL SHORTENING: 32 % (ref 28–44)
INTERVENTRICULAR SEPTUM: 1.08 CM (ref 0.6–1.1)
IVC DIAMETER: 1.09 CM
IVRT: 83.73 MSEC
LA MAJOR: 5.05 CM
LA MINOR: 5.25 CM
LA WIDTH: 4 CM
LEFT ATRIUM SIZE: 3.22 CM
LEFT ATRIUM VOLUME: 56.36 CM3
LEFT INTERNAL DIMENSION IN SYSTOLE: 2.95 CM (ref 2.1–4)
LEFT VENTRICLE DIASTOLIC VOLUME: 83.76 ML
LEFT VENTRICLE SYSTOLIC VOLUME: 33.49 ML
LEFT VENTRICULAR INTERNAL DIMENSION IN DIASTOLE: 4.32 CM (ref 3.5–6)
LEFT VENTRICULAR MASS: 156.78 G
LV LATERAL E/E' RATIO: 10 M/S
LV SEPTAL E/E' RATIO: 11.25 M/S
LVOT MG: 2.18 MMHG
LVOT MV: 0.71 CM/S
MV MEAN GRADIENT: 2 MMHG
MV PEAK A VEL: 0.9 M/S
MV PEAK E VEL: 0.9 M/S
MV PEAK GRADIENT: 4 MMHG
MV STENOSIS PRESSURE HALF TIME: 84.62 MS
MV VALVE AREA BY CONTINUITY EQUATION: 1.81 CM2
MV VALVE AREA P 1/2 METHOD: 2.6 CM2
PISA AR MAX VEL: 2.46 M/S
PISA TR MAX VEL: 1.56 M/S
PV PEAK GRADIENT: 4 MMHG
PV PEAK VELOCITY: 0.95 M/S
RA MAJOR: 4.93 CM
RA PRESSURE ESTIMATED: 3 MMHG
RA WIDTH: 2.9 CM
RIGHT VENTRICULAR END-DIASTOLIC DIMENSION: 3.68 CM
RV TB RVSP: 5 MMHG
RV TISSUE DOPPLER FREE WALL SYSTOLIC VELOCITY 1 (APICAL 4 CHAMBER VIEW): 12.39 CM/S
SINUS: 3.69 CM
STJ: 2.74 CM
TDI LATERAL: 0.09 M/S
TDI SEPTAL: 0.08 M/S
TDI: 0.09 M/S
TR MAX PG: 10 MMHG
TRICUSPID ANNULAR PLANE SYSTOLIC EXCURSION: 2.05 CM
TV REST PULMONARY ARTERY PRESSURE: 13 MMHG

## 2023-11-10 PROCEDURE — 93306 TTE W/DOPPLER COMPLETE: CPT | Mod: 26,,, | Performed by: INTERNAL MEDICINE

## 2023-11-10 PROCEDURE — 93306 ECHO (CUPID ONLY): ICD-10-PCS | Mod: 26,,, | Performed by: INTERNAL MEDICINE

## 2023-11-10 PROCEDURE — 93306 TTE W/DOPPLER COMPLETE: CPT

## 2023-11-15 ENCOUNTER — LAB VISIT (OUTPATIENT)
Dept: LAB | Facility: HOSPITAL | Age: 83
End: 2023-11-15
Attending: OTOLARYNGOLOGY
Payer: MEDICARE

## 2023-11-15 ENCOUNTER — OFFICE VISIT (OUTPATIENT)
Dept: OTOLARYNGOLOGY | Facility: CLINIC | Age: 83
End: 2023-11-15
Payer: MEDICARE

## 2023-11-15 VITALS — BODY MASS INDEX: 28.56 KG/M2 | WEIGHT: 204 LBS | HEIGHT: 71 IN

## 2023-11-15 DIAGNOSIS — K11.20 PAROTITIS: Primary | ICD-10-CM

## 2023-11-15 DIAGNOSIS — K11.20 PAROTITIS: ICD-10-CM

## 2023-11-15 DIAGNOSIS — Z85.72 HISTORY OF FOLLICULAR LYMPHOMA: ICD-10-CM

## 2023-11-15 DIAGNOSIS — J35.8 TONSILLAR TAG: ICD-10-CM

## 2023-11-15 LAB
CREAT SERPL-MCNC: 1.2 MG/DL (ref 0.5–1.4)
EST. GFR  (NO RACE VARIABLE): 59 ML/MIN/1.73 M^2

## 2023-11-15 PROCEDURE — 1126F PR PAIN SEVERITY QUANTIFIED, NO PAIN PRESENT: ICD-10-PCS | Mod: CPTII,S$GLB,, | Performed by: OTOLARYNGOLOGY

## 2023-11-15 PROCEDURE — 36415 COLL VENOUS BLD VENIPUNCTURE: CPT | Performed by: OTOLARYNGOLOGY

## 2023-11-15 PROCEDURE — 3288F PR FALLS RISK ASSESSMENT DOCUMENTED: ICD-10-PCS | Mod: CPTII,S$GLB,, | Performed by: OTOLARYNGOLOGY

## 2023-11-15 PROCEDURE — 82565 ASSAY OF CREATININE: CPT | Performed by: OTOLARYNGOLOGY

## 2023-11-15 PROCEDURE — 99214 OFFICE O/P EST MOD 30 MIN: CPT | Mod: S$GLB,,, | Performed by: OTOLARYNGOLOGY

## 2023-11-15 PROCEDURE — 1101F PT FALLS ASSESS-DOCD LE1/YR: CPT | Mod: CPTII,S$GLB,, | Performed by: OTOLARYNGOLOGY

## 2023-11-15 PROCEDURE — 1159F PR MEDICATION LIST DOCUMENTED IN MEDICAL RECORD: ICD-10-PCS | Mod: CPTII,S$GLB,, | Performed by: OTOLARYNGOLOGY

## 2023-11-15 PROCEDURE — 99214 PR OFFICE/OUTPT VISIT, EST, LEVL IV, 30-39 MIN: ICD-10-PCS | Mod: S$GLB,,, | Performed by: OTOLARYNGOLOGY

## 2023-11-15 PROCEDURE — 1159F MED LIST DOCD IN RCRD: CPT | Mod: CPTII,S$GLB,, | Performed by: OTOLARYNGOLOGY

## 2023-11-15 PROCEDURE — 1101F PR PT FALLS ASSESS DOC 0-1 FALLS W/OUT INJ PAST YR: ICD-10-PCS | Mod: CPTII,S$GLB,, | Performed by: OTOLARYNGOLOGY

## 2023-11-15 PROCEDURE — 3288F FALL RISK ASSESSMENT DOCD: CPT | Mod: CPTII,S$GLB,, | Performed by: OTOLARYNGOLOGY

## 2023-11-15 PROCEDURE — 1126F AMNT PAIN NOTED NONE PRSNT: CPT | Mod: CPTII,S$GLB,, | Performed by: OTOLARYNGOLOGY

## 2023-11-15 RX ORDER — AMOXICILLIN AND CLAVULANATE POTASSIUM 875; 125 MG/1; MG/1
1 TABLET, FILM COATED ORAL 2 TIMES DAILY
Qty: 14 TABLET | Refills: 0 | Status: SHIPPED | OUTPATIENT
Start: 2023-11-15 | End: 2023-11-22

## 2023-11-15 NOTE — PROGRESS NOTES
OTOLARYNGOLOGY CLINIC NOTE  Date:  11/15/2023     Chief complaint:  Chief Complaint   Patient presents with    Other     Booked about about his nose tissue swells at times. Also now right side face neck is swollen since 11/8/2023. Lymphoma patient in remission, prostate cancer terminal       History of Present Illness  Robe Cevallos is a 83 y.o. male  presenting today for a followup.pt has history of lymphoma and prostate cancer    The area in back of mouth still there. Not hurting, never has hurting  Gets a lot of saliva build up in his mouth, not any issues with dry mouth   Redness and swelling started lasted wednesday-Spreading lower    A little tender currently  No bad taste in mouth that noticed.   No swelling like this before. Does not have swelling or pain with eating currently either. Only time it is effected is when he touches it     First time happened since had mumps.that was bilateral and as a child    Had a large stone in smg area on the right and had a sonar to break up the stone and has not had issue since but this started in parotid area   When sleeps nose feels closed after 10 minutes opens up only     I last saw the patient on 5-28-21. Below text is copied from  note on that date describing history of present illness at that time :    Per review of medical records-heme Onc physician Dr. Carcamo- He has a history of Low grade , Stage 1, follicular lymphoma Right groin in 2004.       new possible nasal mass.  This is on his external nose.  He was also seen by his dermatologist yesterday for evaluation of this.  Area on outside of nose is new since last exam.  He reports that the area inside his nostril seems to be better.  He has been using the ointment on the inside of his nose still.  He started using the ointment on the outside of his nose when it started feeling irritated as well and he feels that this is helping in the area.     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 Jolley    CATARACT EXTRACTION W/  INTRAOCULAR LENS IMPLANT Left 04/20/2017    Dr. Jolley    CHOLECYSTECTOMY      EYE SURGERY      Rt cataract    KNEE ARTHROSCOPY      Lt knee    MEDIPORT REMOVAL N/A 4/17/2020    Procedure: REMOVAL, CATHETER, CENTRAL VENOUS, TUNNELED, WITH PORT;  Surgeon: Bandar Baekr MD;  Location: [facility] CATH LAB;  Service: Radiology;  Laterality: N/A;  picc removal    TONSILLECTOMY      Tunneled PICC line Right 09/30/2019    Via IJ, 23cm  length        Medications  Current Outpatient Medications on File Prior to Visit   Medication Sig Dispense Refill    atorvastatin (LIPITOR) 20 MG tablet TAKE 1 TABLET(20 MG) BY MOUTH EVERY EVENING 90 tablet 3    metoprolol succinate (TOPROL-XL) 25 MG 24 hr tablet TAKE 1 TABLET(25 MG) BY MOUTH EVERY DAY 90 tablet 1    pyridostigmine (MESTINON) 60 mg Tab Take 60 mg by mouth 3 (three) times daily as needed.      tamsulosin (FLOMAX) 0.4 mg Cap Take 1 capsule by mouth nightly.       No current facility-administered medications on file prior to visit.       Review of Systems  Review of Systems   Constitutional:  Positive for malaise/fatigue.   Gastrointestinal:  Positive for diarrhea.   Skin:  Positive for rash.   Neurological:  Positive for dizziness.   Endo/Heme/Allergies:  Bruises/bleeds  "easily.   Psychiatric/Behavioral: Negative.      Answers submitted by the patient for this visit:  Review of Symptoms Questionnaire  (Submitted on 11/11/2023)  facial swelling: Yes  postnasal drip: Yes  Tooth/Dental Problems?: Yes  Snoring?: Yes  Urinating too frequently?: Yes  Cold all of the time? : Yes    Social History   reports that he has quit smoking. He has never used smokeless tobacco. He reports that he does not drink alcohol and does not use drugs.     Family History  Family History   Problem Relation Age of Onset    No Known Problems Mother     Heart disease Father     No Known Problems Sister     No Known Problems Brother     No Known Problems Maternal Aunt     No Known Problems Maternal Uncle     No Known Problems Paternal Aunt     No Known Problems Paternal Uncle     No Known Problems Maternal Grandmother     No Known Problems Maternal Grandfather     No Known Problems Paternal Grandmother     No Known Problems Paternal Grandfather     Amblyopia Neg Hx     Blindness Neg Hx     Cataracts Neg Hx     Glaucoma Neg Hx     Macular degeneration Neg Hx     Retinal detachment Neg Hx     Strabismus Neg Hx     Cancer Neg Hx     Diabetes Neg Hx     Hypertension Neg Hx     Stroke Neg Hx     Thyroid disease Neg Hx         Physical Exam   There were no vitals filed for this visit. Body mass index is 28.45 kg/m².  Weight: 92.5 kg (204 lb)   Height: 5' 11" (180.3 cm)     GENERAL: no acute distress.  HEAD: normocephalic.   EYES: No scleral icterus  EARS: external ear without lesion, normal pinna shape and position.  External auditory canal with normal cerumen, tympanic membrane fully visible, no perforation , no retraction. No middle ear effusion. Ossicles intact.   NOSE: external nose without significant bony abnormality  ORAL CAVITY/OROPHARYNX: tongue mobile. Smooth walled nodule upper palate area unchanged. Soft to palpation . No purulence from duct, no stones palpated  NECK: trachea midline. Slight tenderness in " parotid area with mild erythema. Not taut and mild amount of swelling  LYMPH NODES:No cervical lymphadenopathy.  RESPIRATORY: no stridor, no stertor. Voice normal. Respirations nonlabored.  NEURO: alert, responds to questions appropriately.    PSYCH:mood appropriate      Imaging:  The patient does not have any new imaging of the head and neck since last visit.     Labs:  CBC  Recent Labs   Lab 08/25/21  0928 10/10/22  1150 10/12/23  0854   WBC 6.14 6.44 5.74   Hemoglobin 14.2 14.2 13.4 L   Hematocrit 43.3 44.2 42.2   MCV 83 87 86   Platelets 151 143 L 143 L     BMP  Recent Labs   Lab 08/25/21  0928 10/10/22  1150 10/12/23  0854   Glucose 91 96 98   Sodium 140 140 142   Potassium 4.5 4.7 4.4   Chloride 109 111 H 107   CO2 21 L 25 26   BUN 16 15 16   Creatinine 1.2 1.2 1.2   Calcium 9.5 9.4 9.6     COAGS        Assessment  1. Parotitis  - CT Soft Tissue Neck With Contrast; Future  - CREATININE, SERUM; Future    2. History of follicular lymphoma    3. Tonsillar tag       Plan:  Discussed plan of care with patient in detail and all questions answered. Patient reported understanding of plan of care. I gave the patient the opportunity to ask questions and patient confirmed all questions answered to satisfaction.     Offered to biopsy area on palate- unchanged since last visit. Suspect benign but discussed only way to know for sure is to remove and send for path    Rx for abx sent  Hydrate, lemon wedges/sialogogues, warm compresses, massage   After infection resolves get ct scan for eval for stones or any other pathology     F/u 3 months sooner if issue      Please be aware that this note has been generated with the assistance of Ron voice-to-text.  Please excuse any spelling or grammatical errors.

## 2023-11-21 ENCOUNTER — HOSPITAL ENCOUNTER (OUTPATIENT)
Dept: RADIOLOGY | Facility: HOSPITAL | Age: 83
Discharge: HOME | End: 2023-11-21
Attending: OTOLARYNGOLOGY
Payer: MEDICARE

## 2023-11-21 DIAGNOSIS — K11.20 PAROTITIS: ICD-10-CM

## 2023-11-21 PROCEDURE — 70491 CT SOFT TISSUE NECK W/DYE: CPT | Mod: TC

## 2023-11-21 PROCEDURE — 70491 CT SOFT TISSUE NECK WITH CONTRAST: ICD-10-PCS | Mod: 26,,, | Performed by: RADIOLOGY

## 2023-11-21 PROCEDURE — 25500020 PHARM REV CODE 255: Performed by: OTOLARYNGOLOGY

## 2023-11-21 PROCEDURE — 70491 CT SOFT TISSUE NECK W/DYE: CPT | Mod: 26,,, | Performed by: RADIOLOGY

## 2023-11-21 RX ADMIN — IOHEXOL 75 ML: 300 INJECTION, SOLUTION INTRAVENOUS at 11:11

## 2023-12-13 ENCOUNTER — OFFICE VISIT (OUTPATIENT)
Dept: OPHTHALMOLOGY | Facility: CLINIC | Age: 83
End: 2023-12-13
Attending: OPHTHALMOLOGY
Payer: MEDICARE

## 2023-12-13 DIAGNOSIS — H31.001 CHORIORETINAL SCAR, RIGHT: ICD-10-CM

## 2023-12-13 DIAGNOSIS — H35.3123 NONEXUDATIVE AGE-RELATED MACULAR DEGENERATION, LEFT EYE, ADVANCED ATROPHIC WITHOUT SUBFOVEAL INVOLVEMENT: Primary | ICD-10-CM

## 2023-12-13 DIAGNOSIS — H35.3212 EXUDATIVE AGE-RELATED MACULAR DEGENERATION OF RIGHT EYE WITH INACTIVE CHOROIDAL NEOVASCULARIZATION: ICD-10-CM

## 2023-12-13 PROCEDURE — 99999 PR PBB SHADOW E&M-EST. PATIENT-LVL III: CPT | Mod: PBBFAC,,, | Performed by: OPHTHALMOLOGY

## 2023-12-13 PROCEDURE — 1159F MED LIST DOCD IN RCRD: CPT | Mod: CPTII,S$GLB,, | Performed by: OPHTHALMOLOGY

## 2023-12-13 PROCEDURE — 1101F PT FALLS ASSESS-DOCD LE1/YR: CPT | Mod: CPTII,S$GLB,, | Performed by: OPHTHALMOLOGY

## 2023-12-13 PROCEDURE — 1160F PR REVIEW ALL MEDS BY PRESCRIBER/CLIN PHARMACIST DOCUMENTED: ICD-10-PCS | Mod: CPTII,S$GLB,, | Performed by: OPHTHALMOLOGY

## 2023-12-13 PROCEDURE — 92014 COMPRE OPH EXAM EST PT 1/>: CPT | Mod: S$GLB,,, | Performed by: OPHTHALMOLOGY

## 2023-12-13 PROCEDURE — 1126F AMNT PAIN NOTED NONE PRSNT: CPT | Mod: CPTII,S$GLB,, | Performed by: OPHTHALMOLOGY

## 2023-12-13 PROCEDURE — 92134 POSTERIOR SEGMENT OCT RETINA (OCULAR COHERENCE TOMOGRAPHY)-BOTH EYES: ICD-10-PCS | Mod: S$GLB,,, | Performed by: OPHTHALMOLOGY

## 2023-12-13 PROCEDURE — 3288F FALL RISK ASSESSMENT DOCD: CPT | Mod: CPTII,S$GLB,, | Performed by: OPHTHALMOLOGY

## 2023-12-13 PROCEDURE — 1101F PR PT FALLS ASSESS DOC 0-1 FALLS W/OUT INJ PAST YR: ICD-10-PCS | Mod: CPTII,S$GLB,, | Performed by: OPHTHALMOLOGY

## 2023-12-13 PROCEDURE — 99999 PR PBB SHADOW E&M-EST. PATIENT-LVL III: ICD-10-PCS | Mod: PBBFAC,,, | Performed by: OPHTHALMOLOGY

## 2023-12-13 PROCEDURE — 3288F PR FALLS RISK ASSESSMENT DOCUMENTED: ICD-10-PCS | Mod: CPTII,S$GLB,, | Performed by: OPHTHALMOLOGY

## 2023-12-13 PROCEDURE — 1126F PR PAIN SEVERITY QUANTIFIED, NO PAIN PRESENT: ICD-10-PCS | Mod: CPTII,S$GLB,, | Performed by: OPHTHALMOLOGY

## 2023-12-13 PROCEDURE — 92014 PR EYE EXAM, EST PATIENT,COMPREHESV: ICD-10-PCS | Mod: S$GLB,,, | Performed by: OPHTHALMOLOGY

## 2023-12-13 PROCEDURE — 1159F PR MEDICATION LIST DOCUMENTED IN MEDICAL RECORD: ICD-10-PCS | Mod: CPTII,S$GLB,, | Performed by: OPHTHALMOLOGY

## 2023-12-13 PROCEDURE — 92134 CPTRZ OPH DX IMG PST SGM RTA: CPT | Mod: S$GLB,,, | Performed by: OPHTHALMOLOGY

## 2023-12-13 PROCEDURE — 1160F RVW MEDS BY RX/DR IN RCRD: CPT | Mod: CPTII,S$GLB,, | Performed by: OPHTHALMOLOGY

## 2023-12-13 NOTE — PROGRESS NOTES
"Subjective:       Patient ID: Robe Cevallos is a 83 y.o. male      Chief Complaint   Patient presents with    Macular Degeneration     History of Present Illness  HPI    Overdue 6 Month oct/Dfe   Dls: 09/28/2023 Dr. Kapoor     Pt states vision is always blurry, but he feels his vision has declined.   He states " it feels like my left eye does all the work" He states vision   started to decline over the summer.       -Flashes   -Floaters   -Pain     Eye meds:   Areds-2 PO       -Flashes   -Floaters   -Pain   Last edited by Charles Kapoor MD on 12/13/2023 12:24 PM.        Imaging:    See report    Assessment/Plan:     1. 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  No reactivation  Observe  - Posterior Segment OCT Retina-Both eyes    2. Nonexudative age-related macular degeneration, left eye, advanced atrophic without subfoveal involvement  OD also with atrophy at fovea  Has had worsening since 2019 scans but no dramatically worse  Discussed Syfovre and RBA.  Pt will consider  Reeval in 6 mo with OCT and FAF    Discussed Dry and Wet AMD in detail  Recommend continue AREDS 2 Vitamins  Home [employer] Grid Testing  RTC immediately PRN any changes in vision    3. Chorioretinal scar, right  Stable.  Observe    Follow up in about 6 months (around 6/13/2024), or if symptoms worsen or fail to improve, for Comprehensive Examination, OCT Mac, Fundus Autofluoresence.     "

## 2024-01-25 ENCOUNTER — HOSPITAL ENCOUNTER (EMERGENCY)
Facility: HOSPITAL | Age: 84
Discharge: HOME | End: 2024-01-25
Attending: STUDENT IN AN ORGANIZED HEALTH CARE EDUCATION/TRAINING PROGRAM
Payer: MEDICARE

## 2024-01-25 VITALS
BODY MASS INDEX: 28.56 KG/M2 | RESPIRATION RATE: 17 BRPM | SYSTOLIC BLOOD PRESSURE: 137 MMHG | OXYGEN SATURATION: 97 % | WEIGHT: 204 LBS | TEMPERATURE: 100 F | DIASTOLIC BLOOD PRESSURE: 76 MMHG | HEART RATE: 84 BPM | HEIGHT: 71 IN

## 2024-01-25 DIAGNOSIS — K29.70 GASTRITIS, PRESENCE OF BLEEDING UNSPECIFIED, UNSPECIFIED CHRONICITY, UNSPECIFIED GASTRITIS TYPE: ICD-10-CM

## 2024-01-25 DIAGNOSIS — M79.605 BILATERAL LEG PAIN: ICD-10-CM

## 2024-01-25 DIAGNOSIS — R53.1 WEAKNESS: Primary | ICD-10-CM

## 2024-01-25 DIAGNOSIS — M79.604 BILATERAL LEG PAIN: ICD-10-CM

## 2024-01-25 LAB
ALBUMIN SERPL BCP-MCNC: 4.1 G/DL (ref 3.5–5.2)
ALLENS TEST: ABNORMAL
ALP SERPL-CCNC: 81 U/L (ref 55–135)
ALT SERPL W/O P-5'-P-CCNC: 23 U/L (ref 10–44)
ANION GAP SERPL CALC-SCNC: 8 MMOL/L (ref 8–16)
AST SERPL-CCNC: 23 U/L (ref 10–40)
BASOPHILS # BLD AUTO: 0.05 K/UL (ref 0–0.2)
BASOPHILS NFR BLD: 0.4 % (ref 0–1.9)
BILIRUB SERPL-MCNC: 0.8 MG/DL (ref 0.1–1)
BILIRUB UR QL STRIP: NEGATIVE
BNP SERPL-MCNC: 48 PG/ML (ref 0–99)
BUN SERPL-MCNC: 19 MG/DL (ref 8–23)
CALCIUM SERPL-MCNC: 9.7 MG/DL (ref 8.7–10.5)
CHLORIDE SERPL-SCNC: 107 MMOL/L (ref 95–110)
CLARITY UR: CLEAR
CO2 SERPL-SCNC: 22 MMOL/L (ref 23–29)
COLOR UR: COLORLESS
CREAT SERPL-MCNC: 1.3 MG/DL (ref 0.5–1.4)
CTP QC/QA: YES
CTP QC/QA: YES
DIFFERENTIAL METHOD BLD: ABNORMAL
EOSINOPHIL # BLD AUTO: 0.1 K/UL (ref 0–0.5)
EOSINOPHIL NFR BLD: 0.7 % (ref 0–8)
ERYTHROCYTE [DISTWIDTH] IN BLOOD BY AUTOMATED COUNT: 14.1 % (ref 11.5–14.5)
EST. GFR  (NO RACE VARIABLE): 54 ML/MIN/1.73 M^2
GLUCOSE SERPL-MCNC: 110 MG/DL (ref 70–110)
GLUCOSE UR QL STRIP: NEGATIVE
HCO3 UR-SCNC: 22.5 MMOL/L (ref 24–28)
HCT VFR BLD AUTO: 41.7 % (ref 40–54)
HGB BLD-MCNC: 13.6 G/DL (ref 14–18)
HGB UR QL STRIP: NEGATIVE
IMM GRANULOCYTES # BLD AUTO: 0.05 K/UL (ref 0–0.04)
IMM GRANULOCYTES NFR BLD AUTO: 0.4 % (ref 0–0.5)
KETONES UR QL STRIP: NEGATIVE
LDH SERPL L TO P-CCNC: 225 U/L (ref 110–260)
LEUKOCYTE ESTERASE UR QL STRIP: NEGATIVE
LYMPHOCYTES # BLD AUTO: 0.5 K/UL (ref 1–4.8)
LYMPHOCYTES NFR BLD: 4.5 % (ref 18–48)
MAGNESIUM SERPL-MCNC: 1.8 MG/DL (ref 1.6–2.6)
MCH RBC QN AUTO: 27.2 PG (ref 27–31)
MCHC RBC AUTO-ENTMCNC: 32.6 G/DL (ref 32–36)
MCV RBC AUTO: 83 FL (ref 82–98)
MONOCYTES # BLD AUTO: 0.7 K/UL (ref 0.3–1)
MONOCYTES NFR BLD: 5.9 % (ref 4–15)
NEUTROPHILS # BLD AUTO: 10.5 K/UL (ref 1.8–7.7)
NEUTROPHILS NFR BLD: 88.1 % (ref 38–73)
NITRITE UR QL STRIP: NEGATIVE
NRBC BLD-RTO: 0 /100 WBC
PCO2 BLDA: 28 MMHG (ref 35–45)
PH SMN: 7.51 [PH] (ref 7.35–7.45)
PH UR STRIP: 8 [PH] (ref 5–8)
PHOSPHATE SERPL-MCNC: 1.8 MG/DL (ref 2.7–4.5)
PLATELET # BLD AUTO: 131 K/UL (ref 150–450)
PMV BLD AUTO: 11 FL (ref 9.2–12.9)
PO2 BLDA: 22 MMHG (ref 40–60)
POC BE: 1 MMOL/L
POC MOLECULAR INFLUENZA A AGN: NEGATIVE
POC MOLECULAR INFLUENZA B AGN: NEGATIVE
POC SATURATED O2: 44 % (ref 95–100)
POC TCO2: 23 MMOL/L (ref 24–29)
POTASSIUM SERPL-SCNC: 3.9 MMOL/L (ref 3.5–5.1)
PROT SERPL-MCNC: 7 G/DL (ref 6–8.4)
PROT UR QL STRIP: NEGATIVE
RBC # BLD AUTO: 5 M/UL (ref 4.6–6.2)
SAMPLE: ABNORMAL
SARS-COV-2 RDRP RESP QL NAA+PROBE: NEGATIVE
SITE: ABNORMAL
SODIUM SERPL-SCNC: 137 MMOL/L (ref 136–145)
SP GR UR STRIP: 1.01 (ref 1–1.03)
TROPONIN I SERPL DL<=0.01 NG/ML-MCNC: <0.006 NG/ML (ref 0–0.03)
URATE SERPL-MCNC: 6 MG/DL (ref 3.4–7)
URN SPEC COLLECT METH UR: ABNORMAL
UROBILINOGEN UR STRIP-ACNC: NEGATIVE EU/DL
WBC # BLD AUTO: 11.88 K/UL (ref 3.9–12.7)

## 2024-01-25 PROCEDURE — 84484 ASSAY OF TROPONIN QUANT: CPT | Performed by: STUDENT IN AN ORGANIZED HEALTH CARE EDUCATION/TRAINING PROGRAM

## 2024-01-25 PROCEDURE — 87502 INFLUENZA DNA AMP PROBE: CPT

## 2024-01-25 PROCEDURE — 83880 ASSAY OF NATRIURETIC PEPTIDE: CPT | Performed by: STUDENT IN AN ORGANIZED HEALTH CARE EDUCATION/TRAINING PROGRAM

## 2024-01-25 PROCEDURE — 99900035 HC TECH TIME PER 15 MIN (STAT)

## 2024-01-25 PROCEDURE — 85025 COMPLETE CBC W/AUTO DIFF WBC: CPT | Performed by: STUDENT IN AN ORGANIZED HEALTH CARE EDUCATION/TRAINING PROGRAM

## 2024-01-25 PROCEDURE — 25500020 PHARM REV CODE 255: Performed by: STUDENT IN AN ORGANIZED HEALTH CARE EDUCATION/TRAINING PROGRAM

## 2024-01-25 PROCEDURE — 84100 ASSAY OF PHOSPHORUS: CPT | Performed by: STUDENT IN AN ORGANIZED HEALTH CARE EDUCATION/TRAINING PROGRAM

## 2024-01-25 PROCEDURE — 93010 ELECTROCARDIOGRAM REPORT: CPT | Mod: ,,, | Performed by: INTERNAL MEDICINE

## 2024-01-25 PROCEDURE — 83735 ASSAY OF MAGNESIUM: CPT | Performed by: STUDENT IN AN ORGANIZED HEALTH CARE EDUCATION/TRAINING PROGRAM

## 2024-01-25 PROCEDURE — 87635 SARS-COV-2 COVID-19 AMP PRB: CPT | Performed by: STUDENT IN AN ORGANIZED HEALTH CARE EDUCATION/TRAINING PROGRAM

## 2024-01-25 PROCEDURE — 99285 EMERGENCY DEPT VISIT HI MDM: CPT | Mod: 25

## 2024-01-25 PROCEDURE — 80048 BASIC METABOLIC PNL TOTAL CA: CPT | Mod: XB

## 2024-01-25 PROCEDURE — 80053 COMPREHEN METABOLIC PANEL: CPT | Performed by: STUDENT IN AN ORGANIZED HEALTH CARE EDUCATION/TRAINING PROGRAM

## 2024-01-25 PROCEDURE — 81003 URINALYSIS AUTO W/O SCOPE: CPT | Performed by: STUDENT IN AN ORGANIZED HEALTH CARE EDUCATION/TRAINING PROGRAM

## 2024-01-25 PROCEDURE — 93005 ELECTROCARDIOGRAM TRACING: CPT

## 2024-01-25 PROCEDURE — 82803 BLOOD GASES ANY COMBINATION: CPT

## 2024-01-25 PROCEDURE — 83615 LACTATE (LD) (LDH) ENZYME: CPT | Performed by: STUDENT IN AN ORGANIZED HEALTH CARE EDUCATION/TRAINING PROGRAM

## 2024-01-25 PROCEDURE — 84550 ASSAY OF BLOOD/URIC ACID: CPT | Performed by: STUDENT IN AN ORGANIZED HEALTH CARE EDUCATION/TRAINING PROGRAM

## 2024-01-25 RX ORDER — PANTOPRAZOLE SODIUM 40 MG/1
40 TABLET, DELAYED RELEASE ORAL DAILY
Qty: 14 TABLET | Refills: 0 | Status: SHIPPED | OUTPATIENT
Start: 2024-01-25 | End: 2024-02-08

## 2024-01-25 RX ADMIN — IOHEXOL 75 ML: 350 INJECTION, SOLUTION INTRAVENOUS at 02:01

## 2024-01-25 NOTE — ED PROVIDER NOTES
Encounter Date: 1/25/2024       History     Chief Complaint   Patient presents with    Generalized Weakness     Here via [facility] with c/o fatigue and generalized weakness, had chemo yesterday, Hx prostate cancer      83 y.o.male who has a past medical history of metastatic prostate cancer on degarelix, lymphoma currently in remission, and Hypertension who presents to the emergency department with abdominal pain, generalized fatigue and bilateral lower extremity weakness.  Patient states symptoms were sudden in onset as he was attempting to go to the bathroom after waking up from sleep.  He reports having issues controlling his bladder and at times urinates on himself.  Tonight he had the urge to urinate so got up and was attempting to ambulate to the bathroom when he had significant abdominal pain and subsequent weakness to his legs.  Denies any falls or head injury.  He reports he had infusion of degarelix yesterday and tolerated it well.  Denies any prior reactions to infusions.  Denies any numbness or tingling.  Denies any dysuria or hematuria.    The history is provided by the patient.     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 Jolley    CATARACT EXTRACTION W/  INTRAOCULAR LENS IMPLANT Left 04/20/2017    Dr. Jolley    CHOLECYSTECTOMY      EYE SURGERY      Rt cataract    KNEE ARTHROSCOPY      Lt knee    MEDIPORT REMOVAL N/A 4/17/2020    Procedure: REMOVAL, CATHETER, CENTRAL VENOUS, TUNNELED, WITH PORT;   Surgeon: Bandar Baker MD;  Location: [facility] CATH LAB;  Service: Radiology;  Laterality: N/A;  picc removal    TONSILLECTOMY      Tunneled PICC line Right 09/30/2019    Via IJ, 23cm  length     Family History   Problem Relation Age of Onset    No Known Problems Mother     Heart disease Father     No Known Problems Sister     No Known Problems Brother     No Known Problems Maternal Aunt     No Known Problems Maternal Uncle     No Known Problems Paternal Aunt     No Known Problems Paternal Uncle     No Known Problems Maternal Grandmother     No Known Problems Maternal Grandfather     No Known Problems Paternal Grandmother     No Known Problems Paternal Grandfather     Amblyopia Neg Hx     Blindness Neg Hx     Cataracts Neg Hx     Glaucoma Neg Hx     Macular degeneration Neg Hx     Retinal detachment Neg Hx     Strabismus Neg Hx     Cancer Neg Hx     Diabetes Neg Hx     Hypertension Neg Hx     Stroke Neg Hx     Thyroid disease Neg Hx      Social History     Tobacco Use    Smoking status: Former    Smokeless tobacco: Never   Substance Use Topics    Alcohol use: No    Drug use: Never     Review of Systems   Constitutional:  Negative for fever.   HENT:  Negative for sore throat.    Respiratory:  Negative for shortness of breath.    Cardiovascular:  Negative for chest pain.   Gastrointestinal:  Positive for abdominal pain. Negative for nausea.   Genitourinary:  Negative for dysuria.   Musculoskeletal:  Negative for back pain.   Skin:  Negative for rash.   Neurological:  Positive for weakness.   Hematological:  Does not bruise/bleed easily.       Physical Exam     Initial Vitals   BP Pulse Resp Temp SpO2   01/25/24 0014 01/25/24 0014 01/25/24 0014 01/25/24 0104 01/25/24 0014   (!) 176/92 102 18 99.5 °F (37.5 °C) 99 %      MAP       --                Physical Exam    Nursing note and vitals reviewed.  Constitutional: He is not diaphoretic. No distress.   HENT:   Head: Normocephalic and atraumatic.   Eyes: Conjunctivae  and EOM are normal. Pupils are equal, round, and reactive to light.   Neck:   Normal range of motion.  Cardiovascular:  Regular rhythm.           Pulses:       Radial pulses are 2+ on the right side and 2+ on the left side.        Posterior tibial pulses are 2+ on the right side and 2+ on the left side.   Pulmonary/Chest: Breath sounds normal. No respiratory distress.   Abdominal: Abdomen is soft. Bowel sounds are normal. He exhibits no distension. There is abdominal tenderness in the epigastric area, periumbilical area and suprapubic area. There is no rebound and no guarding.   Musculoskeletal:         General: No tenderness. Normal range of motion.      Cervical back: Normal range of motion.     Lymphadenopathy:     He has no cervical adenopathy.   Neurological: He is alert and oriented to person, place, and time.   Skin: Skin is warm. Capillary refill takes less than 2 seconds.   Psychiatric: He has a normal mood and affect. His behavior is normal.         ED Course   Procedures  Labs Reviewed   CBC W/ AUTO DIFFERENTIAL - Abnormal; Notable for the following components:       Result Value    Hemoglobin 13.6 (*)     Platelets 131 (*)     Gran # (ANC) 10.5 (*)     Immature Grans (Abs) 0.05 (*)     Lymph # 0.5 (*)     Gran % 88.1 (*)     Lymph % 4.5 (*)     All other components within normal limits   COMPREHENSIVE METABOLIC PANEL - Abnormal; Notable for the following components:    CO2 22 (*)     eGFR 54 (*)     All other components within normal limits   URINALYSIS, REFLEX TO URINE CULTURE - Abnormal; Notable for the following components:    Color, UA Colorless (*)     All other components within normal limits    Narrative:     Specimen Source->Urine   PHOSPHORUS - Abnormal; Notable for the following components:    Phosphorus 1.8 (*)     All other components within normal limits   ISTAT PROCEDURE - Abnormal; Notable for the following components:    POC PH 7.513 (*)     POC PCO2 28.0 (*)     POC PO2 22 (*)     POC  HCO3 22.5 (*)     POC TCO2 23 (*)     All other components within normal limits   MAGNESIUM   LACTATE DEHYDROGENASE   URIC ACID   B-TYPE NATRIURETIC PEPTIDE   TROPONIN I   SARS-COV-2 RDRP GENE   POCT INFLUENZA A/B MOLECULAR          Imaging Results              CT Abdomen Pelvis With IV Contrast NO Oral Contrast (Final result)  Result time 01/25/24 04:26:12      Final result by Skyla Paez MD (01/25/24 04:26:12)                   Impression:      1. Left pleural effusion with basilar atelectatic/consolidative change.  Bibasilar atelectasis and/or scarring.  2. Gastric wall thickening which could relate to nondistention although correlation for symptoms of gastritis advised.  Small hiatal hernia.  3. Subcentimeter hepatic hypodensities too small to definitively characterize.  More ill-defined region of hypoattenuation in the left hepatic lobe which could be artifactual although attention on subsequent surveillance/follow-up exams or further assessment with nonemergent MRI abdomen is advised in light of patient's reported clinical history.  4. Heterogeneous prostate with more focal hypodensity as discussed above.  Correlation with more recent prostate MRI performed at outside facility advised.  5. Sclerotic focus in the T12 vertebral body concerning for osseous metastatic disease.  6. Nonspecific bilateral perinephric fat stranding.  Correlation with urinalysis advised.  7. Cholecystectomy, aortic atherosclerosis and additional findings as discussed above.      Electronically signed by: Skyla Paez MD  Date:    01/25/2024  Time:    04:26               Narrative:    EXAMINATION:  CT ABDOMEN PELVIS WITH IV CONTRAST    CLINICAL HISTORY:  RLQ abdominal pain (Age >= 14y);    TECHNIQUE:  Low dose axial images, sagittal and coronal reformations were obtained from the lung bases to the pubic symphysis following the IV administration of 75 mL of Omnipaque 350 .  No oral contrast.    COMPARISON:  CT abdomen and pelvis  09/25/2019    FINDINGS:  There is a left pleural effusion with left basilar atelectatic/consolidative change.  There are streaky bibasilar opacities suggestive of atelectasis and/or scarring.  The visualized portions of the heart and pericardium are within normal limits.    The liver is hypoattenuating relative to the spleen which can be seen with hepatic steatosis.  There is a subcentimeter left hepatic hypodensity too small to definitively characterize.  There is a vague region of hypoattenuation in the left hepatic lobe, possibly artifactual although inpatient with history of prior malignancy consider further assessment with nonemergent MRI or triple phase liver CT.  The spleen, pancreas and adrenal glands are unremarkable.  The gallbladder is surgically absent.  No significant intra or extrahepatic biliary ductal dilatation.  The portal vein and splenic vein appear patent.    The kidneys are normal in size and location and enhance symmetrically.  Nonspecific bilateral perinephric fat stranding present.  No hydronephrosis bilaterally.  The urinary bladder is distended.  The prostate is heterogeneous with a more focal 1.2 cm hypodensity and parenchymal calcifications.  Correlation with more recent MRI prostate of 03/01/2023 is advised.    The abdominal aorta is nonaneurysmal with atherosclerosis.  There are shotty periaortic lymph nodes present normal and upper limit of normal sized iliac chain and inguinal lymph nodes present.  Fat containing inguinal hernias present.  No retroperitoneal fluid/hemorrhage.    There is a small hiatal hernia.  There is nonspecific gastric wall thickening including the gastric antrum/pylorus.  Findings could reflect nondistention although correlation for symptoms of gastritis advised.  The visualized loops of large and small bowel demonstrate no evidence of obstruction or inflammatory change.  There is scattered colonic diverticula without evidence to suggest acute diverticulitis at  this time.  Moderate volume of fecal material in the colon.  The appendix is unremarkable.  No free intraperitoneal air, portal venous gas or ascites.    The visualized osseous structures demonstrate grade 1 anterolisthesis of L4 on L5.  There is a 1.3 cm sclerotic focus in the T12 vertebral body concerning for osseous metastasis in this patient with reported history of primary malignancy.                                       X-Ray Chest AP Portable (Final result)  Result time 01/25/24 02:04:44      Final result by Rk Paez MD (01/25/24 02:04:44)                   Impression:      Chronic subsegmental opacities at the left lung base with associated pleural calcification, similar to prior examination.  No new large confluent airspace consolidation appreciated.      Electronically signed by: Rk Paez MD  Date:    01/25/2024  Time:    02:04               Narrative:    EXAMINATION:  XR CHEST AP PORTABLE    CLINICAL HISTORY:  Weakness    TECHNIQUE:  Single frontal view of the chest was performed.    COMPARISON:  Chest radiograph 11/16/22    FINDINGS:  Cardiac monitoring leads overlie the chest.  Cardiac silhouette is stable in size and configuration.  There is atherosclerotic calcification of the thoracic aorta.  Lungs demonstrate coarse interstitial attenuation, similar to prior examination.  There are chronic subsegmental opacities at the left lung base with associated pleural calcification, similar to prior examination.  No new large confluent airspace consolidation identified.  No evidence of pneumothorax.  Osseous structures demonstrate degenerative changes.                                       Medications   iohexoL (OMNIPAQUE 350) injection 75 mL (75 mLs Intravenous Given 1/25/24 0243)     Medical Decision Making:   History:   Old Medical Records: I decided to obtain old medical records.  Initial Assessment:   83 y.o.male who has a past medical history of metastatic prostate cancer on degarelix,  lymphoma currently in remission, and Hypertension who presents to the emergency department with abdominal pain, generalized fatigue and bilateral lower extremity weakness.  Patient in no acute distress, vitals within normal limits.  no saddle anesthesia on exam.  No focal neurological deficits.  Uncertain cause of patient's generalized fatigue and lower extremity weakness.  Differential very brown so will obtain lab work and CT imaging.  Patient currently pain-free so will reassess after labs and imaging.  Differential Diagnosis:   Differential Diagnosis includes, but is not limited to:  CVA/TIA, vertigo, anemia/blood loss, cardiogenic shock, arrhythmia, orthostatic hypotension, dehydration, medication side effect, vitamin deficiency, liver disease, hypothyroidism, drug intoxication/withdrawal, metabolic derangement.    Clinical Tests:   Lab Tests: Ordered and Reviewed  Radiological Study: Ordered and Reviewed  Medical Tests: Ordered and Reviewed             ED Course as of 01/25/24 0659   Thu Jan 25, 2024   0106 Urinalysis, Reflex to Urine Culture Urine, Clean Catch(!)  UA without signs of infection. [AS]   0106 CBC auto differential(!)  CBC without significant leukocytosis, anemia (at baseline), or platelet abnormalities.   [AS]   0134 Independent Interpretation of EKG:  Rhythm: Sinus   Rate: 87  QTC: 462  No STEMI  RBBB stable from prior  [AS]   0438 CT Abdomen Pelvis With IV Contrast NO Oral Contrast  CT abdomen pelvis with findings concerning for gastritis known metastatic disease.  No acute findings otherwise.  Given possibly of patient's abdominal pain being due to gastritis will write a short course of Protonix to take at home. [AS]   0445 Chem 14 negative for hypo-or hyper natremia, kalemia, chloridemia, or other electrolyte abnormalities; BUN and creatinine (at baseline), ALT and AST were within normal limits indicating normal liver function.  Phosphorus mildly low in the setting metastatic prostate cancer  to T12.  Troponin BNP normal no evidence of tumor lysis syndrome based on lab work. [AS]   0500 Pt is currently stable for discharge. I see no indication of an emergent process beyond that addressed during our encounter but have duly counseled the patient/family regarding the need for prompt follow-up as well as the indications that should prompt immediate return to the emergency room should new or worrisome developments occur. I discussed the ED work up and diagnostic findings with the patient/family. The patient/family has been provided with verbal and printed direction regarding our final diagnosis(es) as well as instructions regarding use of OTC and/or Rx medications intended to manage the patient's aforementioned conditions. The patient/family verbalized an understanding. The patient/family is asked if there are any questions or concerns. We discuss the case, until all issues are addressed to the patient/family's satisfaction. Patient/family understands and is agreeable to the plan.   [AS]      ED Course User Index  [AS] Irais Rust MD          Medical Decision Making  Amount and/or Complexity of Data Reviewed  Labs: ordered. Decision-making details documented in ED Course.  Radiology: ordered. Decision-making details documented in ED Course.    Risk  Prescription drug management.           Clinical Impression:   Final diagnoses:  [R53.1] Weakness (Primary)  [M79.604, M79.605] Bilateral leg pain  [K29.70] Gastritis, presence of bleeding unspecified, unspecified chronicity, unspecified gastritis type          ED Disposition Condition    Discharge Stable          ED Prescriptions       Medication Sig Dispense Start Date End Date Auth. Provider    pantoprazole (PROTONIX) 40 MG tablet Take 1 tablet (40 mg total) by mouth once daily. for 14 days 14 tablet 1/25/2024 2/8/2024 Irais Rust MD          Follow-up Information       Follow up With Specialties Details Why Contact Info    Kang Reddy MD  Internal Medicine Schedule an appointment as soon as possible for a visit  for reassesment [address]  [address]  [facility] [address]  [phone]      [facility] - Emergency Dept Emergency Medicine  If symptoms worsen [address]  [facility] [place] [id number] [phone]            DISCLAIMER: This note was prepared with tabulate voice recognition transcription software. Garbled syntax, mangled pronouns, and other bizarre constructions may be attributed to that software system.     Irais Rust MD  01/25/24 0659

## 2024-01-25 NOTE — ED TRIAGE NOTES
Robe Cevallos, a 83 y.o. male presents to the ED w/ complaint of generalized weakness. Pt called EMS after having severe weakness in his lower extremities. Pt says he was unable to ambulate and had a severe shivering episode.  Pt had chemo yesterday.  Pt denies any sick contact.      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

## 2024-01-25 NOTE — DISCHARGE INSTRUCTIONS
Thank you for coming to our Emergency Department today. It is important to remember that some problems are difficult to diagnose and may not be found during your first visit. Be sure to follow up with your primary care doctor and review any labs/imaging that was performed with them. If you do not have a primary care doctor, you may contact the one listed on your discharge paperwork or you may also call the [facility] Appointment Desk at [phone] to schedule an appointment with one.     All medications may potentially have side effects and it is impossible to predict which medications may give you side effects. If you feel that you are having a negative effect of any medication you should immediately stop taking them and seek medical attention.    Return to the ER with any questions/concerns, new/concerning symptoms, worsening or failure to improve. Do not drive or make any important decisions for 24 hours if you have received any pain medications, sedatives or mood altering drugs during your ER visit.

## 2024-06-11 ENCOUNTER — TELEPHONE (OUTPATIENT)
Dept: OPHTHALMOLOGY | Facility: CLINIC | Age: 84
End: 2024-06-11
Payer: MEDICARE

## 2024-06-12 ENCOUNTER — OFFICE VISIT (OUTPATIENT)
Dept: OPHTHALMOLOGY | Facility: CLINIC | Age: 84
End: 2024-06-12
Attending: OPHTHALMOLOGY
Payer: MEDICARE

## 2024-06-12 DIAGNOSIS — H31.001 CHORIORETINAL SCAR, RIGHT: ICD-10-CM

## 2024-06-12 DIAGNOSIS — H35.3123 NONEXUDATIVE AGE-RELATED MACULAR DEGENERATION, LEFT EYE, ADVANCED ATROPHIC WITHOUT SUBFOVEAL INVOLVEMENT: Primary | ICD-10-CM

## 2024-06-12 DIAGNOSIS — H35.3212 EXUDATIVE AGE-RELATED MACULAR DEGENERATION OF RIGHT EYE WITH INACTIVE CHOROIDAL NEOVASCULARIZATION: ICD-10-CM

## 2024-06-12 DIAGNOSIS — H53.16 CHARLES BONNET SYNDROME: ICD-10-CM

## 2024-06-12 DIAGNOSIS — H43.813 PVD (POSTERIOR VITREOUS DETACHMENT), BOTH EYES: ICD-10-CM

## 2024-06-12 PROCEDURE — 1101F PT FALLS ASSESS-DOCD LE1/YR: CPT | Mod: CPTII,S$GLB,, | Performed by: OPHTHALMOLOGY

## 2024-06-12 PROCEDURE — 92201 OPSCPY EXTND RTA DRAW UNI/BI: CPT | Mod: S$GLB,,, | Performed by: OPHTHALMOLOGY

## 2024-06-12 PROCEDURE — 92014 COMPRE OPH EXAM EST PT 1/>: CPT | Mod: S$GLB,,, | Performed by: OPHTHALMOLOGY

## 2024-06-12 PROCEDURE — 1159F MED LIST DOCD IN RCRD: CPT | Mod: CPTII,S$GLB,, | Performed by: OPHTHALMOLOGY

## 2024-06-12 PROCEDURE — 1126F AMNT PAIN NOTED NONE PRSNT: CPT | Mod: CPTII,S$GLB,, | Performed by: OPHTHALMOLOGY

## 2024-06-12 PROCEDURE — 3288F FALL RISK ASSESSMENT DOCD: CPT | Mod: CPTII,S$GLB,, | Performed by: OPHTHALMOLOGY

## 2024-06-12 PROCEDURE — 1160F RVW MEDS BY RX/DR IN RCRD: CPT | Mod: CPTII,S$GLB,, | Performed by: OPHTHALMOLOGY

## 2024-06-12 PROCEDURE — 99999 PR PBB SHADOW E&M-EST. PATIENT-LVL III: CPT | Mod: PBBFAC,,, | Performed by: OPHTHALMOLOGY

## 2024-06-12 PROCEDURE — 92134 CPTRZ OPH DX IMG PST SGM RTA: CPT | Mod: S$GLB,,, | Performed by: OPHTHALMOLOGY

## 2024-06-12 RX ORDER — DEGARELIX 80 MG
KIT SUBCUTANEOUS
COMMUNITY

## 2024-06-12 RX ORDER — DEGARELIX ACETATE 120 MG
VIAL (EA) SUBCUTANEOUS
COMMUNITY
Start: 2023-04-06

## 2024-06-12 NOTE — PROGRESS NOTES
Subjective:       Patient ID: Robe Cevallos is a 84 y.o. male      Chief Complaint   Patient presents with    Spots and/or Floaters     History of Present Illness  HPI    Urgent New/increased flashes/floaters OU    Pt notes both eyes seeing perfect circles when looking at water.  Then   black spots like floaters.  Then has spots that swirl and come closer to   eyes.  But knows that they are not there.  Started in February.   Worsening Va OU. Pt states lines on amsler have become more wavy with some   missing.     No pain  Gtts:  At's PRN OU  AREDS  Last edited by Charles Kapoor MD on 6/12/2024  9:49 AM.        Imaging:    See report    Assessment/Plan:     1. 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  No reactivation  Observe  - Posterior Segment OCT Retina-Both eyes    2. Nonexudative age-related macular degeneration, left eye, advanced atrophic without subfoveal involvement  OD also with atrophy at fovea  Has had worsening since 2019 scans but no dramatically worse  Discussed Syfovre and RBA.  Pt will consider  Reeval in 6 mo with OCT and FAF    Discussed Dry and Wet AMD in detail  Recommend continue AREDS 2 Vitamins  Home Amsler Grid Testing  RTC immediately PRN any changes in vision    3. Chorioretinal scar, right  Stable.  Observe    4. PVD both  Pathology of PVD, Retinal Tear, Retinal Detachment reviewed in great detail  RD precautions discussed in detail, patient expressed understanding  RTC immediately PRN (especially ANY change flashes, floaters, vision, visual field)    5. Lionel Bonnet Syndrome  Discussed in detail and reassured  Explains new symptoms    Follow up in 6 months (on 12/12/2024), or if symptoms worsen or fail to improve, for OCT Mac, Dilated examination, Fundus Autofluoresence.

## 2024-09-13 ENCOUNTER — TELEPHONE (OUTPATIENT)
Dept: OPHTHALMOLOGY | Facility: CLINIC | Age: 84
End: 2024-09-13
Payer: MEDICARE

## 2024-09-23 ENCOUNTER — LAB VISIT (OUTPATIENT)
Dept: LAB | Facility: HOSPITAL | Age: 84
End: 2024-09-23
Attending: INTERNAL MEDICINE
Payer: MEDICARE

## 2024-09-23 DIAGNOSIS — C83.32 DIFFUSE LARGE B-CELL LYMPHOMA OF INTRATHORACIC LYMPH NODES: ICD-10-CM

## 2024-09-23 LAB
ALBUMIN SERPL BCP-MCNC: 4 G/DL (ref 3.5–5.2)
ALP SERPL-CCNC: 72 U/L (ref 55–135)
ALT SERPL W/O P-5'-P-CCNC: 23 U/L (ref 10–44)
ANION GAP SERPL CALC-SCNC: 8 MMOL/L (ref 8–16)
AST SERPL-CCNC: 22 U/L (ref 10–40)
BASOPHILS # BLD AUTO: 0.05 K/UL (ref 0–0.2)
BASOPHILS NFR BLD: 0.8 % (ref 0–1.9)
BILIRUB SERPL-MCNC: 0.5 MG/DL (ref 0.1–1)
BUN SERPL-MCNC: 19 MG/DL (ref 8–23)
CALCIUM SERPL-MCNC: 9.8 MG/DL (ref 8.7–10.5)
CHLORIDE SERPL-SCNC: 107 MMOL/L (ref 95–110)
CO2 SERPL-SCNC: 24 MMOL/L (ref 23–29)
CREAT SERPL-MCNC: 1.3 MG/DL (ref 0.5–1.4)
DIFFERENTIAL METHOD BLD: ABNORMAL
EOSINOPHIL # BLD AUTO: 0.2 K/UL (ref 0–0.5)
EOSINOPHIL NFR BLD: 2.4 % (ref 0–8)
ERYTHROCYTE [DISTWIDTH] IN BLOOD BY AUTOMATED COUNT: 14.7 % (ref 11.5–14.5)
EST. GFR  (NO RACE VARIABLE): 54 ML/MIN/1.73 M^2
FERRITIN SERPL-MCNC: 63 NG/ML (ref 20–300)
GLUCOSE SERPL-MCNC: 93 MG/DL (ref 70–110)
HCT VFR BLD AUTO: 42.5 % (ref 40–54)
HGB BLD-MCNC: 13.5 G/DL (ref 14–18)
IMM GRANULOCYTES # BLD AUTO: 0.02 K/UL (ref 0–0.04)
IMM GRANULOCYTES NFR BLD AUTO: 0.3 % (ref 0–0.5)
IRON SERPL-MCNC: 71 UG/DL (ref 45–160)
LYMPHOCYTES # BLD AUTO: 0.9 K/UL (ref 1–4.8)
LYMPHOCYTES NFR BLD: 13.6 % (ref 18–48)
MCH RBC QN AUTO: 27.5 PG (ref 27–31)
MCHC RBC AUTO-ENTMCNC: 31.8 G/DL (ref 32–36)
MCV RBC AUTO: 87 FL (ref 82–98)
MONOCYTES # BLD AUTO: 0.5 K/UL (ref 0.3–1)
MONOCYTES NFR BLD: 7.2 % (ref 4–15)
NEUTROPHILS # BLD AUTO: 4.7 K/UL (ref 1.8–7.7)
NEUTROPHILS NFR BLD: 75.7 % (ref 38–73)
NRBC BLD-RTO: 0 /100 WBC
PLATELET # BLD AUTO: 174 K/UL (ref 150–450)
PMV BLD AUTO: 10.7 FL (ref 9.2–12.9)
POTASSIUM SERPL-SCNC: 4.7 MMOL/L (ref 3.5–5.1)
PROT SERPL-MCNC: 7 G/DL (ref 6–8.4)
RBC # BLD AUTO: 4.91 M/UL (ref 4.6–6.2)
SATURATED IRON: 18 % (ref 20–50)
SODIUM SERPL-SCNC: 139 MMOL/L (ref 136–145)
TOTAL IRON BINDING CAPACITY: 391 UG/DL (ref 250–450)
TRANSFERRIN SERPL-MCNC: 264 MG/DL (ref 200–375)
WBC # BLD AUTO: 6.25 K/UL (ref 3.9–12.7)

## 2024-09-23 PROCEDURE — 84466 ASSAY OF TRANSFERRIN: CPT | Performed by: INTERNAL MEDICINE

## 2024-09-23 PROCEDURE — 36415 COLL VENOUS BLD VENIPUNCTURE: CPT | Performed by: INTERNAL MEDICINE

## 2024-09-23 PROCEDURE — 85025 COMPLETE CBC W/AUTO DIFF WBC: CPT | Performed by: INTERNAL MEDICINE

## 2024-09-23 PROCEDURE — 83540 ASSAY OF IRON: CPT | Performed by: INTERNAL MEDICINE

## 2024-09-23 PROCEDURE — 82728 ASSAY OF FERRITIN: CPT | Performed by: INTERNAL MEDICINE

## 2024-09-23 PROCEDURE — 80053 COMPREHEN METABOLIC PANEL: CPT | Performed by: INTERNAL MEDICINE

## 2024-09-26 ENCOUNTER — OFFICE VISIT (OUTPATIENT)
Dept: HEMATOLOGY/ONCOLOGY | Facility: CLINIC | Age: 84
End: 2024-09-26
Payer: MEDICARE

## 2024-09-26 VITALS
DIASTOLIC BLOOD PRESSURE: 70 MMHG | WEIGHT: 196.19 LBS | HEIGHT: 71 IN | HEART RATE: 73 BPM | SYSTOLIC BLOOD PRESSURE: 125 MMHG | BODY MASS INDEX: 27.47 KG/M2 | OXYGEN SATURATION: 97 %

## 2024-09-26 DIAGNOSIS — Z85.72 HISTORY OF LYMPHOMA: Primary | ICD-10-CM

## 2024-09-26 DIAGNOSIS — C61 PROSTATE CANCER: ICD-10-CM

## 2024-09-26 PROCEDURE — 99999 PR PBB SHADOW E&M-EST. PATIENT-LVL IV: CPT | Mod: PBBFAC,,, | Performed by: INTERNAL MEDICINE

## 2024-09-26 NOTE — PROGRESS NOTES
I met with the patient to introduce myself and discuss any social assistance I could provide. He is currently caring for his wife, who has advanced-stage Parkinson's disease. The patient indicated that he feels everything is under control and has resources available should he need additional support in the future.I reiterated how I could assist him moving forward and offered to help coordinate services for his wife if needed. The patient expressed appreciation and seemed to have a clear understanding of the  available to him. I provided him with my contact information for future reference.

## 2024-09-26 NOTE — PROGRESS NOTES
"Subjective:       Patient ID: Robe Cevallos is a 84 y.o. male.    Chief Complaint: Referral      HPI  Diffuse large B-cell lymphoma of intrathoracic lymph nodes (Follow Up)     HPI Patient is a 84 y.o. year old male who presents to the clinic today for  Oncology followup. Has been seen at [facility] in the past. Dx'd to have Low grade , Stage 1, follicular lymphoma Right groin in 2004. Good response to R-CVP Chemotherapy and maintenance rituxan.       He was then noted yoselin have a lesion excised from Left side of nostril which revealed superficially invasive squamous cell [id number]. Invasive.      In Aug/Sep 2019 he underwent a biopsy of Lymph node from left neck and pathology revealed "difuse large b cell lymphoma"     CT scans revealed a large mediastinal mass. Patient had sifnificant sx. Transferred to [facility]. Was given emergency mediastinal radiation. Started on mini CHOP on 10/2/19 and was admitted for 6  weeks, at the same time CTA chest revealed Emboli in right upper, middle and lower lobes. The  left mediastinal mass had decreased in size  He completed 6 cycles of mini CHOP in February 2020 and is on surveillance  PET scan was in 10/2021 was negative for lymphoma    Interval Hx: He reports stressors 2/2 care of his chronically ill wife with end stage Parkinson's disease   He is followed by Urology at [facility] for prostate ca  He is undergoing Firmagon inj   He reports mild hot flashes Firmagon  Appetite and weight stable  No SOB/cough/CP  No fevers, night sweats  No recent infxns  No hx of recurrent infxns.            Lab Results   Component Value Date    WBC 6.25 09/23/2024    HGB 13.5 (L) 09/23/2024    HCT 42.5 09/23/2024    MCV 87 09/23/2024     09/23/2024         Review of Systems   Constitutional:  Negative for appetite change, fatigue, fever and unexpected weight change.   HENT:  Negative for mouth sores.    Eyes:  Negative for visual disturbance.   Respiratory:  Negative for cough and " "shortness of breath.    Cardiovascular:  Negative for chest pain.   Gastrointestinal:  Negative for abdominal pain and diarrhea.   Endocrine:        Hot flashes   Genitourinary:  Negative for frequency.   Musculoskeletal:  Negative for back pain.   Integumentary:  Negative for rash.   Neurological:  Negative for headaches.   Hematological:  Negative for adenopathy.   Psychiatric/Behavioral:  The patient is not nervous/anxious.          Objective:       Vitals:    09/26/24 1400   BP: 125/70   BP Location: Left arm   Patient Position: Sitting   Pulse: 73   SpO2: 97%   Weight: 89 kg (196 lb 3.4 oz)   Height: 5' 11" (1.803 m)       Physical Exam  Constitutional:       Appearance: He is well-developed.   HENT:      Head: Normocephalic.      Mouth/Throat:      Pharynx: No oropharyngeal exudate.   Eyes:      General: No scleral icterus.        Right eye: No discharge.         Left eye: No discharge.      Conjunctiva/sclera: Conjunctivae normal.   Neck:      Thyroid: No thyromegaly.   Cardiovascular:      Rate and Rhythm: Normal rate and regular rhythm.      Heart sounds: Normal heart sounds. No murmur heard.  Pulmonary:      Effort: Pulmonary effort is normal.      Breath sounds: Normal breath sounds. No wheezing or rales.   Abdominal:      General: Bowel sounds are normal.      Palpations: Abdomen is soft.      Tenderness: There is no abdominal tenderness. There is no guarding or rebound.   Musculoskeletal:         General: No swelling. Normal range of motion.      Cervical back: Normal range of motion and neck supple.   Lymphadenopathy:      Cervical: No cervical adenopathy.      Upper Body:      Right upper body: No supraclavicular adenopathy.      Left upper body: No supraclavicular adenopathy.   Skin:     Findings: No erythema or rash.   Neurological:      Mental Status: He is alert and oriented to person, place, and time.      Cranial Nerves: No cranial nerve deficit.   Psychiatric:         Mood and Affect: Mood " normal.         Behavior: Behavior normal.               Lab Results   Component Value Date    WBC 6.25 09/23/2024    HGB 13.5 (L) 09/23/2024    HCT 42.5 09/23/2024    MCV 87 09/23/2024     09/23/2024       MRI Prostate w wo Contrast: 03/01/2023    IMPRESSION: PI-RADS 5. There is a large lesion extending across the midline involving the apex, mid gland, and base with invasion of the left more so than right seminal vesicles and conceivable involvement of the neurovascular bundles, especially on the left given that there is likely extraprostatic extension within these regions. There is also osseous metastatic disease involving the right inferior pubic ramus. There are small lymph nodes in the perirectal region that although are not overtly enlarged by size criteria could conceivably represent small metastatic lymph nodes. Further assessment with PET/CT utilizing Pylarify could be considered if clinically indicated.         PET CT Skull Base to Mid Thigh PSMA: 04/28/2023   Impression    1.   Unchanged dominant mass in the posterior prostate consistent with PI-RADS 5 lesion on recent prostate MRI and biopsied prostate adenocarcinoma.   2.   Multiple pelvic and bilateral iliac hypermetabolic lymph nodes consistent with local metastatic esme disease.   3.   Osseous sclerotic metastases in the right inferior pubic ramus and T12 vertebral body.      CT a/p w/contrast 1/25/24   Impression:     1. Left pleural effusion with basilar atelectatic/consolidative change.  Bibasilar atelectasis and/or scarring.  2. Gastric wall thickening which could relate to nondistention although correlation for symptoms of gastritis advised.  Small hiatal hernia.  3. Subcentimeter hepatic hypodensities too small to definitively characterize.  More ill-defined region of hypoattenuation in the left hepatic lobe which could be artifactual although attention on subsequent surveillance/follow-up exams or further assessment with nonemergent  MRI abdomen is advised in light of patient's reported clinical history.  4. Heterogeneous prostate with more focal hypodensity as discussed above.  Correlation with more recent prostate MRI performed at outside facility advised.  5. Sclerotic focus in the T12 vertebral body concerning for osseous metastatic disease.  6. Nonspecific bilateral perinephric fat stranding.  Correlation with urinalysis advised.  7. Cholecystectomy, aortic atherosclerosis and additional findings as discussed above.             PSA Level:  PSA 08/15/2023 1.19  PSA 01/27/2023 44.73  PSA 10/19/2021 18.21    Assessment:       Problem List Items Addressed This Visit          Oncology    History of follicular lymphoma    History of Diffuse large B-cell lymphoma of intrathoracic lymph nodes - Primary  He completed 6 cycles of mini CHOP in February 2020 and is on surveillance  PET scan was in 10/2021 was negative for lymphoma   No b symptoms   Doing well   No symptoms   Cbc,cmp, LDH prior to f/u 1 yr   Recommend update immunizations  Above care plan was discussed with patient and all questions were addressed to his satisfaction     Relevant Orders        Prostate CA on ADT therapy, Firmagon.   Followed by Urology, on ADT, on Firmagon  PSA 06/20/2024 1.02  PSA 11/22/2023 0.76          Follow-up:      Cbc,cmp, LDH prior to f/u 1 yr  Update immunizatons

## 2024-10-04 RX ORDER — ATORVASTATIN CALCIUM 20 MG/1
TABLET, FILM COATED ORAL
Qty: 90 TABLET | Refills: 0 | Status: SHIPPED | OUTPATIENT
Start: 2024-10-04

## 2024-10-11 ENCOUNTER — LAB VISIT (OUTPATIENT)
Dept: LAB | Facility: HOSPITAL | Age: 84
End: 2024-10-11
Attending: INTERNAL MEDICINE
Payer: MEDICARE

## 2024-10-11 DIAGNOSIS — C83.32 DIFFUSE LARGE B-CELL LYMPHOMA OF INTRATHORACIC LYMPH NODES: ICD-10-CM

## 2024-10-11 LAB
ALBUMIN SERPL BCP-MCNC: 4 G/DL (ref 3.5–5.2)
ALP SERPL-CCNC: 79 U/L (ref 55–135)
ALT SERPL W/O P-5'-P-CCNC: 22 U/L (ref 10–44)
ANION GAP SERPL CALC-SCNC: 9 MMOL/L (ref 8–16)
AST SERPL-CCNC: 22 U/L (ref 10–40)
BASOPHILS # BLD AUTO: 0.05 K/UL (ref 0–0.2)
BASOPHILS NFR BLD: 0.6 % (ref 0–1.9)
BILIRUB SERPL-MCNC: 0.7 MG/DL (ref 0.1–1)
BUN SERPL-MCNC: 18 MG/DL (ref 8–23)
CALCIUM SERPL-MCNC: 9.5 MG/DL (ref 8.7–10.5)
CHLORIDE SERPL-SCNC: 107 MMOL/L (ref 95–110)
CO2 SERPL-SCNC: 23 MMOL/L (ref 23–29)
CREAT SERPL-MCNC: 1.2 MG/DL (ref 0.5–1.4)
DIFFERENTIAL METHOD BLD: ABNORMAL
EOSINOPHIL # BLD AUTO: 0.2 K/UL (ref 0–0.5)
EOSINOPHIL NFR BLD: 2.5 % (ref 0–8)
ERYTHROCYTE [DISTWIDTH] IN BLOOD BY AUTOMATED COUNT: 14.7 % (ref 11.5–14.5)
EST. GFR  (NO RACE VARIABLE): 59 ML/MIN/1.73 M^2
GLUCOSE SERPL-MCNC: 96 MG/DL (ref 70–110)
HCT VFR BLD AUTO: 44.4 % (ref 40–54)
HGB BLD-MCNC: 14.3 G/DL (ref 14–18)
IMM GRANULOCYTES # BLD AUTO: 0.03 K/UL (ref 0–0.04)
IMM GRANULOCYTES NFR BLD AUTO: 0.3 % (ref 0–0.5)
LDH SERPL L TO P-CCNC: 226 U/L (ref 110–260)
LYMPHOCYTES # BLD AUTO: 1.2 K/UL (ref 1–4.8)
LYMPHOCYTES NFR BLD: 13.9 % (ref 18–48)
MCH RBC QN AUTO: 27.9 PG (ref 27–31)
MCHC RBC AUTO-ENTMCNC: 32.2 G/DL (ref 32–36)
MCV RBC AUTO: 87 FL (ref 82–98)
MONOCYTES # BLD AUTO: 0.6 K/UL (ref 0.3–1)
MONOCYTES NFR BLD: 7 % (ref 4–15)
NEUTROPHILS # BLD AUTO: 6.6 K/UL (ref 1.8–7.7)
NEUTROPHILS NFR BLD: 75.7 % (ref 38–73)
NRBC BLD-RTO: 0 /100 WBC
PLATELET # BLD AUTO: 144 K/UL (ref 150–450)
PMV BLD AUTO: 10.6 FL (ref 9.2–12.9)
POTASSIUM SERPL-SCNC: 4.4 MMOL/L (ref 3.5–5.1)
PROT SERPL-MCNC: 7 G/DL (ref 6–8.4)
RBC # BLD AUTO: 5.12 M/UL (ref 4.6–6.2)
SODIUM SERPL-SCNC: 139 MMOL/L (ref 136–145)
WBC # BLD AUTO: 8.7 K/UL (ref 3.9–12.7)

## 2024-10-11 PROCEDURE — 80053 COMPREHEN METABOLIC PANEL: CPT | Performed by: INTERNAL MEDICINE

## 2024-10-11 PROCEDURE — 85025 COMPLETE CBC W/AUTO DIFF WBC: CPT | Performed by: INTERNAL MEDICINE

## 2024-10-11 PROCEDURE — 83615 LACTATE (LD) (LDH) ENZYME: CPT | Performed by: INTERNAL MEDICINE

## 2024-10-11 PROCEDURE — 36415 COLL VENOUS BLD VENIPUNCTURE: CPT | Performed by: INTERNAL MEDICINE

## 2024-10-17 ENCOUNTER — OFFICE VISIT (OUTPATIENT)
Dept: CARDIOLOGY | Facility: CLINIC | Age: 84
End: 2024-10-17
Payer: MEDICARE

## 2024-10-17 VITALS
RESPIRATION RATE: 18 BRPM | BODY MASS INDEX: 27.6 KG/M2 | HEART RATE: 76 BPM | SYSTOLIC BLOOD PRESSURE: 100 MMHG | OXYGEN SATURATION: 95 % | WEIGHT: 197.19 LBS | DIASTOLIC BLOOD PRESSURE: 70 MMHG | HEIGHT: 71 IN

## 2024-10-17 DIAGNOSIS — I10 ESSENTIAL HYPERTENSION: ICD-10-CM

## 2024-10-17 DIAGNOSIS — I70.0 AORTIC ATHEROSCLEROSIS: ICD-10-CM

## 2024-10-17 DIAGNOSIS — I77.810 AORTIC ROOT DILATATION: Primary | ICD-10-CM

## 2024-10-17 DIAGNOSIS — E78.5 DYSLIPIDEMIA: ICD-10-CM

## 2024-10-17 DIAGNOSIS — I25.10 CORONARY ARTERY DISEASE INVOLVING NATIVE CORONARY ARTERY OF NATIVE HEART WITHOUT ANGINA PECTORIS: ICD-10-CM

## 2024-10-17 PROCEDURE — 1159F MED LIST DOCD IN RCRD: CPT | Mod: CPTII,S$GLB,, | Performed by: INTERNAL MEDICINE

## 2024-10-17 PROCEDURE — 99999 PR PBB SHADOW E&M-EST. PATIENT-LVL IV: CPT | Mod: PBBFAC,,, | Performed by: INTERNAL MEDICINE

## 2024-10-17 PROCEDURE — 99214 OFFICE O/P EST MOD 30 MIN: CPT | Mod: S$GLB,,, | Performed by: INTERNAL MEDICINE

## 2024-10-17 PROCEDURE — 3288F FALL RISK ASSESSMENT DOCD: CPT | Mod: CPTII,S$GLB,, | Performed by: INTERNAL MEDICINE

## 2024-10-17 PROCEDURE — 1126F AMNT PAIN NOTED NONE PRSNT: CPT | Mod: CPTII,S$GLB,, | Performed by: INTERNAL MEDICINE

## 2024-10-17 PROCEDURE — 1101F PT FALLS ASSESS-DOCD LE1/YR: CPT | Mod: CPTII,S$GLB,, | Performed by: INTERNAL MEDICINE

## 2024-10-17 PROCEDURE — 1160F RVW MEDS BY RX/DR IN RCRD: CPT | Mod: CPTII,S$GLB,, | Performed by: INTERNAL MEDICINE

## 2024-10-17 PROCEDURE — G2211 COMPLEX E/M VISIT ADD ON: HCPCS | Mod: S$GLB,,, | Performed by: INTERNAL MEDICINE

## 2024-10-17 NOTE — LETTER
October 17, 2024        Kang Reddy MD  [address]  [address]  [place] [address]             [facility]  [address]  [address]  [facility] [place] [address]  Phone: [phone]   Patient: Rboe Cevallos   MR Number: [id number]   YOB: 1940   Date of Visit: 10/17/2024       Dear Dr. Reddy:    Thank you for referring Robe Cevallos to me for evaluation. Attached you will find relevant portions of my assessment and plan of care.    If you have questions, please do not hesitate to call me. I look forward to following Robe Cevallos along with you.    Sincerely,      Sampson Ireland MD            CC  No Recipients    Enclosure

## 2024-10-17 NOTE — PROGRESS NOTES
CARDIOVASCULAR PROGRESS NOTE    REASON FOR CONSULT:   Robe Cevallos is a 84 y.o. male who presents for f/u CAD (presumed), aortic root dilatation.    PCP: Barry  Onc: Rossi  HISTORY OF PRESENT ILLNESS:   The patient returns for follow up after a greater than 1 year hiatus.  He reports generally asymptomatic status without angina, dyspnea, palpitations, or syncope.  There has been no PND, orthopnea, melena, hematuria, or claudication symptoms.    I reviewed the results of his echocardiogram from November 2023 noting mild progression of his aortic root dilatation at 4.7 cm.  I also previously personally reviewed his PET-CT from 2021 noting the ascending aorta at 3.9 cm which seems to correspond with the echocardiograms.  The maximum dilatation appears to be at the sinuses of Valsalva.    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 Jolley    CATARACT EXTRACTION W/  INTRAOCULAR LENS IMPLANT Left 04/20/2017    Dr. Jolley    CHOLECYSTECTOMY      EYE SURGERY      Rt cataract    KNEE ARTHROSCOPY      Lt knee    MEDIPORT REMOVAL N/A 4/17/2020    Procedure: REMOVAL, CATHETER, CENTRAL VENOUS, TUNNELED, WITH PORT;  Surgeon: Bandar Baker MD;  Location: [facility] CATH LAB;  Service: Radiology;  Laterality: N/A;  picc removal    TONSILLECTOMY      Tunneled PICC line Right 09/30/2019    Via IJ, 23cm  length       ALLERGIES AND MEDICATION:   Review of patient's allergies  indicates:  No Known Allergies     Medication List            Accurate as of October 17, 2024 11:27 AM. If you have any questions, ask your nurse or doctor.                CHANGE how you take these medications      FIRMAGON KIT W DILUENT SYRINGE 80 mg injection  Generic drug: degarelix  What changed: Another medication with the same name was removed. Continue taking this medication, and follow the directions you see here.  Changed by: Sampson Ireland MD            CONTINUE taking these medications      atorvastatin 20 MG tablet  Commonly known as: LIPITOR  TAKE 1 TABLET(20 MG) BY MOUTH EVERY EVENING     metoprolol succinate 25 MG 24 hr tablet  Commonly known as: TOPROL-XL  TAKE 1 TABLET(25 MG) BY MOUTH EVERY DAY     pantoprazole 40 MG tablet  Commonly known as: PROTONIX  Take 1 tablet (40 mg total) by mouth once daily. for 14 days     pyridostigmine 60 mg Tab  Commonly known as: MESTINON     tamsulosin 0.4 mg Cap  Commonly known as: FLOMAX              SOCIAL HISTORY:     Social History     Socioeconomic History    Marital status:    Tobacco Use    Smoking status: Former    Smokeless tobacco: Never   Substance and Sexual Activity    Alcohol use: No    Drug use: Never    Sexual activity: Not Currently     Partners: Female     Social Drivers of Health     Financial Resource Strain: Patient Declined (12/12/2023)    Overall Financial Resource Strain (CARDIA)     Difficulty of Paying Living Expenses: Patient declined   Food Insecurity: Unknown (12/12/2023)    Hunger Vital Sign     Ran Out of Food in the Last Year: Patient declined   Transportation Needs: Patient Declined (8/26/2023)    [facility]    PRAPARE - Transportation     Lack of Transportation (Medical): Patient declined     Lack of Transportation (Non-Medical): Patient declined   Physical Activity: Unknown (12/12/2023)    Exercise Vital Sign     Days of Exercise per Week: Patient declined   Stress: Patient Declined  (8/26/2023)    Received from [facility], [facility] - Occupational Stress Questionnaire     Feeling of Stress : Patient declined   Housing Stability: Unknown (12/12/2023)    Housing Stability Vital Sign     Unable to Pay for Housing in the Last Year: Patient refused       FAMILY HISTORY:     Family History   Problem Relation Name Age of Onset    No Known Problems Mother      Heart disease Father      No Known Problems Sister      No Known Problems Brother      No Known Problems Maternal Aunt      No Known Problems Maternal Uncle      No Known Problems Paternal Aunt      No Known Problems Paternal Uncle      No Known Problems Maternal Grandmother      No Known Problems Maternal Grandfather      No Known Problems Paternal Grandmother      No Known Problems Paternal Grandfather      Amblyopia Neg Hx      Blindness Neg Hx      Cataracts Neg Hx      Glaucoma Neg Hx      Macular degeneration Neg Hx      Retinal detachment Neg Hx      Strabismus Neg Hx      Cancer Neg Hx      Diabetes Neg Hx      Hypertension Neg Hx      Stroke Neg Hx      Thyroid disease Neg Hx         REVIEW OF SYSTEMS:   Review of Systems   Constitutional:  Negative for chills, diaphoresis and fever.   HENT:  Negative for nosebleeds.    Eyes:  Negative for blurred vision, double vision and photophobia.   Respiratory:  Negative for hemoptysis, shortness of breath and wheezing.    Cardiovascular:  Negative for chest pain, palpitations, orthopnea, claudication, leg swelling and PND.   Gastrointestinal:  Negative for abdominal pain, blood in stool, heartburn, melena, nausea and vomiting.   Genitourinary:  Negative for flank pain and hematuria.   Musculoskeletal:  Negative for falls, myalgias and neck pain.   Skin:  Negative for rash.   Neurological:  Negative for dizziness, seizures, loss of consciousness, weakness and headaches.   Endo/Heme/Allergies:  Negative for polydipsia. Does not bruise/bleed easily.  "  Psychiatric/Behavioral:  Negative for depression and memory loss. The patient is not nervous/anxious.        PHYSICAL EXAM:     Vitals:    10/17/24 1104   BP: 100/70   Pulse: 76   Resp: 18    Body mass index is 27.5 kg/m².  Weight: 89.4 kg (197 lb 3.2 oz)   Height: 5' 11" (180.3 cm)     Physical Exam  Vitals reviewed.   Constitutional:       General: He is not in acute distress.     Appearance: Normal appearance. He is well-developed. He is not ill-appearing, toxic-appearing or diaphoretic.   HENT:      Head: Normocephalic and atraumatic.   Eyes:      General: No scleral icterus.     Extraocular Movements: Extraocular movements intact.      Conjunctiva/sclera: Conjunctivae normal.      Pupils: Pupils are equal, round, and reactive to light.   Neck:      Thyroid: No thyromegaly.      Vascular: Normal carotid pulses. No carotid bruit or JVD.      Trachea: Trachea normal.   Cardiovascular:      Rate and Rhythm: Normal rate and regular rhythm.      Pulses:           Carotid pulses are 2+ on the right side and 2+ on the left side.     Heart sounds: S1 normal and S2 normal. No murmur heard.     No friction rub. No gallop.   Pulmonary:      Effort: Pulmonary effort is normal. No respiratory distress.      Breath sounds: Normal breath sounds. No stridor. No wheezing, rhonchi or rales.   Chest:      Chest wall: No tenderness.   Abdominal:      General: There is no distension.      Palpations: Abdomen is soft.   Musculoskeletal:         General: No swelling or tenderness. Normal range of motion.      Cervical back: Normal range of motion and neck supple. No edema or rigidity.      Right lower leg: No edema.      Left lower leg: No edema.   Feet:      Right foot:      Skin integrity: No ulcer.      Left foot:      Skin integrity: No ulcer.   Skin:     General: Skin is warm and dry.      Coloration: Skin is not jaundiced.      Comments: Stigmata of CVI bilat LE    Neurological:      General: No focal deficit present.      " Mental Status: He is alert and oriented to person, place, and time.      Cranial Nerves: No cranial nerve deficit.   Psychiatric:         Mood and Affect: Mood normal.         Speech: Speech normal.         Behavior: Behavior normal. Behavior is cooperative.         DATA:   EKG: (personally reviewed tracing(s))  10/16/24 SR 69, IRBBB    Laboratory:  CBC:  Recent Labs   Lab 01/25/24  0043 09/23/24  1119 10/11/24  1110   WBC 11.88 6.25 8.70   Hemoglobin 13.6 L 13.5 L 14.3   Hematocrit 41.7 42.5 44.4   Platelets 131 L 174 144 L       CHEMISTRIES:  Recent Labs   Lab 01/25/24  0043 09/23/24  1119 10/11/24  1110   Glucose 110 93 96   Sodium 137 139 139   Potassium 3.9 4.7 4.4   BUN 19 19 18   Creatinine 1.3 1.3 1.2   Calcium 9.7 9.8 9.5   Magnesium 1.8  --   --        CARDIAC BIOMARKERS:  Recent Labs   Lab 01/25/24 0043   Troponin I <0.006       COAGS:        LIPIDS/LFTS:  Recent Labs   Lab 01/26/22  0916 08/22/22  1228 10/10/22  1150 01/25/24  0043 09/23/24  1119 10/11/24  1110   Cholesterol 162 125  --   --   --   --    Triglycerides 107 142  --   --   --   --    HDL 43 41  --   --   --   --    LDL Cholesterol 97.6 55.6 L  --   --   --   --    Non-HDL Cholesterol 119 84  --   --   --   --    AST  --  20   < > 23 22 22   ALT  --  21   < > 23 23 22    < > = values in this interval not displayed.         Cardiovascular Testing:  Echo 11/10/23 (images personally reviewed and interpreted: SoV 4.7cm)    Left Ventricle: The left ventricle is normal in size. Mildly increased wall thickness. There is concentric remodeling. Normal wall motion. There is normal systolic function with a visually estimated ejection fraction of 60 - 65%. Grade I diastolic dysfunction.    Right Ventricle: Normal right ventricular cavity size. Systolic function is normal.    Aorta: Aortic root is mildly dilated measuring 3.69 cm. Ascending aorta is mildly dilated measuring 3.86 cm.    Pulmonary Artery: The estimated pulmonary artery systolic pressure  is 13 mmHg.    3/22/23 (Ao root dil stable vs report 8/22/22)  The left ventricle is normal in size with concentric hypertrophy and normal systolic function.  The estimated ejection fraction is 65%.  Indeterminate left ventricular diastolic function.  Normal right ventricular size with normal right ventricular systolic function.  Mild left atrial enlargement.  Normal central venous pressure (3 mmHg).  The estimated PA systolic pressure is 21 mmHg.  The aortic root is moderately dilated. 4.1 cm  The sinuses of Valsalva is moderately dilated. 4.5 cm    Ex stress echo 1/28/22  The left ventricle is normal in size with mild concentric hypertrophy and normal systolic function.  The estimated ejection fraction is 60%.  Normal right ventricular size with normal right ventricular systolic function.  The sinuses of Valsalva is moderately dilated, 4.5cm.  The ECG portion of this study is negative for myocardial ischemia (Hiren 6:05, 7 METS, 111% MPHR, +/94).  The stress echo portion of this study is negative for myocardial ischemia.    Aortic US 1/26/22  No evidence of AAA.  Aortic atherosclerosis.    LE venous US/reflux 1/26/22  No evidence of lower extremity DVT venous reflux bilaterally.    Carotid US 10/7/19  No sonographic evidence of hemodynamically significant stenosis of the bilateral extracranial internal carotid arteries.        ASSESSMENT:   # ao root dil, 3.7->4.5->4.7cm (echo 11/2023).  Asc Ao 3.9cm on CT 10/1/21.  # CAD, presumed (cor Ca++ on CT 10/1/21).  CARMELLA 1/2022 neg.  Asymptomatic.  # HTN, controlled  # FH AAA.  Neg aortic US 1/2022 (but Ao root dil noted on echo 1/2022)  # CKD3a  # aortic atherosclerosis (CT 10/2/21)  # dyslipidemia on atorva 20mg  # episodic LLE edema, no CVI noted on US 1/2022, but had ruptured varix in 1/2022.      PLAN:   Cont med rx  Check echo  RTC 3 months (Jan 2025).    The above documents medical care services that are part of ongoing care related to this patient's  serious/complex condition (Code ) (CAD/Ao root dil/HTN).      Sampson Ireland MD, FACC

## 2024-11-01 ENCOUNTER — HOSPITAL ENCOUNTER (OUTPATIENT)
Dept: CARDIOLOGY | Facility: HOSPITAL | Age: 84
Discharge: HOME | End: 2024-11-01
Attending: INTERNAL MEDICINE
Payer: MEDICARE

## 2024-11-01 VITALS — HEIGHT: 71 IN | WEIGHT: 196 LBS | BODY MASS INDEX: 27.44 KG/M2

## 2024-11-01 DIAGNOSIS — I77.810 AORTIC ROOT DILATATION: ICD-10-CM

## 2024-11-01 LAB
AORTIC ROOT ANNULUS: 4.25 CM
AORTIC VALVE CUSP SEPERATION: 2.11 CM
APICAL FOUR CHAMBER EJECTION FRACTION: 49 %
APICAL TWO CHAMBER EJECTION FRACTION: 55 %
ASCENDING AORTA: 4.16 CM
AV INDEX (PROSTH): 1.02
AV MEAN GRADIENT: 3.1 MMHG
AV PEAK GRADIENT: 5.8 MMHG
AV REGURGITATION PRESSURE HALF TIME: 624.78 MS
AV VALVE AREA BY VELOCITY RATIO: 4.1 CM²
AV VALVE AREA: 4.6 CM²
AV VELOCITY RATIO: 0.92
BSA FOR ECHO PROCEDURE: 2.11 M2
CV ECHO LV RWT: 0.49 CM
DOP CALC AO PEAK VEL: 1.2 M/S
DOP CALC AO VTI: 23.9 CM
DOP CALC LVOT AREA: 4.5 CM2
DOP CALC LVOT DIAMETER: 2.4 CM
DOP CALC LVOT PEAK VEL: 1.1 M/S
DOP CALC LVOT STROKE VOLUME: 109.9 CM3
DOP CALC MV VTI: 35 CM
DOP CALCLVOT PEAK VEL VTI: 24.3 CM
E WAVE DECELERATION TIME: 263.98 MSEC
E/A RATIO: 0.81
E/E' RATIO: 11.65 M/S
ECHO LV POSTERIOR WALL: 1 CM (ref 0.6–1.1)
FRACTIONAL SHORTENING: 58.5 % (ref 28–44)
INTERVENTRICULAR SEPTUM: 0.7 CM (ref 0.6–1.1)
IVC DIAMETER: 1.54 CM
LA MAJOR: 4.36 CM
LA MINOR: 5.14 CM
LA WIDTH: 4.2 CM
LEFT ATRIUM SIZE: 3.33 CM
LEFT ATRIUM VOLUME INDEX: 26.8 ML/M2
LEFT ATRIUM VOLUME: 56.09 CM3
LEFT INTERNAL DIMENSION IN SYSTOLE: 1.7 CM (ref 2.1–4)
LEFT VENTRICLE DIASTOLIC VOLUME INDEX: 35.48 ML/M2
LEFT VENTRICLE DIASTOLIC VOLUME: 74.15 ML
LEFT VENTRICLE END DIASTOLIC VOLUME APICAL 2 CHAMBER: 83.18 ML
LEFT VENTRICLE END DIASTOLIC VOLUME APICAL 4 CHAMBER: 68.01 ML
LEFT VENTRICLE MASS INDEX: 50.5 G/M2
LEFT VENTRICLE SYSTOLIC VOLUME INDEX: 4 ML/M2
LEFT VENTRICLE SYSTOLIC VOLUME: 8.4 ML
LEFT VENTRICULAR INTERNAL DIMENSION IN DIASTOLE: 4.1 CM (ref 3.5–6)
LEFT VENTRICULAR MASS: 105.6 G
LV LATERAL E/E' RATIO: 12.38 M/S
LV SEPTAL E/E' RATIO: 11 M/S
LVED V (TEICH): 74.15 ML
LVES V (TEICH): 8.4 ML
LVOT MG: 2.62 MMHG
LVOT MV: 0.77 CM/S
MV MEAN GRADIENT: 2 MMHG
MV PEAK A VEL: 1.22 M/S
MV PEAK E VEL: 0.99 M/S
MV PEAK GRADIENT: 5 MMHG
MV STENOSIS PRESSURE HALF TIME: 76.56 MS
MV VALVE AREA BY CONTINUITY EQUATION: 3.14 CM2
MV VALVE AREA P 1/2 METHOD: 2.87 CM2
OHS CV RV/LV RATIO: 0.95 CM
OHS LV EJECTION FRACTION SIMPSONS BIPLANE MOD: 52 %
PISA AR MAX VEL: 2.74 M/S
PISA TR MAX VEL: 2.58 M/S
PV PEAK GRADIENT: 5 MMHG
PV PEAK VELOCITY: 1.08 M/S
RA MAJOR: 4.47 CM
RA PRESSURE ESTIMATED: 3 MMHG
RA WIDTH: 4.2 CM
RIGHT VENTRICLE DIASTOLIC BASEL DIMENSION: 3.9 CM
RIGHT VENTRICULAR END-DIASTOLIC DIMENSION: 3.91 CM
RV TB RVSP: 6 MMHG
RV TISSUE DOPPLER FREE WALL SYSTOLIC VELOCITY 1 (APICAL 4 CHAMBER VIEW): 13.05 CM/S
SINUS: 4.7 CM
STJ: 3.66 CM
TDI LATERAL: 0.08 M/S
TDI SEPTAL: 0.09 M/S
TDI: 0.09 M/S
TR MAX PG: 27 MMHG
TRICUSPID ANNULAR PLANE SYSTOLIC EXCURSION: 2.3 CM
TV REST PULMONARY ARTERY PRESSURE: 30 MMHG
Z-SCORE OF LEFT VENTRICULAR DIMENSION IN END DIASTOLE: -4.53
Z-SCORE OF LEFT VENTRICULAR DIMENSION IN END SYSTOLE: -6.65

## 2024-11-01 PROCEDURE — 93306 TTE W/DOPPLER COMPLETE: CPT | Mod: 26,,, | Performed by: INTERNAL MEDICINE

## 2024-11-01 PROCEDURE — 93306 TTE W/DOPPLER COMPLETE: CPT

## 2025-01-14 RX ORDER — ATORVASTATIN CALCIUM 20 MG/1
TABLET, FILM COATED ORAL
Qty: 90 TABLET | Refills: 0 | OUTPATIENT
Start: 2025-01-14

## 2025-01-14 RX ORDER — ATORVASTATIN CALCIUM 20 MG/1
20 TABLET, FILM COATED ORAL NIGHTLY
Qty: 90 TABLET | Refills: 3 | Status: SHIPPED | OUTPATIENT
Start: 2025-01-14

## 2025-02-11 ENCOUNTER — OFFICE VISIT (OUTPATIENT)
Dept: NEUROLOGY | Facility: CLINIC | Age: 85
End: 2025-02-11
Payer: MEDICARE

## 2025-02-11 VITALS
HEART RATE: 70 BPM | OXYGEN SATURATION: 96 % | BODY MASS INDEX: 28.24 KG/M2 | DIASTOLIC BLOOD PRESSURE: 75 MMHG | HEIGHT: 71 IN | SYSTOLIC BLOOD PRESSURE: 130 MMHG | WEIGHT: 201.75 LBS

## 2025-02-11 DIAGNOSIS — G70.00 OCULAR MYASTHENIA GRAVIS: Primary | ICD-10-CM

## 2025-02-11 PROCEDURE — 99999 PR PBB SHADOW E&M-EST. PATIENT-LVL III: CPT | Mod: PBBFAC,,, | Performed by: PSYCHIATRY & NEUROLOGY

## 2025-02-11 RX ORDER — PREDNISONE 20 MG/1
20 TABLET ORAL
COMMUNITY
Start: 2024-12-19 | End: 2025-02-11

## 2025-02-11 RX ORDER — PREDNISONE 10 MG/1
10 TABLET ORAL DAILY
Qty: 30 TABLET | Refills: 11 | Status: SHIPPED | OUTPATIENT
Start: 2025-02-11

## 2025-02-11 NOTE — PROGRESS NOTES
Neurology Clinic Note      Date: 2/11/25  Patient Name: Robe Cevallos   MRN: [id number]   PCP: Kang Reddy  Referring Provider: Self, Aaareferral      Subjective:        Myasthenia gravis.    Interval History (2/11/25):    No double vision, no droopy eyelids.    Has problems seeing tv secondary to macular degeneratiion    Mestinon in 60 mg  4 time a day.    On 10 mg prednisone.        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 Jolley    CATARACT EXTRACTION W/  INTRAOCULAR LENS IMPLANT Left 04/20/2017    Dr. Jolley    CHOLECYSTECTOMY      EYE SURGERY      Rt cataract    KNEE ARTHROSCOPY      Lt knee    MEDIPORT REMOVAL N/A 4/17/2020    Procedure: REMOVAL, CATHETER, CENTRAL VENOUS, TUNNELED, WITH PORT;  Surgeon: Bandar Baker MD;  Location: [facility] CATH LAB;  Service: Radiology;  Laterality: N/A;  picc removal    TONSILLECTOMY      Tunneled PICC line Right 09/30/2019    Via IJ, 23cm  length       CURRENT MEDS:  Current Outpatient Medications   Medication Sig Dispense Refill    atorvastatin (LIPITOR) 20 MG tablet Take 1 tablet (20 mg total) by mouth every evening. 90 tablet 3    degarelix (FIRMAGON KIT W DILUENT SYRINGE) 80 mg injection as directed Subcutaneous      metoprolol succinate (TOPROL-XL) 25 MG 24 hr tablet TAKE 1 TABLET(25 MG) BY MOUTH EVERY DAY 90 tablet 1    predniSONE (DELTASONE) 20 MG tablet Take 20 mg by mouth.      pyridostigmine (MESTINON) 60 mg Tab Take 60 mg by mouth 3 (three) times daily as needed.       "tamsulosin (FLOMAX) 0.4 mg Cap Take 1 capsule by mouth nightly.       No current facility-administered medications for this visit.       ALLERGIES:  Review of patient's allergies indicates:  No Known Allergies    FAMILY HISTORY:  Family History   Problem Relation Name Age of Onset    No Known Problems Mother      Heart disease Father      No Known Problems Sister      No Known Problems Brother      No Known Problems Maternal Aunt      No Known Problems Maternal Uncle      No Known Problems Paternal Aunt      No Known Problems Paternal Uncle      No Known Problems Maternal Grandmother      No Known Problems Maternal Grandfather      No Known Problems Paternal Grandmother      No Known Problems Paternal Grandfather      Amblyopia Neg Hx      Blindness Neg Hx      Cataracts Neg Hx      Glaucoma Neg Hx      Macular degeneration Neg Hx      Retinal detachment Neg Hx      Strabismus Neg Hx      Cancer Neg Hx      Diabetes Neg Hx      Hypertension Neg Hx      Stroke Neg Hx      Thyroid disease Neg Hx         SOCIAL HISTORY:  Social History     Tobacco Use    Smoking status: Former    Smokeless tobacco: Never   Substance Use Topics    Alcohol use: No    Drug use: Never       Review of Systems:  12 system review of systems is negative except for the symptoms mentioned in HPI.      Objective:     Vitals:    02/11/25 1358   BP: 130/75   BP Location: Left arm   Patient Position: Sitting   Pulse: 70   SpO2: 96%   Weight: 91.5 kg (201 lb 11.5 oz)   Height: 5' 11" (1.803 m)         General: Well-developed, well-groomed. No apparent distress  Eyes: Anicteric, noninjected.  ENT: Normocephalic, atraumatic.    Respiratory: No respiratory distress.    Cardiovascular:  No carotid bruits, no murmurs  Abdomen:  Nontender  Skin: No rashes, or lesions, nodules on exposed areas    Neurological examination    Mental status:  Alert, appropriate, oriented x3.  Speech fluent with no evidence of dysarthria or aphasia.  Mood pleasant.      Cranial " nerves:  Cranial nerves 2-12 normal.no fatigue of upgaze and strong eyeclosure    Motor:  Strength and tone normal.  No tremor.      Sensory:  Sensation to pinprick diminished in legs.      Cerebellar:  Finger-nose-finger testing normal bilaterally.  Heel-shin testing normal bilaterally.      Reflexes:  Bilateral biceps jerks, brachioradialis reflexes, knee jerks, ankle jerks, 2+ and symmetric with both toes downgoing.      Gait and station:  Gait normal including posture, rate, and stride length.             Images:            Other Studies:              Assessment and Plan:   Robe Cevallos is a 85 y.o. male presenting mysthenia gravis, had minor exacerbation improved with prednisone.  I discussed options with him and he will remain on prednisone 10 mg daily.        Problem List Items Addressed This Visit    None        No follow-ups on file.        Armando Davis MD  Neurology  [facility]

## 2025-02-13 ENCOUNTER — OFFICE VISIT (OUTPATIENT)
Dept: CARDIOLOGY | Facility: CLINIC | Age: 85
End: 2025-02-13
Payer: MEDICARE

## 2025-02-13 VITALS
DIASTOLIC BLOOD PRESSURE: 64 MMHG | SYSTOLIC BLOOD PRESSURE: 116 MMHG | RESPIRATION RATE: 18 BRPM | OXYGEN SATURATION: 96 % | HEART RATE: 90 BPM | WEIGHT: 199.88 LBS | BODY MASS INDEX: 27.98 KG/M2 | HEIGHT: 71 IN

## 2025-02-13 DIAGNOSIS — I77.810 AORTIC ROOT DILATATION: Primary | ICD-10-CM

## 2025-02-13 DIAGNOSIS — E78.5 DYSLIPIDEMIA: ICD-10-CM

## 2025-02-13 DIAGNOSIS — I25.10 CORONARY ARTERY DISEASE INVOLVING NATIVE CORONARY ARTERY OF NATIVE HEART WITHOUT ANGINA PECTORIS: ICD-10-CM

## 2025-02-13 DIAGNOSIS — I10 ESSENTIAL HYPERTENSION: ICD-10-CM

## 2025-02-13 DIAGNOSIS — I70.0 AORTIC ATHEROSCLEROSIS: ICD-10-CM

## 2025-02-13 LAB
OHS QRS DURATION: 104 MS
OHS QTC CALCULATION: 430 MS

## 2025-02-13 PROCEDURE — 1101F PT FALLS ASSESS-DOCD LE1/YR: CPT | Mod: CPTII,S$GLB,, | Performed by: INTERNAL MEDICINE

## 2025-02-13 PROCEDURE — 99999 PR PBB SHADOW E&M-EST. PATIENT-LVL IV: CPT | Mod: PBBFAC,,, | Performed by: INTERNAL MEDICINE

## 2025-02-13 PROCEDURE — 1159F MED LIST DOCD IN RCRD: CPT | Mod: CPTII,S$GLB,, | Performed by: INTERNAL MEDICINE

## 2025-02-13 PROCEDURE — 99214 OFFICE O/P EST MOD 30 MIN: CPT | Mod: S$GLB,,, | Performed by: INTERNAL MEDICINE

## 2025-02-13 PROCEDURE — 1126F AMNT PAIN NOTED NONE PRSNT: CPT | Mod: CPTII,S$GLB,, | Performed by: INTERNAL MEDICINE

## 2025-02-13 PROCEDURE — 1160F RVW MEDS BY RX/DR IN RCRD: CPT | Mod: CPTII,S$GLB,, | Performed by: INTERNAL MEDICINE

## 2025-02-13 PROCEDURE — 3288F FALL RISK ASSESSMENT DOCD: CPT | Mod: CPTII,S$GLB,, | Performed by: INTERNAL MEDICINE

## 2025-02-13 PROCEDURE — G2211 COMPLEX E/M VISIT ADD ON: HCPCS | Mod: S$GLB,,, | Performed by: INTERNAL MEDICINE

## 2025-02-13 NOTE — PROGRESS NOTES
CARDIOVASCULAR PROGRESS NOTE    REASON FOR CONSULT:   Robe Cevallos is a 85 y.o. male who presents for f/u CAD (presumed), aortic root dilatation.    PCP: Barry  Onc: Rossi  HISTORY OF PRESENT ILLNESS:   The patient returns for follow up after a greater than 1 year hiatus.  He reports generally asymptomatic status without angina, dyspnea, palpitations, or syncope.  There has been no PND, orthopnea, melena, hematuria, or claudication symptoms.    His echocardiogram notes stable aortic root dimension at 4.7 cm with preserved LV function.    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 Jolley    CATARACT EXTRACTION W/  INTRAOCULAR LENS IMPLANT Left 04/20/2017    Dr. Jolley    CHOLECYSTECTOMY      EYE SURGERY      Rt cataract    KNEE ARTHROSCOPY      Lt knee    MEDIPORT REMOVAL N/A 4/17/2020    Procedure: REMOVAL, CATHETER, CENTRAL VENOUS, TUNNELED, WITH PORT;  Surgeon: Bandar Baker MD;  Location: [facility] CATH LAB;  Service: Radiology;  Laterality: N/A;  picc removal    TONSILLECTOMY      Tunneled PICC line Right 09/30/2019    Via IJ, 23cm  length       ALLERGIES AND MEDICATION:   Review of patient's allergies indicates:  No Known Allergies     Medication List            Accurate as of February 13, 2025 11:28 AM. If you have any questions, ask your nurse or doctor.                CONTINUE taking these medications      atorvastatin 20 MG tablet  Commonly  known as: LIPITOR  Take 1 tablet (20 mg total) by mouth every evening.     FIRMAGON KIT W DILUENT SYRINGE 80 mg injection  Generic drug: degarelix     metoprolol succinate 25 MG 24 hr tablet  Commonly known as: TOPROL-XL  TAKE 1 TABLET(25 MG) BY MOUTH EVERY DAY     predniSONE 10 MG tablet  Commonly known as: DELTASONE  Take 1 tablet (10 mg total) by mouth once daily.     pyridostigmine 60 mg Tab  Commonly known as: MESTINON     tamsulosin 0.4 mg Cap  Commonly known as: FLOMAX              SOCIAL HISTORY:     Social History     Socioeconomic History    Marital status:    Tobacco Use    Smoking status: Former    Smokeless tobacco: Never   Substance and Sexual Activity    Alcohol use: No    Drug use: Never    Sexual activity: Not Currently     Partners: Female     Social Drivers of Health     Financial Resource Strain: Low Risk  (2/7/2025)    Overall Financial Resource Strain (CARDIA)     Difficulty of Paying Living Expenses: Not hard at all   Food Insecurity: Patient Declined (2/7/2025)    Hunger Vital Sign     Worried About Running Out of Food in the Last Year: Patient declined     Ran Out of Food in the Last Year: Patient declined   Transportation Needs: No Transportation Needs (11/30/2024)    Received from [facility]    PRAPARE - Transportation     Lack of Transportation (Medical): No     Lack of Transportation (Non-Medical): No   Physical Activity: Unknown (2/7/2025)    Exercise Vital Sign     Days of Exercise per Week: Patient declined   Recent Concern: Physical Activity - Insufficiently Active (11/30/2024)    Received from [facility]    Exercise Vital Sign     Days of Exercise per Week: 1 day     Minutes of Exercise per Session: 10 min   Stress: Patient Declined (2/7/2025)    [employer] - Occupational Stress Questionnaire     Feeling of Stress : Patient declined   Housing Stability: Unknown (2/7/2025)    Housing Stability Vital Sign     Unable to Pay for Housing in the Last  "Year: Patient declined       FAMILY HISTORY:     Family History   Problem Relation Name Age of Onset    No Known Problems Mother      Heart disease Father      No Known Problems Sister      No Known Problems Brother      No Known Problems Maternal Aunt      No Known Problems Maternal Uncle      No Known Problems Paternal Aunt      No Known Problems Paternal Uncle      No Known Problems Maternal Grandmother      No Known Problems Maternal Grandfather      No Known Problems Paternal Grandmother      No Known Problems Paternal Grandfather      Amblyopia Neg Hx      Blindness Neg Hx      Cataracts Neg Hx      Glaucoma Neg Hx      Macular degeneration Neg Hx      Retinal detachment Neg Hx      Strabismus Neg Hx      Cancer Neg Hx      Diabetes Neg Hx      Hypertension Neg Hx      Stroke Neg Hx      Thyroid disease Neg Hx         REVIEW OF SYSTEMS:   Review of Systems   Constitutional:  Negative for chills, diaphoresis and fever.   HENT:  Negative for nosebleeds.    Eyes:  Negative for blurred vision, double vision and photophobia.   Respiratory:  Negative for hemoptysis, shortness of breath and wheezing.    Cardiovascular:  Negative for chest pain, palpitations, orthopnea, claudication, leg swelling and PND.   Gastrointestinal:  Negative for abdominal pain, blood in stool, heartburn, melena, nausea and vomiting.   Genitourinary:  Negative for flank pain and hematuria.   Musculoskeletal:  Negative for falls, myalgias and neck pain.   Skin:  Negative for rash.   Neurological:  Negative for dizziness, seizures, loss of consciousness, weakness and headaches.   Endo/Heme/Allergies:  Negative for polydipsia. Does not bruise/bleed easily.   Psychiatric/Behavioral:  Negative for depression and memory loss. The patient is not nervous/anxious.        PHYSICAL EXAM:     Vitals:    02/13/25 1119   BP: 116/64   Pulse: 90   Resp: 18    Body mass index is 27.87 kg/m².  Weight: 90.6 kg (199 lb 13.6 oz)   Height: 5' 11" (180.3 cm) "     Physical Exam  Vitals reviewed.   Constitutional:       General: He is not in acute distress.     Appearance: Normal appearance. He is well-developed. He is not ill-appearing, toxic-appearing or diaphoretic.   HENT:      Head: Normocephalic and atraumatic.   Eyes:      General: No scleral icterus.     Extraocular Movements: Extraocular movements intact.      Conjunctiva/sclera: Conjunctivae normal.      Pupils: Pupils are equal, round, and reactive to light.   Neck:      Thyroid: No thyromegaly.      Vascular: Normal carotid pulses. No carotid bruit or JVD.      Trachea: Trachea normal.   Cardiovascular:      Rate and Rhythm: Normal rate and regular rhythm.      Pulses:           Carotid pulses are 2+ on the right side and 2+ on the left side.     Heart sounds: S1 normal and S2 normal. No murmur heard.     No friction rub. No gallop.   Pulmonary:      Effort: Pulmonary effort is normal. No respiratory distress.      Breath sounds: Normal breath sounds. No stridor. No wheezing, rhonchi or rales.   Chest:      Chest wall: No tenderness.   Abdominal:      General: There is no distension.      Palpations: Abdomen is soft.   Musculoskeletal:         General: No swelling or tenderness. Normal range of motion.      Cervical back: Normal range of motion and neck supple. No edema or rigidity.      Right lower leg: No edema.      Left lower leg: No edema.   Feet:      Right foot:      Skin integrity: No ulcer.      Left foot:      Skin integrity: No ulcer.   Skin:     General: Skin is warm and dry.      Coloration: Skin is not jaundiced.      Comments: Stigmata of CVI bilat LE    Neurological:      General: No focal deficit present.      Mental Status: He is alert and oriented to person, place, and time.      Cranial Nerves: No cranial nerve deficit.   Psychiatric:         Mood and Affect: Mood normal.         Speech: Speech normal.         Behavior: Behavior normal. Behavior is cooperative.         DATA:   EKG:  (personally reviewed tracing(s))  2/13/25 SR 77, IRBBB    Laboratory:  CBC:  Recent Labs   Lab 01/25/24  0043 09/23/24  1119 10/11/24  1110   WBC 11.88 6.25 8.70   Hemoglobin 13.6 L 13.5 L 14.3   Hematocrit 41.7 42.5 44.4   Platelets 131 L 174 144 L       CHEMISTRIES:  Recent Labs   Lab 01/25/24  0043 09/23/24  1119 10/11/24  1110   Glucose 110 93 96   Sodium 137 139 139   Potassium 3.9 4.7 4.4   BUN 19 19 18   Creatinine 1.3 1.3 1.2   Calcium 9.7 9.8 9.5   Magnesium 1.8  --   --        CARDIAC BIOMARKERS:  Recent Labs   Lab 01/25/24 0043   Troponin I <0.006       COAGS:        LIPIDS/LFTS:  Recent Labs   Lab 08/22/22  1228 10/10/22  1150 01/25/24  0043 09/23/24  1119 10/11/24  1110   Cholesterol 125  --   --   --   --    Triglycerides 142  --   --   --   --    HDL 41  --   --   --   --    LDL Cholesterol 55.6 L  --   --   --   --    Non-HDL Cholesterol 84  --   --   --   --    AST 20   < > 23 22 22   ALT 21   < > 23 23 22    < > = values in this interval not displayed.         Cardiovascular Testing:  Echo 11/1/24 (Ao root dil stable vs 11/2023 echo)    Left Ventricle: The left ventricle is normal in size. Normal wall thickness. There is normal systolic function with a visually estimated ejection fraction of 55 - 60%. Grade I diastolic dysfunction.    Right Ventricle: Normal right ventricular cavity size. Systolic function is normal.    Aorta: Aortic root is moderately dilated measuring 4.7 cm. Ascending aorta is mildly dilated measuring 4.16 cm.    Pulmonary Artery: The estimated pulmonary artery systolic pressure is 30 mmHg.    Ex stress echo 1/28/22  The left ventricle is normal in size with mild concentric hypertrophy and normal systolic function.  The estimated ejection fraction is 60%.  Normal right ventricular size with normal right ventricular systolic function.  The sinuses of Valsalva is moderately dilated, 4.5cm.  The ECG portion of this study is negative for myocardial ischemia (Hiren 6:05, 7 METS,  111% MPHR, +/94).  The stress echo portion of this study is negative for myocardial ischemia.    Aortic US 1/26/22  No evidence of AAA.  Aortic atherosclerosis.    LE venous US/reflux 1/26/22  No evidence of lower extremity DVT venous reflux bilaterally.    Carotid US 10/7/19  No sonographic evidence of hemodynamically significant stenosis of the bilateral extracranial internal carotid arteries.        ASSESSMENT:   # ao root dil, 3.7->4.5->4.7cm (echo 11/2024).  Asc Ao 3.9cm on CT 10/1/21.  # CAD, presumed (cor Ca++ on CT 10/1/21).  CARMELLA 1/2022 neg.  Asymptomatic.  # HTN, controlled  # FH AAA.  Neg aortic US 1/2022 (but Ao root dil noted on echo 1/2022)  # CKD3a  # aortic atherosclerosis (CT 10/2/21)  # dyslipidemia on atorva 20mg  # episodic LLE edema, no CVI noted on US 1/2022, but had ruptured varix in 1/2022.      PLAN:   Cont med rx  RTC 6 months with surveillance echo, CT Chest as contingency (Aug 2025).    The above documents medical care services that are part of ongoing care related to this patient's serious/complex condition (Code ) (CAD/Ao root dil/HTN).      Sampson Ireland MD, FACC

## 2025-02-13 NOTE — LETTER
February 13, 2025        Kang Reddy MD  [address]  [address]  [facility] [address]             [facility]  [address]  [address]  [facility] [place] [address]  Phone: [phone]   Patient: Robe Cevallos   MR Number: [id number]   YOB: 1940   Date of Visit: 2/13/2025       Dear Dr. Reddy:    Thank you for referring Robe Cevallos to me for evaluation. Attached you will find relevant portions of my assessment and plan of care.    If you have questions, please do not hesitate to call me. I look forward to following Robe Cevallos along with you.    Sincerely,      Sampson Ireland MD            CC  No Recipients    Enclosure

## 2025-04-29 ENCOUNTER — TELEPHONE (OUTPATIENT)
Dept: OPHTHALMOLOGY | Facility: CLINIC | Age: 85
End: 2025-04-29
Payer: MEDICARE

## 2025-04-29 NOTE — TELEPHONE ENCOUNTER
Pt called stating he thought he had pink eye but it has sense resolved it self. He did not want to see optometry, he states his wife has parkinson's and he is her primary care giver an it is difficult to get her out of the house. Pt states when he is  ready he will call us.

## 2025-08-14 ENCOUNTER — TELEPHONE (OUTPATIENT)
Dept: NEUROLOGY | Facility: CLINIC | Age: 85
End: 2025-08-14
Payer: MEDICARE

## 2025-08-18 ENCOUNTER — TELEPHONE (OUTPATIENT)
Dept: NEUROLOGY | Facility: CLINIC | Age: 85
End: 2025-08-18
Payer: MEDICARE

## 2025-08-28 ENCOUNTER — TELEPHONE (OUTPATIENT)
Dept: NEUROLOGY | Facility: CLINIC | Age: 85
End: 2025-08-28
Payer: MEDICARE

## (undated) DEVICE — KNIFE ANGLE 1MM

## (undated) DEVICE — SHEILD & GARTERS FOX METAL EYE

## (undated) DEVICE — CARTRIDGE LENS D

## (undated) DEVICE — GLOVE BIOGEL ECLIPSE SZ 7.5

## (undated) DEVICE — SOL IRR STRL WATER 500ML

## (undated) DEVICE — KIT CUSTOM BASIC EYE ST / MEA

## (undated) DEVICE — KIT EYE PIC PACK WB